# Patient Record
Sex: FEMALE | Race: WHITE | NOT HISPANIC OR LATINO | Employment: OTHER | URBAN - METROPOLITAN AREA
[De-identification: names, ages, dates, MRNs, and addresses within clinical notes are randomized per-mention and may not be internally consistent; named-entity substitution may affect disease eponyms.]

---

## 2017-01-05 ENCOUNTER — HOSPITAL ENCOUNTER (OUTPATIENT)
Dept: PHYSICAL THERAPY | Facility: CLINIC | Age: 62
Setting detail: THERAPIES SERIES
End: 2017-01-05
Attending: OTOLARYNGOLOGY
Payer: COMMERCIAL

## 2017-01-05 PROCEDURE — 97110 THERAPEUTIC EXERCISES: CPT | Mod: GP | Performed by: PHYSICAL THERAPIST

## 2017-01-05 PROCEDURE — 40000718 ZZHC STATISTIC PT DEPARTMENT ORTHO VISIT: Performed by: PHYSICAL THERAPIST

## 2017-01-05 PROCEDURE — 97112 NEUROMUSCULAR REEDUCATION: CPT | Mod: GP | Performed by: PHYSICAL THERAPIST

## 2017-01-05 NOTE — DISCHARGE INSTRUCTIONS
Home Exercise Program    Continue:  Strength  1) Clamshells  2) Bridging    Balance  3) Tandem, eyes open: 30 sec holds  4) Partial tandem, eyes closed, turn head right and left x 10 with each foot in back (slowly spread feet towards tandem position as this exercise gets easier)    Add:  Balance  1)  Partial tandem, eyes closed, move head up and down x 10 with each foot in back     Habituation/balance  2) Holding 1-2# weight with both hands, place feet together and stretch arms out in front. Keeping eyes and head focused on the weight, turn your body to the right and to the left x 10 each direction. This exercise is meant to make you feel a little dizzy. Stop and rest after, let dizziness lessen, before you continue on to another exercise or any other activity.

## 2017-01-05 NOTE — IP AVS SNAPSHOT
MRN:[id number]                      After Visit Summary   1/5/2017    Yaritza Bradley    MRN: [id number]           Visit Information        Provider Department      1/5/2017  9:30 AM Sima Morillo PT [facility]          Further instructions from your care team       Home Exercise Program    Continue:  Strength  1) Clamshells  2) Bridging    Balance  3) Tandem, eyes open: 30 sec holds  4) Partial tandem, eyes closed, turn head right and left x 10 with each foot in back (slowly spread feet towards tandem position as this exercise gets easier)    Add:  Balance  1)  Partial tandem, eyes closed, move head up and down x 10 with each foot in back     Habituation/balance  2) Holding 1-2# weight with both hands, place feet together and stretch arms out in front. Keeping eyes and head focused on the weight, turn your body to the right and to the left x 10 each direction. This exercise is meant to make you feel a little dizzy. Stop and rest after, let dizziness lessen, before you continue on to another exercise or any other activity.    [employer] Information     [employer] gives you secure access to your electronic health record. If you see a primary care provider, you can also send messages to your care team and make appointments. If you have questions, please call your primary care clinic.  If you do not have a primary care provider, please call [phone] and they will assist you.        Care EveryWhere ID     This is your Care EveryWhere ID. This could be used by other organizations to access your [place] medical records  [id number]

## 2017-01-12 ENCOUNTER — HOSPITAL ENCOUNTER (OUTPATIENT)
Dept: PHYSICAL THERAPY | Facility: CLINIC | Age: 62
Setting detail: THERAPIES SERIES
End: 2017-01-12
Attending: OTOLARYNGOLOGY
Payer: COMMERCIAL

## 2017-01-12 PROCEDURE — 97112 NEUROMUSCULAR REEDUCATION: CPT | Mod: GP | Performed by: PHYSICAL THERAPIST

## 2017-01-12 PROCEDURE — 40000840 ZZHC STATISTIC PT VESTIBULAR VISIT: Performed by: PHYSICAL THERAPIST

## 2017-01-19 ENCOUNTER — HOSPITAL ENCOUNTER (OUTPATIENT)
Dept: PHYSICAL THERAPY | Facility: CLINIC | Age: 62
Setting detail: THERAPIES SERIES
End: 2017-01-19
Attending: OTOLARYNGOLOGY
Payer: COMMERCIAL

## 2017-01-19 PROCEDURE — 40000840 ZZHC STATISTIC PT VESTIBULAR VISIT: Performed by: PHYSICAL THERAPIST

## 2017-01-19 PROCEDURE — 97112 NEUROMUSCULAR REEDUCATION: CPT | Mod: GP | Performed by: PHYSICAL THERAPIST

## 2017-01-26 ENCOUNTER — HOSPITAL ENCOUNTER (OUTPATIENT)
Dept: PHYSICAL THERAPY | Facility: CLINIC | Age: 62
Setting detail: THERAPIES SERIES
End: 2017-01-26
Attending: OTOLARYNGOLOGY
Payer: COMMERCIAL

## 2017-01-26 ENCOUNTER — OFFICE VISIT (OUTPATIENT)
Dept: FAMILY MEDICINE | Facility: CLINIC | Age: 62
End: 2017-01-26
Payer: COMMERCIAL

## 2017-01-26 VITALS
BODY MASS INDEX: 20.49 KG/M2 | SYSTOLIC BLOOD PRESSURE: 110 MMHG | HEART RATE: 80 BPM | WEIGHT: 120 LBS | HEIGHT: 64 IN | DIASTOLIC BLOOD PRESSURE: 61 MMHG

## 2017-01-26 DIAGNOSIS — K21.9 GASTROESOPHAGEAL REFLUX DISEASE WITHOUT ESOPHAGITIS: Primary | ICD-10-CM

## 2017-01-26 DIAGNOSIS — R42 LIGHT HEADEDNESS: ICD-10-CM

## 2017-01-26 PROCEDURE — 40000718 ZZHC STATISTIC PT DEPARTMENT ORTHO VISIT: Performed by: PHYSICAL THERAPIST

## 2017-01-26 PROCEDURE — 97112 NEUROMUSCULAR REEDUCATION: CPT | Mod: GP | Performed by: PHYSICAL THERAPIST

## 2017-01-26 PROCEDURE — 93000 ELECTROCARDIOGRAM COMPLETE: CPT | Performed by: FAMILY MEDICINE

## 2017-01-26 PROCEDURE — 99214 OFFICE O/P EST MOD 30 MIN: CPT | Performed by: FAMILY MEDICINE

## 2017-01-26 RX ORDER — PANTOPRAZOLE SODIUM 40 MG/1
40 TABLET, DELAYED RELEASE ORAL DAILY
Qty: 30 TABLET | Refills: 1 | Status: SHIPPED | OUTPATIENT
Start: 2017-01-26 | End: 2017-11-16

## 2017-01-26 NOTE — PATIENT INSTRUCTIONS
Thank you for choosing [facility]  You may be receiving a survey in the mail from [employer] regarding your visit today.  Please take a few minutes to complete and return the survey to let us know how we are doing.      If you have questions or concerns, please contact us via [employer] or you can contact your care team at [phone].    Our Clinic hours are:  Monday 6:40 am  to 7:00 pm  Tuesday -Friday 6:40 am to 5:00 pm    The [place] outpatient lab hours are:  Monday - Friday 6:10 am to 4:45 pm  Saturdays 7:00 am to 11:00 am  Appointments are required, call [phone]    If you have clinical questions after hours or would like to schedule an appointment,  call the clinic at [phone].

## 2017-01-26 NOTE — Clinical Note
My Depression Action Plan  Name: Yaritza Bradley   Date of Birth 1955  Date: 1/26/2017    My doctor: Karla Wade   My clinic: [facility]  [address] [place] [address]  [phone]          GREEN    ZONE   Good Control    What it looks like:     Things are going generally well. You have normal up s and down s. You may even feel depressed from time to time, but bad moods usually last less than a day.   What you need to do:  1. Continue to care for yourself (see self care plan)  2. Check your depression survival kit and update it as needed  3. Follow your physician s recommendations including any medication.  4. Do not stop taking medication unless you consult with your physician first.           YELLOW         ZONE Getting Worse    What it looks like:     Depression is starting to interfere with your life.     It may be hard to get out of bed; you may be starting to isolate yourself from others.    Symptoms of depression are starting to last most all day and this has happened for several days.     You may have suicidal thoughts but they are not constant.   What you need to do:     1. Call your care team, your response to treatment will improve if you keep your care team informed of your progress. Yellow periods are signs an adjustment may need to be made.     2. Continue your self-care, even if you have to fake it!    3. Talk to someone in your support network    4. Open up your depression survival kit           RED    ZONE Medical Alert - Get Help    What it looks like:     Depression is seriously interfering with your life.     You may experience these or other symptoms: You can t get out of bed most days, can t work or engage in other necessary activities, you have trouble taking care of basic hygiene, or basic responsibilities, thoughts of suicide or death that will not go away, self-injurious behavior.     What you need to do:  1. Call your care team  and request a same-day appointment. If they are not available (weekends or after hours) call your local crisis line, emergency room or 911.      Electronically signed by: Amaya López, January 26, 2017    Depression Self Care Plan / Survival Kit    Self-Care for Depression  Here s the deal. Your body and mind are really not as separate as most people think.  What you do and think affects how you feel and how you feel influences what you do and think. This means if you do things that people who feel good do, it will help you feel better.  Sometimes this is all it takes.  There is also a place for medication and therapy depending on how severe your depression is, so be sure to consult with your medical provider and/ or Behavioral Health Consultant if your symptoms are worsening or not improving.     In order to better manage my stress, I will:    Exercise  Get some form of exercise, every day. This will help reduce pain and release endorphins, the  feel good  chemicals in your brain. This is almost as good as taking antidepressants!  This is not the same as joining a gym and then never going! (they count on that by the way ) It can be as simple as just going for a walk or doing some gardening, anything that will get you moving.      Hygiene   Maintain good hygiene (Get out of bed in the morning, Make your bed, Brush your teeth, Take a shower, and Get dressed like you were going to work, even if you are unemployed).  If your clothes don't fit try to get ones that do.    Diet  I will strive to eat foods that are good for me, drink plenty of water, and avoid excessive sugar, caffeine, alcohol, and other mood-altering substances.  Some foods that are helpful in depression are: complex carbohydrates, B vitamins, flaxseed, fish or fish oil, fresh fruits and vegetables.    Psychotherapy  I agree to participate in Individual Therapy (if recommended).    Medication  If prescribed medications, I agree to take them.  Missing  doses can result in serious side effects.  I understand that drinking alcohol, or other illicit drug use, may cause potential side effects.  I will not stop my medication abruptly without first discussing it with my provider.    Staying Connected With Others  I will stay in touch with my friends, family members, and my primary care provider/team.    Use your imagination  Be creative.  We all have a creative side; it doesn t matter if it s oil painting, sand castles, or mud pies! This will also kick up the endorphins.    Witness Beauty  (AKA stop and smell the roses) Take a look outside, even in mid-winter. Notice colors, textures. Watch the squirrels and birds.     Service to others  Be of service to others.  There is always someone else in need.  By helping others we can  get out of ourselves  and remember the really important things.  This also provides opportunities for practicing all the other parts of the program.    Humor  Laugh and be silly!  Adjust your TV habits for less news and crime-drama and more comedy.    Control your stress  Try breathing deep, massage therapy, biofeedback, and meditation. Find time to relax each day.     My support system    Clinic Contact:  Phone number:    Contact 1:  Phone number:    Contact 2:  Phone number:    Denominational/:  Phone number:    Therapist:  Phone number:    Local crisis center:    Phone number:    Other community support:  Phone number:

## 2017-01-26 NOTE — PROGRESS NOTES
"a  SUBJECTIVE:                                                    Yaritza Bradley is 61 year old female   Chief Complaint   Patient presents with     Abdominal Pain     pain for 1 month     Dizziness     intermittent dizziness     ABDOMINAL PAIN     Onset: 1 month     Description:   Character: Dull ache and Burning  Location: epigastric region  Radiation: None and Sternal area    Intensity: moderate    Progression of Symptoms:  worsening    Accompanying Signs & Symptoms:  Fever/Chills?: YES - chills  Gas/Bloating: YES  Nausea: YES  Vomitting: no   Diarrhea?: YES  Constipation:YES  Dysuria or Hematuria: no   Dizziness: YES   History:   Trauma: no   Previous similar pain: YES - \"years ago, although it wasn't quite as bad\"  Previous tests done: Upper Endoscopy    Precipitating factors:   Does the pain change with:     Food: YES     BM: no     Urination: no     Alleviating factors:  n/a    Therapies Tried and outcome: TUMS    LMP:  not applicable       Problem list and histories reviewed & adjusted, as indicated.  Additional history: working with PT on balance, not helping, very careful about salt and fat intake.  Bending over, up and feels like going to faint, does not.      Patient Active Problem List   Diagnosis     Rheumatoid arthritis (H)     Disorder of bone and cartilage     CARDIOVASCULAR SCREENING; LDL GOAL LESS THAN 160     GERD (gastroesophageal reflux disease)     Generalized anxiety disorder     Moderate recurrent major depression (H)     Memory loss or impairment     Acetabular labrum tear, right, initial encounter     Trochanteric bursitis of right hip     Femoral acetabular impingement, right     Primary osteoarthritis of right hip, end stage DJD     Light headedness     Gastroesophageal reflux disease without esophagitis     Past Surgical History   Procedure Laterality Date     Hc dilation/curettage diag/ther non ob  2001     for uterine fibroids     Rotator cuff repair rt/lt  2009     left     " "Esophagoscopy, gastroscopy, duodenoscopy (egd), combined  8/19/2011     Procedure:COMBINED ESOPHAGOSCOPY, GASTROSCOPY, DUODENOSCOPY (EGD), BIOPSY SINGLE OR MULTIPLE; Gastroscopy with biopsy; Surgeon:FARZAD GARCIA; Location:[facility]     Esophagoscopy, gastroscopy, duodenoscopy (egd), combined  7/28/2014     Procedure: COMBINED ESOPHAGOSCOPY, GASTROSCOPY, DUODENOSCOPY (EGD), BIOPSY SINGLE OR MULTIPLE;  Surgeon: Marshall Matrinez MD;  Location: [facility]     Open reduction internal fixation elbow Right 1/22/2016     right THR     Colonoscopy N/A 10/14/2016     Procedure: COLONOSCOPY;  Surgeon: Gerson Douglas MD;  Location: [facility]       Social History   Substance Use Topics     Smoking status: Never Smoker      Smokeless tobacco: Never Used     Alcohol Use: Yes      Comment: 1 wine a month hardly     Family History   Problem Relation Age of Onset     C.A.D. Father      C.A.D. Paternal Grandfather      C.A.D. Brother      Alcohol/Drug Brother      Alzheimer Disease Maternal Grandmother      Alzheimer Disease Maternal Grandfather      Alzheimer Disease Paternal Grandmother      Colon Cancer Mother          Current Outpatient Prescriptions   Medication Sig Dispense Refill     pantoprazole (PROTONIX) 40 MG EC tablet Take 1 tablet (40 mg) by mouth daily Take 30-60 minutes before a meal. 30 tablet 1     FLUoxetine (PROZAC) 40 MG capsule Take 1 capsule (40 mg) by mouth daily 90 capsule 3     Omega-3 Fatty Acids (OMEGA-3 FISH OIL PO) Take by mouth 2 times daily (with meals)       HUMIRA PEN 40 MG/0.8ML injection Inject 40 mg Subcutaneous once a week       RESTASIS 0.05 % ophthalmic emulsion Place 1 drop into both eyes 2 times daily       FOLIC ACID 1 MG PO TABS 1 TABLET DAILY       METHOTREXATE 2.5 MG OR TABS Take 3 tablets Tuesday evening and 2 tablets Wednesday morning. 0 0     SM CALCIUM SOFT CHEWS 500-100-40 OR CHEW CHEW 2-3 ORALLY DAILY  0     B-D INSULIN SYRINGE 25G X 5/8\" 1 ML MISC TO BE USED IN WEEKLY " "METHOTREXATE INJECTIONS  1     Allergies   Allergen Reactions     Nka [No Known Allergies]      Recent Labs   Lab Test 12/08/16  10/11/16   0928 06/16/16 04/26/16   1054  04/20/15   1142   05/27/14   1501   07/31/10   0845   12/07/09   1533   08/27/09   1220   LDL   --   86   --    --    --    --    --    --   129   --    --    --   117   HDL   --   68   --    --    --    --    --    --   63   --    --    --   82   TRIG   --   77   --    --    --    --    --    --   75   --    --    --   98   ALT  14   --   13   --   28   < >  45   < >   --    < >  26   < >  31   CR  0.570   --   0.6  0.58  0.64   < >  0.68   < >   --    < >  0.76   < >  0.73   GFRESTIMATED  114.2   --   108.0  >90  Non  GFR Calc    >90  Non  GFR Calc     < >  89   < >   --    < >  79   < >  83   GFRESTBLACK   --    --    --   >90   GFR Calc    >90   GFR Calc     < >  >90   < >   --    < >  >90   < >  >90   POTASSIUM   --    --    --   4.3   --    --   3.8   --    --    --   3.9   --    --    TSH   --    --    --    --    --    --   1.60   --    --    --   1.30   --    --     < > = values in this interval not displayed.      BP Readings from Last 3 Encounters:   01/26/17 110/61   10/14/16 121/72   09/22/16 137/67    Wt Readings from Last 3 Encounters:   01/26/17 120 lb (54.432 kg)   10/14/16 121 lb (54.885 kg)   09/22/16 121 lb (54.885 kg)         ROS:  Constitutional, HEENT, cardiovascular, pulmonary, gi and gu systems are negative, except as otherwise noted.    OBJECTIVE:                                                    /61 mmHg  Pulse 80  Ht 5' 4\" (1.626 m)  Wt 120 lb (54.432 kg)  BMI 20.59 kg/m2  GENERAL APPEARANCE ADULT: Alert, no acute distress, thin, no distress, tired appearing  HENT: right TM normal, left TM normal, throat/mouth:normal, mucous membranes moist  RESP: lungs clear to auscultation   CV: normal rate, regular rhythm, no murmur or gallop  ABDOMEN: soft, " no organomegaly, masses,  Mid-epigastric and general tenderness, most pronounced mid-epigastric.  Diagnostic Test Results:  none     ASSESSMENT/PLAN:                                                    1. Gastroesophageal reflux disease without esophagitis  Trial of proton pump inhibition, if not effective, gastroscopy.  Information on GI care/GERD printed  - pantoprazole (PROTONIX) 40 MG EC tablet; Take 1 tablet (40 mg) by mouth daily Take 30-60 minutes before a meal.  Dispense: 30 tablet; Refill: 1  - EKG 12-lead complete w/read - Clinics    2. Light headedness  Low blood pressure most likely cause, will increase oncotic rich liquids and recheck if not better.      Karla Wade MD  [facility]

## 2017-01-26 NOTE — MR AVS SNAPSHOT
After Visit Summary   1/26/2017    Yaritza Bradley    MRN: [id number]           Patient Information     Date Of Birth          1955        Visit Information        Provider Department      1/26/2017 8:20 AM Karla Wade MD [facility]        Today's Diagnoses     Gastroesophageal reflux disease without esophagitis    -  1       Care Instructions          Thank you for choosing [facility].  You may be receiving a survey in the mail from [facility] regarding your visit today.  Please take a few minutes to complete and return the survey to let us know how we are doing.      If you have questions or concerns, please contact us via [employer] or you can contact your care team at [phone].    Our Clinic hours are:  Monday 6:40 am  to 7:00 pm  Tuesday -Friday 6:40 am to 5:00 pm    The [place] outpatient lab hours are:  Monday - Friday 6:10 am to 4:45 pm  Saturdays 7:00 am to 11:00 am  Appointments are required, call [phone]    If you have clinical questions after hours or would like to schedule an appointment,  call the clinic at [phone].          Follow-ups after your visit        Your next 10 appointments already scheduled     Jan 26, 2017 10:00 AM   Treatment with Sima Morillo PT   [facility] ([facility])    [address] [address] [phone]              Who to contact     If you have questions or need follow up information about today's clinic visit or your schedule please contact [facility] directly at [phone].  Normal or non-critical lab and imaging results will be communicated to you by MyChart, letter or phone within 4 business days after the clinic has received the results. If you do not hear from us within 7 days, please contact the clinic through MyChart or phone. If you have a critical or abnormal lab result, we will notify you by phone as soon as  "possible.  Submit refill requests through [employer] or call your pharmacy and they will forward the refill request to us. Please allow 3 business days for your refill to be completed.          Additional Information About Your Visit        [employer] Information     [employer] gives you secure access to your electronic health record. If you see a primary care provider, you can also send messages to your care team and make appointments. If you have questions, please call your primary care clinic.  If you do not have a primary care provider, please call [phone] and they will assist you.        Care EveryWhere ID     This is your Care EveryWhere ID. This could be used by other organizations to access your [place] medical records  [id number]        Your Vitals Were     Pulse Height BMI (Body Mass Index)             80 5' 4\" (1.626 m) 20.59 kg/m2          Blood Pressure from Last 3 Encounters:   01/26/17 110/61   10/14/16 121/72   09/22/16 137/67    Weight from Last 3 Encounters:   01/26/17 120 lb (54.432 kg)   10/14/16 121 lb (54.885 kg)   09/22/16 121 lb (54.885 kg)              We Performed the Following     DEPRESSION ACTION PLAN (DAP)     EKG 12-lead complete w/read - Clinics          Today's Medication Changes          These changes are accurate as of: 1/26/17  8:46 AM.  If you have any questions, ask your nurse or doctor.               Start taking these medicines.        Dose/Directions    pantoprazole 40 MG EC tablet   Commonly known as:  PROTONIX   Used for:  Gastroesophageal reflux disease without esophagitis   Started by:  Karla Wade MD        Dose:  40 mg   Take 1 tablet (40 mg) by mouth daily Take 30-60 minutes before a meal.   Quantity:  30 tablet   Refills:  1            Where to get your medicines      These medications were sent to [id number] - [place] [address] AT [place] [address] [facility] [address]     Phone:  " "[phone]    - pantoprazole 40 MG EC tablet             Primary Care Provider Office Phone # Fax #    Karla Maral Wade [phone]       [facility] [address] [facility] [facility] [address]        Thank you!     Thank you for choosing [facility]  for your care. Our goal is always to provide you with excellent care. Hearing back from our patients is one way we can continue to improve our services. Please take a few minutes to complete the written survey that you may receive in the mail after your visit with us. Thank you!             Your Updated Medication List - Protect others around you: Learn how to safely use, store and throw away your medicines at www.disposemymeds.org.          This list is accurate as of: 1/26/17  8:46 AM.  Always use your most recent med list.                   Brand Name Dispense Instructions for use    B-D INSULIN SYRINGE 25G X 5/8\" 1 ML Misc   Generic drug:  Insulin Syringe-Needle U-100      TO BE USED IN WEEKLY METHOTREXATE INJECTIONS       FLUoxetine 40 MG capsule    PROzac    90 capsule    Take 1 capsule (40 mg) by mouth daily       folic acid 1 MG tablet    FOLVITE     1 TABLET DAILY       HUMIRA PEN 40 MG/0.8ML injection   Generic drug:  adalimumab      Inject 40 mg Subcutaneous once a week       methotrexate 2.5 MG tablet CHEMO     0    Take 3 tablets Tuesday evening and 2 tablets Wednesday morning.       OMEGA-3 FISH OIL PO      Take by mouth 2 times daily (with meals)       pantoprazole 40 MG EC tablet    PROTONIX    30 tablet    Take 1 tablet (40 mg) by mouth daily Take 30-60 minutes before a meal.       RESTASIS 0.05 % ophthalmic emulsion   Generic drug:  cycloSPORINE      Place 1 drop into both eyes 2 times daily       SM CALCIUM SOFT CHEWS 500-100-40 MG-UNT-MCG Chew   Generic drug:  Calcium-Vitamin D-Vitamin K      CHEW 2-3 ORALLY DAILY         "

## 2017-01-26 NOTE — NURSING NOTE
"Chief Complaint   Patient presents with     Abdominal Pain     pain for 1 month     Dizziness     intermittent dizziness       Initial /61 mmHg  Pulse 80  Ht 5' 4\" (1.626 m)  Wt 120 lb (54.432 kg)  BMI 20.59 kg/m2 Estimated body mass index is 20.59 kg/(m^2) as calculated from the following:    Height as of this encounter: 5' 4\" (1.626 m).    Weight as of this encounter: 120 lb (54.432 kg).  BP completed using cuff size: regular  "

## 2017-02-09 ENCOUNTER — HOSPITAL ENCOUNTER (OUTPATIENT)
Dept: PHYSICAL THERAPY | Facility: CLINIC | Age: 62
Setting detail: THERAPIES SERIES
End: 2017-02-09
Attending: OTOLARYNGOLOGY
Payer: COMMERCIAL

## 2017-02-09 DIAGNOSIS — Z79.899 LONG TERM USE OF DRUG: ICD-10-CM

## 2017-02-09 DIAGNOSIS — M06.9 RA (RHEUMATOID ARTHRITIS) (H): Primary | ICD-10-CM

## 2017-02-09 LAB
ALBUMIN SERPL-MCNC: 3.8 G/DL (ref 3.4–5)
ALT SERPL W P-5'-P-CCNC: 22 U/L (ref 0–50)
AST SERPL W P-5'-P-CCNC: 23 U/L (ref 0–45)
BASOPHILS # BLD AUTO: 0.1 10E9/L (ref 0–0.2)
BASOPHILS NFR BLD AUTO: 0.9 %
CREAT SERPL-MCNC: 0.73 MG/DL (ref 0.52–1.04)
DIFFERENTIAL METHOD BLD: NORMAL
EOSINOPHIL # BLD AUTO: 0.1 10E9/L (ref 0–0.7)
EOSINOPHIL NFR BLD AUTO: 1.4 %
ERYTHROCYTE [DISTWIDTH] IN BLOOD BY AUTOMATED COUNT: 13.4 % (ref 10–15)
GFR SERPL CREATININE-BSD FRML MDRD: 81 ML/MIN/1.7M2
HCT VFR BLD AUTO: 39.5 % (ref 35–47)
HGB BLD-MCNC: 13.8 G/DL (ref 11.7–15.7)
LYMPHOCYTES # BLD AUTO: 2 10E9/L (ref 0.8–5.3)
LYMPHOCYTES NFR BLD AUTO: 34.1 %
MCH RBC QN AUTO: 30.9 PG (ref 26.5–33)
MCHC RBC AUTO-ENTMCNC: 34.9 G/DL (ref 31.5–36.5)
MCV RBC AUTO: 89 FL (ref 78–100)
MONOCYTES # BLD AUTO: 0.7 10E9/L (ref 0–1.3)
MONOCYTES NFR BLD AUTO: 12.3 %
NEUTROPHILS # BLD AUTO: 3 10E9/L (ref 1.6–8.3)
NEUTROPHILS NFR BLD AUTO: 51.3 %
PLATELET # BLD AUTO: 227 10E9/L (ref 150–450)
RBC # BLD AUTO: 4.46 10E12/L (ref 3.8–5.2)
WBC # BLD AUTO: 5.9 10E9/L (ref 4–11)

## 2017-02-09 PROCEDURE — 82565 ASSAY OF CREATININE: CPT | Performed by: FAMILY MEDICINE

## 2017-02-09 PROCEDURE — 36415 COLL VENOUS BLD VENIPUNCTURE: CPT | Performed by: FAMILY MEDICINE

## 2017-02-09 PROCEDURE — 84460 ALANINE AMINO (ALT) (SGPT): CPT | Performed by: FAMILY MEDICINE

## 2017-02-09 PROCEDURE — 97112 NEUROMUSCULAR REEDUCATION: CPT | Mod: GP | Performed by: PHYSICAL THERAPIST

## 2017-02-09 PROCEDURE — 84450 TRANSFERASE (AST) (SGOT): CPT | Performed by: FAMILY MEDICINE

## 2017-02-09 PROCEDURE — 85025 COMPLETE CBC W/AUTO DIFF WBC: CPT | Performed by: FAMILY MEDICINE

## 2017-02-09 PROCEDURE — 40000840 ZZHC STATISTIC PT VESTIBULAR VISIT: Performed by: PHYSICAL THERAPIST

## 2017-02-09 PROCEDURE — 82040 ASSAY OF SERUM ALBUMIN: CPT | Performed by: FAMILY MEDICINE

## 2017-02-09 NOTE — PROGRESS NOTES
PHYSICAL THERAPY PROGRESS NOTE  02/09/17 0900   Signing Clinician's Name / Credentials   Signing clinician's name / credentials Sima Morillo, PT, DPT, OCS   Session Number   Session Number 7 PO   Progress Note/Recertification   Progress Note Due Date 03/09/17   Progress Note Completed Date 02/09/17   Adult Goals   PT Eval Goals 1;2;3;4   Goal 1   Goal Identifier 1   Goal Description Patient will be able to single leg stand for 10 seconds on each leg to improve safety with navigating curbs and steps.    Target Date 03/09/17   Date Met (partially met - R 9 sec L 12 sec)   Goal 2   Goal Identifier 2   Goal Description Patient will be able to walk and turn head without gait impairments.    Target Date 03/09/17   Date Met (moderate gait impairments demonstrated - see objective)   Goal 3   Goal Identifier 3   Goal Description Patient will achieve 30 seconds on condition 5 of the CTSIB to improve safety in dimly lit environments.    Target Date 03/09/17   Date Met (19 seconds)   Goal 4   Goal Identifier 4   Goal Description Patient will be independent and compliant with HEP to aid functional recovery.    Target Date 03/09/17   Date Met (semi-compliant)   Subjective Report   Subjective Report Patient reports she had to miss last visit due to a flare up in her arm. States that the exercises are going ok. Hasn't been as good as she should about doing them.     Objective Measures   Objective Measures Objective Measure 1;Objective Measure 2;Objective Measure 3;Objective Measure 4   Objective Measure 1   Objective Measure SLS   Details R 9 sec L 12 sec   Objective Measure 2   Objective Measure Tandem    Details R 34 sec L 60 sec   Objective Measure 3   Objective Measure FT on foam, EC   Details 19 sec   Objective Measure 4   Objective Measure Gait with head turns horizontal demonstrated path devaiation, staggering and at times foot crosses midline. Gait with head turns verticle demonstrated mild slowing of gait and  occasional copensatory stutter steps   Treatment Interventions   Interventions Neuromuscular Re-education;Therapeutic Procedure/Exercise   Therapeutic Procedure/exercise   Minutes 5   Skilled Intervention HEP instruction, strength   Patient Response no pain, cuing to lift each foot not drag, cuing to keep feet apart while stepping   Treatment Detail Bike level 4 x 5 min,   Neuromuscular Re-education   Minutes 25   Skilled Intervention balance   Patient Response cuing for WS with SLS activities, occasional Min A from therapist with balance ex, occasional UE assist with balance ex,   Treatment Detail SLS marching 3' holds x 20 B, tandem EO B, FT on foam EC, FT on foam EO with 2# med ball horizontal turns, gait with head turns horizontal and verticle    Assessments Completed   Assessments Completed Patient has demonstrated mild improvement in goals. Needs to be more compliant with home program   Plan   Home program added gait with head turns horizontal and verticle     Updates to plan of care 1x every other week for 4 weeks   Plan for next session balance, strength, habituation   Total Session Time   Total Session Time 30, nmr2   AMBULATORY CLINICS ONLY-MEDICAL AND TREATMENT DIAGNOSIS   Medical Diagnosis disequilibrium   PT Diagnosis balance impairments, motion sensitivity       Please contact me with any questions or concerns.  Thank you for your referral.    Sima Morillo, PT, DPT, OCS  Physical Therapist, Orthopedic Certified Specialist  [facility]  [phone]

## 2017-05-09 ENCOUNTER — TRANSFERRED RECORDS (OUTPATIENT)
Dept: HEALTH INFORMATION MANAGEMENT | Facility: CLINIC | Age: 62
End: 2017-05-09

## 2017-06-08 ENCOUNTER — TRANSFERRED RECORDS (OUTPATIENT)
Dept: HEALTH INFORMATION MANAGEMENT | Facility: CLINIC | Age: 62
End: 2017-06-08

## 2017-06-30 NOTE — PROGRESS NOTES
Patient did not return for follow up treatments as directed.  Goal status and current objective information is therefore unknown.  The daily note from the patient's last visit will serve as the discharge note. Discharge from PT services at this time for this episode of treatment. Please see attached documentation under this episode of care for further information including dates of service, start of care date, referring physician, Dx, treatment plan, treatments, etc.    Please contact me with any questions or concerns.  Thank you for your referral.    Sima Morillo PT, GUTIERREZT, OCS  Physical Therapist, Orthopedic Certified Specialist  [facility]  [phone]       PHYSICAL THERAPY PROGRESS NOTE  02/09/17 0900   Signing Clinician's Name / Credentials   Signing clinician's name / credentials Sima Morillo PT, FEROZ, OCS   Session Number   Session Number 7 PO   Progress Note/Recertification   Progress Note Due Date 03/09/17   Progress Note Completed Date 02/09/17   Adult Goals   PT Eval Goals 1;2;3;4   Goal 1   Goal Identifier 1   Goal Description Patient will be able to single leg stand for 10 seconds on each leg to improve safety with navigating curbs and steps.    Target Date 03/09/17   Date Met (partially met - R 9 sec L 12 sec)   Goal 2   Goal Identifier 2   Goal Description Patient will be able to walk and turn head without gait impairments.    Target Date 03/09/17   Date Met (moderate gait impairments demonstrated - see objective)   Goal 3   Goal Identifier 3   Goal Description Patient will achieve 30 seconds on condition 5 of the CTSIB to improve safety in dimly lit environments.    Target Date 03/09/17   Date Met (19 seconds)   Goal 4   Goal Identifier 4   Goal Description Patient will be independent and compliant with HEP to aid functional recovery.    Target Date 03/09/17   Date Met (semi-compliant)   Subjective Report   Subjective Report Patient reports she had to  miss last visit due to a flare up in her arm. States that the exercises are going ok. Hasn't been as good as she should about doing them.     Objective Measures   Objective Measures Objective Measure 1;Objective Measure 2;Objective Measure 3;Objective Measure 4   Objective Measure 1   Objective Measure SLS   Details R 9 sec L 12 sec   Objective Measure 2   Objective Measure Tandem    Details R 34 sec L 60 sec   Objective Measure 3   Objective Measure FT on foam, EC   Details 19 sec   Objective Measure 4   Objective Measure Gait with head turns horizontal demonstrated path devaiation, staggering and at times foot crosses midline. Gait with head turns verticle demonstrated mild slowing of gait and occasional copensatory stutter steps   Treatment Interventions   Interventions Neuromuscular Re-education;Therapeutic Procedure/Exercise   Therapeutic Procedure/exercise   Minutes 5   Skilled Intervention HEP instruction, strength   Patient Response no pain, cuing to lift each foot not drag, cuing to keep feet apart while stepping   Treatment Detail Bike level 4 x 5 min,   Neuromuscular Re-education   Minutes 25   Skilled Intervention balance   Patient Response cuing for WS with SLS activities, occasional Min A from therapist with balance ex, occasional UE assist with balance ex,   Treatment Detail SLS marching 3' holds x 20 B, tandem EO B, FT on foam EC, FT on foam EO with 2# med ball horizontal turns, gait with head turns horizontal and verticle    Assessments Completed   Assessments Completed Patient has demonstrated mild improvement in goals. Needs to be more compliant with home program   Plan   Home program added gait with head turns horizontal and verticle     Updates to plan of care 1x every other week for 4 weeks   Plan for next session balance, strength, habituation   Total Session Time   Total Session Time 30, nmr2   AMBULATORY CLINICS ONLY-MEDICAL AND TREATMENT DIAGNOSIS   Medical Diagnosis disequilibrium   PT  Diagnosis balance impairments, motion sensitivity

## 2017-06-30 NOTE — ADDENDUM NOTE
Encounter addended by: Sima Morillo, PT on: 6/30/2017  1:37 PM<BR>     Actions taken: Sign clinical note, Flowsheet accepted, Episode resolved

## 2017-07-14 ENCOUNTER — OFFICE VISIT (OUTPATIENT)
Dept: FAMILY MEDICINE | Facility: CLINIC | Age: 62
End: 2017-07-14
Payer: COMMERCIAL

## 2017-07-14 VITALS
TEMPERATURE: 99 F | HEIGHT: 64 IN | BODY MASS INDEX: 20.79 KG/M2 | DIASTOLIC BLOOD PRESSURE: 65 MMHG | SYSTOLIC BLOOD PRESSURE: 122 MMHG | WEIGHT: 121.8 LBS | HEART RATE: 91 BPM

## 2017-07-14 DIAGNOSIS — H25.9 AGE-RELATED CATARACT OF BOTH EYES, UNSPECIFIED AGE-RELATED CATARACT TYPE: ICD-10-CM

## 2017-07-14 DIAGNOSIS — M05.79 RHEUMATOID ARTHRITIS INVOLVING MULTIPLE SITES WITH POSITIVE RHEUMATOID FACTOR (H): ICD-10-CM

## 2017-07-14 DIAGNOSIS — Z01.818 PREOP GENERAL PHYSICAL EXAM: Primary | ICD-10-CM

## 2017-07-14 DIAGNOSIS — F33.1 MODERATE RECURRENT MAJOR DEPRESSION (H): ICD-10-CM

## 2017-07-14 PROBLEM — Z98.890 POST-OPERATIVE NAUSEA AND VOMITING: Status: ACTIVE | Noted: 2017-07-14

## 2017-07-14 PROBLEM — R11.2 POST-OPERATIVE NAUSEA AND VOMITING: Status: ACTIVE | Noted: 2017-07-14

## 2017-07-14 PROCEDURE — 99214 OFFICE O/P EST MOD 30 MIN: CPT | Performed by: FAMILY MEDICINE

## 2017-07-14 ASSESSMENT — ANXIETY QUESTIONNAIRES
2. NOT BEING ABLE TO STOP OR CONTROL WORRYING: SEVERAL DAYS
3. WORRYING TOO MUCH ABOUT DIFFERENT THINGS: SEVERAL DAYS
GAD7 TOTAL SCORE: 8
5. BEING SO RESTLESS THAT IT IS HARD TO SIT STILL: SEVERAL DAYS
1. FEELING NERVOUS, ANXIOUS, OR ON EDGE: SEVERAL DAYS
7. FEELING AFRAID AS IF SOMETHING AWFUL MIGHT HAPPEN: SEVERAL DAYS
IF YOU CHECKED OFF ANY PROBLEMS ON THIS QUESTIONNAIRE, HOW DIFFICULT HAVE THESE PROBLEMS MADE IT FOR YOU TO DO YOUR WORK, TAKE CARE OF THINGS AT HOME, OR GET ALONG WITH OTHER PEOPLE: SOMEWHAT DIFFICULT
6. BECOMING EASILY ANNOYED OR IRRITABLE: SEVERAL DAYS

## 2017-07-14 ASSESSMENT — PATIENT HEALTH QUESTIONNAIRE - PHQ9: 5. POOR APPETITE OR OVEREATING: MORE THAN HALF THE DAYS

## 2017-07-14 NOTE — PROGRESS NOTES
[facility]  [address] [place] [address]  [phone]  Dept: [phone]    PRE-OP EVALUATION:  Today's date: 2017    Yaritza Bradley (: 1955) presents for pre-operative evaluation assessment as requested by Dr. Singleton.  She requires evaluation and anesthesia risk assessment prior to undergoing surgery/procedure for treatment of Both Eyes Cataracts.  Proposed procedure: Phacoemulsification/Interaocular Lens    Date of Surgery/ Procedure: 17 and 17  Time of Surgery/ Procedure: 11 am   Hospital/Surgical Facility: [facility]  Fax number for surgical facility: [phone]  Primary Physician: Karla Wade  Type of Anesthesia Anticipated: Local with MAC    Patient has a Health Care Directive or Living Will:  YES home    1. NO - Do you have a history of heart attack, stroke, stent, bypass or surgery on an artery in the head, neck, heart or legs?  2. NO - Do you ever have any pain or discomfort in your chest?  3. NO - Do you have a history of  Heart Failure?  4. YES - ARE YOUR TROUBLED BY SHORTNESS OF BREATH WHEN WALKING ON THE LEVEL, UP A SLIGHT HILL OR AT NIGHT? Unsure of cause, notices when swims  5. YES - DO YOU CURRENTLY HAVE A COLD, BRONCHITIS OR OTHER RESPIRATORY INFECTION? Cough   6. NO - Do you have a cough, shortness of breath or wheezing?  7. NO - Do you sometimes get pains in the calves of your legs when you walk?  8. NO - Do you or anyone in your family have previous history of blood clots?  9. NO - Do you or does anyone in your family have a serious bleeding problem such as prolonged bleeding following surgeries or cuts?  10. NO - Have you ever had problems with anemia or been told to take iron pills?  11. NO - Have you had any abnormal blood loss such as black, tarry or bloody stools, or abnormal vaginal bleeding?  12. NO - Have you ever had a blood transfusion?  13. NO - Have you or any of your relatives ever had problems with anesthesia?  14.  NO - Do you have sleep apnea, excessive snoring or daytime drowsiness?  15. NO - Do you have any prosthetic heart valves?  16. yes - Do you have prosthetic joints? Right hip and Right elbow  17. NO - Is there any chance that you may be pregnant?      HPI:                                                      Brief HPI related to upcoming procedure: Yaritza Bradley is 61 year old white female with cataracts, GERD, anxiety, rheumatoid arthritis on immunosuppressive medications who is here to get clearance to have general anesthesia.        See problem list for active medical problems.  Problems all longstanding and stable, except as noted/documented.  See ROS for pertinent symptoms related to these conditions.                                                                                                  .    MEDICAL HISTORY:                                                      Patient Active Problem List    Diagnosis Date Noted     Light headedness 01/26/2017     Priority: Medium     Gastroesophageal reflux disease without esophagitis 01/26/2017     Priority: Medium     Acetabular labrum tear, right, initial encounter 04/26/2016     Priority: Medium     Trochanteric bursitis of right hip 04/26/2016     Priority: Medium     Femoral acetabular impingement, right 04/26/2016     Priority: Medium     Primary osteoarthritis of right hip, end stage DJD 04/26/2016     Priority: Medium     Memory loss or impairment 07/07/2014     Priority: Medium     Moderate recurrent major depression (H) 07/07/2012     Priority: Medium     Generalized anxiety disorder 04/10/2012     Priority: Medium     Diagnosis updated by automated process. Provider to review and confirm.       GERD (gastroesophageal reflux disease) 03/17/2011     Priority: Medium     CARDIOVASCULAR SCREENING; LDL GOAL LESS THAN 160 10/31/2010     Priority: Medium     Disorder of bone and cartilage 02/20/2007     Priority: Medium     H/o osteopenia; previously on  Actonel           Rheumatoid arthritis (H) 04/26/2006     Priority: Medium     Celine Moreno  Problem list name updated by automated process. Provider to review        Past Medical History:   Diagnosis Date     Acute sinusitis, unspecified     migraines and allergies     Hemorrhage of rectum and anus 2002     Myalgia and myositis, unspecified     Fibromyalgia     Osteoarthritis      Rheumatoid arthritis(714.0) 1996     Dr. Traylor at [facility]     Past Surgical History:   Procedure Laterality Date     COLONOSCOPY N/A 10/14/2016    Procedure: COLONOSCOPY;  Surgeon: Gerson Douglas MD;  Location: [facility]     ESOPHAGOSCOPY, GASTROSCOPY, DUODENOSCOPY (EGD), COMBINED  8/19/2011    Procedure:COMBINED ESOPHAGOSCOPY, GASTROSCOPY, DUODENOSCOPY (EGD), BIOPSY SINGLE OR MULTIPLE; Gastroscopy with biopsy; Surgeon:FARZAD GARCIA; Location:[facility]     ESOPHAGOSCOPY, GASTROSCOPY, DUODENOSCOPY (EGD), COMBINED  7/28/2014    Procedure: COMBINED ESOPHAGOSCOPY, GASTROSCOPY, DUODENOSCOPY (EGD), BIOPSY SINGLE OR MULTIPLE;  Surgeon: Marshall Martinez MD;  Location: [facility]     HC DILATION/CURETTAGE DIAG/THER NON OB  2001    for uterine fibroids     OPEN REDUCTION INTERNAL FIXATION ELBOW Right 1/22/2016    right THR     ROTATOR CUFF REPAIR RT/LT  2009    left     Current Outpatient Prescriptions   Medication Sig Dispense Refill     FLUoxetine (PROZAC) 40 MG capsule Take 1 capsule (40 mg) by mouth daily 90 capsule 3     Omega-3 Fatty Acids (OMEGA-3 FISH OIL PO) Take by mouth 2 times daily (with meals)       HUMIRA PEN 40 MG/0.8ML injection Inject 40 mg Subcutaneous once a week       RESTASIS 0.05 % ophthalmic emulsion Place 1 drop into both eyes 2 times daily       FOLIC ACID 1 MG PO TABS 1 TABLET DAILY       METHOTREXATE 2.5 MG OR TABS Take 3 tablets Tuesday evening and 2 tablets Wednesday morning. 0 0     SM CALCIUM SOFT CHEWS 500-100-40 OR CHEW CHEW 2-3 ORALLY DAILY  0     pantoprazole (PROTONIX) 40 MG EC tablet  "Take 1 tablet (40 mg) by mouth daily Take 30-60 minutes before a meal. (Patient not taking: Reported on 7/14/2017) 30 tablet 1     B-D INSULIN SYRINGE 25G X 5/8\" 1 ML MISC TO BE USED IN WEEKLY METHOTREXATE INJECTIONS  1     OTC products: None, except as noted above    Allergies   Allergen Reactions     Nka [No Known Allergies]       Latex Allergy: NO    Social History   Substance Use Topics     Smoking status: Never Smoker     Smokeless tobacco: Never Used     Alcohol use Yes      Comment: 1 wine a month hardly     History   Drug Use No       REVIEW OF SYSTEMS:                                                    Constitutional, HEENT, cardiovascular, pulmonary, gi and gu systems are negative, except as otherwise noted.    EXAM:                                                    /65  Pulse 91  Temp 99  F (37.2  C) (Tympanic)  Ht 5' 4\" (1.626 m)  Wt 121 lb 12.8 oz (55.2 kg)  BMI 20.91 kg/m2    GENERAL APPEARANCE: healthy, alert and no distress     EYES: EOMI, PERRL     HENT: ear canals and TM's normal and nose and mouth without ulcers or lesions     NECK: no adenopathy, no asymmetry, masses, or scars and thyroid normal to palpation     RESP: lungs clear to auscultation - no rales, rhonchi or wheezes     CV: regular rates and rhythm, normal S1 S2, no S3 or S4 and no murmur, click or rub     ABDOMEN:  soft, nontender, no HSM or masses and bowel sounds normal     MS: extremities normal- no gross deformities noted, no evidence of inflammation in joints, FROM in all extremities.     SKIN: no suspicious lesions or rashes     NEURO: Normal strength and tone, sensory exam grossly normal, mentation intact and speech normal     PSYCH: mentation appears normal. and affect normal/bright     LYMPHATICS: No axillary, cervical, or supraclavicular nodes    DIAGNOSTICS:                                                    No labs or EKG required for low risk surgery (cataract, skin procedure, breast biopsy, etc)    Recent Labs "   Lab Test  02/09/17   1158 12/08/16  10/11/16   0928   04/26/16   1054   05/27/14   1501   HGB  13.8   --   13.5   < >  13.4   < >  13.3   PLT  227   --   196   < >  178   < >  146*   NA   --    --    --    --   138   --   136   POTASSIUM   --    --    --    --   4.3   --   3.8   CR  0.73  0.570   --    < >  0.58   < >  0.68    < > = values in this interval not displayed.        IMPRESSION:                                                    Reason for surgery/procedure:   1. Preop general physical exam  2. Age-related cataract of both eyes, unspecified age-related cataract type   cleared for general anesthesia    3. Moderate recurrent major depression (H)  On medication and in remission    4. Rheumatoid arthritis involving multiple sites with positive rheumatoid factor (H)  Managed by rheumatology, multiple medication, immunocompromised due to these meds.      The proposed surgical procedure is considered LOW risk.    REVISED CARDIAC RISK INDEX  The patient has the following serious cardiovascular risks for perioperative complications such as (MI, PE, VFib and 3  AV Block):  No serious cardiac risks  INTERPRETATION: The 10-year ASCVD risk score ([place] DC Jr, et al., 2013) is: 2.9%    Values used to calculate the score:      Age: 61 years      Sex: Female      Is Non- : No      Diabetic: No      Tobacco smoker: No      Systolic Blood Pressure: 122 mmHg      Is BP treated: No      HDL Cholesterol: 68 mg/dL      Total Cholesterol: 169 mg/dL      The patient has the following additional risks for perioperative complications:  No identified additional risks      ICD-10-CM    1. Preop general physical exam Z01.818        RECOMMENDATIONS:                                                      --Consult hospital rounder / IM to assist post-op medical management    --Patient is to take all scheduled medications on the day of surgery EXCEPT for modifications listed below.    APPROVAL GIVEN to proceed  with proposed procedure, without further diagnostic evaluation       Signed Electronically by: Karla Wade MD    Copy of this evaluation report is provided to requesting physician.    [facility] Preop Guidelines

## 2017-07-14 NOTE — PATIENT INSTRUCTIONS
Thank you for choosing [facility].  You may be receiving a survey in the mail from [employer] regarding your visit today.  Please take a few minutes to complete and return the survey to let us know how we are doing.      If you have questions or concerns, please contact us via [facility] or you can contact your care team at [phone].    Our Clinic hours are:  Monday 6:40 am  to 7:00 pm  Tuesday -Friday 6:40 am to 5:00 pm    The [place] outpatient lab hours are:  Monday - Friday 6:10 am to 4:45 pm  Saturdays 7:00 am to 11:00 am  Appointments are required, call [phone]    If you have clinical questions after hours or would like to schedule an appointment,  call the clinic at [phone].        [facility] [address] [place] [address] [address] [facility] AT [address]    Before Your Surgery      Call your surgeon if there is any change in your health. This includes signs of a cold or flu (such as a sore throat, runny nose, cough, rash or fever).    Do not smoke, drink alcohol or take over the counter medicine (unless your surgeon or primary care doctor tells you to) for the 24 hours before and after surgery.    If you take prescribed drugs: Follow your doctor s orders about which medicines to take and which to stop until after surgery.    Eating and drinking prior to surgery: follow the instructions from your surgeon    Take a shower or bath the night before surgery. Use the soap your surgeon gave you to gently clean your skin. If you do not have soap from your surgeon, use your regular soap. Do not shave or scrub the surgery site.  Wear clean pajamas and have clean sheets on your bed.

## 2017-07-14 NOTE — MR AVS SNAPSHOT
After Visit Summary   7/14/2017    Yaritza Bradley    MRN: [id number]           Patient Information     Date Of Birth          1955        Visit Information        Provider Department      7/14/2017 9:20 AM Karla Wade MD [facility]        Today's Diagnoses     Preop general physical exam    -  1      Care Instructions          Thank you for choosing [facility].  You may be receiving a survey in the mail from [facility] regarding your visit today.  Please take a few minutes to complete and return the survey to let us know how we are doing.      If you have questions or concerns, please contact us via [employer] or you can contact your care team at [phone].    Our Clinic hours are:  Monday 6:40 am  to 7:00 pm  Tuesday -Friday 6:40 am to 5:00 pm    The [place] outpatient lab hours are:  Monday - Friday 6:10 am to 4:45 pm  Saturdays 7:00 am to 11:00 am  Appointments are required, call [phone]    If you have clinical questions after hours or would like to schedule an appointment,  call the clinic at [phone].        [facility] [address] [place] [address] [address] AT [address]    Before Your Surgery      Call your surgeon if there is any change in your health. This includes signs of a cold or flu (such as a sore throat, runny nose, cough, rash or fever).    Do not smoke, drink alcohol or take over the counter medicine (unless your surgeon or primary care doctor tells you to) for the 24 hours before and after surgery.    If you take prescribed drugs: Follow your doctor s orders about which medicines to take and which to stop until after surgery.    Eating and drinking prior to surgery: follow the instructions from your surgeon    Take a shower or bath the night before surgery. Use the soap your surgeon gave you to gently clean your skin. If you do not have soap from your surgeon, use your regular soap. Do not shave or scrub the  "surgery site.  Wear clean pajamas and have clean sheets on your bed.           Follow-ups after your visit        Who to contact     If you have questions or need follow up information about today's clinic visit or your schedule please contact [facility] directly at [phone].  Normal or non-critical lab and imaging results will be communicated to you by MyChart, letter or phone within 4 business days after the clinic has received the results. If you do not hear from us within 7 days, please contact the clinic through [employer] or phone. If you have a critical or abnormal lab result, we will notify you by phone as soon as possible.  Submit refill requests through [employer] or call your pharmacy and they will forward the refill request to us. Please allow 3 business days for your refill to be completed.          Additional Information About Your Visit        MyChart Information     [employer] gives you secure access to your electronic health record. If you see a primary care provider, you can also send messages to your care team and make appointments. If you have questions, please call your primary care clinic.  If you do not have a primary care provider, please call [phone] and they will assist you.        Care EveryWhere ID     This is your Care EveryWhere ID. This could be used by other organizations to access your [place] medical records  [id number]        Your Vitals Were     Pulse Temperature Height BMI (Body Mass Index)          91 99  F (37.2  C) (Tympanic) 5' 4\" (1.626 m) 20.91 kg/m2         Blood Pressure from Last 3 Encounters:   07/14/17 122/65   01/26/17 110/61   10/14/16 121/72    Weight from Last 3 Encounters:   07/14/17 121 lb 12.8 oz (55.2 kg)   01/26/17 120 lb (54.4 kg)   10/14/16 121 lb (54.9 kg)              Today, you had the following     No orders found for display       Primary Care Provider Office Phone # Fax #    Karla Wade [phone]       " "[facility] [address] [facility] [address]        Equal Access to Services     JOSE GREENE : Hadii aad ku hadvaishalio Sojennyali, waaxda luqadaha, qaybta kaalmada nevinjesusrupesh, bi linaresdotarchana zayas. So [facility] [phone].    ATENCIÓN: Si habla español, tiene a moura disposición servicios gratuitos de asistencia lingüística. Pepeame al [phone].    We comply with applicable federal civil rights laws and [place] laws. We do not discriminate on the basis of race, color, national origin, age, disability sex, sexual orientation or gender identity.            Thank you!     Thank you for choosing [facility]  for your care. Our goal is always to provide you with excellent care. Hearing back from our patients is one way we can continue to improve our services. Please take a few minutes to complete the written survey that you may receive in the mail after your visit with us. Thank you!             Your Updated Medication List - Protect others around you: Learn how to safely use, store and throw away your medicines at www.disposemymeds.org.          This list is accurate as of: 7/14/17  9:38 AM.  Always use your most recent med list.                   Brand Name Dispense Instructions for use Diagnosis    B-D INSULIN SYRINGE 25G X 5/8\" 1 ML Misc   Generic drug:  Insulin Syringe-Needle U-100      TO BE USED IN WEEKLY METHOTREXATE INJECTIONS        FLUoxetine 40 MG capsule    PROzac    90 capsule    Take 1 capsule (40 mg) by mouth daily    Moderate recurrent major depression (H)       folic acid 1 MG tablet    FOLVITE     1 TABLET DAILY        HUMIRA PEN 40 MG/0.8ML injection   Generic drug:  adalimumab      Inject 40 mg Subcutaneous once a week        methotrexate 2.5 MG tablet CHEMO     0    Take 3 tablets Tuesday evening and 2 tablets Wednesday morning.        OMEGA-3 FISH OIL PO      Take by mouth 2 times daily (with meals)        pantoprazole 40 MG EC tablet    PROTONIX    30 " tablet    Take 1 tablet (40 mg) by mouth daily Take 30-60 minutes before a meal.    Gastroesophageal reflux disease without esophagitis       RESTASIS 0.05 % ophthalmic emulsion   Generic drug:  cycloSPORINE      Place 1 drop into both eyes 2 times daily        SM CALCIUM SOFT CHEWS 500-100-40 MG-UNT-MCG Chew   Generic drug:  Calcium-Vitamin D-Vitamin K      CHEW 2-3 ORALLY DAILY

## 2017-07-14 NOTE — NURSING NOTE
"Chief Complaint   Patient presents with     Pre-Op Exam       Initial /65  Pulse 91  Temp 99  F (37.2  C) (Tympanic)  Ht 5' 4\" (1.626 m)  Wt 121 lb 12.8 oz (55.2 kg)  BMI 20.91 kg/m2 Estimated body mass index is 20.91 kg/(m^2) as calculated from the following:    Height as of this encounter: 5' 4\" (1.626 m).    Weight as of this encounter: 121 lb 12.8 oz (55.2 kg).  Medication Reconciliation: complete  "

## 2017-07-15 ASSESSMENT — ANXIETY QUESTIONNAIRES: GAD7 TOTAL SCORE: 8

## 2017-07-15 ASSESSMENT — PATIENT HEALTH QUESTIONNAIRE - PHQ9: SUM OF ALL RESPONSES TO PHQ QUESTIONS 1-9: 11

## 2017-09-21 ENCOUNTER — TRANSFERRED RECORDS (OUTPATIENT)
Dept: HEALTH INFORMATION MANAGEMENT | Facility: CLINIC | Age: 62
End: 2017-09-21

## 2017-09-25 DIAGNOSIS — F33.1 MODERATE RECURRENT MAJOR DEPRESSION (H): ICD-10-CM

## 2017-09-25 RX ORDER — FLUOXETINE 40 MG/1
40 CAPSULE ORAL DAILY
Qty: 90 CAPSULE | Refills: 0 | Status: SHIPPED | OUTPATIENT
Start: 2017-09-25 | End: 2017-12-07

## 2017-09-25 NOTE — TELEPHONE ENCOUNTER
No future appointment scheduled     Fluoxetine     Last Written Prescription Date: 9/22/16  Last Fill Quantity: 90, # refills: 3  Last Office Visit with [facility] primary care provider:  9/22/16        Last PHQ-9 score on record=   PHQ-9 SCORE 7/14/2017   Total Score -   Total Score 11     Erika refill given per protocol x1.  PHQ-9 UTD    Patient needs appointment for additional refills.    Tricia Bates   Ob[facility]Gyn Clinic  RN            Fluoxetine

## 2017-11-16 ENCOUNTER — OFFICE VISIT (OUTPATIENT)
Dept: PODIATRY | Facility: CLINIC | Age: 62
End: 2017-11-16
Payer: COMMERCIAL

## 2017-11-16 VITALS
HEART RATE: 85 BPM | WEIGHT: 121 LBS | HEIGHT: 64 IN | DIASTOLIC BLOOD PRESSURE: 71 MMHG | BODY MASS INDEX: 20.66 KG/M2 | SYSTOLIC BLOOD PRESSURE: 114 MMHG

## 2017-11-16 DIAGNOSIS — L85.1 PLANTAR POROKERATOSIS, ACQUIRED: Primary | ICD-10-CM

## 2017-11-16 DIAGNOSIS — M79.672 BILATERAL FOOT PAIN: ICD-10-CM

## 2017-11-16 DIAGNOSIS — M79.671 BILATERAL FOOT PAIN: ICD-10-CM

## 2017-11-16 PROCEDURE — 11056 PARNG/CUTG B9 HYPRKR LES 2-4: CPT | Performed by: PODIATRIST

## 2017-11-16 PROCEDURE — 99203 OFFICE O/P NEW LOW 30 MIN: CPT | Mod: 25 | Performed by: PODIATRIST

## 2017-11-16 NOTE — LETTER
11/16/2017         RE: Yaritza Bradley  [address] [address]        Dear Colleague,    Thank you for referring your patient, Yaritza Bradley, to the [facility]. Please see a copy of my visit note below.    PATIENT HISTORY:  Yaritza Bradley is a 62 year old female who presents to clinic with parents with a chief complaint of a painful feet.  The patient relates having a wart on the bottom on right and left foot for the past several months.  She has been trying to get rid of with over the counter wart medication with no success.  The patient relates pain with ambulation.         REVIEW OF SYSTEMS:  Constitutional, HEENT, cardiovascular, pulmonary, GI, , musculoskeletal, neuro, skin, endocrine and psych systems are negative, except as otherwise noted.       PAST MEDICAL HISTORY:   Past Medical History:   Diagnosis Date     Acute sinusitis, unspecified     migraines and allergies     Hemorrhage of rectum and anus 2002     Myalgia and myositis, unspecified     Fibromyalgia     Osteoarthritis      Post-operative nausea and vomiting 7/14/2017     Rheumatoid arthritis(714.0) 1996     Dr. Traylor at [facility]        PAST SURGICAL HISTORY:   Past Surgical History:   Procedure Laterality Date     COLONOSCOPY N/A 10/14/2016    Procedure: COLONOSCOPY;  Surgeon: Gerson Douglas MD;  Location: [facility]     ESOPHAGOSCOPY, GASTROSCOPY, DUODENOSCOPY (EGD), COMBINED  8/19/2011    Procedure:COMBINED ESOPHAGOSCOPY, GASTROSCOPY, DUODENOSCOPY (EGD), BIOPSY SINGLE OR MULTIPLE; Gastroscopy with biopsy; Surgeon:FARZAD GARCIA; Location:[facility]     ESOPHAGOSCOPY, GASTROSCOPY, DUODENOSCOPY (EGD), COMBINED  7/28/2014    Procedure: COMBINED ESOPHAGOSCOPY, GASTROSCOPY, DUODENOSCOPY (EGD), BIOPSY SINGLE OR MULTIPLE;  Surgeon: Marshall Martinez MD;  Location: [facility]     HC DILATION/CURETTAGE DIAG/THER NON OB  2001    for uterine fibroids     OPEN REDUCTION INTERNAL FIXATION ELBOW  "Right 1/22/2016    right THR     ROTATOR CUFF REPAIR RT/LT  2009    left        MEDICATIONS:   Current Outpatient Prescriptions:      FLUoxetine (PROZAC) 40 MG capsule, Take 1 capsule (40 mg) by mouth daily Additional refills at clinic appointment., Disp: 90 capsule, Rfl: 0     B-D INSULIN SYRINGE 25G X 5/8\" 1 ML MISC, TO BE USED IN WEEKLY METHOTREXATE INJECTIONS, Disp: , Rfl: 1     Omega-3 Fatty Acids (OMEGA-3 FISH OIL PO), Take by mouth 2 times daily (with meals), Disp: , Rfl:      HUMIRA PEN 40 MG/0.8ML injection, Inject 40 mg Subcutaneous once a week, Disp: , Rfl:      RESTASIS 0.05 % ophthalmic emulsion, Place 1 drop into both eyes 2 times daily, Disp: , Rfl:      FOLIC ACID 1 MG PO TABS, 1 TABLET DAILY, Disp: , Rfl:      METHOTREXATE 2.5 MG OR TABS, Take 3 tablets Tuesday evening and 2 tablets Wednesday morning., Disp: 0, Rfl: 0     SM CALCIUM SOFT CHEWS 500-100-40 OR CHEW, CHEW 2-3 ORALLY DAILY, Disp: , Rfl: 0     ALLERGIES:    Allergies   Allergen Reactions     Nka [No Known Allergies]         SOCIAL HISTORY:   Social History     Social History     Marital status:      Spouse name: N/A     Number of children: N/A     Years of education: N/A     Occupational History     Not on file.     Social History Main Topics     Smoking status: Never Smoker     Smokeless tobacco: Never Used     Alcohol use Yes      Comment: 1 wine a month hardly     Drug use: No     Sexual activity: No     Other Topics Concern      Service No     Blood Transfusions No     Caffeine Concern Yes     3-4 cups a day coffee and pop     Occupational Exposure No     Hobby Hazards No     Sleep Concern Yes     Stress Concern Yes     Weight Concern No     fluctuates up and down     Special Diet Yes     multi vit calcium and vit D chewable 3 a day     Back Care No     Exercise Yes     walk     Bike Helmet Yes     Seat Belt Yes     Self-Exams No     Patient does not do self breast exams, pamphlet given-instructions     Parent/Sibling " "W/ Cabg, Mi Or Angioplasty Before 65f 55m? Yes     Father-54 Bypass     Social History Narrative        FAMILY HISTORY:   Family History   Problem Relation Age of Onset     C.A.D. Father      C.A.D. Paternal Grandfather      C.A.D. Brother      Alcohol/Drug Brother      Alzheimer Disease Maternal Grandmother      Alzheimer Disease Maternal Grandfather      Alzheimer Disease Paternal Grandmother      Colon Cancer Mother         EXAM:Vitals: /71 (BP Location: Left arm, Patient Position: Sitting, Cuff Size: Adult Regular)  Pulse 85  Ht 5' 4\" (1.626 m)  Wt 121 lb (54.9 kg)  BMI 20.77 kg/m2  BMI= Body mass index is 20.77 kg/(m^2).        General appearance: Patient is alert and fully cooperative with history & exam.  No sign of distress is noted during the visit.     Psychiatric: Affect is pleasant & appropriate.  Patient appears motivated to improve health.     Respiratory: Breathing is regular & unlabored while sitting.     HEENT: Hearing is intact to spoken word.  Speech is clear.  No gross evidence of visual impairment that would impact ambulation.     Dermatologic: Integument is intact with an exception of a porokeratotic lesion located under the plantar aspect of the ball and heel on the right and left foot.  One notes no pepper spots within the lesion.  One notes pain with squeezing from the side of the lesions.        Vascular: DP & PT pulses are intact & regular bilaterally.  No significant edema or varicosities noted.  CFT and skin temperature is normal to both lower extremities.     Neurologic: Lower extremity sensation is intact to light touch.  No evidence of weakness or contracture in the lower extremities.  No evidence of neuropathy.     Musculoskeletal: Patient is ambulatory without assistive device or brace.  No gross ankle deformity noted.  No foot or ankle joint effusion is noted.    ASSESSMENT / PLAN: Porokeratosis right and left foot.    I have explained to Yaritza  about the conditions.  We " discussed all risks and benefits from chemical as well as surgical treatments for porokeratosis.  The skin lesions were sharply debrided with a #10 blade down to healthy epidermis. No bleeding noted. It was recommended to the patient that she try topical over-the-counter salicylic acid applications to the affected skin lesions to better soften the tissue. The patient was instructed return to the office if problems persist.    Disclaimer: This note consists of symbols derived from keyboarding, dictation and/or voice recognition software. As a result, there may be errors in the script that have gone undetected. Please consider this when interpreting information found in this chart.       YESI Kruger D.P.M., F.SAMANTHA.C.F.A.S.      Again, thank you for allowing me to participate in the care of your patient.        Sincerely,        Yefri Kruger DPM

## 2017-11-16 NOTE — NURSING NOTE
"Chief Complaint   Patient presents with     Consult     Bilateral foot pain, corns, warts, callus        Initial /71 (BP Location: Left arm, Patient Position: Sitting, Cuff Size: Adult Regular)  Pulse 85  Ht 5' 4\" (1.626 m)  Wt 121 lb (54.9 kg)  BMI 20.77 kg/m2 Estimated body mass index is 20.77 kg/(m^2) as calculated from the following:    Height as of this encounter: 5' 4\" (1.626 m).    Weight as of this encounter: 121 lb (54.9 kg).  Medication Reconciliation: complete     Diane Panchal MA        "

## 2017-11-16 NOTE — PROGRESS NOTES
PATIENT HISTORY:  Yaritza Bradley is a 62 year old female who presents to clinic with parents with a chief complaint of a painful feet.  The patient relates having a wart on the bottom on right and left foot for the past several months.  She has been trying to get rid of with over the counter wart medication with no success.  The patient relates pain with ambulation.         REVIEW OF SYSTEMS:  Constitutional, HEENT, cardiovascular, pulmonary, GI, , musculoskeletal, neuro, skin, endocrine and psych systems are negative, except as otherwise noted.       PAST MEDICAL HISTORY:   Past Medical History:   Diagnosis Date     Acute sinusitis, unspecified     migraines and allergies     Hemorrhage of rectum and anus 2002     Myalgia and myositis, unspecified     Fibromyalgia     Osteoarthritis      Post-operative nausea and vomiting 7/14/2017     Rheumatoid arthritis(714.0) 1996     Dr. Traylor at [facility]        PAST SURGICAL HISTORY:   Past Surgical History:   Procedure Laterality Date     COLONOSCOPY N/A 10/14/2016    Procedure: COLONOSCOPY;  Surgeon: Gerson Douglas MD;  Location: [facility]     ESOPHAGOSCOPY, GASTROSCOPY, DUODENOSCOPY (EGD), COMBINED  8/19/2011    Procedure:COMBINED ESOPHAGOSCOPY, GASTROSCOPY, DUODENOSCOPY (EGD), BIOPSY SINGLE OR MULTIPLE; Gastroscopy with biopsy; Surgeon:FARZAD GARCIA; Location:[facility]     ESOPHAGOSCOPY, GASTROSCOPY, DUODENOSCOPY (EGD), COMBINED  7/28/2014    Procedure: COMBINED ESOPHAGOSCOPY, GASTROSCOPY, DUODENOSCOPY (EGD), BIOPSY SINGLE OR MULTIPLE;  Surgeon: Marshall Martinez MD;  Location: [facility]     HC DILATION/CURETTAGE DIAG/THER NON OB  2001    for uterine fibroids     OPEN REDUCTION INTERNAL FIXATION ELBOW Right 1/22/2016    right THR     ROTATOR CUFF REPAIR RT/LT  2009    left        MEDICATIONS:   Current Outpatient Prescriptions:      FLUoxetine (PROZAC) 40 MG capsule, Take 1 capsule (40 mg) by mouth daily Additional refills at clinic appointment., Disp: 90  "capsule, Rfl: 0     B-D INSULIN SYRINGE 25G X 5/8\" 1 ML MISC, TO BE USED IN WEEKLY METHOTREXATE INJECTIONS, Disp: , Rfl: 1     Omega-3 Fatty Acids (OMEGA-3 FISH OIL PO), Take by mouth 2 times daily (with meals), Disp: , Rfl:      HUMIRA PEN 40 MG/0.8ML injection, Inject 40 mg Subcutaneous once a week, Disp: , Rfl:      RESTASIS 0.05 % ophthalmic emulsion, Place 1 drop into both eyes 2 times daily, Disp: , Rfl:      FOLIC ACID 1 MG PO TABS, 1 TABLET DAILY, Disp: , Rfl:      METHOTREXATE 2.5 MG OR TABS, Take 3 tablets Tuesday evening and 2 tablets Wednesday morning., Disp: 0, Rfl: 0     SM CALCIUM SOFT CHEWS 500-100-40 OR CHEW, CHEW 2-3 ORALLY DAILY, Disp: , Rfl: 0     ALLERGIES:    Allergies   Allergen Reactions     Nka [No Known Allergies]         SOCIAL HISTORY:   Social History     Social History     Marital status:      Spouse name: N/A     Number of children: N/A     Years of education: N/A     Occupational History     Not on file.     Social History Main Topics     Smoking status: Never Smoker     Smokeless tobacco: Never Used     Alcohol use Yes      Comment: 1 wine a month hardly     Drug use: No     Sexual activity: No     Other Topics Concern      Service No     Blood Transfusions No     Caffeine Concern Yes     3-4 cups a day coffee and pop     Occupational Exposure No     Hobby Hazards No     Sleep Concern Yes     Stress Concern Yes     Weight Concern No     fluctuates up and down     Special Diet Yes     multi vit calcium and vit D chewable 3 a day     Back Care No     Exercise Yes     walk     Bike Helmet Yes     Seat Belt Yes     Self-Exams No     Patient does not do self breast exams, pamphlet given-instructions     Parent/Sibling W/ Cabg, Mi Or Angioplasty Before 65f 55m? Yes     Father-54 Bypass     Social History Narrative        FAMILY HISTORY:   Family History   Problem Relation Age of Onset     C.A.D. Father      C.A.D. Paternal Grandfather      C.A.D. Brother      Alcohol/Drug " "Brother      Alzheimer Disease Maternal Grandmother      Alzheimer Disease Maternal Grandfather      Alzheimer Disease Paternal Grandmother      Colon Cancer Mother         EXAM:Vitals: /71 (BP Location: Left arm, Patient Position: Sitting, Cuff Size: Adult Regular)  Pulse 85  Ht 5' 4\" (1.626 m)  Wt 121 lb (54.9 kg)  BMI 20.77 kg/m2  BMI= Body mass index is 20.77 kg/(m^2).        General appearance: Patient is alert and fully cooperative with history & exam.  No sign of distress is noted during the visit.     Psychiatric: Affect is pleasant & appropriate.  Patient appears motivated to improve health.     Respiratory: Breathing is regular & unlabored while sitting.     HEENT: Hearing is intact to spoken word.  Speech is clear.  No gross evidence of visual impairment that would impact ambulation.     Dermatologic: Integument is intact with an exception of a porokeratotic lesion located under the plantar aspect of the ball and heel on the right and left foot.  One notes no pepper spots within the lesion.  One notes pain with squeezing from the side of the lesions.        Vascular: DP & PT pulses are intact & regular bilaterally.  No significant edema or varicosities noted.  CFT and skin temperature is normal to both lower extremities.     Neurologic: Lower extremity sensation is intact to light touch.  No evidence of weakness or contracture in the lower extremities.  No evidence of neuropathy.     Musculoskeletal: Patient is ambulatory without assistive device or brace.  No gross ankle deformity noted.  No foot or ankle joint effusion is noted.    ASSESSMENT / PLAN: Porokeratosis right and left foot.    I have explained to Yaritza  about the conditions.  We discussed all risks and benefits from chemical as well as surgical treatments for porokeratosis.  The skin lesions were sharply debrided with a #10 blade down to healthy epidermis. No bleeding noted. It was recommended to the patient that she try topical " over-the-counter salicylic acid applications to the affected skin lesions to better soften the tissue. The patient was instructed return to the office if problems persist.    Disclaimer: This note consists of symbols derived from keyboarding, dictation and/or voice recognition software. As a result, there may be errors in the script that have gone undetected. Please consider this when interpreting information found in this chart.       YESI Kruger D.P.M., F.SAMANTHA.TOM.F.A.S.

## 2017-12-07 ENCOUNTER — OFFICE VISIT (OUTPATIENT)
Dept: OBGYN | Facility: CLINIC | Age: 62
End: 2017-12-07
Payer: COMMERCIAL

## 2017-12-07 VITALS
HEART RATE: 84 BPM | DIASTOLIC BLOOD PRESSURE: 76 MMHG | WEIGHT: 123 LBS | BODY MASS INDEX: 21 KG/M2 | HEIGHT: 64 IN | SYSTOLIC BLOOD PRESSURE: 113 MMHG

## 2017-12-07 DIAGNOSIS — Z01.411 ENCOUNTER FOR GYNECOLOGICAL EXAMINATION WITH ABNORMAL FINDING: Primary | ICD-10-CM

## 2017-12-07 DIAGNOSIS — N95.2 ATROPHIC VAGINITIS: ICD-10-CM

## 2017-12-07 DIAGNOSIS — Z79.899 LONG TERM USE OF DRUG: ICD-10-CM

## 2017-12-07 DIAGNOSIS — M06.9 RA (RHEUMATOID ARTHRITIS) (H): ICD-10-CM

## 2017-12-07 DIAGNOSIS — F33.1 MODERATE RECURRENT MAJOR DEPRESSION (H): ICD-10-CM

## 2017-12-07 LAB
ALBUMIN SERPL-MCNC: 3.9 G/DL (ref 3.4–5)
ALT SERPL W P-5'-P-CCNC: 35 U/L (ref 0–50)
AST SERPL W P-5'-P-CCNC: 40 U/L (ref 0–45)
BASOPHILS # BLD AUTO: 0 10E9/L (ref 0–0.2)
BASOPHILS NFR BLD AUTO: 0.3 %
CREAT SERPL-MCNC: 0.76 MG/DL (ref 0.52–1.04)
DIFFERENTIAL METHOD BLD: NORMAL
EOSINOPHIL # BLD AUTO: 0.1 10E9/L (ref 0–0.7)
EOSINOPHIL NFR BLD AUTO: 1.2 %
ERYTHROCYTE [DISTWIDTH] IN BLOOD BY AUTOMATED COUNT: 13.7 % (ref 10–15)
GFR SERPL CREATININE-BSD FRML MDRD: 77 ML/MIN/1.7M2
HCT VFR BLD AUTO: 41.6 % (ref 35–47)
HGB BLD-MCNC: 14.3 G/DL (ref 11.7–15.7)
LYMPHOCYTES # BLD AUTO: 1.6 10E9/L (ref 0.8–5.3)
LYMPHOCYTES NFR BLD AUTO: 27.2 %
MCH RBC QN AUTO: 31.1 PG (ref 26.5–33)
MCHC RBC AUTO-ENTMCNC: 34.4 G/DL (ref 31.5–36.5)
MCV RBC AUTO: 90 FL (ref 78–100)
MONOCYTES # BLD AUTO: 0.7 10E9/L (ref 0–1.3)
MONOCYTES NFR BLD AUTO: 12 %
NEUTROPHILS # BLD AUTO: 3.6 10E9/L (ref 1.6–8.3)
NEUTROPHILS NFR BLD AUTO: 59.3 %
PLATELET # BLD AUTO: 215 10E9/L (ref 150–450)
RBC # BLD AUTO: 4.6 10E12/L (ref 3.8–5.2)
WBC # BLD AUTO: 6 10E9/L (ref 4–11)

## 2017-12-07 PROCEDURE — 82040 ASSAY OF SERUM ALBUMIN: CPT | Performed by: FAMILY MEDICINE

## 2017-12-07 PROCEDURE — 85025 COMPLETE CBC W/AUTO DIFF WBC: CPT | Performed by: FAMILY MEDICINE

## 2017-12-07 PROCEDURE — 36415 COLL VENOUS BLD VENIPUNCTURE: CPT | Performed by: FAMILY MEDICINE

## 2017-12-07 PROCEDURE — 84450 TRANSFERASE (AST) (SGOT): CPT | Performed by: FAMILY MEDICINE

## 2017-12-07 PROCEDURE — 99396 PREV VISIT EST AGE 40-64: CPT | Performed by: OBSTETRICS & GYNECOLOGY

## 2017-12-07 PROCEDURE — 84460 ALANINE AMINO (ALT) (SGPT): CPT | Performed by: FAMILY MEDICINE

## 2017-12-07 PROCEDURE — 82565 ASSAY OF CREATININE: CPT | Performed by: FAMILY MEDICINE

## 2017-12-07 RX ORDER — FLUOXETINE 40 MG/1
40 CAPSULE ORAL DAILY
Qty: 90 CAPSULE | Refills: 3 | Status: SHIPPED | OUTPATIENT
Start: 2017-12-07 | End: 2018-10-22

## 2017-12-07 RX ORDER — ESTRADIOL 0.5 MG/1
TABLET ORAL
Qty: 30 TABLET | Refills: 3 | Status: SHIPPED | OUTPATIENT
Start: 2017-12-07 | End: 2018-06-18

## 2017-12-07 NOTE — MR AVS SNAPSHOT
After Visit Summary   12/7/2017    Yaritza Bradley    MRN: [id number]           Patient Information     Date Of Birth          1955        Visit Information        Provider Department      12/7/2017 10:00 AM Melany Katz MD [facility]        Today's Diagnoses     Encounter for gynecological examination with abnormal finding    -  1    Moderate recurrent major depression (H)        Atrophic vaginitis          Care Instructions      Preventive Health Recommendations  Female Ages 50 - 64    Yearly exam: See your health care provider every year in order to  o Review health changes.   o Discuss preventive care.    o Review your medicines if your doctor has prescribed any.      Get a Pap test every three years (unless you have an abnormal result and your provider advises testing more often).    If you get Pap tests with HPV test, you only need to test every 5 years, unless you have an abnormal result.     You do not need a Pap test if your uterus was removed (hysterectomy) and you have not had cancer.    You should be tested each year for STDs (sexually transmitted diseases) if you're at risk.     Have a mammogram every 1 to 2 years.    Have a colonoscopy at age 50, or have a yearly FIT test (stool test). These exams screen for colon cancer.      Have a cholesterol test every 5 years, or more often if advised.    Have a diabetes test (fasting glucose) every three years. If you are at risk for diabetes, you should have this test more often.     If you are at risk for osteoporosis (brittle bone disease), think about having a bone density scan (DEXA).    Shots: Get a flu shot each year. Get a tetanus shot every 10 years.    Nutrition:     Eat at least 5 servings of fruits and vegetables each day.    Eat whole-grain bread, whole-wheat pasta and brown rice instead of white grains and rice.    Talk to your provider about Calcium and Vitamin D.     Lifestyle    Exercise at least 150  minutes a week (30 minutes a day, 5 days a week). This will help you control your weight and prevent disease.    Limit alcohol to one drink per day.    No smoking.     Wear sunscreen to prevent skin cancer.     See your dentist every six months for an exam and cleaning.    See your eye doctor every 1 to 2 years.            Follow-ups after your visit        Your next 10 appointments already scheduled     Jan 02, 2018  9:40 AM CST   New Visit with Mony Ruelas PA-C   [facility]    [address] [place] [id number] [phone]              Who to contact     If you have questions or need follow up information about today's clinic visit or your schedule please contact [facility] directly at [phone].  Normal or non-critical lab and imaging results will be communicated to you by Bashahart, letter or phone within 4 business days after the clinic has received the results. If you do not hear from us within 7 days, please contact the clinic through Bashahart or phone. If you have a critical or abnormal lab result, we will notify you by phone as soon as possible.  Submit refill requests through [employer] or call your pharmacy and they will forward the refill request to us. Please allow 3 business days for your refill to be completed.          Additional Information About Your Visit        [facility] Information     [employer] gives you secure access to your electronic health record. If you see a primary care provider, you can also send messages to your care team and make appointments. If you have questions, please call your primary care clinic.  If you do not have a primary care provider, please call [phone] and they will assist you.        Care EveryWhere ID     This is your Care EveryWhere ID. This could be used by other organizations to access your [place] medical records  [id number]        Your Vitals Were     Pulse Height BMI (Body Mass  "Index)             84 5' 4\" (1.626 m) 21.11 kg/m2          Blood Pressure from Last 3 Encounters:   12/07/17 113/76   11/16/17 114/71   07/14/17 122/65    Weight from Last 3 Encounters:   12/07/17 123 lb (55.8 kg)   11/16/17 121 lb (54.9 kg)   07/14/17 121 lb 12.8 oz (55.2 kg)              Today, you had the following     No orders found for display         Today's Medication Changes          These changes are accurate as of: 12/7/17 10:35 AM.  If you have any questions, ask your nurse or doctor.               Start taking these medicines.        Dose/Directions    estradiol 0.5 MG tablet   Commonly known as:  ESTRACE   Used for:  Atrophic vaginitis   Started by:  Melany Katz MD        Place vaginally at bedtime x 5 nights then twice a week   Quantity:  30 tablet   Refills:  3         These medicines have changed or have updated prescriptions.        Dose/Directions    FLUoxetine 40 MG capsule   Commonly known as:  PROzac   This may have changed:  additional instructions   Used for:  Moderate recurrent major depression (H)   Changed by:  Melany Katz MD        Dose:  40 mg   Take 1 capsule (40 mg) by mouth daily   Quantity:  90 capsule   Refills:  3            Where to get your medicines      These medications were sent to [facility] [address] [place] [address] [address] [facility] AT [facility] OF [address] [place] [address]  [address] [facility] [address]     Phone:  [phone]     estradiol 0.5 MG tablet    FLUoxetine 40 MG capsule                Primary Care Provider Office Phone # Fax #    Karla Maral Wade [phone] [address] [facility] [address]        Equal Access to Services     AISSATOU GREENE AH: Hadii nicole Cheatham, waclaudyda luqadaha, qaybta kaalmada adeegyada, bi zayas. So [facility] [phone].    ATENCIÓN: Si habla español, tiene a moura disposición servicios gratuitos de asistencia lingüística. Llame al " "[phone].    We comply with applicable federal civil rights laws and [place] laws. We do not discriminate on the basis of race, color, national origin, age, disability, sex, sexual orientation, or gender identity.            Thank you!     Thank you for choosing [facility]  for your care. Our goal is always to provide you with excellent care. Hearing back from our patients is one way we can continue to improve our services. Please take a few minutes to complete the written survey that you may receive in the mail after your visit with us. Thank you!             Your Updated Medication List - Protect others around you: Learn how to safely use, store and throw away your medicines at www.disposemymeds.org.          This list is accurate as of: 12/7/17 10:35 AM.  Always use your most recent med list.                   Brand Name Dispense Instructions for use Diagnosis    B-D INSULIN SYRINGE 25G X 5/8\" 1 ML Misc   Generic drug:  Insulin Syringe-Needle U-100      TO BE USED IN WEEKLY METHOTREXATE INJECTIONS        estradiol 0.5 MG tablet    ESTRACE    30 tablet    Place vaginally at bedtime x 5 nights then twice a week    Atrophic vaginitis       FLUoxetine 40 MG capsule    PROzac    90 capsule    Take 1 capsule (40 mg) by mouth daily    Moderate recurrent major depression (H)       folic acid 1 MG tablet    FOLVITE     1 TABLET DAILY        HUMIRA PEN 40 MG/0.8ML injection   Generic drug:  adalimumab      Inject 40 mg Subcutaneous once a week        methotrexate 2.5 MG tablet CHEMO     0    Take 3 tablets Tuesday evening and 2 tablets Wednesday morning.        OMEGA-3 FISH OIL PO      Take by mouth 2 times daily (with meals)        RESTASIS 0.05 % ophthalmic emulsion   Generic drug:  cycloSPORINE      Place 1 drop into both eyes 2 times daily        SM CALCIUM SOFT CHEWS 500-100-40 MG-UNT-MCG Chew   Generic drug:  Calcium-Vitamin D-Vitamin K      CHEW 2-3 ORALLY DAILY        TURMERIC PO             "

## 2017-12-07 NOTE — PROGRESS NOTES
SUBJECTIVE:   CC: Yaritza Bradley is an 62 year old woman who presents for preventive health visit.       Healthy Habits:    Do you get at least three servings of calcium containing foods daily (dairy, green leafy vegetables, etc.)? yes    Amount of exercise or daily activities, outside of work: 5 day(s) per week    Problems taking medications regularly No    Medication side effects: Yes stomach    Have you had an eye exam in the past two years? yes    Do you see a dentist twice per year? yes    Do you have sleep apnea, excessive snoring or daytime drowsiness?no            Today's PHQ-2 Score:   PHQ-2 ( 1999 Pf[employer]) 12/7/2017 8/5/2015   Q1: Little interest or pleasure in doing things 1 0   Q2: Feeling down, depressed or hopeless 1 0   PHQ-2 Score 2 0         Abuse: Current or Past(Physical, Sexual or Emotional)- No  Do you feel safe in your environment - Yes  Social History   Substance Use Topics     Smoking status: Never Smoker     Smokeless tobacco: Never Used     Alcohol use Yes      Comment: 1 wine a month hardly     The patient does not drink >3 drinks per day nor >7 drinks per week.    Reviewed orders with patient.  Reviewed health maintenance and updated orders accordingly - Yes  Labs reviewed in EPIC  BP Readings from Last 3 Encounters:   12/07/17 113/76   11/16/17 114/71   07/14/17 122/65    Wt Readings from Last 3 Encounters:   12/07/17 123 lb (55.8 kg)   11/16/17 121 lb (54.9 kg)   07/14/17 121 lb 12.8 oz (55.2 kg)                  Patient Active Problem List   Diagnosis     Rheumatoid arthritis (H)     Disorder of bone and cartilage     CARDIOVASCULAR SCREENING; LDL GOAL LESS THAN 160     GERD (gastroesophageal reflux disease)     Generalized anxiety disorder     Moderate recurrent major depression (H)     Memory loss or impairment     Acetabular labrum tear, right, initial encounter     Trochanteric bursitis of right hip     Femoral acetabular impingement, right     Primary osteoarthritis of right  hip, end stage DJD     Light headedness     Gastroesophageal reflux disease without esophagitis     Post-operative nausea and vomiting     Past Surgical History:   Procedure Laterality Date     COLONOSCOPY N/A 10/14/2016    Procedure: COLONOSCOPY;  Surgeon: Gerson Douglas MD;  Location: [facility]     ESOPHAGOSCOPY, GASTROSCOPY, DUODENOSCOPY (EGD), COMBINED  8/19/2011    Procedure:COMBINED ESOPHAGOSCOPY, GASTROSCOPY, DUODENOSCOPY (EGD), BIOPSY SINGLE OR MULTIPLE; Gastroscopy with biopsy; Surgeon:FARZAD GARCIA; Location:[facility]     ESOPHAGOSCOPY, GASTROSCOPY, DUODENOSCOPY (EGD), COMBINED  7/28/2014    Procedure: COMBINED ESOPHAGOSCOPY, GASTROSCOPY, DUODENOSCOPY (EGD), BIOPSY SINGLE OR MULTIPLE;  Surgeon: Marshall Martinez MD;  Location: [facility]     HC DILATION/CURETTAGE DIAG/THER NON OB  2001    for uterine fibroids     OPEN REDUCTION INTERNAL FIXATION ELBOW Right 1/22/2016    right THR     ROTATOR CUFF REPAIR RT/LT  2009    left       Social History   Substance Use Topics     Smoking status: Never Smoker     Smokeless tobacco: Never Used     Alcohol use Yes      Comment: 1 wine a month hardly     Family History   Problem Relation Age of Onset     C.A.D. Father      C.A.D. Paternal Grandfather      C.A.D. Brother      Alcohol/Drug Brother      Alzheimer Disease Maternal Grandmother      Alzheimer Disease Maternal Grandfather      Alzheimer Disease Paternal Grandmother      Colon Cancer Mother                Patient over age 50, mutual decision to screen reflected in health maintenance.      Pertinent mammograms are reviewed under the imaging tab.  History of abnormal Pap smear: NO - age 30- 65 PAP every 3 years recommended    Reviewed and updated as needed this visit by clinical staffTobacco  Allergies  Meds  Med Hx  Surg Hx  Fam Hx  Soc Hx        Reviewed and updated as needed this visit by Provider              ROS:  C: NEGATIVE for fever, chills, change in weight  I: NEGATIVE for worrisome rashes,  "moles or lesions  E: NEGATIVE for vision changes or irritation  ENT: NEGATIVE for ear, mouth and throat problems  R: NEGATIVE for significant cough or SOB  B: NEGATIVE for masses, tenderness or discharge  CV: NEGATIVE for chest pain, palpitations or peripheral edema  GI: NEGATIVE for nausea, abdominal pain, heartburn, or change in bowel habits  : NEGATIVE for unusual urinary or vaginal symptoms. No vaginal bleeding.   menopausal female: positive for vaginal dryness and urgency  M: NEGATIVE for significant arthralgias or myalgia  N: NEGATIVE for weakness, dizziness or paresthesias  P: NEGATIVE for changes in mood or affect     OBJECTIVE:   /76 (BP Location: Right arm, Patient Position: Chair, Cuff Size: Adult Small)  Pulse 84  Ht 5' 4\" (1.626 m)  Wt 123 lb (55.8 kg)  BMI 21.11 kg/m2  EXAM:  GENERAL APPEARANCE: healthy, alert and no distress  EYES: Eyes grossly normal to inspection, PERRL and conjunctivae and sclerae normal  HENT: ear canals and TM's normal, nose and mouth without ulcers or lesions, oropharynx clear and oral mucous membranes moist  NECK: no adenopathy, no asymmetry, masses, or scars and thyroid normal to palpation  RESP: lungs clear to auscultation - no rales, rhonchi or wheezes  BREAST: normal without masses, tenderness or nipple discharge and no palpable axillary masses or adenopathy  CV: regular rate and rhythm, normal S1 S2, no S3 or S4, no murmur, click or rub, no peripheral edema and peripheral pulses strong  ABDOMEN: soft, nontender, no hepatosplenomegaly, no masses and bowel sounds normal   (female): normal female external genitalia, normal urethral meatus, vaginal mucosal atrophy noted and normal cervix, adnexae, and uterus without masses.  MS: no musculoskeletal defects are noted and gait is age appropriate without ataxia  SKIN: no suspicious lesions or rashes  NEURO: Normal strength and tone, sensory exam grossly normal, mentation intact and speech normal  PSYCH: mentation " "appears normal and affect normal/bright    ASSESSMENT/PLAN:       ICD-10-CM    1. Encounter for gynecological examination with abnormal finding Z01.411    2. Moderate recurrent major depression (H) F33.1 FLUoxetine (PROZAC) 40 MG capsule   3. Atrophic vaginitis N95.2 estradiol (ESTRACE) 0.5 MG tablet       COUNSELING:   Reviewed preventive health counseling, as reflected in patient instructions  Special attention given to:        Use of vaginal estrogen for atrophy; refill of Prozac       Regular exercise       Healthy diet/nutrition       Vision screening       Osteoporosis Prevention/Bone Health         reports that she has never smoked. She has never used smokeless tobacco.    Estimated body mass index is 21.11 kg/(m^2) as calculated from the following:    Height as of this encounter: 5' 4\" (1.626 m).    Weight as of this encounter: 123 lb (55.8 kg).         Counseling Resources:  ATP IV Guidelines  Pooled Cohorts Equation Calculator  Breast Cancer Risk Calculator  FRAX Risk Assessment  ICSI Preventive Guidelines  Dietary Guidelines for Americans, 2010  USDA's MyPlate  ASA Prophylaxis  Lung CA Screening    Melany Katz MD  [facility]  "

## 2017-12-07 NOTE — NURSING NOTE
"Initial /76 (BP Location: Right arm, Patient Position: Chair, Cuff Size: Adult Small)  Pulse 84  Ht 5' 4\" (1.626 m)  Wt 123 lb (55.8 kg)  BMI 21.11 kg/m2 Estimated body mass index is 21.11 kg/(m^2) as calculated from the following:    Height as of this encounter: 5' 4\" (1.626 m).    Weight as of this encounter: 123 lb (55.8 kg). .      "

## 2018-01-23 ENCOUNTER — TRANSFERRED RECORDS (OUTPATIENT)
Dept: HEALTH INFORMATION MANAGEMENT | Facility: CLINIC | Age: 63
End: 2018-01-23

## 2018-03-06 ENCOUNTER — OFFICE VISIT (OUTPATIENT)
Dept: NEUROLOGY | Facility: CLINIC | Age: 63
End: 2018-03-06
Payer: COMMERCIAL

## 2018-03-06 VITALS
RESPIRATION RATE: 12 BRPM | HEART RATE: 93 BPM | TEMPERATURE: 98.6 F | WEIGHT: 124.4 LBS | SYSTOLIC BLOOD PRESSURE: 118 MMHG | BODY MASS INDEX: 21.35 KG/M2 | DIASTOLIC BLOOD PRESSURE: 77 MMHG

## 2018-03-06 DIAGNOSIS — R06.09 DYSPNEA ON EXERTION: ICD-10-CM

## 2018-03-06 DIAGNOSIS — R26.89 BALANCE PROBLEMS: Primary | ICD-10-CM

## 2018-03-06 PROCEDURE — 82607 VITAMIN B-12: CPT | Performed by: PSYCHIATRY & NEUROLOGY

## 2018-03-06 PROCEDURE — 36415 COLL VENOUS BLD VENIPUNCTURE: CPT | Performed by: PSYCHIATRY & NEUROLOGY

## 2018-03-06 PROCEDURE — 99358 PROLONG SERVICE W/O CONTACT: CPT | Performed by: PSYCHIATRY & NEUROLOGY

## 2018-03-06 PROCEDURE — 99204 OFFICE O/P NEW MOD 45 MIN: CPT | Performed by: PSYCHIATRY & NEUROLOGY

## 2018-03-06 NOTE — MR AVS SNAPSHOT
After Visit Summary   3/6/2018    Yaritza Bradley    MRN: [id number]           Patient Information     Date Of Birth          1955        Visit Information        Provider Department      3/6/2018 11:00 AM Carley Cuadra MD [facility]        Today's Diagnoses     Balance problems    -  1    Dyspnea on exertion          Care Instructions    Plan:    I recommend some vessel imaging - it sounds like this will be done through [facility]. Please let us know when you get the results.   We will check orthostatic vitals today too, as discussed.   Referral to cardiology for further cardiac screen.  Labs today: B12.  I think you should return to physical therapy. Regardless of the cause for the balance problems, this is usually really helpful.  Return to clinic in 3 months.           Follow-ups after your visit        Additional Services     CARDIOLOGY EVAL ADULT REFERRAL       Preferred location:  [facility] [phone]   [email or web address]    Please be aware that coverage of these services is subject to the terms and limitations of your health insurance plan.  Call member services at your health plan with any benefit or coverage questions.      Please bring the following to your appointment:  Any x-rays, CTs or MRIs which have been performed. Contact the facility where they were done to arrange for  prior to your scheduled appointment.    List of current medications  This referral request   Any documents/labs given to you for this referral            PHYSICAL THERAPY REFERRAL       *This therapy referral will be filtered to a centralized scheduling office at [facility] and the patient will receive a call to schedule an appointment at a [place] location most convenient for them. *     [facility] provides Physical Therapy evaluation and treatment and many  "specialty services across the [place] system.  If requesting a specialty program, please choose from the list below.    If you have not heard from the scheduling office within 2 business days, please call [phone] for all locations, with the exception of [place], please call [phone] and [place], please call [phone]  Treatment: Evaluation & Treatment  Special Instructions/Modalities: balance / previous therapy helpful  Special Programs: Balance/Vestibular    Please be aware that coverage of these services is subject to the terms and limitations of your health insurance plan.  Call member services at your health plan with any benefit or coverage questions.      **Note to Provider:  If you are referring outside of [place] for the therapy appointment, please list the name of the location in the \"special instructions\" above, print the referral and give to the patient to schedule the appointment.                  Who to contact     If you have questions or need follow up information about today's clinic visit or your schedule please contact [facility] directly at [phone].  Normal or non-critical lab and imaging results will be communicated to you by MyChart, letter or phone within 4 business days after the clinic has received the results. If you do not hear from us within 7 days, please contact the clinic through "[employer]hart or phone. If you have a critical or abnormal lab result, we will notify you by phone as soon as possible.  Submit refill requests through SeatSw[employer] or call your pharmacy and they will forward the refill request to us. Please allow 3 business days for your refill to be completed.          Additional Information About Your Visit        "[employer] Information     [employer] gives you secure access to your electronic health record. If you see a primary care provider, you can also send messages to your care team and make appointments. If you have questions, please call your " primary care clinic.  If you do not have a primary care provider, please call [phone] and they will assist you.        Care EveryWhere ID     This is your Care EveryWhere ID. This could be used by other organizations to access your [place] medical records  [id number]        Your Vitals Were     Pulse Temperature Respirations BMI (Body Mass Index)          97 98.6  F (37  C) (Oral) 12 21.35 kg/m2         Blood Pressure from Last 3 Encounters:   03/06/18 114/61   12/07/17 113/76   11/16/17 114/71    Weight from Last 3 Encounters:   03/06/18 56.4 kg (124 lb 6.4 oz)   12/07/17 55.8 kg (123 lb)   11/16/17 54.9 kg (121 lb)              We Performed the Following     CARDIOLOGY EVAL ADULT REFERRAL     PHYSICAL THERAPY REFERRAL     Vitamin B12          Today's Medication Changes          These changes are accurate as of 3/6/18 11:48 AM.  If you have any questions, ask your nurse or doctor.               Stop taking these medicines if you haven't already. Please contact your care team if you have questions.     methotrexate 2.5 MG tablet CHEMO   Stopped by:  Carley Cuadra MD                    Primary Care Provider Office Phone # Fax #    Karla Maral Wade [phone] [address] [facility] [address]        Equal Access to Services     JOSE GREENE AH: Hadii nicole hollins hadasho Soaminata, waaxda luqadaha, qaybta kaalmada adevaishnavi, bi patino . So [facility] [phone].    ATENCIÓN: Si habla español, tiene a moura disposición servicios gratuitos de asistencia lingüística. Llame al [phone].    We comply with applicable federal civil rights laws and [place] laws. We do not discriminate on the basis of race, color, national origin, age, disability, sex, sexual orientation, or gender identity.            Thank you!     Thank you for choosing [facility]  for your care. Our goal is always to provide you with excellent care. Hearing back from our patients  "is one way we can continue to improve our services. Please take a few minutes to complete the written survey that you may receive in the mail after your visit with us. Thank you!             Your Updated Medication List - Protect others around you: Learn how to safely use, store and throw away your medicines at www.disposemymeds.org.          This list is accurate as of 3/6/18 11:48 AM.  Always use your most recent med list.                   Brand Name Dispense Instructions for use Diagnosis    B-D INSULIN SYRINGE 25G X 5/8\" 1 ML Misc   Generic drug:  Insulin Syringe-Needle U-100      TO BE USED IN WEEKLY METHOTREXATE INJECTIONS        BONIVA IV      Inject into the vein every 3 months        estradiol 0.5 MG tablet    ESTRACE    30 tablet    Place vaginally at bedtime x 5 nights then twice a week    Atrophic vaginitis       FLUoxetine 40 MG capsule    PROzac    90 capsule    Take 1 capsule (40 mg) by mouth daily    Moderate recurrent major depression (H)       folic acid 1 MG tablet    FOLVITE     1 TABLET DAILY        HUMIRA PEN 40 MG/0.8ML injection   Generic drug:  adalimumab      Inject 40 mg Subcutaneous once a week        METHOTREXATE (PF) SC      Inject 0.5 mLs Subcutaneous once a week        OMEGA-3 FISH OIL PO      Take by mouth daily        RESTASIS 0.05 % ophthalmic emulsion   Generic drug:  cycloSPORINE      Place 1 drop into both eyes 2 times daily        SM CALCIUM SOFT CHEWS 500-100-40 MG-UNT-MCG Chew   Generic drug:  Calcium-Vitamin D-Vitamin K      CHEW 2-3 ORALLY DAILY        TURMERIC PO      Take 1,000 mg by mouth daily          "

## 2018-03-06 NOTE — PROGRESS NOTES
INITIAL NEUROLOGY CONSULTATION    DATE OF VISIT: 3/6/2018  MRN: [id number]  PATIENT NAME: Yaritza Bradley  YOB: 1955    REFERRING PROVIDER: No ref. provider found    Chief Complaint   Patient presents with     New Patient     Balance issues, dizziness.  Self-referred.       SUBJECTIVE:                                                      HPI:   Yaritza Bradley is a 62 year old female whom I was asked to see in consultation for dizziness/balance problems. In reviewing her chart, I note that the patient was seen by  Dr. Cisneros in 2014 for memory difficulties. The problems with balance were also mentioned at that time. MRI brain showed non-specific white matter changes. Cervical spine imaging later in the year showed normal cord, some degenerative changes, moderate facet involvement at C3-C6. Dr. Cisneros makes note of some brisk reflexes and concern regarding B12. B12 was 408 at the time and does not appear to have been checked since. Neuropsych testing showed subtle diffuse cerebral dysfunction and possibly greater non-dominant parietal involvement (without structural brain findings to explain this); Not dementia. Low mood was felt to have played a role in her performance.  Dr. Adrian (ENT) saw the patient in 8.2016 for concerns about poor balance. She was having trouble if she had to do tasks with arms above her head. He reported one fall over 5-6 months of symptoms. No vertigo described. Otologic exam was normal. He suspected multifactorial balance problems and recommended physical therapy. PT note indicates patient mentioned worsening balance after Right hip replacement surgery. There is also history of sinus headaches. The patient reported some  whoozy  sensations with cervical and oculomotor testing by PT. Frenzel lens testing was negative. Her case was not felt to be clearly central or peripheral vestibular in origin. In a January 2017 primary care note, the patient was having some orthostatic symptoms.  She was again sent to PT in early 2017. She had mild sensorineural hearing loss on audiogram completed in 2016.   Lumbar MRI from 4.2015 showed some multilevel foraminal narrowing, mild.   Additional history includes rheumatoid arthritis, fibromyalgia, SHEA and depression.     The patient tells me that her main concern is the balance issue. She feels like she weaves when she walks and that her body doesn't always work with her brain. She denies vertigo, which she has experienced in the past. She tends to be clumsy in general too. She says that positional changes do seem to make a difference (ie, working above her head and squatting down can make her symptomatic). She says that this is all the same as prior complaints but the reason she is here today is that she is noticing the balance problems more frequently lately. She does feel lightheaded at times. She says that she previously thought it was medication-related, but now she has been on the same medicines for a long time.  She has not had any recent falls. She says she does bump her head occasionally. She denies history of head injury causing concussion, no LOC. No neck injuries. She has had Right hip replacement and does endorse that the balance has been worse since.     She does get abdominal pain and reports some chest pain, but she does not think they are associated with the balance problems necessarily. She does get some lightheadedness and shortness of breath with exertion, along with the chest pain (points to sternum). She has had some remote cardiac testing completed in the past, but nothing . She is concerned about cardiac disease in her family. She says that she has talked to her primary care provider about the abdominal pain. She also has a lump on her head too that is new. She also mentions the memory problems. She gets up to urinate frequently during the night. She does not have problems with control any longer, though she describes a brief period of  time when she had some problems with controlling her bladder. She denies changes in hearing/fullness/tinnitus.     She thinks that physical therapy was helpful for the balance, at least temporarily. She admits that she was not good about continuing the recommended exercises at home.     She does have plans to do some carotid artery testing, as well as coronary testing, bone density and something else soon. This is going to be done through [email or web address] on March 22. She says it is a mobile screening program for cardiac/vascular problems.     There is positive family history of migraine, headaches, stroke and dementia in grandparents.    Past Medical History:   Diagnosis Date     Acute sinusitis, unspecified     migraines and allergies     Hemorrhage of rectum and anus 2002     Myalgia and myositis, unspecified     Fibromyalgia     Osteoarthritis      Post-operative nausea and vomiting 7/14/2017     Rheumatoid arthritis(714.0) 1996     [facility]     Past Surgical History:   Procedure Laterality Date     COLONOSCOPY N/A 10/14/2016    Procedure: COLONOSCOPY;  Surgeon: Gesron Douglas MD;  Location: [facility]     ESOPHAGOSCOPY, GASTROSCOPY, DUODENOSCOPY (EGD), COMBINED  8/19/2011    Procedure:COMBINED ESOPHAGOSCOPY, GASTROSCOPY, DUODENOSCOPY (EGD), BIOPSY SINGLE OR MULTIPLE; Gastroscopy with biopsy; Surgeon:FARZAD GARCIA; Location:[facility]     ESOPHAGOSCOPY, GASTROSCOPY, DUODENOSCOPY (EGD), COMBINED  7/28/2014    Procedure: COMBINED ESOPHAGOSCOPY, GASTROSCOPY, DUODENOSCOPY (EGD), BIOPSY SINGLE OR MULTIPLE;  Surgeon: Marshall Martinez MD;  Location: [facility]     HC DILATION/CURETTAGE DIAG/THER NON OB  2001    for uterine fibroids     OPEN REDUCTION INTERNAL FIXATION ELBOW Right 1/22/2016    right THR     ROTATOR CUFF REPAIR RT/LT  2009    left         Current Outpatient Prescriptions on File Prior to Visit:  FLUoxetine (PROZAC) 40 MG capsule Take 1 capsule (40 mg) by mouth daily   estradiol (ESTRACE)  "0.5 MG tablet Place vaginally at bedtime x 5 nights then twice a week   Omega-3 Fatty Acids (OMEGA-3 FISH OIL PO) Take by mouth daily    HUMIRA PEN 40 MG/0.8ML injection Inject 40 mg Subcutaneous once a week   RESTASIS 0.05 % ophthalmic emulsion Place 1 drop into both eyes 2 times daily   FOLIC ACID 1 MG PO TABS 1 TABLET DAILY   SM CALCIUM SOFT CHEWS 500-100-40 OR CHEW CHEW 2-3 ORALLY DAILY   TURMERIC PO Take 1,000 mg by mouth daily    B-D INSULIN SYRINGE 25G X 5\" 1 ML MISC TO BE USED IN WEEKLY METHOTREXATE INJECTIONS     No current facility-administered medications on file prior to visit.   Allergies   Allergen Reactions     Nka [No Known Allergies]         Problem (# of Occurrences) Relation (Name,Age of Onset)    Alcohol/Drug (1) Brother    Alzheimer Disease (3) Maternal Grandmother, Maternal Grandfather, Paternal Grandmother    C.A.D. (3) Father, Paternal Grandfather, Brother    Colon Cancer (1) Mother        Social History   Substance Use Topics     Smoking status: Never Smoker     Smokeless tobacco: Never Used     Alcohol use Yes      Comment: 1 wine a month hardly       REVIEW OF SYSTEMS:                                                      10-point review of systems is negative except as mentioned above in HPI.     EXAM:                                                      Physical Exam:   Vitals: /77 (BP Location: Left arm, Patient Position: Standing, Cuff Size: Adult Regular)  Pulse 93  Temp 98.6  F (37  C) (Oral)  Resp 12  Wt 56.4 kg (124 lb 6.4 oz)  BMI 21.35 kg/m2  BMI= Body mass index is 21.35 kg/(m^2).  GENERAL: NAD.   HEENT: Scaly lesion on Right post-auricular scalp, about 2cm in diameter.   Neurologic:  MENTAL STATUS: Alert, attentive. Appears mildly anxious. Speech is fluent. Normal comprehension. Mini-co/5 (missed one word on recall). Normal concentration. Adequate fund of knowledge.   CRANIAL NERVES: Discs flat. Visual fields intact to confrontation. Pupils equally, round and " reactive to light. Facial sensation and movement normal. EOM full. Hearing intact to conversation. Trapezius strength intact. Palate moves symmetrically. Tongue midline.  MOTOR: 5/5 in proximal and distal muscle groups of upper and lower extremities. Tone and bulk normal.   DTRs: Brisk at patellae and ankles. Babinski down-going bilaterally.   SENSATION: Normal light touch and pinprick. Intact proprioception. Vibration: Normal at both ankles.   COORDINATION: Tremor (L>R) with finger nose finger. Possible mild dysmetria on Left and with knee heel shin bilaterally.  STATION AND GAIT: Romberg negative. Normal toe walk and heel walk. Tandem minimally unsteady. Gait appears slightly spastic vs impulsive.   CV: RRR. S1, S2.   NECK: No bruits.    Relevant Data:  MRI Brain (7.17.14):  IMPRESSION:  Nonspecific white matter T2 hyperintensities in the  cerebral hemispheres, probably sequelae of small vessel ischemic  disease in this age patient. Otherwise normal MRI of the brain.  Sinuses are essentially clear.     MRI Cervical Spine (11.4.14):  FINDINGS:  The spinal cord appears normal. The paraspinal soft tissues  appear normal.  The bone marrow has normal signal intensity.          C2-C3:  Normal disc, facet joints, spinal canal and neural foramina.     C3-C4:  Degeneration of the disc. Mild annular disc bulge. Moderate  right and mild left foraminal stenosis due to uncinate spurs.     C4-C5:  Degeneration of the disc. Minimal bulge. Moderate right  foraminal stenosis due to uncinate spur. Mild degenerative change in  the facet joints.     C5-C6:  Mild annular disc bulge. Central canal normal. Moderate  degenerative change in the facet joints.     C6-C7:  Mild annular disc bulge. Central canal normal. Mild  degenerative change in the facet joints.     C7-T1: Normal disc, facet joints, spinal canal and neural foramina.     IMPRESSION:    1. Cervical cord appears normal.  2. Mild multilevel degenerative change.    Lumbar MRI  (4.20.15):  IMPRESSION:   1. Early degenerative disc disease L2-L3 with minimal left foraminal  stenosis.  2. Degenerative disc disease L3-L4 with minimal left foraminal  stenosis.  3. Early degenerative disc disease and grade 1 degenerative  anterolisthesis L4-L5 with no impingement on neural structures.  Incidental note of a posteriorly projecting synovial cyst from the  right L4-L5 posterior facet joint.  4. Early degenerative disc disease L5-S1 without impingement on neural  Structures.    Imaging reviewed by me. Agree with radiology read.     ASSESSMENT and PLAN:                                                      Assessment and Plan:     ICD-10-CM    1. Balance problems R26.89 Vitamin B12     PHYSICAL THERAPY REFERRAL     CARDIOLOGY EVAL ADULT REFERRAL   2. Dyspnea on exertion R06.09 CARDIOLOGY EVAL ADULT REFERRAL        Ms. Bradely is a 63 yo woman with history of RA, SHEA, GERD, Right hip replacement and mild hearing loss here for balance concerns, primarily. The patient is self-referred and wanted to discuss several problems in clinic today. We decided to focus on the balance. I think that I would agree with Dr. Adrian's assessment that the balance issue is likely multifactorial. Her symptoms seem to be stable over the years, without one distinct etiology that stands out. In light of this, I recommended she return to physical therapy (there was some noted benefit in the past). We will also recheck her B12. I explained to the patient that this can be important for both brain and peripheral nerve health. I recommend she have head/neck vessel imaging done. It sounds like some of this is probably going to be completed through the [facility] service, so we will await those results. In addition, given the sternal pain and SAINI, I think she should pursue cardiology consultation as well, because this could certainly be playing a role in her lightheadedness. That being said, orthostatics were negative in clinic today.      I am considering repeat brain imaging and sleep consultation as part of her work-up, but we will see what the above evaluations show first.     The patient understands and agrees with the plan.     Patient Instructions:  I recommend some vessel imaging - it sounds like this will be done through [facility]. Please let us know when you get the results.   We will check orthostatic vitals today too, as discussed.   Referral to cardiology for further cardiac screen.  Labs today: B12.  I think you should return to physical therapy. Regardless of the cause for the balance problems, this is usually really helpful.  Return to clinic in 3 months.     Total Time: 45 minutes were spent with the patient. Additional 30 minutes in chart review. More than 50% of the time spent on counseling (as described above in Assessment and Plan) /coordinating the care.    Carley Cuadra MD  Neurology

## 2018-03-06 NOTE — NURSING NOTE
"Chief Complaint   Patient presents with     New Patient     Balance issues, dizziness.  Self-referred.       Initial /61 (BP Location: Right arm, Patient Position: Sitting, Cuff Size: Adult Regular)  Pulse 97  Temp 98.6  F (37  C) (Oral)  Resp 12  Wt 56.4 kg (124 lb 6.4 oz)  BMI 21.35 kg/m2 Estimated body mass index is 21.35 kg/(m^2) as calculated from the following:    Height as of 12/7/17: 1.626 m (5' 4\").    Weight as of this encounter: 56.4 kg (124 lb 6.4 oz).  Medication Reconciliation: complete    Patient prefers to be contacted: [phone]  Okay to leave detailed message on voicemail: yes      Lucia MARIA-CMA    "

## 2018-03-06 NOTE — PATIENT INSTRUCTIONS
Plan:    I recommend some vessel imaging - it sounds like this will be done through [facility]. Please let us know when you get the results.   We will check orthostatic vitals today too, as discussed.   Referral to cardiology for further cardiac screen.  Labs today: B12.  I think you should return to physical therapy. Regardless of the cause for the balance problems, this is usually really helpful.  Return to clinic in 3 months.

## 2018-03-06 NOTE — LETTER
3/6/2018         RE: Yaritza Bradley  [address] [facility] [address]        Dear Colleague,    Thank you for referring your patient, Yaritza Bradley, to the [facility]. Please see a copy of my visit note below.    INITIAL NEUROLOGY CONSULTATION    DATE OF VISIT: 3/6/2018  MRN: [id number]  PATIENT NAME: Yaritza Bradley  YOB: 1955    REFERRING PROVIDER: No ref. provider found    Chief Complaint   Patient presents with     New Patient     Balance issues, dizziness.  Self-referred.       SUBJECTIVE:                                                      HPI:   Yaritza Bradley is a 62 year old female whom I was asked to see in consultation for dizziness/balance problems. In reviewing her chart, I note that the patient was seen by  Dr. Cisneros in 2014 for memory difficulties. The problems with balance were also mentioned at that time. MRI brain showed non-specific white matter changes. Cervical spine imaging later in the year showed normal cord, some degenerative changes, moderate facet involvement at C3-C6. Dr. Cisneros makes note of some brisk reflexes and concern regarding B12. B12 was 408 at the time and does not appear to have been checked since. Neuropsych testing showed subtle diffuse cerebral dysfunction and possibly greater non-dominant parietal involvement (without structural brain findings to explain this); Not dementia. Low mood was felt to have played a role in her performance.  Dr. Adrian (ENT) saw the patient in 8.2016 for concerns about poor balance. She was having trouble if she had to do tasks with arms above her head. He reported one fall over 5-6 months of symptoms. No vertigo described. Otologic exam was normal. He suspected multifactorial balance problems and recommended physical therapy. PT note indicates patient mentioned worsening balance after Right hip replacement surgery. There is also history of sinus headaches. The patient reported some  whoozy  sensations with  cervical and oculomotor testing by PT. Frenzel lens testing was negative. Her case was not felt to be clearly central or peripheral vestibular in origin. In a January 2017 primary care note, the patient was having some orthostatic symptoms. She was again sent to PT in early 2017. She had mild sensorineural hearing loss on audiogram completed in 2016.   Lumbar MRI from 4.2015 showed some multilevel foraminal narrowing, mild.   Additional history includes rheumatoid arthritis, fibromyalgia, SHEA and depression.     The patient tells me that her main concern is the balance issue. She feels like she weaves when she walks and that her body doesn't always work with her brain. She denies vertigo, which she has experienced in the past. She tends to be clumsy in general too. She says that positional changes do seem to make a difference (ie, working above her head and squatting down can make her symptomatic). She says that this is all the same as prior complaints but the reason she is here today is that she is noticing the balance problems more frequently lately. She does feel lightheaded at times. She says that she previously thought it was medication-related, but now she has been on the same medicines for a long time.  She has not had any recent falls. She says she does bump her head occasionally. She denies history of head injury causing concussion, no LOC. No neck injuries. She has had Right hip replacement and does endorse that the balance has been worse since.     She does get abdominal pain and reports some chest pain, but she does not think they are associated with the balance problems necessarily. She does get some lightheadedness and shortness of breath with exertion, along with the chest pain (points to sternum). She has had some remote cardiac testing completed in the past, but nothing . She is concerned about cardiac disease in her family. She says that she has talked to her primary care provider about the  abdominal pain. She also has a lump on her head too that is new. She also mentions the memory problems. She gets up to urinate frequently during the night. She does not have problems with control any longer, though she describes a brief period of time when she had some problems with controlling her bladder. She denies changes in hearing/fullness/tinnitus.     She thinks that physical therapy was helpful for the balance, at least temporarily. She admits that she was not good about continuing the recommended exercises at home.     She does have plans to do some carotid artery testing, as well as coronary testing, bone density and something else soon. This is going to be done through [facility] on March 22. She says it is a mobile screening program for cardiac/vascular problems.     There is positive family history of migraine, headaches, stroke and dementia in grandparents.    Past Medical History:   Diagnosis Date     Acute sinusitis, unspecified     migraines and allergies     Hemorrhage of rectum and anus 2002     Myalgia and myositis, unspecified     Fibromyalgia     Osteoarthritis      Post-operative nausea and vomiting 7/14/2017     Rheumatoid arthritis(714.0) 1996     Dr. Traylor [facility]     Past Surgical History:   Procedure Laterality Date     COLONOSCOPY N/A 10/14/2016    Procedure: COLONOSCOPY;  Surgeon: Gerson Douglas MD;  Location: [facility]     ESOPHAGOSCOPY, GASTROSCOPY, DUODENOSCOPY (EGD), COMBINED  8/19/2011    Procedure:COMBINED ESOPHAGOSCOPY, GASTROSCOPY, DUODENOSCOPY (EGD), BIOPSY SINGLE OR MULTIPLE; Gastroscopy with biopsy; Surgeon:FARZAD GARCIA; Location:[facility]     ESOPHAGOSCOPY, GASTROSCOPY, DUODENOSCOPY (EGD), COMBINED  7/28/2014    Procedure: COMBINED ESOPHAGOSCOPY, GASTROSCOPY, DUODENOSCOPY (EGD), BIOPSY SINGLE OR MULTIPLE;  Surgeon: Marshall Martinez MD;  Location: [facility]     HC DILATION/CURETTAGE DIAG/THER NON OB  2001    for uterine fibroids     OPEN REDUCTION INTERNAL  "FIXATION ELBOW Right 1/22/2016    right THR     ROTATOR CUFF REPAIR RT/LT  2009    left         Current Outpatient Prescriptions on File Prior to Visit:  FLUoxetine (PROZAC) 40 MG capsule Take 1 capsule (40 mg) by mouth daily   estradiol (ESTRACE) 0.5 MG tablet Place vaginally at bedtime x 5 nights then twice a week   Omega-3 Fatty Acids (OMEGA-3 FISH OIL PO) Take by mouth daily    HUMIRA PEN 40 MG/0.8ML injection Inject 40 mg Subcutaneous once a week   RESTASIS 0.05 % ophthalmic emulsion Place 1 drop into both eyes 2 times daily   FOLIC ACID 1 MG PO TABS 1 TABLET DAILY   SM CALCIUM SOFT CHEWS 500-100-40 OR CHEW CHEW 2-3 ORALLY DAILY   TURMERIC PO Take 1,000 mg by mouth daily    B-D INSULIN SYRINGE 25G X 5/8\" 1 ML MISC TO BE USED IN WEEKLY METHOTREXATE INJECTIONS     No current facility-administered medications on file prior to visit.   Allergies   Allergen Reactions     Nka [No Known Allergies]         Problem (# of Occurrences) Relation (Name,Age of Onset)    Alcohol/Drug (1) Brother    Alzheimer Disease (3) Maternal Grandmother, Maternal Grandfather, Paternal Grandmother    C.A.D. (3) Father, Paternal Grandfather, Brother    Colon Cancer (1) Mother        Social History   Substance Use Topics     Smoking status: Never Smoker     Smokeless tobacco: Never Used     Alcohol use Yes      Comment: 1 wine a month hardly       REVIEW OF SYSTEMS:                                                      10-point review of systems is negative except as mentioned above in HPI.     EXAM:                                                      Physical Exam:   Vitals: /77 (BP Location: Left arm, Patient Position: Standing, Cuff Size: Adult Regular)  Pulse 93  Temp 98.6  F (37  C) (Oral)  Resp 12  Wt 56.4 kg (124 lb 6.4 oz)  BMI 21.35 kg/m2  BMI= Body mass index is 21.35 kg/(m^2).  GENERAL: NAD.   HEENT: Scaly lesion on Right post-auricular scalp, about 2cm in diameter.   Neurologic:  MENTAL STATUS: Alert, attentive. " Appears mildly anxious. Speech is fluent. Normal comprehension. Mini-co/5 (missed one word on recall). Normal concentration. Adequate fund of knowledge.   CRANIAL NERVES: Discs flat. Visual fields intact to confrontation. Pupils equally, round and reactive to light. Facial sensation and movement normal. EOM full. Hearing intact to conversation. Trapezius strength intact. Palate moves symmetrically. Tongue midline.  MOTOR: 5/5 in proximal and distal muscle groups of upper and lower extremities. Tone and bulk normal.   DTRs: Brisk at patellae and ankles. Babinski down-going bilaterally.   SENSATION: Normal light touch and pinprick. Intact proprioception. Vibration: Normal at both ankles.   COORDINATION: Tremor (L>R) with finger nose finger. Possible mild dysmetria on Left and with knee heel shin bilaterally.  STATION AND GAIT: Romberg negative. Normal toe walk and heel walk. Tandem minimally unsteady. Gait appears slightly spastic vs impulsive.   CV: RRR. S1, S2.   NECK: No bruits.    Relevant Data:  MRI Brain (14):  IMPRESSION:  Nonspecific white matter T2 hyperintensities in the  cerebral hemispheres, probably sequelae of small vessel ischemic  disease in this age patient. Otherwise normal MRI of the brain.  Sinuses are essentially clear.     MRI Cervical Spine (14):  FINDINGS:  The spinal cord appears normal. The paraspinal soft tissues  appear normal.  The bone marrow has normal signal intensity.          C2-C3:  Normal disc, facet joints, spinal canal and neural foramina.     C3-C4:  Degeneration of the disc. Mild annular disc bulge. Moderate  right and mild left foraminal stenosis due to uncinate spurs.     C4-C5:  Degeneration of the disc. Minimal bulge. Moderate right  foraminal stenosis due to uncinate spur. Mild degenerative change in  the facet joints.     C5-C6:  Mild annular disc bulge. Central canal normal. Moderate  degenerative change in the facet joints.     C6-C7:  Mild annular disc  bulge. Central canal normal. Mild  degenerative change in the facet joints.     C7-T1: Normal disc, facet joints, spinal canal and neural foramina.     IMPRESSION:    1. Cervical cord appears normal.  2. Mild multilevel degenerative change.    Lumbar MRI (4.20.15):  IMPRESSION:   1. Early degenerative disc disease L2-L3 with minimal left foraminal  stenosis.  2. Degenerative disc disease L3-L4 with minimal left foraminal  stenosis.  3. Early degenerative disc disease and grade 1 degenerative  anterolisthesis L4-L5 with no impingement on neural structures.  Incidental note of a posteriorly projecting synovial cyst from the  right L4-L5 posterior facet joint.  4. Early degenerative disc disease L5-S1 without impingement on neural  Structures.    Imaging reviewed by me. Agree with radiology read.     ASSESSMENT and PLAN:                                                      Assessment and Plan:     ICD-10-CM    1. Balance problems R26.89 Vitamin B12     PHYSICAL THERAPY REFERRAL     CARDIOLOGY EVAL ADULT REFERRAL   2. Dyspnea on exertion R06.09 CARDIOLOGY EVAL ADULT REFERRAL        Ms. Bradley is a 61 yo woman with history of RA, SHEA, GERD, Right hip replacement and mild hearing loss here for balance concerns, primarily. The patient is self-referred and wanted to discuss several problems in clinic today. We decided to focus on the balance. I think that I would agree with Dr. Adrian's assessment that the balance issue is likely multifactorial. Her symptoms seem to be stable over the years, without one distinct etiology that stands out. In light of this, I recommended she return to physical therapy (there was some noted benefit in the past). We will also recheck her B12. I explained to the patient that this can be important for both brain and peripheral nerve health. I recommend she have head/neck vessel imaging done. It sounds like some of this is probably going to be completed through the [facility] service, so we will  await those results. In addition, given the sternal pain and SAINI, I think she should pursue cardiology consultation as well, because this could certainly be playing a role in her lightheadedness. That being said, orthostatics were negative in clinic today.     I am considering repeat brain imaging and sleep consultation as part of her work-up, but we will see what the above evaluations show first.     The patient understands and agrees with the plan.     Patient Instructions:  I recommend some vessel imaging - it sounds like this will be done through [facility]. Please let us know when you get the results.   We will check orthostatic vitals today too, as discussed.   Referral to cardiology for further cardiac screen.  Labs today: B12.  I think you should return to physical therapy. Regardless of the cause for the balance problems, this is usually really helpful.  Return to clinic in 3 months.     Total Time: 45 minutes were spent with the patient. Additional 30 minutes in chart review. More than 50% of the time spent on counseling (as described above in Assessment and Plan) /coordinating the care.    Carley Cuadra MD  Neurology        Again, thank you for allowing me to participate in the care of your patient.        Sincerely,        Carley Cuadra MD

## 2018-03-07 LAB — VIT B12 SERPL-MCNC: 315 PG/ML (ref 193–986)

## 2018-03-07 NOTE — PROGRESS NOTES
Please advise Yaritza Bradley,  1955, that her B12 level is in the low range. I recommend she start a daily supplement: 1000mcg.  [phone] (home) none (work)  Carley Cuadra

## 2018-03-08 ENCOUNTER — MEDICAL CORRESPONDENCE (OUTPATIENT)
Dept: HEALTH INFORMATION MANAGEMENT | Facility: CLINIC | Age: 63
End: 2018-03-08

## 2018-03-21 ENCOUNTER — TRANSFERRED RECORDS (OUTPATIENT)
Dept: HEALTH INFORMATION MANAGEMENT | Facility: CLINIC | Age: 63
End: 2018-03-21

## 2018-04-09 DIAGNOSIS — M06.9 RA (RHEUMATOID ARTHRITIS) (H): ICD-10-CM

## 2018-04-09 DIAGNOSIS — Z79.899 LONG TERM USE OF DRUG: ICD-10-CM

## 2018-04-09 LAB
ALBUMIN SERPL-MCNC: 3.6 G/DL (ref 3.4–5)
ALT SERPL W P-5'-P-CCNC: 24 U/L (ref 0–50)
AST SERPL W P-5'-P-CCNC: 19 U/L (ref 0–45)
BASOPHILS # BLD AUTO: 0 10E9/L (ref 0–0.2)
BASOPHILS NFR BLD AUTO: 0.4 %
CREAT SERPL-MCNC: 0.75 MG/DL (ref 0.52–1.04)
DIFFERENTIAL METHOD BLD: NORMAL
EOSINOPHIL # BLD AUTO: 0.1 10E9/L (ref 0–0.7)
EOSINOPHIL NFR BLD AUTO: 0.8 %
ERYTHROCYTE [DISTWIDTH] IN BLOOD BY AUTOMATED COUNT: 13 % (ref 10–15)
GFR SERPL CREATININE-BSD FRML MDRD: 78 ML/MIN/1.7M2
HCT VFR BLD AUTO: 38.1 % (ref 35–47)
HGB BLD-MCNC: 13.6 G/DL (ref 11.7–15.7)
LYMPHOCYTES # BLD AUTO: 2.3 10E9/L (ref 0.8–5.3)
LYMPHOCYTES NFR BLD AUTO: 32.4 %
MCH RBC QN AUTO: 32.5 PG (ref 26.5–33)
MCHC RBC AUTO-ENTMCNC: 35.7 G/DL (ref 31.5–36.5)
MCV RBC AUTO: 91 FL (ref 78–100)
MONOCYTES # BLD AUTO: 0.7 10E9/L (ref 0–1.3)
MONOCYTES NFR BLD AUTO: 9.4 %
NEUTROPHILS # BLD AUTO: 4.1 10E9/L (ref 1.6–8.3)
NEUTROPHILS NFR BLD AUTO: 57 %
PLATELET # BLD AUTO: 216 10E9/L (ref 150–450)
RBC # BLD AUTO: 4.19 10E12/L (ref 3.8–5.2)
WBC # BLD AUTO: 7.2 10E9/L (ref 4–11)

## 2018-04-09 PROCEDURE — 85025 COMPLETE CBC W/AUTO DIFF WBC: CPT

## 2018-04-09 PROCEDURE — 82040 ASSAY OF SERUM ALBUMIN: CPT

## 2018-04-09 PROCEDURE — 82565 ASSAY OF CREATININE: CPT

## 2018-04-09 PROCEDURE — 36415 COLL VENOUS BLD VENIPUNCTURE: CPT

## 2018-04-09 PROCEDURE — 84450 TRANSFERASE (AST) (SGOT): CPT

## 2018-04-09 PROCEDURE — 84460 ALANINE AMINO (ALT) (SGPT): CPT

## 2018-05-04 DIAGNOSIS — M06.9 RA (RHEUMATOID ARTHRITIS) (H): ICD-10-CM

## 2018-05-04 DIAGNOSIS — Z51.81 ENCOUNTER FOR THERAPEUTIC DRUG MONITORING: Primary | ICD-10-CM

## 2018-05-15 ENCOUNTER — TELEPHONE (OUTPATIENT)
Dept: NEUROLOGY | Facility: CLINIC | Age: 63
End: 2018-05-15

## 2018-05-15 NOTE — TELEPHONE ENCOUNTER
Reason for Call:  Other     Detailed comments: Pt thought she had appt this morning. She left a copy of a [facility] Screening here for Dr. Cuadra's review. Her question is:  Based on results of the screening, would she still recommend pt be seen by cardiac services; if so, what is the cardiologist's name. Also wants to know if she should start PT. Does she need to make another appt or can this be handled over the phone - Please advise    Phone Number Patient can be reached at: Home number on file [phone] (home)    Best Time: Any    Can we leave a detailed message on this number? YES    Call taken on 5/15/2018 at 12:28 PM by Denise Behrendt

## 2018-05-15 NOTE — TELEPHONE ENCOUNTER
I called pt and recommended that she schedule an office visit to address these concerns.  Pt came in this morning for what she had written in her calendar for an appt with Dr. Cuadra and she was told that she was not on her schedule today.  Pt would like Dr. Cuadra to review her concerns and respond back, she does not want to wait until her next available appt on July 30th.    Palmira Bruce  [facility] RN

## 2018-05-16 NOTE — TELEPHONE ENCOUNTER
Left message on answering machine for patient to call back.  Skye Kline RN  [facility]

## 2018-05-16 NOTE — TELEPHONE ENCOUNTER
Based on the symptoms she described when in clinic (chest pain/dyspnea on exertion), I do think she should still follow-up with cardiology. Any of the providers would be fine. I am not sure who comes up here, perhaps Dr. Lodnon or Dr. Vargas? Please let the patient know I reviewed the test results she brought in and we will have this scanned into her chart so cardiology can review as well.   Thank you,  Carley Cuadra

## 2018-05-16 NOTE — TELEPHONE ENCOUNTER
Spoke with patient and advised of note below from Dr. Cuadra.  Pt reported she would make an appt with cardiology.  Cecelia MEJIA RN BSN PHN  [facility]

## 2018-05-20 ENCOUNTER — HOSPITAL ENCOUNTER (INPATIENT)
Facility: CLINIC | Age: 63
LOS: 1 days | Discharge: HOME | DRG: 603 | End: 2018-05-21
Attending: EMERGENCY MEDICINE | Admitting: INTERNAL MEDICINE
Payer: COMMERCIAL

## 2018-05-20 ENCOUNTER — APPOINTMENT (OUTPATIENT)
Dept: GENERAL RADIOLOGY | Facility: CLINIC | Age: 63
DRG: 603 | End: 2018-05-20
Attending: EMERGENCY MEDICINE
Payer: COMMERCIAL

## 2018-05-20 DIAGNOSIS — L08.9 LOCAL SKIN INFECTION: ICD-10-CM

## 2018-05-20 DIAGNOSIS — S61.452A DOG BITE OF LEFT HAND, INITIAL ENCOUNTER: ICD-10-CM

## 2018-05-20 DIAGNOSIS — Z23 NEED FOR PROPHYLACTIC VACCINATION AND INOCULATION AGAINST CHOLERA ALONE: ICD-10-CM
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
## 2018-05-20 DIAGNOSIS — S61.452A OPEN BITE OF LEFT HAND WITHOUT FOREIGN BODY, INITIAL ENCOUNTER: ICD-10-CM

## 2018-05-20 DIAGNOSIS — L08.9 INFECTED DOG BITE: ICD-10-CM
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
## 2018-05-20 PROBLEM — R11.2 POST-OPERATIVE NAUSEA AND VOMITING: Status: RESOLVED | Noted: 2017-07-14 | Resolved: 2018-05-20

## 2018-05-20 PROBLEM — S61.459A DOG BITE OF HAND: Status: ACTIVE | Noted: 2018-05-20

## 2018-05-20 PROBLEM — L03.90 CELLULITIS: Status: ACTIVE | Noted: 2018-05-20

## 2018-05-20 PROBLEM — R42 LIGHT HEADEDNESS: Status: RESOLVED | Noted: 2017-01-26 | Resolved: 2018-05-20

## 2018-05-20 PROBLEM — Z98.890 POST-OPERATIVE NAUSEA AND VOMITING: Status: RESOLVED | Noted: 2017-07-14 | Resolved: 2018-05-20

## 2018-05-20 LAB
ANION GAP SERPL CALCULATED.3IONS-SCNC: 6 MMOL/L (ref 3–14)
BASOPHILS # BLD AUTO: 0 10E9/L (ref 0–0.2)
BASOPHILS NFR BLD AUTO: 0.2 %
BUN SERPL-MCNC: 12 MG/DL (ref 7–30)
CALCIUM SERPL-MCNC: 8.6 MG/DL (ref 8.5–10.1)
CHLORIDE SERPL-SCNC: 101 MMOL/L (ref 94–109)
CO2 SERPL-SCNC: 27 MMOL/L (ref 20–32)
CREAT SERPL-MCNC: 0.63 MG/DL (ref 0.52–1.04)
DIFFERENTIAL METHOD BLD: ABNORMAL
EOSINOPHIL # BLD AUTO: 0.1 10E9/L (ref 0–0.7)
EOSINOPHIL NFR BLD AUTO: 0.6 %
ERYTHROCYTE [DISTWIDTH] IN BLOOD BY AUTOMATED COUNT: 13 % (ref 10–15)
GFR SERPL CREATININE-BSD FRML MDRD: >90 ML/MIN/1.7M2
GLUCOSE SERPL-MCNC: 98 MG/DL (ref 70–99)
HCT VFR BLD AUTO: 37.7 % (ref 35–47)
HGB BLD-MCNC: 13.3 G/DL (ref 11.7–15.7)
IMM GRANULOCYTES # BLD: 0 10E9/L (ref 0–0.4)
IMM GRANULOCYTES NFR BLD: 0.2 %
LYMPHOCYTES # BLD AUTO: 1.8 10E9/L (ref 0.8–5.3)
LYMPHOCYTES NFR BLD AUTO: 14.1 %
MCH RBC QN AUTO: 31.3 PG (ref 26.5–33)
MCHC RBC AUTO-ENTMCNC: 35.3 G/DL (ref 31.5–36.5)
MCV RBC AUTO: 89 FL (ref 78–100)
MONOCYTES # BLD AUTO: 1.2 10E9/L (ref 0–1.3)
MONOCYTES NFR BLD AUTO: 9.7 %
NEUTROPHILS # BLD AUTO: 9.3 10E9/L (ref 1.6–8.3)
NEUTROPHILS NFR BLD AUTO: 75.2 %
PLATELET # BLD AUTO: 217 10E9/L (ref 150–450)
POTASSIUM SERPL-SCNC: 3.8 MMOL/L (ref 3.4–5.3)
RBC # BLD AUTO: 4.25 10E12/L (ref 3.8–5.2)
SODIUM SERPL-SCNC: 134 MMOL/L (ref 133–144)
WBC # BLD AUTO: 12.4 10E9/L (ref 4–11)

## 2018-05-20 PROCEDURE — 80048 BASIC METABOLIC PNL TOTAL CA: CPT | Performed by: EMERGENCY MEDICINE

## 2018-05-20 PROCEDURE — 25000128 H RX IP 250 OP 636: Performed by: INTERNAL MEDICINE

## 2018-05-20 PROCEDURE — 96365 THER/PROPH/DIAG IV INF INIT: CPT | Performed by: EMERGENCY MEDICINE

## 2018-05-20 PROCEDURE — 90715 TDAP VACCINE 7 YRS/> IM: CPT | Performed by: EMERGENCY MEDICINE

## 2018-05-20 PROCEDURE — 99284 EMERGENCY DEPT VISIT MOD MDM: CPT | Mod: Z6 | Performed by: EMERGENCY MEDICINE

## 2018-05-20 PROCEDURE — 73130 X-RAY EXAM OF HAND: CPT | Mod: LT

## 2018-05-20 PROCEDURE — 25000125 ZZHC RX 250: Performed by: INTERNAL MEDICINE

## 2018-05-20 PROCEDURE — 25000132 ZZH RX MED GY IP 250 OP 250 PS 637: Performed by: FAMILY MEDICINE

## 2018-05-20 PROCEDURE — 90471 IMMUNIZATION ADMIN: CPT | Performed by: EMERGENCY MEDICINE

## 2018-05-20 PROCEDURE — 12000000 ZZH R&B MED SURG/OB

## 2018-05-20 PROCEDURE — 99222 1ST HOSP IP/OBS MODERATE 55: CPT | Mod: AI | Performed by: INTERNAL MEDICINE

## 2018-05-20 PROCEDURE — 85025 COMPLETE CBC W/AUTO DIFF WBC: CPT | Performed by: EMERGENCY MEDICINE

## 2018-05-20 PROCEDURE — 25000128 H RX IP 250 OP 636: Performed by: FAMILY MEDICINE

## 2018-05-20 PROCEDURE — 87040 BLOOD CULTURE FOR BACTERIA: CPT | Performed by: EMERGENCY MEDICINE

## 2018-05-20 PROCEDURE — 25000128 H RX IP 250 OP 636: Performed by: EMERGENCY MEDICINE

## 2018-05-20 PROCEDURE — 99285 EMERGENCY DEPT VISIT HI MDM: CPT | Mod: 25 | Performed by: EMERGENCY MEDICINE

## 2018-05-20 RX ORDER — ACETAMINOPHEN 325 MG/1
650 TABLET ORAL EVERY 4 HOURS PRN
Status: DISCONTINUED | OUTPATIENT
Start: 2018-05-20 | End: 2018-05-21 | Stop reason: HOSPADM

## 2018-05-20 RX ORDER — PROCHLORPERAZINE MALEATE 10 MG
10 TABLET ORAL EVERY 6 HOURS PRN
Status: DISCONTINUED | OUTPATIENT
Start: 2018-05-20 | End: 2018-05-20

## 2018-05-20 RX ORDER — ONDANSETRON 2 MG/ML
4 INJECTION INTRAMUSCULAR; INTRAVENOUS EVERY 6 HOURS PRN
Status: DISCONTINUED | OUTPATIENT
Start: 2018-05-20 | End: 2018-05-21 | Stop reason: HOSPADM

## 2018-05-20 RX ORDER — IBUPROFEN 600 MG/1
600 TABLET, FILM COATED ORAL EVERY 6 HOURS PRN
Status: DISCONTINUED | OUTPATIENT
Start: 2018-05-20 | End: 2018-05-21 | Stop reason: HOSPADM

## 2018-05-20 RX ORDER — NALOXONE HYDROCHLORIDE 0.4 MG/ML
.1-.4 INJECTION, SOLUTION INTRAMUSCULAR; INTRAVENOUS; SUBCUTANEOUS
Status: DISCONTINUED | OUTPATIENT
Start: 2018-05-20 | End: 2018-05-21 | Stop reason: HOSPADM

## 2018-05-20 RX ORDER — OXYCODONE HYDROCHLORIDE 5 MG/1
5-10 TABLET ORAL
Status: DISCONTINUED | OUTPATIENT
Start: 2018-05-20 | End: 2018-05-21 | Stop reason: HOSPADM

## 2018-05-20 RX ORDER — CLINDAMYCIN PHOSPHATE 600 MG/50ML
600 INJECTION, SOLUTION INTRAVENOUS EVERY 8 HOURS
Status: DISCONTINUED | OUTPATIENT
Start: 2018-05-20 | End: 2018-05-21 | Stop reason: HOSPADM

## 2018-05-20 RX ORDER — ONDANSETRON 4 MG/1
4 TABLET, ORALLY DISINTEGRATING ORAL EVERY 6 HOURS PRN
Status: DISCONTINUED | OUTPATIENT
Start: 2018-05-20 | End: 2018-05-21 | Stop reason: HOSPADM

## 2018-05-20 RX ORDER — SODIUM CHLORIDE 9 MG/ML
INJECTION, SOLUTION INTRAVENOUS CONTINUOUS
Status: DISCONTINUED | OUTPATIENT
Start: 2018-05-20 | End: 2018-05-21 | Stop reason: HOSPADM

## 2018-05-20 RX ORDER — POLYETHYLENE GLYCOL 3350 17 G/17G
17 POWDER, FOR SOLUTION ORAL DAILY PRN
Status: DISCONTINUED | OUTPATIENT
Start: 2018-05-20 | End: 2018-05-21 | Stop reason: HOSPADM

## 2018-05-20 RX ORDER — OXYCODONE HYDROCHLORIDE 5 MG/1
5-10 TABLET ORAL
Status: DISCONTINUED | OUTPATIENT
Start: 2018-05-20 | End: 2018-05-20

## 2018-05-20 RX ORDER — PROCHLORPERAZINE 25 MG
25 SUPPOSITORY, RECTAL RECTAL EVERY 12 HOURS PRN
Status: DISCONTINUED | OUTPATIENT
Start: 2018-05-20 | End: 2018-05-20

## 2018-05-20 RX ORDER — AMPICILLIN AND SULBACTAM 2; 1 G/1; G/1
3 INJECTION, POWDER, FOR SOLUTION INTRAMUSCULAR; INTRAVENOUS EVERY 6 HOURS
Status: DISCONTINUED | OUTPATIENT
Start: 2018-05-20 | End: 2018-05-21 | Stop reason: HOSPADM

## 2018-05-20 RX ORDER — ONDANSETRON 2 MG/ML
4 INJECTION INTRAMUSCULAR; INTRAVENOUS EVERY 6 HOURS PRN
Status: DISCONTINUED | OUTPATIENT
Start: 2018-05-20 | End: 2018-05-20

## 2018-05-20 RX ORDER — PROCHLORPERAZINE MALEATE 10 MG
10 TABLET ORAL EVERY 6 HOURS PRN
Status: DISCONTINUED | OUTPATIENT
Start: 2018-05-20 | End: 2018-05-21 | Stop reason: HOSPADM

## 2018-05-20 RX ORDER — ACETAMINOPHEN 325 MG/1
650 TABLET ORAL EVERY 4 HOURS PRN
Status: DISCONTINUED | OUTPATIENT
Start: 2018-05-20 | End: 2018-05-20

## 2018-05-20 RX ORDER — ONDANSETRON 4 MG/1
4 TABLET, ORALLY DISINTEGRATING ORAL EVERY 6 HOURS PRN
Status: DISCONTINUED | OUTPATIENT
Start: 2018-05-20 | End: 2018-05-20

## 2018-05-20 RX ORDER — PROCHLORPERAZINE 25 MG
25 SUPPOSITORY, RECTAL RECTAL EVERY 12 HOURS PRN
Status: DISCONTINUED | OUTPATIENT
Start: 2018-05-20 | End: 2018-05-21 | Stop reason: HOSPADM

## 2018-05-20 RX ORDER — NALOXONE HYDROCHLORIDE 0.4 MG/ML
.1-.4 INJECTION, SOLUTION INTRAMUSCULAR; INTRAVENOUS; SUBCUTANEOUS
Status: DISCONTINUED | OUTPATIENT
Start: 2018-05-20 | End: 2018-05-20

## 2018-05-20 RX ADMIN — SODIUM CHLORIDE: 9 INJECTION, SOLUTION INTRAVENOUS at 23:00

## 2018-05-20 RX ADMIN — SODIUM CHLORIDE: 9 INJECTION, SOLUTION INTRAVENOUS at 09:07

## 2018-05-20 RX ADMIN — FLUOXETINE 40 MG: 20 CAPSULE ORAL at 08:31

## 2018-05-20 RX ADMIN — CLINDAMYCIN PHOSPHATE 600 MG: 12 INJECTION, SOLUTION INTRAVENOUS at 10:52

## 2018-05-20 RX ADMIN — CLINDAMYCIN PHOSPHATE 600 MG: 12 INJECTION, SOLUTION INTRAVENOUS at 19:04

## 2018-05-20 RX ADMIN — CLOSTRIDIUM TETANI TOXOID ANTIGEN (FORMALDEHYDE INACTIVATED), CORYNEBACTERIUM DIPHTHERIAE TOXOID ANTIGEN (FORMALDEHYDE INACTIVATED), BORDETELLA PERTUSSIS TOXOID ANTIGEN (GLUTARALDEHYDE INACTIVATED), BORDETELLA PERTUSSIS FILAMENTOUS HEMAGGLUTININ ANTIGEN (FORMALDEHYDE INACTIVATED), BORDETELLA PERTUSSIS PERTACTIN ANTIGEN, AND BORDETELLA PERTUSSIS FIMBRIAE 2/3 ANTIGEN 0.5 ML: 5; 2; 2.5; 5; 3; 5 INJECTION, SUSPENSION INTRAMUSCULAR at 06:29

## 2018-05-20 RX ADMIN — Medication 1 MG: at 23:00

## 2018-05-20 RX ADMIN — TAZOBACTAM SODIUM AND PIPERACILLIN SODIUM 3.38 G: 375; 3 INJECTION, SOLUTION INTRAVENOUS at 05:56

## 2018-05-20 RX ADMIN — AMPICILLIN SODIUM AND SULBACTAM SODIUM 3 G: 2; 1 INJECTION, POWDER, FOR SOLUTION INTRAMUSCULAR; INTRAVENOUS at 17:36

## 2018-05-20 RX ADMIN — SODIUM CHLORIDE, POTASSIUM CHLORIDE, SODIUM LACTATE AND CALCIUM CHLORIDE 1000 ML: 600; 310; 30; 20 INJECTION, SOLUTION INTRAVENOUS at 05:45

## 2018-05-20 RX ADMIN — AMPICILLIN SODIUM AND SULBACTAM SODIUM 3 G: 2; 1 INJECTION, POWDER, FOR SOLUTION INTRAMUSCULAR; INTRAVENOUS at 11:54

## 2018-05-20 NOTE — ED NOTES
Patient had dog bite to left palm by 4th digit yesterday afternoon. Dogs shots UTD.  Small puncture wound noted.  This morning redness to left hand with red streaks up arm.  CMS good.  Afebrile.  Pain tolerable.

## 2018-05-20 NOTE — PLAN OF CARE
"[facility] [place]MISSION NOTE    Patient admitted to room 2301 at approximately 0635 via wheel chair from emergency room. Patient was accompanied by spouse and transport tech.     Verbal SBAR report received from Lucia GRISSOM prior to patient arrival.     Patient ambulated to bed with stand-by assist. Patient alert and oriented X 3. The patient is not having any pain.  . Admission vital signs: Blood pressure 128/54, pulse 80, temperature 97.9  F (36.6  C), temperature source Oral, resp. rate 18, height 1.626 m (5' 4\"), weight 57.5 kg (126 lb 12.2 oz), SpO2 100 %, not currently breastfeeding. Patient was oriented to plan of care, call light, bed controls, tv, telephone, bathroom and visiting hours.     Risk Assessment    The following safety risks were identified during admission: fall. Yellow risk band applied: YES.     Skin Initial Assessment    This writer admitted this patient and completed a full skin assessment and Mukesh score in the Adult PCS flowsheet. Appropriate interventions initiated as needed.     Secondary skin check completed by Peggy Gonsales & Pam Law RN'S.     patient is at risk for falls D/T Unsteady gait, History of Rheumatoid Arthritis, few yrs back patient fell broke bilat elbows, patient is currently in PATIENT, seeing a neurologists, etc. To find out why unsteady gait. Patient thinks it is related to her B/P.    Mukesh Risk Assessment  Sensory Perception: 4-->no impairment  Moisture: 4-->rarely moist  Activity: 3-->walks occasionally  Mobility: 3-->slightly limited  Nutrition: 3-->adequate  Friction and Shear: 3-->no apparent problem  Mukesh Score: 20    Ney[facility]    "

## 2018-05-20 NOTE — IP AVS SNAPSHOT
[facility]    [address] [address]    Phone:  [phone]    Fax:  [phone]                                       After Visit Summary   5/20/2018    Yaritza Bradley    MRN: [id number]           After Visit Summary Signature Page     I have received my discharge instructions, and my questions have been answered. I have discussed any challenges I see with this plan with the nurse or doctor.    ..........................................................................................................................................  Patient/Patient Representative Signature      ..........................................................................................................................................  Patient Representative Print Name and Relationship to Patient    ..................................................               ................................................  Date                                            Time    ..........................................................................................................................................  Reviewed by Signature/Title    ...................................................              ..............................................  Date                                                            Time

## 2018-05-20 NOTE — IP AVS SNAPSHOT
MRN:[id number]                      After Visit Summary   5/20/2018    Yaritza Bradley    MRN: [id number]           Thank you!     Thank you for choosing [facility] for your care. Our goal is always to provide you with excellent care. Hearing back from our patients is one way we can continue to improve our services. Please take a few minutes to complete the written survey that you may receive in the mail after you visit with us. Thank you!        Patient Information     Date Of Birth          1955        Designated Caregiver       Most Recent Value    Caregiver    Will someone help with your care after discharge? yes    Name of designated caregiver Remi    Phone number of caregiver [phone]    Caregiver address same as pt      About your hospital stay     You were admitted on:  May 20, 2018 You last received care in the:  [facility]    You were discharged on:  May 21, 2018        Reason for your hospital stay       You were hospitalized for treatment of a soft tissue infection (cellulitis) due to a dog bite. You needed IV antibiotics to initially treat the infection, but will be transitioned to an oral antibiotic called Augmentin to take for 10 days when you return home.                  Who to Call     For medical emergencies, please call 911.  For non-urgent questions about your medical care, please call your primary care provider or clinic, [phone]          Attending Provider     Provider Specialty    Rebel Whitlock MD Emergency Medicine    [facility], Shane RIVAS MD [facility] Internal Medicine    Marvin nicole MD Internal Medicine       Primary Care Provider Office Phone # Fax #    Karla Maral Wade [phone]       When to contact your care team       Call your Orthopedic surgeon at [facility]  if you have any of the following: temperature greater than 100.4,  increased shortness of breath,  "increased drainage, increased swelling or increased pain.                  Follow-up Appointments     Follow-up and recommended labs and tests       Follow up with  Faby Simpson PA-C or Dr. Jeffrey Andrade at  [facility], within 1 week for hospital follow- up.                  Your next 10 appointments already scheduled     May 30, 2018 10:40 AM CDT   SHORT with Aziza Aviles MD   [facility] [place] [place]    [address]  [facility] [id number] [phone]            Jun 18, 2018  2:00 PM CDT   New Visit with Azra London MD   [facility] ([facility]    [address] [address] [phone]              Further instructions from your care team       Orthopedic Surgery Discharge Instructions    1. Follow up:  Follow up with Faby Simpson PA-C or Dr. Jeffrey Swann  Within 1 week for re-check of your hand.  Call [phone] if appointment needed or questions. If you have questions or concerns while at home, please call [facility]. If it is an emergency, call 911.                Pending Results     Date and Time Order Name Status Description    5/20/2018 0532 Blood culture Preliminary     5/20/2018 0532 Blood culture Preliminary             Statement of Approval     Ordered          05/21/18 5974  I have reviewed and agree with all the recommendations and orders detailed in this document.  EFFECTIVE NOW     Approved and electronically signed by:  Mattie Baumann PA-C             Admission Information     Date & Time Provider Department Dept. Phone    5/20/2018 Marvin Baez MD [facility] [phone]      Your Vitals Were     Blood Pressure Pulse Temperature Respirations Height Weight    134/56 83 98.8  F (37.1  C) (Oral) 16 1.626 m (5' 4\") 57.5 kg (126 lb 12.2 oz)    Pulse Oximetry BMI (Body Mass Index)                98% 21.76 kg/m2        " "  MyChart Information     [facility] gives you secure access to your electronic health record. If you see a primary care provider, you can also send messages to your care team and make appointments. If you have questions, please call your primary care clinic.  If you do not have a primary care provider, please call [phone] and they will assist you.        Care EveryWhere ID     This is your Care EveryWhere ID. This could be used by other organizations to access your [facility] medical records  [id number]        Equal Access to Services     JOSE GREENE : Hadii nicole ku hadasho Soomaali, waaxda luqadaha, qaybta kaalmada adeegyada, waxcameron patino . So [facility] [phone].    ATENCIÓN: Si habla español, tiene a moura disposición servicios gratuitos de asistencia lingüística. Asha al [phone].    We comply with applicable federal civil rights laws and [place] laws. We do not discriminate on the basis of race, color, national origin, age, disability, sex, sexual orientation, or gender identity.               Review of your medicines      START taking        Dose / Directions    amoxicillin-clavulanate 875-125 MG per tablet   Commonly known as:  AUGMENTIN   Used for:  Open bite of left hand without foreign body, initial encounter        Dose:  1 tablet   Take 1 tablet by mouth 2 times daily   Quantity:  28 tablet   Refills:  0         CONTINUE these medicines which have NOT CHANGED        Dose / Directions    B-D INSULIN SYRINGE 25G X 5/8\" 1 ML Misc   Generic drug:  Insulin Syringe-Needle U-100        TO BE USED IN WEEKLY METHOTREXATE INJECTIONS   Refills:  1       BONIVA IV        Inject into the vein every 3 months   Refills:  0       estradiol 0.5 MG tablet   Commonly known as:  ESTRACE   Used for:  Atrophic vaginitis        Place vaginally at bedtime x 5 nights then twice a week   Quantity:  30 tablet   Refills:  3       FLUoxetine 40 MG capsule   Commonly known as:  PROzac   Used for:  " Moderate recurrent major depression (H)        Dose:  40 mg   Take 1 capsule (40 mg) by mouth daily   Quantity:  90 capsule   Refills:  3       folic acid 1 MG tablet   Commonly known as:  FOLVITE        1 TABLET DAILY   Refills:  0       HUMIRA PEN 40 MG/0.8ML injection   Generic drug:  adalimumab        Dose:  40 mg   Inject 40 mg Subcutaneous every 14 days   Refills:  0       METHOTREXATE (PF) SC        Dose:  0.5 mL   Inject 0.5 mLs Subcutaneous once a week On Tuesday   Refills:  0       OMEGA-3 FISH OIL PO   Indication:  dry eyes        Dose:  1 g   Take 1 g by mouth daily (DRY EYE OMEGA BENEFITS) 667-250 mg-unit cap   Refills:  0       RESTASIS 0.05 % ophthalmic emulsion   Generic drug:  cycloSPORINE        Dose:  1 drop   Place 1 drop into both eyes 2 times daily   Refills:  0       SM CALCIUM SOFT CHEWS 500-100-40 MG-UNT-MCG Chew   Generic drug:  Calcium-Vitamin D-Vitamin K        CHEW 2-3 ORALLY DAILY   Refills:  0       TURMERIC PO        Dose:  1000 mg   Take 1,000 mg by mouth daily   Refills:  0       UNKNOWN TO PATIENT        Apply topically every evening Prescription ointment for hip pain   Refills:  0            Where to get your medicines      These medications were sent to [facility] [place], [place] - [address]  [address] [address]     Phone:  [phone]     amoxicillin-clavulanate 875-125 MG per tablet                Protect others around you: Learn how to safely use, store and throw away your medicines at www.disposemymeds.org.        ANTIBIOTIC INSTRUCTION     You've Been Prescribed an Antibiotic - Now What?  Your healthcare team thinks that you or your loved one might have an infection. Some infections can be treated with antibiotics, which are powerful, life-saving drugs. Like all medications, antibiotics have side effects and should only be used when necessary. There are some important things you should know about your antibiotic treatment.      Your  healthcare team may run tests before you start taking an antibiotic.    Your team may take samples (e.g., from your blood, urine or other areas) to run tests to look for bacteria. These test can be important to determine if you need an antibiotic at all and, if you do, which antibiotic will work best.      Within a few days, your healthcare team might change or even stop your antibiotic.    Your team may start you on an antibiotic while they are working to find out what is making you sick.    Your team might change your antibiotic because test results show that a different antibiotic would be better to treat your infection.    In some cases, once your team has more information, they learn that you do not need an antibiotic at all. They may find out that you don't have an infection, or that the antibiotic you're taking won't work against your infection. For example, an infection caused by a virus can't be treated with antibiotics. Staying on an antibiotic when you don't need it is more likely to be harmful than helpful.      You may experience side effects from your antibiotic.    Like all medications, antibiotics have side effects. Some of these can be serious.    Let you healthcare team know if you have any known allergies when you are admitted to the hospital.    One significant side effect of nearly all antibiotics is the risk of severe and sometimes deadly diarrhea caused by Clostridium difficile (C. Difficile). This occurs when a person takes antibiotics because some good germs are destroyed. Antibiotic use allows C. diificile to take over, putting patients at high risk for this serious infection.    As a patient or caregiver, it is important to understand your or your loved one's antibiotic treatment. It is especially important for caregivers to speak up when patients can't speak for themselves. Here are some important questions to ask your healthcare team.    What infection is this antibiotic treating and how  "do you know I have that infection?    What side effects might occur from this antibiotic?    How long will I need to take this antibiotic?    Is it safe to take this antibiotic with other medications or supplements (e.g., vitamins) that I am taking?     Are there any special directions I need to know about taking this antibiotic? For example, should I take it with food?    How will I be monitored to know whether my infection is responding to the antibiotic?    What tests may help to make sure the right antibiotic is prescribed for me?      Information provided by:  www.cdc.gov/getsmart  U.S. Department of Health and Human Services  Centers for disease Control and Prevention  National Center for Emerging and Zoonotic Infectious Diseases  Division of Healthcare Quality Promotion             Medication List: This is a list of all your medications and when to take them. Check marks below indicate your daily home schedule. Keep this list as a reference.      Medications           Morning Afternoon Evening Bedtime As Needed    amoxicillin-clavulanate 875-125 MG per tablet   Commonly known as:  AUGMENTIN   Take 1 tablet by mouth 2 times daily                                B-D INSULIN SYRINGE 25G X 5/8\" 1 ML Misc   TO BE USED IN WEEKLY METHOTREXATE INJECTIONS   Generic drug:  Insulin Syringe-Needle U-100                                BONIVA IV   Inject into the vein every 3 months                                estradiol 0.5 MG tablet   Commonly known as:  ESTRACE   Place vaginally at bedtime x 5 nights then twice a week                                FLUoxetine 40 MG capsule   Commonly known as:  PROzac   Take 1 capsule (40 mg) by mouth daily   Last time this was given:  40 mg on 5/21/2018  8:16 AM                                folic acid 1 MG tablet   Commonly known as:  FOLVITE   1 TABLET DAILY                                HUMIRA PEN 40 MG/0.8ML injection   Inject 40 mg Subcutaneous every 14 days   Generic drug:  " adalimumab                                METHOTREXATE (PF) SC   Inject 0.5 mLs Subcutaneous once a week On Tuesday                                OMEGA-3 FISH OIL PO   Take 1 g by mouth daily (DRY EYE OMEGA BENEFITS) 667-250 mg-unit cap                                RESTASIS 0.05 % ophthalmic emulsion   Place 1 drop into both eyes 2 times daily   Generic drug:  cycloSPORINE                                SM CALCIUM SOFT CHEWS 500-100-40 MG-UNT-MCG Chew   CHEW 2-3 ORALLY DAILY   Generic drug:  Calcium-Vitamin D-Vitamin K                                TURMERIC PO   Take 1,000 mg by mouth daily                                UNKNOWN TO PATIENT   Apply topically every evening Prescription ointment for hip pain

## 2018-05-20 NOTE — CONSULTS
[facility] Consultation    Consultation - [facility]  Level of consult: Consult, follow and place orders    Yaritza Olvera,  1955, MRN [id number]     Admitting Dx: Infected dog bite [W54.0XXA, L08.9]     PCP: Karla Wade, [phone]     Code status:  Full Code     Extended Emergency Contact Information  Primary Emergency Contact: Remi OLVERA  Address: [address] [place] [address]  Home Phone: [phone]  Work Phone: [phone]  Mobile Phone: none  Relation: Spouse  Secondary Emergency Contact: MELY Zheng   [facility]  Home Phone: [phone]  Work Phone: none  Mobile Phone: none  Relation: Mother     Assessment:    1. Dog bite cellulitis     Plan:  No surgical intervention indicated.  Erythema is receeding from previously placed marker line, xrays show no fracture.  No indications of septic joint or compartment syndrome.  Continue zosyn.  Pain medication as needed. Monitor for increasing pain, redness, swelling.  Encouraged elevation and finger ROM.  Will continue to follow.    Active Problems:    Cellulitis       Chief Complaint  Left hand dog bite     HPI  We have been requested by Dr. Jackson to evaluate Yaritza Olvera who is a 62 year old year old female for a dog bite.  The bite occurred yesterday when she tried to deter two dogs who were fighting.  She had increasing pain, swelling and redness overnight and presented to the ED.     History is obtained from the patient     Past Medical History  Past Medical History:   Diagnosis Date     Acute sinusitis, unspecified     migraines and allergies     Hemorrhage of rectum and anus      Myalgia and myositis, unspecified     Fibromyalgia     Osteoarthritis      Post-operative nausea and vomiting 2017     Rheumatoid arthritis(714.0)      Dr. Traylor at [facility]       Surgical History  Past Surgical History:   Procedure Laterality Date     COLONOSCOPY N/A  10/14/2016    Procedure: COLONOSCOPY;  Surgeon: Gerson Douglas MD;  Location: [facility]     ESOPHAGOSCOPY, GASTROSCOPY, DUODENOSCOPY (EGD), COMBINED  8/19/2011    Procedure:COMBINED ESOPHAGOSCOPY, GASTROSCOPY, DUODENOSCOPY (EGD), BIOPSY SINGLE OR MULTIPLE; Gastroscopy with biopsy; Surgeon:FARZAD GARCIA; Location:[facility]     ESOPHAGOSCOPY, GASTROSCOPY, DUODENOSCOPY (EGD), COMBINED  7/28/2014    Procedure: COMBINED ESOPHAGOSCOPY, GASTROSCOPY, DUODENOSCOPY (EGD), BIOPSY SINGLE OR MULTIPLE;  Surgeon: Marshall Martinez MD;  Location: [facility]     HC DILATION/CURETTAGE DIAG/THER NON OB  2001    for uterine fibroids     OPEN REDUCTION INTERNAL FIXATION ELBOW Right 1/22/2016    right THR     ROTATOR CUFF REPAIR RT/LT  2009    left        Social History  Social History     Social History     Marital status:      Spouse name: N/A     Number of children: N/A     Years of education: N/A     Occupational History     Not on file.     Social History Main Topics     Smoking status: Never Smoker     Smokeless tobacco: Never Used     Alcohol use Yes      Comment: 1 wine a month hardly     Drug use: No     Sexual activity: No     Other Topics Concern      Service No     Blood Transfusions No     Caffeine Concern Yes     3-4 cups a day coffee and pop     Occupational Exposure No     Hobby Hazards No     Sleep Concern Yes     Stress Concern Yes     Weight Concern No     fluctuates up and down     Special Diet Yes     multi vit calcium and vit D chewable 3 a day     Back Care No     Exercise Yes     walk     Bike Helmet Yes     Seat Belt Yes     Self-Exams No     Patient does not do self breast exams, pamphlet given-instructions     Parent/Sibling W/ Cabg, Mi Or Angioplasty Before 65f 55m? Yes     Father-54 [address]     Social History Narrative       Family History  Family History   Problem Relation Age of Onset     C.A.D. Father      C.A.D. Paternal Grandfather      C.A.D. Brother      Alcohol/Drug Brother       Alzheimer Disease Maternal Grandmother      Alzheimer Disease Maternal Grandfather      Alzheimer Disease Paternal Grandmother      Colon Cancer Mother         Allergies:  Nka [no known allergies]      Current Medications:  Current Facility-Administered Medications   Medication     acetaminophen (TYLENOL) tablet 650 mg     FLUoxetine (PROzac) capsule 40 mg     ibuprofen (ADVIL/MOTRIN) tablet 600 mg     melatonin tablet 1 mg     naloxone (NARCAN) injection 0.1-0.4 mg     ondansetron (ZOFRAN-ODT) ODT tab 4 mg    Or     ondansetron (ZOFRAN) injection 4 mg     oxyCODONE IR (ROXICODONE) tablet 5-10 mg     piperacillin-tazobactam (ZOSYN) infusion 3.375 g     polyethylene glycol (MIRALAX/GLYCOLAX) Packet 17 g     prochlorperazine (COMPAZINE) injection 10 mg    Or     prochlorperazine (COMPAZINE) tablet 10 mg    Or     prochlorperazine (COMPAZINE) Suppository 25 mg     sodium chloride 0.9% infusion       Review of Systems:  CONSTITUTIONAL: NEGATIVE for fever, chills, change in weight  ENT/MOUTH: NEGATIVE for ear, mouth and throat problems  RESP: NEGATIVE for significant cough or SOB  CV: NEGATIVE for chest pain, palpitations or peripheral edema  MUSCULOSKELETAL: POSITIVE for significant arthralgias or myalgia,  arthralgias, joint stiffness, joint swelling and joint warmth   NEURO: NEGATIVE for weakness, dizziness or paresthesias    Physical Exam:  Temp:  [97.9  F (36.6  C)-98.2  F (36.8  C)] 97.9  F (36.6  C)  Pulse:  [80-96] 80  Resp:  [18-20] 18  BP: (128-138)/(54-84) 128/54  SpO2:  [97 %-100 %] 100 %    Exam:  Constitutional: healthy, alert and no distress  Head: Normocephalic. No masses, lesions, tenderness or abnormalities  Cardiovascular: negative, No edema or JVD.  Respiratory: negative  Gastrointestinal: negative, Abdomen soft, non-tender.   Musculoskeletal: Left hand is edematous with erythema from the PIP joints to the medial aspect of the upper arm.  Erythema is outlined with black marker and has receded from  this line.  Puncture bite is present on the volar aspect of the second webspace.  No drainage or purulence. Fingers show stiff ROM due to edema.    Neurologic: negative  Psychiatric: mentation appears normal and affect normal/bright       Pertinent Labs  Lab Results: personally reviewed.  Lab Results   Component Value Date    WBC 12.4 (H) 05/20/2018    HGB 13.3 05/20/2018    HCT 37.7 05/20/2018    MCV 89 05/20/2018     05/20/2018     No results for input(s): INR in the last 168 hours.    Pertinent Radiology  Radiology Results: images and radiology report reviewed  Recent Results (from the past 24 hour(s))   XR Hand Left G/E 3 Views    Narrative    XR HAND LT G/E 3 VW 5/20/2018 6:19 AM    HISTORY: Dogbite.    COMPARISON: None.      Impression    IMPRESSION: No evidence of acute fracture or malalignment.  Degenerative changes of the first carpometacarpal joint.    RODRI DAWSON MD       Attestation:  I have reviewed today's vital signs, notes, medications, labs and imaging.     Faby Simpson

## 2018-05-20 NOTE — ED PROVIDER NOTES
History     Chief Complaint   Patient presents with     Dog Bite     Wound Infection     HPI  Yaritza Bradley is a 62 year old female who presents for evaluation of swollen painful left hand.  The patient reports that about 24 hours ago she was bit by her neighbor's puppy in the left hand.  The dog's shots are reportedly up-to-date.  She started having increased pain in the left hand over the past several hours with redness, swelling, and then streaking erythema up the arm to the axilla.  Pain is aching, moderate severity.  She has not taking anything for symptoms.  She notes a puncture wound to the palmar aspect of the left hand at the base of the index finger.  No numbness or tingling.  She denies fever, chills, body aches, nausea, vomiting, abdominal pain.  She does have a history of rheumatoid arthritis and is on Humira and methotrexate for this.    Problem List:    Patient Active Problem List    Diagnosis Date Noted     Post-operative nausea and vomiting 07/14/2017     Priority: Medium     Light headedness 01/26/2017     Priority: Medium     Gastroesophageal reflux disease without esophagitis 01/26/2017     Priority: Medium     Acetabular labrum tear, right, initial encounter 04/26/2016     Priority: Medium     Trochanteric bursitis of right hip 04/26/2016     Priority: Medium     Femoral acetabular impingement, right 04/26/2016     Priority: Medium     Primary osteoarthritis of right hip, end stage DJD 04/26/2016     Priority: Medium     Memory loss or impairment 07/07/2014     Priority: Medium     Moderate recurrent major depression (H) 07/07/2012     Priority: Medium     Generalized anxiety disorder 04/10/2012     Priority: Medium     Diagnosis updated by automated process. Provider to review and confirm.       GERD (gastroesophageal reflux disease) 03/17/2011     Priority: Medium     CARDIOVASCULAR SCREENING; LDL GOAL LESS THAN 160 10/31/2010     Priority: Medium     Disorder of bone and cartilage  02/20/2007     Priority: Medium     H/o osteopenia; previously on Actonel           Rheumatoid arthritis (H) 04/26/2006     Priority: Medium     Celine Moreno  Problem list name updated by automated process. Provider to review          Past Medical History:    Past Medical History:   Diagnosis Date     Acute sinusitis, unspecified      Hemorrhage of rectum and anus 2002     Myalgia and myositis, unspecified      Osteoarthritis      Post-operative nausea and vomiting 7/14/2017     Rheumatoid arthritis(714.0) 1996       Past Surgical History:    Past Surgical History:   Procedure Laterality Date     COLONOSCOPY N/A 10/14/2016    Procedure: COLONOSCOPY;  Surgeon: Gerson Douglas MD;  Location: [facility]     ESOPHAGOSCOPY, GASTROSCOPY, DUODENOSCOPY (EGD), COMBINED  8/19/2011    Procedure:COMBINED ESOPHAGOSCOPY, GASTROSCOPY, DUODENOSCOPY (EGD), BIOPSY SINGLE OR MULTIPLE; Gastroscopy with biopsy; Surgeon:FARZAD GARCIA; Location:[facility]     ESOPHAGOSCOPY, GASTROSCOPY, DUODENOSCOPY (EGD), COMBINED  7/28/2014    Procedure: COMBINED ESOPHAGOSCOPY, GASTROSCOPY, DUODENOSCOPY (EGD), BIOPSY SINGLE OR MULTIPLE;  Surgeon: Marshall Martinez MD;  Location: [facility]     HC DILATION/CURETTAGE DIAG/THER NON OB  2001    for uterine fibroids     OPEN REDUCTION INTERNAL FIXATION ELBOW Right 1/22/2016    right THR     ROTATOR CUFF REPAIR RT/LT  2009    left       Family History:    Family History   Problem Relation Age of Onset     C.A.D. Father      C.A.D. Paternal Grandfather      C.A.D. Brother      Alcohol/Drug Brother      Alzheimer Disease Maternal Grandmother      Alzheimer Disease Maternal Grandfather      Alzheimer Disease Paternal Grandmother      Colon Cancer Mother        Social History:  Marital Status:   [2]  Social History   Substance Use Topics     Smoking status: Never Smoker     Smokeless tobacco: Never Used     Alcohol use Yes      Comment: 1 wine a month hardly        Medications:      B-D  "INSULIN SYRINGE 25G X 5/8\" 1 ML MISC   estradiol (ESTRACE) 0.5 MG tablet   FLUoxetine (PROZAC) 40 MG capsule   FOLIC ACID 1 MG PO TABS   HUMIRA PEN 40 MG/0.8ML injection   Ibandronate Sodium (BONIVA IV)   Methotrexate, Anti-Rheumatic, (METHOTREXATE, PF, SC)   Omega-3 Fatty Acids (OMEGA-3 FISH OIL PO)   RESTASIS 0.05 % ophthalmic emulsion   SM CALCIUM SOFT CHEWS 500-100-40 OR CHEW   TURMERIC PO         Review of Systems  Pertinent positives and negatives listed in the HPI, all other systems reviewed and are negative.    Physical Exam   BP: 138/84  Pulse: 96  Temp: 98.2  F (36.8  C)  Resp: 20  SpO2: 97 %      Physical Exam   Constitutional: She is oriented to person, place, and time. She appears well-developed and well-nourished. She appears distressed.   HENT:   Head: Normocephalic and atraumatic.   Right Ear: External ear normal.   Left Ear: External ear normal.   Nose: Nose normal.   Eyes: Conjunctivae are normal. No scleral icterus.   Neck: Normal range of motion.   Cardiovascular: Normal rate and regular rhythm.    Pulmonary/Chest: Effort normal. No stridor. No respiratory distress.   Abdominal: Soft. She exhibits no distension.   Neurological: She is alert and oriented to person, place, and time.   Skin: Skin is warm and dry. She is not diaphoretic.   Left hand: Small puncture wound to the palmar aspect of the left hand at the base of the index finger with erythema, swelling, and streaking erythema up the arm to the axilla.  The left index finger is swollen over the proximal phalanx but she has good range of motion.   Psychiatric: She has a normal mood and affect. Her behavior is normal.   Nursing note and vitals reviewed.      ED Course     ED Course     Procedures               Critical Care time:  none               Results for orders placed or performed during the hospital encounter of 05/20/18 (from the past 24 hour(s))   Basic metabolic panel   Result Value Ref Range    Sodium 134 133 - 144 mmol/L    " Potassium 3.8 3.4 - 5.3 mmol/L    Chloride 101 94 - 109 mmol/L    Carbon Dioxide 27 20 - 32 mmol/L    Anion Gap 6 3 - 14 mmol/L    Glucose 98 70 - 99 mg/dL    Urea Nitrogen 12 7 - 30 mg/dL    Creatinine 0.63 0.52 - 1.04 mg/dL    GFR Estimate >90 >60 mL/min/1.7m2    GFR Estimate If Black >90 >60 mL/min/1.7m2    Calcium 8.6 8.5 - 10.1 mg/dL   CBC with platelets differential   Result Value Ref Range    WBC 12.4 (H) 4.0 - 11.0 10e9/L    RBC Count 4.25 3.8 - 5.2 10e12/L    Hemoglobin 13.3 11.7 - 15.7 g/dL    Hematocrit 37.7 35.0 - 47.0 %    MCV 89 78 - 100 fl    MCH 31.3 26.5 - 33.0 pg    MCHC 35.3 31.5 - 36.5 g/dL    RDW 13.0 10.0 - 15.0 %    Platelet Count 217 150 - 450 10e9/L    Diff Method Automated Method     % Neutrophils 75.2 %    % Lymphocytes 14.1 %    % Monocytes 9.7 %    % Eosinophils 0.6 %    % Basophils 0.2 %    % Immature Granulocytes 0.2 %    Absolute Neutrophil 9.3 (H) 1.6 - 8.3 10e9/L    Absolute Lymphocytes 1.8 0.8 - 5.3 10e9/L    Absolute Monocytes 1.2 0.0 - 1.3 10e9/L    Absolute Eosinophils 0.1 0.0 - 0.7 10e9/L    Absolute Basophils 0.0 0.0 - 0.2 10e9/L    Abs Immature Granulocytes 0.0 0 - 0.4 10e9/L   XR Hand Left G/E 3 Views    Narrative    XR HAND LT G/E 3 VW 5/20/2018 6:19 AM    HISTORY: Dogbite.    COMPARISON: None.      Impression    IMPRESSION: No evidence of acute fracture or malalignment.  Degenerative changes of the first carpometacarpal joint.    RODRI DAWSON MD       Medications   piperacillin-tazobactam (ZOSYN) infusion 3.375 g (0 g Intravenous Stopped 5/20/18 0627)   lactated ringers BOLUS 1,000 mL (0 mLs Intravenous ED Infusing on Admission/transfer 5/20/18 0627)   Tdap (tetanus-diphtheria-acell pertussis) (ADACEL) injection 0.5 mL (0.5 mLs Intramuscular Given 5/20/18 0629)       Assessments & Plan (with Medical Decision Making)   62-year-old female who presents for pain and swelling of the left hand after a dog bite.  Temperature is 36.8 C, blood pressure 138/84, heart rate 96, SPO2  is 98% on room air.  X-ray of the head obtained, images reviewed independently as well as radiology read reviewed, no signs of retained foreign body or fracture.  No sensation of abscess on examination.  There is definitely cellulitis with lymphatic spreading up the arm.  Given her history of rheumatoid arthritis being treated with Humira and methotrexate this is very concerning.  While she does have a swollen digit, there are no definite signs of infectious tenosynovitis at this time.  Blood cultures are drawn, she does have an elevated white blood cell count of 12.  She is started on IV piperacillin tazobactam for treatment.  Given her rapid progression of symptoms she will be admitted to the medicine service for further treatment.  I have discussed the case with the on-call hospitalist, Dr. Jackson who agrees to admit the patient to his service.    I have reviewed the nursing notes.    I have reviewed the findings, diagnosis, plan and need for follow up with the patient.       New Prescriptions    No medications on file       Final diagnoses:   Infected dog bite       5/20/2018   [facility] EMERGENCY DEPARTMENT     Rebel Whitlock MD  05/20/18 0687

## 2018-05-20 NOTE — H&P
[facility] History and Physical    Yaritza Bradley MRN# [id number]   Age: 62 year old YOB: 1955     Date of Admission:  5/20/2018    Home clinic: [facility]  Primary care provider: Karla Wade          Chief Complaint:   Dog bite with wound infection    History is obtained from the patient          History of Present Illness:   This patient is a 62 year old  female with a significant past medical history of RA  who presents with dog bite with erethema/ pain in L hand this morning. erethema increased and spread up to L axilla  In a streak pattern. Hand swollen and tender--unable to flex fingers. No fever.           Past Medical History:     Patient Active Problem List    Diagnosis Date Noted     Cellulitis 05/20/2018     Priority: Medium     Post-operative nausea and vomiting 07/14/2017     Priority: Medium     Light headedness 01/26/2017     Priority: Medium     Gastroesophageal reflux disease without esophagitis 01/26/2017     Priority: Medium     Acetabular labrum tear, right, initial encounter 04/26/2016     Priority: Medium     Trochanteric bursitis of right hip 04/26/2016     Priority: Medium     Femoral acetabular impingement, right 04/26/2016     Priority: Medium     Primary osteoarthritis of right hip, end stage DJD 04/26/2016     Priority: Medium     Memory loss or impairment 07/07/2014     Priority: Medium     Moderate recurrent major depression (H) 07/07/2012     Priority: Medium     Generalized anxiety disorder 04/10/2012     Priority: Medium     Diagnosis updated by automated process. Provider to review and confirm.       GERD (gastroesophageal reflux disease) 03/17/2011     Priority: Medium     CARDIOVASCULAR SCREENING; LDL GOAL LESS THAN 160 10/31/2010     Priority: Medium     Disorder of bone and cartilage 02/20/2007     Priority: Medium     H/o osteopenia; previously on Actonel           Rheumatoid arthritis (H) 04/26/2006     Priority: Medium      Celine Moreno  Problem list name updated by automated process. Provider to review                 Past Surgical History:      Past Surgical History:   Procedure Laterality Date     COLONOSCOPY N/A 10/14/2016    Procedure: COLONOSCOPY;  Surgeon: Gerson Douglas MD;  Location: [facility]     ESOPHAGOSCOPY, GASTROSCOPY, DUODENOSCOPY (EGD), COMBINED  8/19/2011    Procedure:COMBINED ESOPHAGOSCOPY, GASTROSCOPY, DUODENOSCOPY (EGD), BIOPSY SINGLE OR MULTIPLE; Gastroscopy with biopsy; Surgeon:FARZAD GARCIA; Location:[facility]     ESOPHAGOSCOPY, GASTROSCOPY, DUODENOSCOPY (EGD), COMBINED  7/28/2014    Procedure: COMBINED ESOPHAGOSCOPY, GASTROSCOPY, DUODENOSCOPY (EGD), BIOPSY SINGLE OR MULTIPLE;  Surgeon: Marshall Martinez MD;  Location: [facility]     HC DILATION/CURETTAGE DIAG/THER NON OB  2001    for uterine fibroids     OPEN REDUCTION INTERNAL FIXATION ELBOW Right 1/22/2016    right THR     ROTATOR CUFF REPAIR RT/LT  2009    left             Social History:     Social History     Social History     Marital status:      Spouse name: N/A     Number of children: N/A     Years of education: N/A     Occupational History     Not on file.     Social History Main Topics     Smoking status: Never Smoker     Smokeless tobacco: Never Used     Alcohol use Yes      Comment: 1 wine a month hardly     Drug use: No     Sexual activity: No     Other Topics Concern      Service No     Blood Transfusions No     Caffeine Concern Yes     3-4 cups a day coffee and pop     Occupational Exposure No     Hobby Hazards No     Sleep Concern Yes     Stress Concern Yes     Weight Concern No     fluctuates up and down     Special Diet Yes     multi vit calcium and vit D chewable 3 a day     Back Care No     Exercise Yes     walk     Bike Helmet Yes     Seat Belt Yes     Self-Exams No     Patient does not do self breast exams, pamphlet given-instructions     Parent/Sibling W/ Cabg, Mi Or Angioplasty Before 65f 55m? Yes      "Father-54 Bypass     Social History Narrative             Family History:     Family History   Problem Relation Age of Onset     C.A.D. Father      C.A.D. Paternal Grandfather      C.A.D. Brother      Alcohol/Drug Brother      Alzheimer Disease Maternal Grandmother      Alzheimer Disease Maternal Grandfather      Alzheimer Disease Paternal Grandmother      Colon Cancer Mother              Allergies:     Allergies   Allergen Reactions     Nka [No Known Allergies]              Medications:     Prior to Admission medications    Medication Sig Last Dose Taking? Auth Provider   B-D INSULIN SYRINGE 25G X 5/8\" 1 ML MISC TO BE USED IN WEEKLY METHOTREXATE INJECTIONS 5/19/2018 at Unknown time Yes Reported, Patient   estradiol (ESTRACE) 0.5 MG tablet Place vaginally at bedtime x 5 nights then twice a week 5/19/2018 at Unknown time Yes Melany Katz MD   FLUoxetine (PROZAC) 40 MG capsule Take 1 capsule (40 mg) by mouth daily 5/19/2018 at Unknown time Yes Melany Katz MD   FOLIC ACID 1 MG PO TABS 1 TABLET DAILY 5/19/2018 at Unknown time Yes Reported, Patient   HUMIRA PEN 40 MG/0.8ML injection Inject 40 mg Subcutaneous once a week 5/19/2018 at Unknown time Yes Reported, Patient   Ibandronate Sodium (BONIVA IV) Inject into the vein every 3 months 5/19/2018 at Unknown time Yes Reported, Patient   Methotrexate, Anti-Rheumatic, (METHOTREXATE, PF, SC) Inject 0.5 mLs Subcutaneous once a week 5/19/2018 at Unknown time Yes Reported, Patient   Omega-3 Fatty Acids (OMEGA-3 FISH OIL PO) Take by mouth daily  5/19/2018 at Unknown time Yes Reported, Patient   RESTASIS 0.05 % ophthalmic emulsion Place 1 drop into both eyes 2 times daily 5/19/2018 at Unknown time Yes Reported, Patient   SM CALCIUM SOFT CHEWS 500-100-40 OR CHEW CHEW 2-3 ORALLY DAILY 5/19/2018 at Unknown time Yes Karla Wade MD   TURMERIC PO Take 1,000 mg by mouth daily  5/19/2018 at Unknown time Yes Reported, Patient            Review of Systems: " "  The Review of Systems is negative in ALL other than noted in the HPI          Physical Exam:   Blood pressure 128/54, pulse 80, temperature 97.9  F (36.6  C), temperature source Oral, resp. rate 18, height 1.626 m (5' 4\"), weight 57.5 kg (126 lb 12.2 oz), SpO2 100 %, not currently breastfeeding.  GENERAL APPEARANCE: healthy, alert and no distress  EYES: conjunctiva clear, eyes grossly normal  HENT: external ears and nose normal   NECK: supple, no masses or adenopathy  RESP: lungs clear to auscultation - no rales, rhonchi or wheezes  CV: regular rate and rhythm, normal S1 S2, no S3 or S4 and no murmur, click or rub   ABDOMEN: soft, nontender, no HSM or masses and bowel sounds normal  MS: no clubbing, cyanosis; dorsum L hand edema-erethema dorsum of hand --up forearm/ upper arm in streak up to axilla. Tender LN. Unable to completely flex digits. Puncture wound on palmar side near thumb.  SKIN: clear without significant rashes or lesions  NEURO: Normal strength and tone, sensory exam grossly normal, mentation intact and speech normal         Data:     Lab Results   Component Value Date    WBC 12.4 (H) 05/20/2018    HGB 13.3 05/20/2018    HCT 37.7 05/20/2018    MCV 89 05/20/2018     05/20/2018     Lab Results   Component Value Date     05/20/2018    CO2 27 05/20/2018     Lab Results   Component Value Date    BUN 12 05/20/2018     No components found for: SEDRATE  No components found for: DDIMER  No results found for: BNP  Lab Results   Component Value Date    TSH 1.60 05/27/2014     No results found for: TROPONIN  UA RESULTS:  No results for input(s): COLOR, APPEARANCE, URINEGLC, URINEBILI, URINEKETONE, SG, UBLD, URINEPH, PROTEIN, UROBILINOGEN, NITRITE, LEUKEST, RBCU, WBCU in the last 25911 hours.  Liver Function Studies -   Recent Labs   Lab Test  04/09/18   1302   07/07/14   1303   PROTTOTAL   --    --   6.7*   ALBUMIN  3.6   < >  4.0   BILITOTAL   --    --   0.9   ALKPHOS   --    --   117   AST  19   < " >  37   ALT  24   < >  35    < > = values in this interval not displayed.       EKG results: none    RADIOLOGY:  Xray hand L-IMPRESSION: No evidence of acute fracture or malalignment.  Degenerative changes of the first carpometacarpal joint.     A/P  INFECTED DOG BITE WITH CELLULITIS/ LYMPHANGITIS  Started on zosyn in ED. WBC 12.4. Afebrile.   -continue zosyn. Add clinda for better anaerobic coverage. If not iproving in a day or so, needs ortho consult.    RA  Continue meds OP    DEPRESSION  Continue meds    HRT  Continue meds    DISPO  Home in 1-2 days when improved.     DVT PROF-low risk--not needed     Jignesh Groves MD  [phone]

## 2018-05-21 VITALS
OXYGEN SATURATION: 98 % | SYSTOLIC BLOOD PRESSURE: 134 MMHG | BODY MASS INDEX: 21.64 KG/M2 | RESPIRATION RATE: 16 BRPM | HEART RATE: 83 BPM | HEIGHT: 64 IN | WEIGHT: 126.76 LBS | TEMPERATURE: 98.8 F | DIASTOLIC BLOOD PRESSURE: 56 MMHG

## 2018-05-21 LAB
ALBUMIN SERPL-MCNC: 3 G/DL (ref 3.4–5)
ALP SERPL-CCNC: 54 U/L (ref 40–150)
ALT SERPL W P-5'-P-CCNC: 20 U/L (ref 0–50)
ANION GAP SERPL CALCULATED.3IONS-SCNC: 4 MMOL/L (ref 3–14)
AST SERPL W P-5'-P-CCNC: 16 U/L (ref 0–45)
BILIRUB SERPL-MCNC: 0.5 MG/DL (ref 0.2–1.3)
BUN SERPL-MCNC: 8 MG/DL (ref 7–30)
CALCIUM SERPL-MCNC: 7.7 MG/DL (ref 8.5–10.1)
CHLORIDE SERPL-SCNC: 111 MMOL/L (ref 94–109)
CO2 SERPL-SCNC: 24 MMOL/L (ref 20–32)
CREAT SERPL-MCNC: 0.56 MG/DL (ref 0.52–1.04)
ERYTHROCYTE [DISTWIDTH] IN BLOOD BY AUTOMATED COUNT: 13.2 % (ref 10–15)
GFR SERPL CREATININE-BSD FRML MDRD: >90 ML/MIN/1.7M2
GLUCOSE SERPL-MCNC: 99 MG/DL (ref 70–99)
HCT VFR BLD AUTO: 33.7 % (ref 35–47)
HGB BLD-MCNC: 11.8 G/DL (ref 11.7–15.7)
MCH RBC QN AUTO: 31.1 PG (ref 26.5–33)
MCHC RBC AUTO-ENTMCNC: 35 G/DL (ref 31.5–36.5)
MCV RBC AUTO: 89 FL (ref 78–100)
PLATELET # BLD AUTO: 168 10E9/L (ref 150–450)
POTASSIUM SERPL-SCNC: 4.1 MMOL/L (ref 3.4–5.3)
PROT SERPL-MCNC: 5.7 G/DL (ref 6.8–8.8)
RBC # BLD AUTO: 3.79 10E12/L (ref 3.8–5.2)
SODIUM SERPL-SCNC: 139 MMOL/L (ref 133–144)
WBC # BLD AUTO: 6.3 10E9/L (ref 4–11)

## 2018-05-21 PROCEDURE — 80053 COMPREHEN METABOLIC PANEL: CPT | Performed by: FAMILY MEDICINE

## 2018-05-21 PROCEDURE — 36415 COLL VENOUS BLD VENIPUNCTURE: CPT | Performed by: FAMILY MEDICINE

## 2018-05-21 PROCEDURE — 99239 HOSP IP/OBS DSCHRG MGMT >30: CPT | Performed by: PHYSICIAN ASSISTANT

## 2018-05-21 PROCEDURE — 25000132 ZZH RX MED GY IP 250 OP 250 PS 637: Performed by: FAMILY MEDICINE

## 2018-05-21 PROCEDURE — 25000128 H RX IP 250 OP 636: Performed by: FAMILY MEDICINE

## 2018-05-21 PROCEDURE — 85027 COMPLETE CBC AUTOMATED: CPT | Performed by: FAMILY MEDICINE

## 2018-05-21 PROCEDURE — 25000128 H RX IP 250 OP 636: Performed by: INTERNAL MEDICINE

## 2018-05-21 PROCEDURE — 25000125 ZZHC RX 250: Performed by: INTERNAL MEDICINE

## 2018-05-21 RX ADMIN — FLUOXETINE 40 MG: 20 CAPSULE ORAL at 08:16

## 2018-05-21 RX ADMIN — CLINDAMYCIN PHOSPHATE 600 MG: 12 INJECTION, SOLUTION INTRAVENOUS at 03:37

## 2018-05-21 RX ADMIN — AMPICILLIN SODIUM AND SULBACTAM SODIUM 3 G: 2; 1 INJECTION, POWDER, FOR SOLUTION INTRAMUSCULAR; INTRAVENOUS at 00:30

## 2018-05-21 RX ADMIN — AMPICILLIN SODIUM AND SULBACTAM SODIUM 3 G: 2; 1 INJECTION, POWDER, FOR SOLUTION INTRAMUSCULAR; INTRAVENOUS at 07:00

## 2018-05-21 RX ADMIN — CLINDAMYCIN PHOSPHATE 600 MG: 12 INJECTION, SOLUTION INTRAVENOUS at 11:31

## 2018-05-21 RX ADMIN — SODIUM CHLORIDE: 9 INJECTION, SOLUTION INTRAVENOUS at 11:31

## 2018-05-21 RX ADMIN — AMPICILLIN SODIUM AND SULBACTAM SODIUM 3 G: 2; 1 INJECTION, POWDER, FOR SOLUTION INTRAMUSCULAR; INTRAVENOUS at 13:34

## 2018-05-21 NOTE — DISCHARGE SUMMARY
[facility]    Discharge Summary  Hospital Medicine    Date of Admission:  5/20/2018  Date of Discharge:  5/21/2018   Discharging Provider: Mattie Baumann  Date of Service: 5/21/2018      Primary Care     Mauro Karla Vidaly  [address] [address]      Identification and Chief Complaint: Yaritza Bradley is a 62 year old female who presented on 5/20/2018 with complaint of swelling, redness, and pain in her left hand after a dog bite.    Discharge Diagnoses       Cellulitis    Rheumatoid arthritis (H)    Generalized anxiety disorder    Moderate recurrent major depression (H)    Dog bite of hand      Discharge Disposition   Discharged to home    Discharge Orders     Follow-up and recommended labs and tests   Follow up with  Faby Simpson PA-C or Dr. Jeffrey Andrade at  [facility], within 1 week for hospital follow- up.     When to contact your care team   Call your Orthopedic surgeon at [facility]  if you have any of the following: temperature greater than 100.4,  increased shortness of breath, increased drainage, increased swelling or increased pain.     Reason for your hospital stay   You were hospitalized for treatment of a soft tissue infection (cellulitis) due to a dog bite. You needed IV antibiotics to initially treat the infection, but will be transitioned to an oral antibiotic called Augmentin to take for 10 days when you return home.           Further instructions from your care team       Orthopedic Surgery Discharge Instructions    1. Follow up:  Follow up with Faby Simpson PA-C or Dr. Jeffrey Swann  Within 1 week for re-check of your hand.  Call [phone] if appointment needed or questions. If you have questions or concerns while at home, please call [facility]. If it is an emergency, call [phone].                   Discharge Medications   Current Discharge Medication List      START taking these medications    Details  "  amoxicillin-clavulanate (AUGMENTIN) 875-125 MG per tablet Take 1 tablet by mouth 2 times daily  Qty: 28 tablet, Refills: 0    Associated Diagnoses: Open bite of left hand without foreign body, initial encounter         CONTINUE these medications which have NOT CHANGED    Details   B-D INSULIN SYRINGE 25G X 5/8\" 1 ML MISC TO BE USED IN WEEKLY METHOTREXATE INJECTIONS  Refills: 1      estradiol (ESTRACE) 0.5 MG tablet Place vaginally at bedtime x 5 nights then twice a week  Qty: 30 tablet, Refills: 3    Associated Diagnoses: Atrophic vaginitis      FLUoxetine (PROZAC) 40 MG capsule Take 1 capsule (40 mg) by mouth daily  Qty: 90 capsule, Refills: 3    Associated Diagnoses: Moderate recurrent major depression (H)      FOLIC ACID 1 MG PO TABS 1 TABLET DAILY      HUMIRA PEN 40 MG/0.8ML injection Inject 40 mg Subcutaneous every 14 days       Ibandronate Sodium (BONIVA IV) Inject into the vein every 3 months      Methotrexate, Anti-Rheumatic, (METHOTREXATE, PF, SC) Inject 0.5 mLs Subcutaneous once a week On Tuesday      Omega-3 Fatty Acids (OMEGA-3 FISH OIL PO) Take 1 g by mouth daily (DRY EYE OMEGA BENEFITS) 667-250 mg-unit cap      RESTASIS 0.05 % ophthalmic emulsion Place 1 drop into both eyes 2 times daily      SM CALCIUM SOFT CHEWS 500-100-40 OR CHEW CHEW 2-3 ORALLY DAILY  Refills: 0      TURMERIC PO Take 1,000 mg by mouth daily       UNKNOWN TO PATIENT Apply topically every evening Prescription ointment for hip pain           Allergies   Allergies   Allergen Reactions     Nka [No Known Allergies]        Consultations This Hospital Stay   Consultation during this admission received from orthopedics    ORTHOPEDIC SURGERY IP CONSULT    Significant Results and Procedures   Procedures    None    Data   Results for orders placed or performed during the hospital encounter of 05/20/18   XR Hand Left G/E 3 Views    Narrative    XR HAND LT G/E 3 VW 5/20/2018 6:19 AM    HISTORY: Dogbite.    COMPARISON: None.      Impression    " IMPRESSION: No evidence of acute fracture or malalignment.  Degenerative changes of the first carpometacarpal joint.    RODRI DAWSON MD       History of Present Illness   (From H&P)     This patient is a 62 year old  female with a significant past medical history of RA  who presents with dog bite with erythema/ pain in L hand this morning. erythema increased and spread up to L axilla  In a streak pattern. Hand swollen and tender--unable to flex fingers. No fever.    Hospital Course   Yaritza Bradley was admitted on 5/20/2018.  The following problems were addressed during her hospitalization:    INFECTED DOG BITE WITH CELLULITIS/ LYMPHANGITIS  Dog bite occurred on 5/20/18 as patient was  2 dogs who were fighting (one was her dog, and the dog who bit her was the neighbor's dog). Acquired a puncture wound of the palmar aspect of left hand between 2nd and 3rd fingers. Developed profound swelling, erythema, and pain overnight, prompting emergency department evaluation on 5/21. Erythema with streaking almost up to her axillae upon presentation. Presented with WBC 12.4, afebrile. Hand x-ray without fracture or malalignment. Started on zosyn in emergency department, later transitioned to Unasyn and clindamycin. Tdap vaccine was administered during hospitalization. Orthopedic surgery was consulted and followed patient during hospitalization. Her leukocytosis resolved on day of discharge (WBC 6.3), afebrile during hospital course. Area of erythema was outlined upon admission, erythema had nearly fully resolved on day of discharge. Distal CMS intact. Patient did not require narcotic medications for pain management during hospitalization, no narcotics were prescribed upon discharge. Patient stable to discharge on a 10 day course of Augmentin. Patient instructed to follow-up with [facility] within 7 days.     Rheumatoid arthritis  Patient taking methotrexate and Humira prior to admission. She was not  due for administration of either medication during her hospital stay. Medications were resumed unchanged upon discharge.     DEPRESSION  Previously diagnosed. Taking Prozac prior to admission which was continued during hospitalization and upon discharge.     Hormone replacement therapy  Patient taking estrace prior to admission which was held during hospitalization and resumed upon discharge.    Dry eyes  Patient uses Restasis prior to admission which was held during hospitalization and resumed upon discharge.    Osteopenia  Patient gets Boniva injections q 3 months. No injections required during hospitalization, medication resumed upon discharge.      Pending Results   Unresulted Labs Ordered in the Past 30 Days of this Admission     Date and Time Order Name Status Description    5/20/2018 0532 Blood culture Preliminary     5/20/2018 0532 Blood culture Preliminary           Physical Exam   Temp:  [96.8  F (36  C)-98.8  F (37.1  C)] 98.8  F (37.1  C)  Pulse:  [70-84] 83  Resp:  [16-18] 16  BP: (109-134)/(52-66) 134/56  SpO2:  [98 %-100 %] 98 %  Vitals:    05/20/18 0639   Weight: 57.5 kg (126 lb 12.2 oz)       General appearance: Awake, alert, and in no acute distress.   HEENT: Moist mucus membranes, no exudate. No scleral icterus.  CV: Regular rhythm & rate, no murmurs. No edema, dorsalis pedis pulses 2+.  Respiratory: Moving air well bilaterally, no wheezing, crackles, or rhonchi.  GI: Non-distended, soft, nontender to palpation. Normoactive bowel sounds.  Skin: Warm and dry. Left hand with small puncture wound on palmar aspect of left hand between 2nd and 3rd metacarpal with approximately 2 cm area of surrounding erythema and edema. No exudate from wound. Patient's fingers and palm are slightly edematous, especially the 2nd, 3rd, and 4th digits. Marking is present that outlined the previous area of erythema upon admission - erythema is almost entirely resolved with exception of area surrounding puncture wound on  palm.   Musculoskeletal / extremities: Moves all extremities equally, no obvious abnormalities. Distal CMS of bilateral hands is intact.  Neurologic: No focal deficits.      The discharge plan was discussed with the patient and Dr. Baez.    Total time on this discharge was greater than 30 minutes with more than 50% time spent in coordination of care.    Mattie Baumann PA-C  [place]  Pager: [phone]

## 2018-05-21 NOTE — PLAN OF CARE
Problem: Patient Care Overview  Goal: Plan of Care/Patient Progress Review  Outcome: Improving  Patient reports only minimal discomfort with movement, declines pain medication.  Swelling and redness have significantly decreased, receiving IV Antibiotics.  Up independently, states that she would like to go home today.

## 2018-05-21 NOTE — PLAN OF CARE
"Problem: Infection, Risk/Actual (Adult)  Goal: Infection Prevention/Resolution  Patient will demonstrate the desired outcomes by discharge/transition of care.   Outcome: Improving  Pt alert, oriented, independent. Has mild pain in hand, denies need for pain medications. IV antibiotics give. Redness has decreased significantly overnight is within marked lines. Hand is edematous +2. Denies nausea, SOB. NaCl running at 100 ml/hr. LS clear. /55  Pulse 70  Temp 97.7  F (36.5  C) (Oral)  Resp 18  Ht 1.626 m (5' 4\")  Wt 57.5 kg (126 lb 12.2 oz)  SpO2 99%  BMI 21.76 kg/m2        "

## 2018-05-21 NOTE — PROGRESS NOTES
[facility] DISCHARGE NOTE    Patient discharged to home at 4:32 PM via ambulation. Accompanied by spouse and staff. Discharge instructions reviewed with patient and spouse, opportunity offered to ask questions. Prescriptions sent to patients preferred pharmacy. All belongings sent with patient.    Delphine Deluna RN

## 2018-05-21 NOTE — PROGRESS NOTES
Ortho    No acute events overnight.  Pain and erythema markedly improving  Hasn't had fevers or chills    AFVSS  Pleasant, NAD  Nonlabored breathing  Left hand with two puncture wounds without erythema  Full motion of all fingers without significant pain  Mild swelling in fingers    IMPRESSION:   1.  Left hand dog bite 5.19.18 -- responding well to zosyn    PLAN:   --Cont elevating left hand and using fingers as much as possible to minimize risk of stiffness   --Given her dramatic improvement, think it's safe to discharge home today on oral antibiotics.   --Recommend augmentin bid x 10d   --RTC later this week vs early next week for follow up    Jared Fitch MD

## 2018-05-21 NOTE — PHARMACY - DISCHARGE MEDICATION RECONCILIATION
"Discharge medication review for this patient is complete. Pharmacist assisted with medication reconciliation of discharge medications with prior to admission medications.     The following changes were made to the discharge medication list based on pharmacist review:  Added:  augmentin  Discontinued: NA  Changed: NA      Patient's Discharge Medication List  - medications as listed on After Visit Summary (AVS)     Review of your medicines      START taking       Dose / Directions    amoxicillin-clavulanate 875-125 MG per tablet   Commonly known as:  AUGMENTIN   Used for:  Open bite of left hand without foreign body, initial encounter        Dose:  1 tablet   Take 1 tablet by mouth 2 times daily   Quantity:  28 tablet   Refills:  0         CONTINUE these medicines which have NOT CHANGED       Dose / Directions    B-D INSULIN SYRINGE 25G X 5/8\" 1 ML Misc   Generic drug:  Insulin Syringe-Needle U-100        TO BE USED IN WEEKLY METHOTREXATE INJECTIONS   Refills:  1       BONIVA IV        Inject into the vein every 3 months   Refills:  0       estradiol 0.5 MG tablet   Commonly known as:  ESTRACE   Used for:  Atrophic vaginitis        Place vaginally at bedtime x 5 nights then twice a week   Quantity:  30 tablet   Refills:  3       FLUoxetine 40 MG capsule   Commonly known as:  PROzac   Used for:  Moderate recurrent major depression (H)        Dose:  40 mg   Take 1 capsule (40 mg) by mouth daily   Quantity:  90 capsule   Refills:  3       folic acid 1 MG tablet   Commonly known as:  FOLVITE        1 TABLET DAILY   Refills:  0       HUMIRA PEN 40 MG/0.8ML injection   Generic drug:  adalimumab        Dose:  40 mg   Inject 40 mg Subcutaneous every 14 days   Refills:  0       METHOTREXATE (PF) SC        Dose:  0.5 mL   Inject 0.5 mLs Subcutaneous once a week On Tuesday   Refills:  0       OMEGA-3 FISH OIL PO   Indication:  dry eyes        Dose:  1 g   Take 1 g by mouth daily (DRY EYE OMEGA BENEFITS) 667-250 mg-unit cap "   Refills:  0       RESTASIS 0.05 % ophthalmic emulsion   Generic drug:  cycloSPORINE        Dose:  1 drop   Place 1 drop into both eyes 2 times daily   Refills:  0       SM CALCIUM SOFT CHEWS 500-100-40 MG-UNT-MCG Chew   Generic drug:  Calcium-Vitamin D-Vitamin K        CHEW 2-3 ORALLY DAILY   Refills:  0       TURMERIC PO        Dose:  1000 mg   Take 1,000 mg by mouth daily   Refills:  0       UNKNOWN TO PATIENT        Apply topically every evening Prescription ointment for hip pain   Refills:  0            Where to get your medicines      These medications were sent to [facility] [place], [place] - [address]  [address] [facility] [address]     Phone:  [phone]      amoxicillin-clavulanate 875-125 MG per tablet

## 2018-05-21 NOTE — DISCHARGE INSTRUCTIONS
Orthopedic Surgery Discharge Instructions    1. Follow up:  Follow up with Faby Simpson PA-C or Dr. Jeffrey Swann  Within 1 week for re-check of your hand.  Call [phone] if appointment needed or questions. If you have questions or concerns while at home, please call [facility]. If it is an emergency, call [phone].

## 2018-05-22 ENCOUNTER — TELEPHONE (OUTPATIENT)
Dept: FAMILY MEDICINE | Facility: CLINIC | Age: 63
End: 2018-05-22

## 2018-05-22 NOTE — TELEPHONE ENCOUNTER
"ED / Discharge Outreach Protocol    Patient Contact    Attempt # 1    Was call answered?  Yes.  \"May I please speak with <patient name>\"  Is patient available?   Yes  Hospital/TCU/ED for chronic condition Discharge Protocol    \"Hi, my name is Kell Helton, a registered nurse, and I am calling from [facility].  I am calling to follow up and see how things are going for you after your recent emergency visit/hospital/TCU stay.\"    Tell me how you are doing now that you are home?\" \"much of the red was gone yesterday. And I can bend fingers and touch palm and everything looks good. No fevers \"      Discharge Instructions    \"Let's review your discharge instructions.  What is/are the follow-up recommendations?  Pt. Response: \"orthopedist, I haven't scheduled yet. I will call for an appt with Dr Swann\"     \"Has an appointment with your primary care provider been scheduled?\"   \"they just said ortho, but I have an appt next week in [facility]. \"    \"When you see the provider, I would recommend that you bring your medications with you.\"    Medications    \"Tell me what changed about your medicines when you discharged?\"    Changes to chronic meds?    0-1    \"What questions do you have about your medications?\"    None     New diagnoses of heart failure, COPD, diabetes, or MI?    No            Medication reconciliation completed? Yes  Was MTM referral placed (*Make sure to put transitions as reason for referral)?   No    Call Summary    \"What questions or concerns do you have about your recent visit and your follow-up care?\"     none    \"If you have questions or things don't continue to improve, we encourage you contact us through the main clinic number [phone] .  Even if the clinic is not open, triage nurses are available 24/7 to help you.     We would like you to know that our clinic has extended hours 7 am to 7 pm some days each week  .  We also have urgent care noon to eight in the ED daily    \"Thank you for your " "time and take care!\"    Kell Helton RNC             "

## 2018-05-26 LAB
BACTERIA SPEC CULT: NO GROWTH
BACTERIA SPEC CULT: NO GROWTH
Lab: NORMAL
Lab: NORMAL
SPECIMEN SOURCE: NORMAL
SPECIMEN SOURCE: NORMAL

## 2018-06-18 ENCOUNTER — OFFICE VISIT (OUTPATIENT)
Dept: CARDIOLOGY | Facility: CLINIC | Age: 63
End: 2018-06-18
Attending: PSYCHIATRY & NEUROLOGY
Payer: COMMERCIAL

## 2018-06-18 VITALS
OXYGEN SATURATION: 99 % | DIASTOLIC BLOOD PRESSURE: 73 MMHG | BODY MASS INDEX: 21.11 KG/M2 | WEIGHT: 123 LBS | HEART RATE: 91 BPM | SYSTOLIC BLOOD PRESSURE: 124 MMHG

## 2018-06-18 DIAGNOSIS — R07.9 CHEST PAIN: Primary | ICD-10-CM

## 2018-06-18 PROCEDURE — 99205 OFFICE O/P NEW HI 60 MIN: CPT | Performed by: INTERNAL MEDICINE

## 2018-06-18 NOTE — LETTER
"6/18/2018    Karla Wade MD  [address] [address]    RE: Yaritza Bradley       Dear Colleague,    I had the pleasure of seeing Yaritza Bradley in the [facility].    HPI and Plan:      Ms Yaritza Bradley was seen for imbalance, fogginess of thinking, orthostasis, non-exertional chest pain and she has rheumatoid arthritis.    She has a family history for premature CAD in her father and brother.    Exam:  Blood pressure 124/73, pulse 91, weight 55.8 kg (123 lb), SpO2 99 %, not currently breastfeeding.  Chest is clear, cardiac with S1, S2.    Assessment/Plan:  1. Lexiscan stress test.  2. Echo given RA.  3. PT for balance therapy.    Orders Placed This Encounter   Procedures     NM Lexiscan stress test     Follow-Up with Cardiologist     Echocardiogram       Orders Placed This Encounter   Medications     diclofenac (VOLTAREN) 1 % GEL topical gel     Sig: Place onto the skin 4 times daily       Medications Discontinued During This Encounter   Medication Reason     amoxicillin-clavulanate (AUGMENTIN) 875-125 MG per tablet      estradiol (ESTRACE) 0.5 MG tablet      UNKNOWN TO PATIENT          Encounter Diagnosis   Name Primary?     Chest pain Yes       CURRENT MEDICATIONS:  Current Outpatient Prescriptions   Medication Sig Dispense Refill     B-D INSULIN SYRINGE 25G X 5/8\" 1 ML MISC TO BE USED IN WEEKLY METHOTREXATE INJECTIONS  1     Cyanocobalamin (B-12) 1000 MCG TBCR Take 1,000 mcg by mouth daily 100 tablet 1     diclofenac (VOLTAREN) 1 % GEL topical gel Place onto the skin 4 times daily       FLUoxetine (PROZAC) 40 MG capsule Take 1 capsule (40 mg) by mouth daily 90 capsule 3     FOLIC ACID 1 MG PO TABS 1 TABLET DAILY       HUMIRA PEN 40 MG/0.8ML injection Inject 40 mg Subcutaneous every 14 days        Ibandronate Sodium (BONIVA IV) Inject into the vein every 3 months       Methotrexate, Anti-Rheumatic, (METHOTREXATE, PF, SC) Inject 0.5 mLs Subcutaneous once a week On " Tuesday       Omega-3 Fatty Acids (OMEGA-3 FISH OIL PO) Take 1 g by mouth daily (DRY EYE OMEGA BENEFITS) 667-250 mg-unit cap       RESTASIS 0.05 % ophthalmic emulsion Place 1 drop into both eyes 2 times daily       SM CALCIUM SOFT CHEWS 500-100-40 OR CHEW CHEW 2-3 ORALLY DAILY  0     TURMERIC PO Take 1,000 mg by mouth daily          ALLERGIES     Allergies   Allergen Reactions     Nka [No Known Allergies]        PAST MEDICAL HISTORY:  Past Medical History:   Diagnosis Date     Acetabular labrum tear, right, initial encounter 4/26/2016     Hemorrhage of rectum and anus 2002     Post-operative nausea and vomiting 7/14/2017     Trochanteric bursitis of right hip 4/26/2016       PAST SURGICAL HISTORY:  Past Surgical History:   Procedure Laterality Date     COLONOSCOPY N/A 10/14/2016    Procedure: COLONOSCOPY;  Surgeon: Gerson Douglas MD;  Location: [facility]     ESOPHAGOSCOPY, GASTROSCOPY, DUODENOSCOPY (EGD), COMBINED  8/19/2011    Procedure:COMBINED ESOPHAGOSCOPY, GASTROSCOPY, DUODENOSCOPY (EGD), BIOPSY SINGLE OR MULTIPLE; Gastroscopy with biopsy; Surgeon:FARZAD GARCIA; Location:[facility]     ESOPHAGOSCOPY, GASTROSCOPY, DUODENOSCOPY (EGD), COMBINED  7/28/2014    Procedure: COMBINED ESOPHAGOSCOPY, GASTROSCOPY, DUODENOSCOPY (EGD), BIOPSY SINGLE OR MULTIPLE;  Surgeon: Marshall Martinez MD;  Location: [facility]     HC DILATION/CURETTAGE DIAG/THER NON OB  2001    for uterine fibroids     OPEN REDUCTION INTERNAL FIXATION ELBOW Right 1/22/2016    right THR     ROTATOR CUFF REPAIR RT/LT  2009    left       FAMILY HISTORY:  Family History   Problem Relation Age of Onset     C.A.D. Father      C.A.D. Paternal Grandfather      C.A.D. Brother      Alcohol/Drug Brother      Alzheimer Disease Maternal Grandmother      Alzheimer Disease Maternal Grandfather      Alzheimer Disease Paternal Grandmother      Colon Cancer Mother        SOCIAL HISTORY:  Social History     Social History     Marital status:      Spouse name:  N/A     Number of children: N/A     Years of education: N/A     Social History Main Topics     Smoking status: Never Smoker     Smokeless tobacco: Never Used     Alcohol use Yes      Comment: 1 wine a month hardly     Drug use: No     Sexual activity: No     Other Topics Concern      Service No     Blood Transfusions No     Caffeine Concern Yes     3-4 cups a day coffee and pop     Occupational Exposure No     Hobby Hazards No     Sleep Concern Yes     Stress Concern Yes     Weight Concern No     fluctuates up and down     Special Diet Yes     multi vit calcium and vit D chewable 3 a day     Back Care No     Exercise Yes     walk     Bike Helmet Yes     Seat Belt Yes     Self-Exams No     Patient does not do self breast exams, pamphlet given-instructions     Parent/Sibling W/ Cabg, Mi Or Angioplasty Before 65f 55m? Yes     Father-[address]     Social History Narrative       Review of Systems:  Skin:  Negative       Eyes:  Positive for glasses    ENT:  Negative      Respiratory:  Positive for shortness of breath;cough     Cardiovascular:  Negative for;palpitations;syncope or near-syncope chest pain;Positive for;edema;fatigue;lightheadedness;dizziness    Gastroenterology: Positive for heartburn;vomiting;nausea    Genitourinary:  Negative      Musculoskeletal:  Positive for arthritis    Neurologic:  Positive for headaches;memory problems    Psychiatric:  Positive for anxiety    Heme/Lymph/Imm:  Negative for bleeding disorder    Endocrine:  Negative for thyroid disorder;diabetes      Physical Exam:  Vitals: /73  Pulse 91  Wt 55.8 kg (123 lb)  SpO2 99%  BMI 21.11 kg/m2    Constitutional:  cooperative;in no acute distress thin      Skin:  warm and dry to the touch, no apparent skin lesions or masses noted          Head:  normocephalic        Eyes:  sclera white        Lymph:      ENT:           Neck:  carotid pulses are full and equal bilaterally, JVP normal, no carotid bruit        Respiratory:  normal  breath sounds, clear to auscultation, normal A-P diameter, normal symmetry, normal respiratory excursion, no use of accessory muscles         Cardiac: regular rhythm, normal S1/S2, no S3 or S4, apical impulse not displaced, no murmurs, gallops or rubs                                                         GI:           Extremities and Muscular Skeletal:  no deformities, clubbing, cyanosis, erythema observed;no edema              Neurological:  no gross motor deficits;affect appropriate        Psych:  Alert and Oriented x 3;affect appropriate, oriented to time, person and place        Thank you for allowing me to participate in the care of your patient.    Sincerely,     Azra London MD     [facility]    cc:   Carley Cuadra MD  [address] [place] [address]

## 2018-06-18 NOTE — PATIENT INSTRUCTIONS
[facility]  [facility]~[address]~[place], [place]~[address]  Thank you for your [facility] visit today. If you have questions regarding your visit, please contact your cardiology RN's, Isa Strong or Asha Morgan, at [phone]. Your provider has recommended the following:  Medication Changes:  None today. Continue taking your medications as you have been.  Recommendations:  Echocardiogram and Anupama scan next available  Follow-up:  See Dr. London or HERLINDA for cardiology follow up in 3 months  To schedule a future appointment, we kindly ask that you call cardiology scheduling at [phone] three months prior to requested revisit date.               Reminder:  For your safety, we ask that you bring in your current medication(s) or an updated list of your medications with you to EACH office visit. Include the medication name, dose of pill on bottle and how you are taking it. Include over-the-counter medications or supplements. Your provider will review this at each visit and plan your care based on your current information.

## 2018-06-18 NOTE — MR AVS SNAPSHOT
After Visit Summary   6/18/2018    Yaritza Bradley    MRN: [id number]           Patient Information     Date Of Birth          1955        Visit Information        Provider Department      6/18/2018 2:00 PM Azra London MD [facility]        Today's Diagnoses     Chest pain    -  1      Care Instructions    [facility]~[address]~[place], [place]~[address]  Thank you for your  Heart Care visit today. If you have questions regarding your visit, please contact your cardiology RN's, Isa Strong or Asha Morgan, at [phone]. Your provider has recommended the following:  Medication Changes:  None today. Continue taking your medications as you have been.  Recommendations:  Echocardiogram and Anupama scan next available  Follow-up:  See Dr. London or HERLINDA for cardiology follow up in 3 months  To schedule a future appointment, we kindly ask that you call cardiology scheduling at [phone] three months prior to requested revisit date.               Reminder:  For your safety, we ask that you bring in your current medication(s) or an updated list of your medications with you to EACH office visit. Include the medication name, dose of pill on bottle and how you are taking it. Include over-the-counter medications or supplements. Your provider will review this at each visit and plan your care based on your current information.               Follow-ups after your visit        Additional Services     Follow-Up with Cardiologist                 Future tests that were ordered for you today     Open Future Orders        Priority Expected Expires Ordered    Follow-Up with Cardiologist Routine 9/18/2018 3/18/2019 6/18/2018    Echocardiogram Routine 6/18/2018 6/18/2019 6/18/2018    NM Lexiscan stress test Routine 6/18/2018 6/18/2019 6/18/2018            Who to contact     If you have questions or need follow up  information about today's clinic visit or your schedule please contact [facility] directly at [phone].  Normal or non-critical lab and imaging results will be communicated to you by [employer], letter or phone within 4 business days after the clinic has received the results. If you do not hear from us within 7 days, please contact the clinic through [employer] or phone. If you have a critical or abnormal lab result, we will notify you by phone as soon as possible.  Submit refill requests through [employer] or call your pharmacy and they will forward the refill request to us. Please allow 3 business days for your refill to be completed.          Additional Information About Your Visit        [employer] Information     [employer] gives you secure access to your electronic health record. If you see a primary care provider, you can also send messages to your care team and make appointments. If you have questions, please call your primary care clinic.  If you do not have a primary care provider, please call [phone] and they will assist you.        Care EveryWhere ID     This is your Care EveryWhere ID. This could be used by other organizations to access your [facility] medical records  [id number]        Your Vitals Were     Pulse Pulse Oximetry BMI (Body Mass Index)             91 99% 21.11 kg/m2          Blood Pressure from Last 3 Encounters:   06/18/18 124/73   05/21/18 134/56   03/06/18 118/77    Weight from Last 3 Encounters:   06/18/18 55.8 kg (123 lb)   05/20/18 57.5 kg (126 lb 12.2 oz)   03/06/18 56.4 kg (124 lb 6.4 oz)                 Today's Medication Changes          These changes are accurate as of 6/18/18  3:16 PM.  If you have any questions, ask your nurse or doctor.               Stop taking these medicines if you haven't already. Please contact your care team if you have questions.     amoxicillin-clavulanate 875-125 MG per tablet   Commonly known as:  AUGMENTIN  "  Stopped by:  Azra London MD           estradiol 0.5 MG tablet   Commonly known as:  ESTRACE   Stopped by:  Azra London MD           UNKNOWN TO PATIENT   Stopped by:  Azra London MD                    Primary Care Provider Office Phone # Fax #    Karla Maral Wade [phone] [address] [facility] [address]        Equal Access to Services     [facility] Center[facility]LL[facility] : Hadii aad ku hadasho Soomaali, waaxda luqadaha, qaybta kaalmada adeegyada, waxay idiin hayaan adeeg kharash la'aan ah. So [facility] [phone].    ATENCIÓN: Si dimple urban, tiene a moura disposición servicios gratuitos de asistencia lingüística. Llame al [phone].    We comply with applicable federal civil rights laws and [place] laws. We do not discriminate on the basis of race, color, national origin, age, disability, sex, sexual orientation, or gender identity.            Thank you!     Thank you for choosing [facility]  for your care. Our goal is always to provide you with excellent care. Hearing back from our patients is one way we can continue to improve our services. Please take a few minutes to complete the written survey that you may receive in the mail after your visit with us. Thank you!             Your Updated Medication List - Protect others around you: Learn how to safely use, store and throw away your medicines at www.disposemymeds.org.          This list is accurate as of 6/18/18  3:16 PM.  Always use your most recent med list.                   Brand Name Dispense Instructions for use Diagnosis    B-12 1000 MCG Tbcr     100 tablet    Take 1,000 mcg by mouth daily        B-D INSULIN SYRINGE 25G X 5/8\" 1 ML Misc   Generic drug:  Insulin Syringe-Needle U-100      TO BE USED IN WEEKLY METHOTREXATE INJECTIONS        BONIVA IV      Inject into the vein every 3 months        diclofenac 1 % Gel topical gel    VOLTAREN     Place onto the skin 4 times daily        " FLUoxetine 40 MG capsule    PROzac    90 capsule    Take 1 capsule (40 mg) by mouth daily    Moderate recurrent major depression (H)       folic acid 1 MG tablet    FOLVITE     1 TABLET DAILY        HUMIRA PEN 40 MG/0.8ML injection   Generic drug:  adalimumab      Inject 40 mg Subcutaneous every 14 days        METHOTREXATE (PF) SC      Inject 0.5 mLs Subcutaneous once a week On Tuesday        OMEGA-3 FISH OIL PO      Take 1 g by mouth daily (DRY EYE OMEGA BENEFITS) 667-250 mg-unit cap        RESTASIS 0.05 % ophthalmic emulsion   Generic drug:  cycloSPORINE      Place 1 drop into both eyes 2 times daily        SM CALCIUM SOFT CHEWS 500-100-40 MG-UNT-MCG Chew   Generic drug:  Calcium-Vitamin D-Vitamin K      CHEW 2-3 ORALLY DAILY        TURMERIC PO      Take 1,000 mg by mouth daily

## 2018-06-18 NOTE — PROGRESS NOTES
"HPI and Plan:      Ms Yaritza Bradley was seen for imbalance, fogginess of thinking, orthostasis, non-exertional chest pain and she has rheumatoid arthritis.    She has a family history for premature CAD in her father and brother.    Exam:  Blood pressure 124/73, pulse 91, weight 55.8 kg (123 lb), SpO2 99 %, not currently breastfeeding.  Chest is clear, cardiac with S1, S2.    Assessment/Plan:  1. Lexiscan stress test.  2. Echo given RA.  3. PT for balance therapy.    Orders Placed This Encounter   Procedures     NM Lexiscan stress test     Follow-Up with Cardiologist     Echocardiogram       Orders Placed This Encounter   Medications     diclofenac (VOLTAREN) 1 % GEL topical gel     Sig: Place onto the skin 4 times daily       Medications Discontinued During This Encounter   Medication Reason     amoxicillin-clavulanate (AUGMENTIN) 875-125 MG per tablet      estradiol (ESTRACE) 0.5 MG tablet      UNKNOWN TO PATIENT          Encounter Diagnosis   Name Primary?     Chest pain Yes       CURRENT MEDICATIONS:  Current Outpatient Prescriptions   Medication Sig Dispense Refill     B-D INSULIN SYRINGE 25G X 5/8\" 1 ML MISC TO BE USED IN WEEKLY METHOTREXATE INJECTIONS  1     Cyanocobalamin (B-12) 1000 MCG TBCR Take 1,000 mcg by mouth daily 100 tablet 1     diclofenac (VOLTAREN) 1 % GEL topical gel Place onto the skin 4 times daily       FLUoxetine (PROZAC) 40 MG capsule Take 1 capsule (40 mg) by mouth daily 90 capsule 3     FOLIC ACID 1 MG PO TABS 1 TABLET DAILY       HUMIRA PEN 40 MG/0.8ML injection Inject 40 mg Subcutaneous every 14 days        Ibandronate Sodium (BONIVA IV) Inject into the vein every 3 months       Methotrexate, Anti-Rheumatic, (METHOTREXATE, PF, SC) Inject 0.5 mLs Subcutaneous once a week On Tuesday       Omega-3 Fatty Acids (OMEGA-3 FISH OIL PO) Take 1 g by mouth daily (DRY EYE OMEGA BENEFITS) 667-250 mg-unit cap       RESTASIS 0.05 % ophthalmic emulsion Place 1 drop into both eyes 2 times daily       SM " CALCIUM SOFT CHEWS 500-100-40 OR CHEW CHEW 2-3 ORALLY DAILY  0     TURMERIC PO Take 1,000 mg by mouth daily          ALLERGIES     Allergies   Allergen Reactions     Nka [No Known Allergies]        PAST MEDICAL HISTORY:  Past Medical History:   Diagnosis Date     Acetabular labrum tear, right, initial encounter 4/26/2016     Hemorrhage of rectum and anus 2002     Post-operative nausea and vomiting 7/14/2017     Trochanteric bursitis of right hip 4/26/2016       PAST SURGICAL HISTORY:  Past Surgical History:   Procedure Laterality Date     COLONOSCOPY N/A 10/14/2016    Procedure: COLONOSCOPY;  Surgeon: Gerson Douglas MD;  Location: [facility]     ESOPHAGOSCOPY, GASTROSCOPY, DUODENOSCOPY (EGD), COMBINED  8/19/2011    Procedure:COMBINED ESOPHAGOSCOPY, GASTROSCOPY, DUODENOSCOPY (EGD), BIOPSY SINGLE OR MULTIPLE; Gastroscopy with biopsy; Surgeon:FARZAD GARCIA; Location:[facility]     ESOPHAGOSCOPY, GASTROSCOPY, DUODENOSCOPY (EGD), COMBINED  7/28/2014    Procedure: COMBINED ESOPHAGOSCOPY, GASTROSCOPY, DUODENOSCOPY (EGD), BIOPSY SINGLE OR MULTIPLE;  Surgeon: Marshall Martinez MD;  Location: [facility]     HC DILATION/CURETTAGE DIAG/THER NON OB  2001    for uterine fibroids     OPEN REDUCTION INTERNAL FIXATION ELBOW Right 1/22/2016    right THR     ROTATOR CUFF REPAIR RT/LT  2009    left       FAMILY HISTORY:  Family History   Problem Relation Age of Onset     C.A.D. Father      C.A.D. Paternal Grandfather      C.A.D. Brother      Alcohol/Drug Brother      Alzheimer Disease Maternal Grandmother      Alzheimer Disease Maternal Grandfather      Alzheimer Disease Paternal Grandmother      Colon Cancer Mother        SOCIAL HISTORY:  Social History     Social History     Marital status:      Spouse name: N/A     Number of children: N/A     Years of education: N/A     Social History Main Topics     Smoking status: Never Smoker     Smokeless tobacco: Never Used     Alcohol use Yes      Comment: 1 wine a month hardly      Drug use: No     Sexual activity: No     Other Topics Concern      Service No     Blood Transfusions No     Caffeine Concern Yes     3-4 cups a day coffee and pop     Occupational Exposure No     Hobby Hazards No     Sleep Concern Yes     Stress Concern Yes     Weight Concern No     fluctuates up and down     Special Diet Yes     multi vit calcium and vit D chewable 3 a day     Back Care No     Exercise Yes     walk     Bike Helmet Yes     Seat Belt Yes     Self-Exams No     Patient does not do self breast exams, pamphlet given-instructions     Parent/Sibling W/ Cabg, Mi Or Angioplasty Before 65f 55m? Yes     Father-54 Bypass     Social History Narrative       Review of Systems:  Skin:  Negative       Eyes:  Positive for glasses    ENT:  Negative      Respiratory:  Positive for shortness of breath;cough     Cardiovascular:  Negative for;palpitations;syncope or near-syncope chest pain;Positive for;edema;fatigue;lightheadedness;dizziness    Gastroenterology: Positive for heartburn;vomiting;nausea    Genitourinary:  Negative      Musculoskeletal:  Positive for arthritis    Neurologic:  Positive for headaches;memory problems    Psychiatric:  Positive for anxiety    Heme/Lymph/Imm:  Negative for bleeding disorder    Endocrine:  Negative for thyroid disorder;diabetes      Physical Exam:  Vitals: /73  Pulse 91  Wt 55.8 kg (123 lb)  SpO2 99%  BMI 21.11 kg/m2    Constitutional:  cooperative;in no acute distress thin      Skin:  warm and dry to the touch, no apparent skin lesions or masses noted          Head:  normocephalic        Eyes:  sclera white        Lymph:      ENT:           Neck:  carotid pulses are full and equal bilaterally, JVP normal, no carotid bruit        Respiratory:  normal breath sounds, clear to auscultation, normal A-P diameter, normal symmetry, normal respiratory excursion, no use of accessory muscles         Cardiac: regular rhythm, normal S1/S2, no S3 or S4, apical impulse not  displaced, no murmurs, gallops or rubs                                                         GI:           Extremities and Muscular Skeletal:  no deformities, clubbing, cyanosis, erythema observed;no edema              Neurological:  no gross motor deficits;affect appropriate        Psych:  Alert and Oriented x 3;affect appropriate, oriented to time, person and place          CC  Carley Cuadra MD  [address] [place] [address]

## 2018-06-18 NOTE — Clinical Note
"6/18/2018    Karla Wade MD  [address] [address]    RE: Yaritza Bradley       Dear Colleague,    I had the pleasure of seeing Yaritza Bradley in the [facility].    HPI and Plan:      Ms Yaritza Bradley was seen for imbalance, fogginess of thinking, orthostasis, non-exertional chest pain and she has rheumatoid arthritis.    She has a family history for premature CAD in her father and brother.    Exam:  Blood pressure 124/73, pulse 91, weight 55.8 kg (123 lb), SpO2 99 %, not currently breastfeeding.  Chest is clear, cardiac with S1, S2.    Assessment/Plan:  1. Lexiscan stress test.  2. Echo given RA.  3. PT for balance therapy.    Orders Placed This Encounter   Procedures     NM Lexiscan stress test     Follow-Up with Cardiologist     Echocardiogram       Orders Placed This Encounter   Medications     diclofenac (VOLTAREN) 1 % GEL topical gel     Sig: Place onto the skin 4 times daily       Medications Discontinued During This Encounter   Medication Reason     amoxicillin-clavulanate (AUGMENTIN) 875-125 MG per tablet      estradiol (ESTRACE) 0.5 MG tablet      UNKNOWN TO PATIENT          Encounter Diagnosis   Name Primary?     Chest pain Yes       CURRENT MEDICATIONS:  Current Outpatient Prescriptions   Medication Sig Dispense Refill     B-D INSULIN SYRINGE 25G X 5/8\" 1 ML MISC TO BE USED IN WEEKLY METHOTREXATE INJECTIONS  1     Cyanocobalamin (B-12) 1000 MCG TBCR Take 1,000 mcg by mouth daily 100 tablet 1     diclofenac (VOLTAREN) 1 % GEL topical gel Place onto the skin 4 times daily       FLUoxetine (PROZAC) 40 MG capsule Take 1 capsule (40 mg) by mouth daily 90 capsule 3     FOLIC ACID 1 MG PO TABS 1 TABLET DAILY       HUMIRA PEN 40 MG/0.8ML injection Inject 40 mg Subcutaneous every 14 days        Ibandronate Sodium (BONIVA IV) Inject into the vein every 3 months       Methotrexate, Anti-Rheumatic, (METHOTREXATE, PF, SC) Inject 0.5 mLs Subcutaneous once a week On " Tuesday       Omega-3 Fatty Acids (OMEGA-3 FISH OIL PO) Take 1 g by mouth daily (DRY EYE OMEGA BENEFITS) 667-250 mg-unit cap       RESTASIS 0.05 % ophthalmic emulsion Place 1 drop into both eyes 2 times daily       SM CALCIUM SOFT CHEWS 500-100-40 OR CHEW CHEW 2-3 ORALLY DAILY  0     TURMERIC PO Take 1,000 mg by mouth daily          ALLERGIES     Allergies   Allergen Reactions     Nka [No Known Allergies]        PAST MEDICAL HISTORY:  Past Medical History:   Diagnosis Date     Acetabular labrum tear, right, initial encounter 4/26/2016     Hemorrhage of rectum and anus 2002     Post-operative nausea and vomiting 7/14/2017     Trochanteric bursitis of right hip 4/26/2016       PAST SURGICAL HISTORY:  Past Surgical History:   Procedure Laterality Date     COLONOSCOPY N/A 10/14/2016    Procedure: COLONOSCOPY;  Surgeon: Gerson Douglas MD;  Location: [facility]     ESOPHAGOSCOPY, GASTROSCOPY, DUODENOSCOPY (EGD), COMBINED  8/19/2011    Procedure:COMBINED ESOPHAGOSCOPY, GASTROSCOPY, DUODENOSCOPY (EGD), BIOPSY SINGLE OR MULTIPLE; Gastroscopy with biopsy; Surgeon:FARZAD GARCIA; Location:[facility]     ESOPHAGOSCOPY, GASTROSCOPY, DUODENOSCOPY (EGD), COMBINED  7/28/2014    Procedure: COMBINED ESOPHAGOSCOPY, GASTROSCOPY, DUODENOSCOPY (EGD), BIOPSY SINGLE OR MULTIPLE;  Surgeon: Marshall Martinez MD;  Location: [facility]     HC DILATION/CURETTAGE DIAG/THER NON OB  2001    for uterine fibroids     OPEN REDUCTION INTERNAL FIXATION ELBOW Right 1/22/2016    right THR     ROTATOR CUFF REPAIR RT/LT  2009    left       FAMILY HISTORY:  Family History   Problem Relation Age of Onset     C.A.D. Father      C.A.D. Paternal Grandfather      C.A.D. Brother      Alcohol/Drug Brother      Alzheimer Disease Maternal Grandmother      Alzheimer Disease Maternal Grandfather      Alzheimer Disease Paternal Grandmother      Colon Cancer Mother        SOCIAL HISTORY:  Social History     Social History     Marital status:      Spouse name:  N/A     Number of children: N/A     Years of education: N/A     Social History Main Topics     Smoking status: Never Smoker     Smokeless tobacco: Never Used     Alcohol use Yes      Comment: 1 wine a month hardly     Drug use: No     Sexual activity: No     Other Topics Concern      Service No     Blood Transfusions No     Caffeine Concern Yes     3-4 cups a day coffee and pop     Occupational Exposure No     Hobby Hazards No     Sleep Concern Yes     Stress Concern Yes     Weight Concern No     fluctuates up and down     Special Diet Yes     multi vit calcium and vit D chewable 3 a day     Back Care No     Exercise Yes     walk     Bike Helmet Yes     Seat Belt Yes     Self-Exams No     Patient does not do self breast exams, pamphlet given-instructions     Parent/Sibling W/ Cabg, Mi Or Angioplasty Before 65f 55m? Yes     Father-54 Bypass     Social History Narrative       Review of Systems:  Skin:  Negative       Eyes:  Positive for glasses    ENT:  Negative      Respiratory:  Positive for shortness of breath;cough     Cardiovascular:  Negative for;palpitations;syncope or near-syncope chest pain;Positive for;edema;fatigue;lightheadedness;dizziness    Gastroenterology: Positive for heartburn;vomiting;nausea    Genitourinary:  Negative      Musculoskeletal:  Positive for arthritis    Neurologic:  Positive for headaches;memory problems    Psychiatric:  Positive for anxiety    Heme/Lymph/Imm:  Negative for bleeding disorder    Endocrine:  Negative for thyroid disorder;diabetes      Physical Exam:  Vitals: /73  Pulse 91  Wt 55.8 kg (123 lb)  SpO2 99%  BMI 21.11 kg/m2    Constitutional:  cooperative;in no acute distress thin      Skin:  warm and dry to the touch, no apparent skin lesions or masses noted          Head:  normocephalic        Eyes:  sclera white        Lymph:      ENT:           Neck:  carotid pulses are full and equal bilaterally, JVP normal, no carotid bruit        Respiratory:  normal  breath sounds, clear to auscultation, normal A-P diameter, normal symmetry, normal respiratory excursion, no use of accessory muscles         Cardiac: regular rhythm, normal S1/S2, no S3 or S4, apical impulse not displaced, no murmurs, gallops or rubs                                                         GI:           Extremities and Muscular Skeletal:  no deformities, clubbing, cyanosis, erythema observed;no edema              Neurological:  no gross motor deficits;affect appropriate        Psych:  Alert and Oriented x 3;affect appropriate, oriented to time, person and place          CC  Carley Cuadra MD  [address] [place] [address]                    Thank you for allowing me to participate in the care of your patient.      Sincerely,     Azra London MD     [facility]    cc:   Carley Cuadra MD  [address] [place], [place] [id number]

## 2018-07-16 ENCOUNTER — OFFICE VISIT (OUTPATIENT)
Dept: FAMILY MEDICINE | Facility: CLINIC | Age: 63
End: 2018-07-16
Payer: COMMERCIAL

## 2018-07-16 VITALS
DIASTOLIC BLOOD PRESSURE: 70 MMHG | HEART RATE: 88 BPM | HEIGHT: 64 IN | WEIGHT: 122 LBS | RESPIRATION RATE: 18 BRPM | SYSTOLIC BLOOD PRESSURE: 126 MMHG | TEMPERATURE: 98 F | OXYGEN SATURATION: 98 % | BODY MASS INDEX: 20.83 KG/M2

## 2018-07-16 DIAGNOSIS — K21.9 GASTROESOPHAGEAL REFLUX DISEASE, ESOPHAGITIS PRESENCE NOT SPECIFIED: Primary | ICD-10-CM

## 2018-07-16 PROCEDURE — 99214 OFFICE O/P EST MOD 30 MIN: CPT | Performed by: FAMILY MEDICINE

## 2018-07-16 ASSESSMENT — PAIN SCALES - GENERAL: PAINLEVEL: SEVERE PAIN (7)

## 2018-07-16 ASSESSMENT — ANXIETY QUESTIONNAIRES
5. BEING SO RESTLESS THAT IT IS HARD TO SIT STILL: MORE THAN HALF THE DAYS
7. FEELING AFRAID AS IF SOMETHING AWFUL MIGHT HAPPEN: SEVERAL DAYS
1. FEELING NERVOUS, ANXIOUS, OR ON EDGE: SEVERAL DAYS
3. WORRYING TOO MUCH ABOUT DIFFERENT THINGS: SEVERAL DAYS
GAD7 TOTAL SCORE: 9
2. NOT BEING ABLE TO STOP OR CONTROL WORRYING: SEVERAL DAYS
6. BECOMING EASILY ANNOYED OR IRRITABLE: SEVERAL DAYS

## 2018-07-16 ASSESSMENT — PATIENT HEALTH QUESTIONNAIRE - PHQ9: 5. POOR APPETITE OR OVEREATING: MORE THAN HALF THE DAYS

## 2018-07-16 NOTE — PROGRESS NOTES
"  SUBJECTIVE:   Yaritza Bradley is a 62 year old female who presents to clinic today for the following health issues:      GERD/Heartburn    Onset: ongoing    Description:     Burning in chest: YES    Intensity: 7/10    Progression of Symptoms: same    Accompanying Signs & Symptoms:  Does it feel like food gets stuck: no   Nausea: YES  Vomiting (bloody?): no   Abdominal Pain: YES- upper abdomen  Black-Tarry stools: no :  Bloody stools: no     History:   Previous ulcers: no     Precipitating factors:   Caffeine use: YES- cut back  Alcohol use: no   NSAID/Aspirin use: no   Tobacco use: no   Worse with carbonated drinks cause a burp reflux and no particular food or drink.    Alleviating factors:    Epigastric pain \"most all the time\".  Goes down into the stomach and up into the chest.    Fatigue - has RA.   Has had some ongoing balance and dizziness problems. Has seen a neurologist for that.    Had a period of chills in early June.  Teeth were chattering.  Couldn't get warm.  That lasted several hours.  Then it broke after she had the sweats.  That all only lasted a day.  The next day was tired, but otherwise felt okay. One episode of emesis with that.      Saw Dr. London a month ago - echo and lexiscan was recommended and patient decided not to do that because it was her impression that Dr. London didn't feel it was cardiac and she felt the test was ordered \"to appease\" her.  We discussed that tests are generally not ordered if they are felt to be inappropriate and that in order to really determine that there is no cardiac component, testing is necessary.      Therapies Tried and outcome:chewable antacid     Has been on PPI in the past and it was found to be normal.    /70  Pulse 88  Temp 98  F (36.7  C) (Tympanic)  Resp 18  Ht 5' 4\" (1.626 m)  Wt 122 lb (55.3 kg)  SpO2 98%  BMI 20.94 kg/m2  EXAM: GENERAL APPEARANCE: Alert, no acute distress  RESP: lungs clear to auscultation   CV: normal rate, regular rhythm, " no murmur or gallop  ABDOMEN: soft, no organomegaly or masses , tender epigastric and RUQ moderate and guarding   PSYCH: mentation appears normal., affect and mood normal    ASSESSMENT/PLAN:      ICD-10-CM    1. Gastroesophageal reflux disease, esophagitis presence not specified K21.9 GASTROENTEROLOGY ADULT REF PROCEDURE ONLY [facility] [phone]; [facility]     Recommend EGD to r/o gastritis/PUD.  If negative, consider further evaluation of gallbladder (US/HIDA) or consideration of other causes (sjogren's, etc).     Encouraged her to do the echo/lexiscan as ordered by Dr. London.    Aziza Aviles M.D.      Patient Instructions         Thank you for choosing [facility].  You may be receiving a survey in the mail from [employer] regarding your visit today.  Please take a few minutes to complete and return the survey to let us know how we are doing.      Our Clinic hours are:  Mondays    7:20 am - 7 pm  Tues -  Fri  7:20 am - 5 pm    Clinic Phone: [phone]    The clinic lab opens at 7:30 am Mon - Fri and appointments are required.    [facility]. [phone]  Monday  8 am - 7pm  Tues - Fri 8 am - 5:30 pm

## 2018-07-16 NOTE — MR AVS SNAPSHOT
After Visit Summary   7/16/2018    Yaritza Bradley    MRN: [id number]           Patient Information     Date Of Birth          1955        Visit Information        Provider Department      7/16/2018 2:00 PM Aziza Aviles MD [facility]        Today's Diagnoses     Gastroesophageal reflux disease, esophagitis presence not specified    -  1      Care Instructions          Thank you for choosing [facility].  You may be receiving a survey in the mail from [employer] regarding your visit today.  Please take a few minutes to complete and return the survey to let us know how we are doing.      Our Clinic hours are:  Mondays    7:20 am - 7 pm  Tues -  Fri  7:20 am - 5 pm    Clinic Phone: [phone]    The clinic lab opens at 7:30 am Mon - Fri and appointments are required.    [facility]. [phone]  Monday  8 am - 7pm  Tues - Fri 8 am - 5:30 pm                 Follow-ups after your visit        Additional Services     GASTROENTEROLOGY ADULT REF PROCEDURE ONLY [facility] [phone]; [facility] Surgeon       Last Lab Result: Creatinine (mg/dL)       Date                     Value                 05/21/2018               0.56             ----------  Body mass index is 20.94 kg/(m^2).     Needed:  No  Language:  English    Patient will be contacted to schedule procedure.     Please be aware that coverage of these services is subject to the terms and limitations of your health insurance plan.  Call member services at your health plan with any benefit or coverage questions.  Any procedures must be performed at a [facility] facility OR coordinated by your clinic's referral office.    Please bring the following with you to your appointment:    (1) Any X-Rays, CTs or MRIs which have been performed.  Contact the facility where they were done to arrange for  prior to your scheduled appointment.    (2) List of current medications  "  (3) This referral request   (4) Any documents/labs given to you for this referral                  Who to contact     If you have questions or need follow up information about today's clinic visit or your schedule please contact [facility] directly at [phone].  Normal or non-critical lab and imaging results will be communicated to you by MyChart, letter or phone within 4 business days after the clinic has received the results. If you do not hear from us within 7 days, please contact the clinic through [employer] or phone. If you have a critical or abnormal lab result, we will notify you by phone as soon as possible.  Submit refill requests through [employer] or call your pharmacy and they will forward the refill request to us. Please allow 3 business days for your refill to be completed.          Additional Information About Your Visit        [employer] Information     [employer] gives you secure access to your electronic health record. If you see a primary care provider, you can also send messages to your care team and make appointments. If you have questions, please call your primary care clinic.  If you do not have a primary care provider, please call [phone] and they will assist you.        Care EveryWhere ID     This is your Care EveryWhere ID. This could be used by other organizations to access your [facility] medical records  [id number]        Your Vitals Were     Pulse Temperature Respirations Height Pulse Oximetry BMI (Body Mass Index)    88 98  F (36.7  C) (Tympanic) 18 5' 4\" (1.626 m) 98% 20.94 kg/m2       Blood Pressure from Last 3 Encounters:   07/16/18 126/70   06/18/18 124/73   05/21/18 134/56    Weight from Last 3 Encounters:   07/16/18 122 lb (55.3 kg)   06/18/18 123 lb (55.8 kg)   05/20/18 126 lb 12.2 oz (57.5 kg)              We Performed the Following     DEPRESSION ACTION PLAN (DAP)     GASTROENTEROLOGY ADULT REF PROCEDURE ONLY [facility] [phone]; [facility] " "Surgeon        Primary Care Provider Office Phone # Fax #    Karla Maral Wade [phone] [address] [address]        Equal Access to Services     JOSE GREENE : Veda hollins gabrielao Sojennyali, waaxda luqadaha, qaybta kaalmada yosef, bi disla mattiebossman snell laNohemikenyon zayas. So [facility] [phone].    ATENCIÓN: Si habla español, tiene a moura disposición servicios gratuitos de asistencia lingüística. Llame al [phone].    We comply with applicable federal civil rights laws and [place] laws. We do not discriminate on the basis of race, color, national origin, age, disability, sex, sexual orientation, or gender identity.            Thank you!     Thank you for choosing [facility]  for your care. Our goal is always to provide you with excellent care. Hearing back from our patients is one way we can continue to improve our services. Please take a few minutes to complete the written survey that you may receive in the mail after your visit with us. Thank you!             Your Updated Medication List - Protect others around you: Learn how to safely use, store and throw away your medicines at www.disposemymeds.org.          This list is accurate as of 7/16/18  2:29 PM.  Always use your most recent med list.                   Brand Name Dispense Instructions for use Diagnosis    B-12 1000 MCG Tbcr     100 tablet    Take 1,000 mcg by mouth daily        B-D INSULIN SYRINGE 25G X 5/8\" 1 ML Misc   Generic drug:  Insulin Syringe-Needle U-100      TO BE USED IN WEEKLY METHOTREXATE INJECTIONS        BONIVA IV      Inject into the vein every 3 months        diclofenac 1 % Gel topical gel    VOLTAREN     Place onto the skin 4 times daily        FLUoxetine 40 MG capsule    PROzac    90 capsule    Take 1 capsule (40 mg) by mouth daily    Moderate recurrent major depression (H)       folic acid 1 MG tablet    FOLVITE     1 TABLET DAILY        HUMIRA PEN 40 MG/0.8ML " injection   Generic drug:  adalimumab      Inject 40 mg Subcutaneous every 14 days        METHOTREXATE (PF) SC      Inject 0.5 mLs Subcutaneous once a week On Tuesday        OMEGA-3 FISH OIL PO      Take 1 g by mouth daily (DRY EYE OMEGA BENEFITS) 667-250 mg-unit cap        RESTASIS 0.05 % ophthalmic emulsion   Generic drug:  cycloSPORINE      Place 1 drop into both eyes 2 times daily        SM CALCIUM SOFT CHEWS 500-100-40 MG-UNT-MCG Chew   Generic drug:  Calcium-Vitamin D-Vitamin K      CHEW 2-3 ORALLY DAILY        TURMERIC PO      Take 1,000 mg by mouth daily

## 2018-07-16 NOTE — PATIENT INSTRUCTIONS
Thank you for choosing [facility].  You may be receiving a survey in the mail from [facility] regarding your visit today.  Please take a few minutes to complete and return the survey to let us know how we are doing.      Our Clinic hours are:  Mondays    7:20 am - 7 pm  Tues - Fri  7:20 am - 5 pm    Clinic Phone: [phone]    The clinic lab opens at 7:30 am Mon - Fri and appointments are required.    [facility]. [phone]  Monday  8 am - 7pm  Tues - Fri 8 am - 5:30 pm

## 2018-07-17 ENCOUNTER — ANESTHESIA EVENT (OUTPATIENT)
Dept: GASTROENTEROLOGY | Facility: CLINIC | Age: 63
End: 2018-07-17
Payer: COMMERCIAL

## 2018-07-17 ASSESSMENT — PATIENT HEALTH QUESTIONNAIRE - PHQ9: SUM OF ALL RESPONSES TO PHQ QUESTIONS 1-9: 13

## 2018-07-17 ASSESSMENT — ANXIETY QUESTIONNAIRES: GAD7 TOTAL SCORE: 9

## 2018-07-17 NOTE — ANESTHESIA PREPROCEDURE EVALUATION
Anesthesia Evaluation     . Pt has had prior anesthetic.     History of anesthetic complications   - PONV        ROS/MED HX    ENT/Pulmonary:       Neurologic:       Cardiovascular:         METS/Exercise Tolerance:     Hematologic:         Musculoskeletal:   (+) arthritis, , , -       GI/Hepatic:     (+) GERD       Renal/Genitourinary:         Endo:         Psychiatric:     (+) psychiatric history anxiety and depression      Infectious Disease:         Malignancy:         Other:                     Physical Exam  Normal systems: pulmonary and dental    Airway   Mallampati: I  TM distance: >3 FB  Neck ROM: full    Dental     Cardiovascular   Rhythm and rate: regular and normal      Pulmonary    breath sounds clear to auscultation                    Anesthesia Plan      History & Physical Review  History and physical reviewed and following examination; no interval change.    ASA Status:  3 .    NPO Status:  > 6 hours    Plan for MAC Reason for MAC:  Deep or markedly invasive procedure (G8)         Postoperative Care      Consents  Anesthetic plan, risks, benefits and alternatives discussed with:  Patient..                          .

## 2018-07-18 ENCOUNTER — SURGERY (OUTPATIENT)
Age: 63
End: 2018-07-18

## 2018-07-18 ENCOUNTER — ANESTHESIA (OUTPATIENT)
Dept: GASTROENTEROLOGY | Facility: CLINIC | Age: 63
End: 2018-07-18
Payer: COMMERCIAL

## 2018-07-18 ENCOUNTER — HOSPITAL ENCOUNTER (OUTPATIENT)
Facility: CLINIC | Age: 63
Discharge: HOME | End: 2018-07-18
Attending: SURGERY | Admitting: SURGERY
Payer: COMMERCIAL

## 2018-07-18 VITALS
OXYGEN SATURATION: 98 % | BODY MASS INDEX: 20.83 KG/M2 | TEMPERATURE: 97.9 F | RESPIRATION RATE: 16 BRPM | SYSTOLIC BLOOD PRESSURE: 123 MMHG | HEIGHT: 64 IN | WEIGHT: 122 LBS | DIASTOLIC BLOOD PRESSURE: 63 MMHG

## 2018-07-18 LAB — UPPER GI ENDOSCOPY: NORMAL

## 2018-07-18 PROCEDURE — 43239 EGD BIOPSY SINGLE/MULTIPLE: CPT | Performed by: SURGERY

## 2018-07-18 PROCEDURE — 88305 TISSUE EXAM BY PATHOLOGIST: CPT | Performed by: SURGERY

## 2018-07-18 PROCEDURE — 25000128 H RX IP 250 OP 636: Performed by: NURSE ANESTHETIST, CERTIFIED REGISTERED

## 2018-07-18 PROCEDURE — 25000125 ZZHC RX 250: Performed by: SURGERY

## 2018-07-18 PROCEDURE — 37000008 ZZH ANESTHESIA TECHNICAL FEE, 1ST 30 MIN: Performed by: SURGERY

## 2018-07-18 PROCEDURE — 25000128 H RX IP 250 OP 636: Performed by: SURGERY

## 2018-07-18 PROCEDURE — 88305 TISSUE EXAM BY PATHOLOGIST: CPT | Mod: 26 | Performed by: SURGERY

## 2018-07-18 RX ORDER — ONDANSETRON 2 MG/ML
4 INJECTION INTRAMUSCULAR; INTRAVENOUS
Status: DISCONTINUED | OUTPATIENT
Start: 2018-07-18 | End: 2018-07-18 | Stop reason: HOSPADM

## 2018-07-18 RX ORDER — LIDOCAINE 40 MG/G
CREAM TOPICAL
Status: DISCONTINUED | OUTPATIENT
Start: 2018-07-18 | End: 2018-07-18 | Stop reason: HOSPADM

## 2018-07-18 RX ORDER — PROPOFOL 10 MG/ML
INJECTION, EMULSION INTRAVENOUS PRN
Status: DISCONTINUED | OUTPATIENT
Start: 2018-07-18 | End: 2018-07-18

## 2018-07-18 RX ORDER — SODIUM CHLORIDE, SODIUM LACTATE, POTASSIUM CHLORIDE, CALCIUM CHLORIDE 600; 310; 30; 20 MG/100ML; MG/100ML; MG/100ML; MG/100ML
INJECTION, SOLUTION INTRAVENOUS CONTINUOUS
Status: DISCONTINUED | OUTPATIENT
Start: 2018-07-18 | End: 2018-07-18 | Stop reason: HOSPADM

## 2018-07-18 RX ADMIN — TOPICAL ANESTHETIC 2 EACH: 200 SPRAY DENTAL; PERIODONTAL at 11:38

## 2018-07-18 RX ADMIN — PROPOFOL 150 MG: 10 INJECTION, EMULSION INTRAVENOUS at 11:38

## 2018-07-18 RX ADMIN — LIDOCAINE HYDROCHLORIDE 1 ML: 10 INJECTION, SOLUTION EPIDURAL; INFILTRATION; INTRACAUDAL; PERINEURAL at 10:40

## 2018-07-18 RX ADMIN — SODIUM CHLORIDE, POTASSIUM CHLORIDE, SODIUM LACTATE AND CALCIUM CHLORIDE: 600; 310; 30; 20 INJECTION, SOLUTION INTRAVENOUS at 10:40

## 2018-07-18 NOTE — LETTER
Yaritza Bradley  [address] [place] [id number]    July 20, 2018    Dear Yaritza,  This letter is to inform you of the results of your pathology report on your EGD.  Your pathology report was:  FINAL DIAGNOSIS:   A. Stomach, antrum, biopsy:   - Gastric antral-type mucosa with changes suggestive of reactive/chemical   gastropathy   - No evidence of inflammation, intestinal metaplasia, dysplasia or   malignancy   - No Helicobacter pylori identified.     B. Stomach, body, biopsy:   - Gastric body-type mucosa with focal minimal chronic inflammation.   - No evidence of active inflammation, intestinal metaplasia, dysplasia or   malignancy.   - No Helicobacter pylori identified.     C. Gastroesophageal junction, biopsy:   - Stratified squamous mucosa with mild reactive changes, nonspecific.   - Cardia columnar mucinous mucosa with mild chronic inflammation and   reactive changes   - No evidence of goblet cells, dysplasia or malignancy    Biopsies had some inflammation in the stomach (mild/minor) which could be from NSAID use. I am unsure how much this might be related to your pain. There was no H.pylori bacteria see, no acid damage or changes to the esophagus from reflux (Rankin's esophagus). If ongoing symptoms then an US of the gallbladder may show a problem there.     If you have questions please feel free to call the clinic at [phone].    Sincerely,     Jorge Schofield M.D.

## 2018-07-18 NOTE — H&P
"  GI Pre-Procedure History & Physical    Yaritza Bradley MRN# [id number]   Age: 62 year old YOB: 1955      Date of Surgery: 7/18/2018  Location [facility]      Date of Exam 7/18/2018 Facility (Same Day Surgery)            Active problem list:   Patient Active Problem List   Diagnosis     Rheumatoid arthritis (H)     Disorder of bone and cartilage     CARDIOVASCULAR SCREENING; LDL GOAL LESS THAN 160     GERD (gastroesophageal reflux disease)     Generalized anxiety disorder     Moderate recurrent major depression (H)     Memory loss or impairment     Femoral acetabular impingement, right     Primary osteoarthritis of right hip, end stage DJD     Cellulitis     Dog bite of hand            Medications (include herbals and vitamins):   Any Plavix use in the last 7 days?  No     Current Facility-Administered Medications   Medication     lactated ringers infusion     lidocaine (LMX4) kit     lidocaine 1 % 1 mL     ondansetron (ZOFRAN) injection 4 mg     sodium chloride (PF) 0.9% PF flush 3 mL     sodium chloride (PF) 0.9% PF flush 3 mL             Allergies:      Allergies   Allergen Reactions     Nka [No Known Allergies]              Social History:     Social History   Substance Use Topics     Smoking status: Never Smoker     Smokeless tobacco: Never Used     Alcohol use Yes      Comment: 1 wine a month hardly            Physical Exam:   All vitals have been reviewed   Patient Vitals for the past 8 hrs:   BP Temp Temp src Heart Rate Resp SpO2 Height Weight   07/18/18 1005 126/63 97.9  F (36.6  C) Oral 71 16 99 % 1.626 m (5' 4\") 55.3 kg (122 lb)       Airway assessment:   Patient is able to open mouth wide  Patient is able to stick out tongue   Constitutional: Awake, alert, cooperative, no apparent distress, and appears stated age.  Eyes: Pupils equal, round and reactive to light.  ENT: Normocephalic, sinuses nontender, external ears without lesions, oral pharynx with moist mucus membranes.  Neck: " Supple, symmetrical, trachea midline, thyroid symmetric and non-tender.  Lungs: No increased work of breathing, good air exchange, clear to auscultation bilaterally, no crackles or wheezing.  Cardiovascular: Regular rate and rhythm, normal heart sound, and no murmur noted.  Abdomen: Normal bowel sounds, soft, non-distended, non-tender, no masses palpated.  Musculoskeletal: No cyanosis or edema.  Neurologic: Awake, alert, oriented to name, place and time.    Skin: No rashes, erythema, pallor, petechia or purpura.          Review of Systems:       E: NEGATIVE for vision changes or irritation  E/M: NEGATIVE for ear, mouth and throat problems  R: NEGATIVE for significant cough or SOB  CV: NEGATIVE for chest pain, palpitations or peripheral edema  GI: NEGATIVE for nausea, abdominal pain, heartburn, or change in bowel habits  : NEGATIVE for frequency, dysuria, or hematuria  M: NEGATIVE for significant arthralgias or myalgia  N: NEGATIVE for weakness, dizziness or paresthesias  H: NEGATIVE for bleeding problems           Lab / Radiology Results:   All laboratory data reviewed          Assessment:   Appropriately NPO  Chief complaint or anatomic assessment of involved area: EGD for epigastric pain, GERD         Plan:   MAC Anesthesia     Patient's active problems diagnostically and therapeutically optimized for the planned procedure  Risks, benefits, alternatives to procedure explained and consent obtained  P2 (patient with mild systemic disease)  Discharge from Phase 1 and / or Phase 2 recovery when patient meets criteria    I have reviewed the history and physical, lab finding(s), diagnostic data, medications, and the plan for sedation.  I have determined this patient to be an appropriate candidate for the planned sedation / procedure and have reassessed the patient immediately prior to sedation / procedure.      Jorge Schofield MD

## 2018-07-18 NOTE — ANESTHESIA POSTPROCEDURE EVALUATION
Patient: Yaritza Bradley    Procedure(s):  gastroscopy with biopsies - Wound Class: II-Clean Contaminated    Diagnosis:gasroesophageal reflux disease    diagnostic  Diagnosis Additional Information: No value filed.    Anesthesia Type:  MAC    Note:  Anesthesia Post Evaluation    Patient location during evaluation: Bedside  Patient participation: Able to fully participate in evaluation  Level of consciousness: awake and alert  Pain management: adequate  Airway patency: patent  Cardiovascular status: acceptable  Respiratory status: acceptable  Hydration status: acceptable  PONV: none     Anesthetic complications: None          Last vitals:  Vitals:    07/18/18 1005   BP: 126/63   Resp: 16   Temp: 36.6  C (97.9  F)   SpO2: 99%         Electronically Signed By: WENDY Hutchison CRNA  July 18, 2018  11:57 AM

## 2018-07-20 LAB — COPATH REPORT: NORMAL

## 2018-08-23 ENCOUNTER — HOSPITAL ENCOUNTER (OUTPATIENT)
Dept: MAMMOGRAPHY | Facility: CLINIC | Age: 63
Discharge: HOME | End: 2018-08-23
Attending: FAMILY MEDICINE | Admitting: FAMILY MEDICINE
Payer: COMMERCIAL

## 2018-08-23 DIAGNOSIS — Z12.31 VISIT FOR SCREENING MAMMOGRAM: ICD-10-CM

## 2018-08-23 PROCEDURE — 77067 SCR MAMMO BI INCL CAD: CPT

## 2018-09-11 DIAGNOSIS — M06.9 RA (RHEUMATOID ARTHRITIS) (H): ICD-10-CM

## 2018-09-11 DIAGNOSIS — Z51.81 ENCOUNTER FOR THERAPEUTIC DRUG MONITORING: ICD-10-CM

## 2018-09-11 LAB
ALBUMIN SERPL-MCNC: 3.8 G/DL (ref 3.4–5)
ALT SERPL W P-5'-P-CCNC: 23 U/L (ref 0–50)
AST SERPL W P-5'-P-CCNC: 19 U/L (ref 0–45)
BASOPHILS # BLD AUTO: 0 10E9/L (ref 0–0.2)
BASOPHILS NFR BLD AUTO: 0.7 %
CREAT SERPL-MCNC: 0.71 MG/DL (ref 0.52–1.04)
DIFFERENTIAL METHOD BLD: NORMAL
EOSINOPHIL # BLD AUTO: 0.1 10E9/L (ref 0–0.7)
EOSINOPHIL NFR BLD AUTO: 1.2 %
ERYTHROCYTE [DISTWIDTH] IN BLOOD BY AUTOMATED COUNT: 13.5 % (ref 10–15)
GFR SERPL CREATININE-BSD FRML MDRD: 83 ML/MIN/1.7M2
HCT VFR BLD AUTO: 38.8 % (ref 35–47)
HGB BLD-MCNC: 13.6 G/DL (ref 11.7–15.7)
LYMPHOCYTES # BLD AUTO: 2 10E9/L (ref 0.8–5.3)
LYMPHOCYTES NFR BLD AUTO: 32.4 %
MCH RBC QN AUTO: 32.2 PG (ref 26.5–33)
MCHC RBC AUTO-ENTMCNC: 35.1 G/DL (ref 31.5–36.5)
MCV RBC AUTO: 92 FL (ref 78–100)
MONOCYTES # BLD AUTO: 0.8 10E9/L (ref 0–1.3)
MONOCYTES NFR BLD AUTO: 12.5 %
NEUTROPHILS # BLD AUTO: 3.2 10E9/L (ref 1.6–8.3)
NEUTROPHILS NFR BLD AUTO: 53.2 %
PLATELET # BLD AUTO: 230 10E9/L (ref 150–450)
RBC # BLD AUTO: 4.22 10E12/L (ref 3.8–5.2)
WBC # BLD AUTO: 6.1 10E9/L (ref 4–11)

## 2018-09-11 PROCEDURE — 84450 TRANSFERASE (AST) (SGOT): CPT | Performed by: INTERNAL MEDICINE

## 2018-09-11 PROCEDURE — 36415 COLL VENOUS BLD VENIPUNCTURE: CPT | Performed by: INTERNAL MEDICINE

## 2018-09-11 PROCEDURE — 82040 ASSAY OF SERUM ALBUMIN: CPT | Performed by: INTERNAL MEDICINE

## 2018-09-11 PROCEDURE — 85025 COMPLETE CBC W/AUTO DIFF WBC: CPT | Performed by: INTERNAL MEDICINE

## 2018-09-11 PROCEDURE — 84460 ALANINE AMINO (ALT) (SGPT): CPT | Performed by: INTERNAL MEDICINE

## 2018-09-11 PROCEDURE — 82565 ASSAY OF CREATININE: CPT | Performed by: INTERNAL MEDICINE

## 2018-10-22 DIAGNOSIS — F33.1 MODERATE RECURRENT MAJOR DEPRESSION (H): ICD-10-CM

## 2018-10-22 NOTE — TELEPHONE ENCOUNTER
"Requested Prescriptions   Pending Prescriptions Disp Refills     cycloSPORINE (RESTASIS) 0.05 % ophthalmic emulsion       Sig: Place 1 drop into both eyes 2 times daily    Medication is Historical        FLUoxetine (PROZAC) 40 MG capsule  Last Written Prescription Date:  12/7/2017  Last Fill Quantity: 90,  # refills: 3   Last office visit: 7/16/2018 with prescribing provider:  Stefan    Future Office Visit:     90 capsule 3     Sig: Take 1 capsule (40 mg) by mouth daily    SSRIs Protocol Failed    10/22/2018 10:59 AM  SHEA-7 SCORE 1/18/2016 7/14/2017 7/16/2018   Total Score - - -   Total Score 14 8 9            Failed - PHQ-9 score less than 5 in past 6 months    Please review last PHQ-9 score.   PHQ-9 SCORE 9/22/2016 7/14/2017 7/16/2018   Total Score - - -   Total Score 20 11 13            Passed - Patient is age 18 or older       Passed - No active pregnancy on record       Passed - No positive pregnancy test in last 12 months       Passed - Recent (6 mo) or future (30 days) visit within the authorizing provider's specialty    Patient had office visit in the last 6 months or has a visit in the next 30 days with authorizing provider or within the authorizing provider's specialty.  See \"Patient Info\" tab in inbasket, or \"Choose Columns\" in Meds & Orders section of the refill encounter.            Methotrexate, PF, (RASUVO) 25 MG/0.5ML autoinjector      .Medication is Historical        folic acid (FOLVITE) 1 MG tablet  Medication is Historical   30 tablet      Sig: Take 1 tablet (1,000 mcg) by mouth daily    Vitamin Supplements (Adult) Protocol Failed    10/22/2018 10:59 AM       Failed - Recent (12 mo) or future (30 days) visit within the authorizing provider's specialty    Patient had office visit in the last 12 months or has a visit in the next 30 days with authorizing provider or within the authorizing provider's specialty.  See \"Patient Info\" tab in inbasket, or \"Choose Columns\" in Meds & Orders section of the " refill encounter.             Passed - High dose Vitamin D not ordered

## 2018-10-24 ENCOUNTER — MYC MEDICAL ADVICE (OUTPATIENT)
Dept: FAMILY MEDICINE | Facility: CLINIC | Age: 63
End: 2018-10-24

## 2018-10-24 NOTE — TELEPHONE ENCOUNTER
Routing refill request to provider for review/approval because:  Drug not on the [facility] refill protocol   Medication is reported/historical  Three of the four meds are on chart as historical and 2 of them are not on protocol. Fluoxetine was last signed by Dr Katz.   PHQ-9 ([employer]) 7/16/2018   1.  Little interest or pleasure in doing things 1   2.  Feeling down, depressed, or hopeless 1   3.  Trouble falling or staying asleep, or sleeping too much 1   4.  Feeling tired or having little energy 3   5.  Poor appetite or overeating 2   6.  Feeling bad about yourself 0   7.  Trouble concentrating 2   8.  Moving slowly or restless 3   9.  Suicidal or self-harm thoughts 0   PHQ-9 Total Score 13     I sent her another PHQ9 via [employer] to complete and return now.     Kell Helton RNC

## 2018-10-26 RX ORDER — FOLIC ACID 1 MG/1
1000 TABLET ORAL DAILY
Qty: 30 TABLET | OUTPATIENT
Start: 2018-10-26

## 2018-10-26 RX ORDER — CYCLOSPORINE 0.5 MG/ML
1 EMULSION OPHTHALMIC 2 TIMES DAILY
OUTPATIENT
Start: 2018-10-26

## 2018-10-26 RX ORDER — FLUOXETINE 40 MG/1
40 CAPSULE ORAL DAILY
Qty: 90 CAPSULE | Refills: 3 | Status: SHIPPED | OUTPATIENT
Start: 2018-10-26 | End: 2018-12-12

## 2018-10-26 NOTE — TELEPHONE ENCOUNTER
cycloSPORINE (RESTASIS) 0.05 % ophthalmic emulsion  Place 1 drop into both eyes 2 times daily       Disp: Not specified Refills:     Class: E-Prescribe Start: 10/26/2018   Refused by: Karla Wade MD  Refusal reason: Originating/Specialty Provider to approve  Methotrexate, PF, (RASUVO) 25 MG/0.5ML autoinjector  N/A       Disp: Not specified Refills:     Class: E-Prescribe Start: 10/26/2018   Refused by: Karla Wade MD  Refusal reason: Originating/Specialty Provider to approve  folic acid (FOLVITE) 1 MG tablet  Take 1 tablet (1,000 mcg) by mouth daily       Disp: 30 tablet Refills:     Class: E-Prescribe Start: 10/26/2018   Refused by: Karla Wade MD  Refusal reason: Originating/Specialty Provider to approve

## 2018-11-01 ENCOUNTER — OFFICE VISIT (OUTPATIENT)
Dept: FAMILY MEDICINE | Facility: CLINIC | Age: 63
End: 2018-11-01
Payer: COMMERCIAL

## 2018-11-01 VITALS
DIASTOLIC BLOOD PRESSURE: 74 MMHG | RESPIRATION RATE: 18 BRPM | WEIGHT: 124 LBS | HEART RATE: 87 BPM | HEIGHT: 64 IN | SYSTOLIC BLOOD PRESSURE: 136 MMHG | BODY MASS INDEX: 21.17 KG/M2 | TEMPERATURE: 98.4 F | OXYGEN SATURATION: 99 %

## 2018-11-01 DIAGNOSIS — R42 DIZZINESS: Primary | ICD-10-CM

## 2018-11-01 DIAGNOSIS — Z23 NEED FOR PROPHYLACTIC VACCINATION AND INOCULATION AGAINST INFLUENZA: ICD-10-CM

## 2018-11-01 DIAGNOSIS — R10.11 ABDOMINAL PAIN, RIGHT UPPER QUADRANT: ICD-10-CM

## 2018-11-01 DIAGNOSIS — R26.89 BALANCE PROBLEMS: ICD-10-CM

## 2018-11-01 DIAGNOSIS — R51.9 NONINTRACTABLE EPISODIC HEADACHE, UNSPECIFIED HEADACHE TYPE: ICD-10-CM

## 2018-11-01 DIAGNOSIS — F40.240 CLAUSTROPHOBIA: ICD-10-CM

## 2018-11-01 PROCEDURE — 99215 OFFICE O/P EST HI 40 MIN: CPT | Mod: 25 | Performed by: FAMILY MEDICINE

## 2018-11-01 PROCEDURE — 90471 IMMUNIZATION ADMIN: CPT | Performed by: FAMILY MEDICINE

## 2018-11-01 PROCEDURE — 90472 IMMUNIZATION ADMIN EACH ADD: CPT | Performed by: FAMILY MEDICINE

## 2018-11-01 PROCEDURE — 90732 PPSV23 VACC 2 YRS+ SUBQ/IM: CPT | Performed by: FAMILY MEDICINE

## 2018-11-01 PROCEDURE — 90686 IIV4 VACC NO PRSV 0.5 ML IM: CPT | Performed by: FAMILY MEDICINE

## 2018-11-01 RX ORDER — LORAZEPAM 0.5 MG/1
0.25-0.5 TABLET ORAL EVERY 8 HOURS PRN
Qty: 2 TABLET | Refills: 0 | Status: SHIPPED | OUTPATIENT
Start: 2018-11-01 | End: 2018-12-12

## 2018-11-01 ASSESSMENT — PAIN SCALES - GENERAL: PAINLEVEL: MODERATE PAIN (4)

## 2018-11-01 NOTE — PROGRESS NOTES

## 2018-11-01 NOTE — PATIENT INSTRUCTIONS
Thank you for choosing [facility].  You may be receiving a survey in the mail from [facility] regarding your visit today.  Please take a few minutes to complete and return the survey to let us know how we are doing.      Our Clinic hours are:  Mondays    7:20 am - 7 pm  Tues - Fri  7:20 am - 5 pm    Clinic Phone: [phone]    The clinic lab opens at 7:30 am Mon - Fri and appointments are required.    [facility]. [phone]  Monday  8 am - 7pm  Tues - Fri 8 am - 5:30 pm

## 2018-11-01 NOTE — MR AVS SNAPSHOT
After Visit Summary   11/1/2018    Yaritza Bradley    MRN: [id number]           Patient Information     Date Of Birth          1955        Visit Information        Provider Department      11/1/2018 2:20 PM Aziza Aviles MD [facility]        Today's Diagnoses     Dizziness    -  1    Nonintractable episodic headache, unspecified headache type        Abdominal pain, right upper quadrant        Balance problems        Claustrophobia          Care Instructions          Thank you for choosing [facility].  You may be receiving a survey in the mail from [employer] [facility] regarding your visit today.  Please take a few minutes to complete and return the survey to let us know how we are doing.      Our Clinic hours are:  Mondays    7:20 am - 7 pm  Tues -  Fri  7:20 am - 5 pm    Clinic Phone: [phone]    The clinic lab opens at 7:30 am Mon - Fri and appointments are required.    [facility]  Ph. [phone]  Monday  8 am - 7pm  Tues - Fri 8 am - 5:30 pm                 Follow-ups after your visit        Your next 10 appointments already scheduled     Dec 12, 2018  9:20 AM CST   PHYSICAL with Aziza Aviles MD   [facility] ([facility])    [address] [facility] [place] [id number] [phone]              Future tests that were ordered for you today     Open Future Orders        Priority Expected Expires Ordered    MR Brain w/o & w Contrast Routine  11/1/2019 11/1/2018    MRA Brain ([place]) wo Contrast Routine  11/1/2019 11/1/2018    NM Hepatobiliary Scan w GB EF Routine  11/1/2019 11/1/2018    MRA Neck (Carotids) w Contrast Routine  11/1/2019 11/1/2018            Who to contact     If you have questions or need follow up information about today's clinic visit or your schedule please contact [facility] directly at [phone].  Normal or non-critical lab and imaging results will be  "communicated to you by [employer], letter or phone within 4 business days after the clinic has received the results. If you do not hear from us within 7 days, please contact the clinic through [employer] or phone. If you have a critical or abnormal lab result, we will notify you by phone as soon as possible.  Submit refill requests through [employer] or call your pharmacy and they will forward the refill request to us. Please allow 3 business days for your refill to be completed.          Additional Information About Your Visit        [employer] Information     [employer] gives you secure access to your electronic health record. If you see a primary care provider, you can also send messages to your care team and make appointments. If you have questions, please call your primary care clinic.  If you do not have a primary care provider, please call [phone] and they will assist you.        Care EveryWhere ID     This is your Care EveryWhere ID. This could be used by other organizations to access your [place] medical records  [id number]        Your Vitals Were     Pulse Temperature Respirations Height Pulse Oximetry Breastfeeding?    87 98.4  F (36.9  C) (Tympanic) 18 5' 4\" (1.626 m) 99% No    BMI (Body Mass Index)                   21.28 kg/m2            Blood Pressure from Last 3 Encounters:   11/01/18 136/74   07/18/18 123/63   07/16/18 126/70    Weight from Last 3 Encounters:   11/01/18 124 lb (56.2 kg)   07/18/18 122 lb (55.3 kg)   07/16/18 122 lb (55.3 kg)                 Today's Medication Changes          These changes are accurate as of 11/1/18  2:57 PM.  If you have any questions, ask your nurse or doctor.               Start taking these medicines.        Dose/Directions    LORazepam 0.5 MG tablet   Commonly known as:  ATIVAN   Used for:  Claustrophobia   Started by:  Aziza Aviles MD        Dose:  0.25-0.5 mg   Take 0.5-1 tablets (0.25-0.5 mg) by mouth every 8 hours as needed for anxiety 1 tab 1 hour prior to " "MRI, can repeat 15 minutes before or during procedure if necessary   Quantity:  2 tablet   Refills:  0            Where to get your medicines      Some of these will need a paper prescription and others can be bought over the counter.  Ask your nurse if you have questions.     Bring a paper prescription for each of these medications     LORazepam 0.5 MG tablet                Primary Care Provider Office Phone # Fax #    Karla Maral Wade [phone] [address] [facility] [address]        Equal Access to Services     JOSE GREENE : Hadii aad ku hadasho Soomaali, waaxda luqadaha, qaybta kaalmada adeegyada, waxay idiin hayaan adebossman patino . So [facility] [phone].    ATENCIÓN: Si habla español, tiene a moura disposición servicios gratuitos de asistencia lingüística. Llame al [phone].    We comply with applicable federal civil rights laws and [place] laws. We do not discriminate on the basis of race, color, national origin, age, disability, sex, sexual orientation, or gender identity.            Thank you!     Thank you for choosing [facility]  for your care. Our goal is always to provide you with excellent care. Hearing back from our patients is one way we can continue to improve our services. Please take a few minutes to complete the written survey that you may receive in the mail after your visit with us. Thank you!             Your Updated Medication List - Protect others around you: Learn how to safely use, store and throw away your medicines at www.disposemymeds.org.          This list is accurate as of 11/1/18  2:57 PM.  Always use your most recent med list.                   Brand Name Dispense Instructions for use Diagnosis    B-12 1000 MCG Tbcr     100 tablet    Take 1,000 mcg by mouth daily        B-D INSULIN SYRINGE 25G X 5/8\" 1 ML Misc   Generic drug:  Insulin Syringe-Needle U-100      TO BE USED IN WEEKLY METHOTREXATE INJECTIONS        BONIVA IV "      Inject into the vein every 3 months        diclofenac 1 % Gel topical gel    VOLTAREN     Place onto the skin 4 times daily        FLUoxetine 40 MG capsule    PROzac    90 capsule    Take 1 capsule (40 mg) by mouth daily    Moderate recurrent major depression (H)       folic acid 1 MG tablet    FOLVITE     1 TABLET DAILY        HUMIRA PEN 40 MG/0.8ML injection   Generic drug:  adalimumab      Inject 40 mg Subcutaneous every 14 days        LORazepam 0.5 MG tablet    ATIVAN    2 tablet    Take 0.5-1 tablets (0.25-0.5 mg) by mouth every 8 hours as needed for anxiety 1 tab 1 hour prior to MRI, can repeat 15 minutes before or during procedure if necessary    Claustrophobia       METHOTREXATE (PF) SC      Inject 0.5 mLs Subcutaneous once a week On Tuesday        OMEGA-3 FISH OIL PO      Take 1 g by mouth daily (DRY EYE OMEGA BENEFITS) 667-250 mg-unit cap        RESTASIS 0.05 % ophthalmic emulsion   Generic drug:  cycloSPORINE      Place 1 drop into both eyes 2 times daily        SM CALCIUM SOFT CHEWS 500-100-40 MG-UNT-MCG Chew   Generic drug:  Calcium-Vitamin D-Vitamin K      CHEW 2-3 ORALLY DAILY        TURMERIC PO      Take 1,000 mg by mouth daily

## 2018-11-01 NOTE — PROGRESS NOTES
"  SUBJECTIVE:   Yaritza Bradley is a 62 year old female who presents to clinic today for the following health issues:      Acute Illness   Acute illness concerns: vertigo, headache and cough  Onset: 6 days    Fever: no    Chills/Sweats: no    Headache (location?): YES    Sinus Pressure:YES- tender, facial pain and teeth pain    Conjunctivitis:  no    Ear Pain: YES: left    Rhinorrhea: no     Congestion: no     Sore Throat: no      Cough: YES non-productive of clear sputum    Wheeze: no     Decreased Appetite: no     Nausea: YES    Vomiting: no     Diarrhea:  no     Dysuria/Freq.: no     Fatigue/Achiness: YES    Sick/Strep Exposure: YES- kid     Therapies Tried and outcome: n/a      Vertigo started Saturday/Sunday/Monday.  That has improved now.     Still feels a bit unstable/off balance.  That has been going on for at least a year.  Happens all the time.  Will walk into end tables, etc.  Walking along and suddenly will fall or lose her balance.    ?tripping on her feet    Feels she doesn't walk in a straight line.    Gets lightheaded when she looks up for too long. Feels flushed/nauseated and lightheaded/dizzy.  This seems to get worse with time.      C/o headaches almost daily.  That has been going on a month or so.  Pain is both temples and behind her eyes.  Wakes up with them but doesn't feel the headache is the reason she's waking up.     Does have dry eyes.  No occipital pain.  Some blurry vision - less when using the restasis.    Sees an eye doctor regularly and just saw her ophthalmologist. No diplopia.  No field cuts.     The URI symptoms started a few days ago.        /74  Pulse 87  Temp 98.4  F (36.9  C) (Tympanic)  Resp 18  Ht 5' 4\" (1.626 m)  Wt 124 lb (56.2 kg)  SpO2 99%  Breastfeeding? No  BMI 21.28 kg/m2  EXAM: GENERAL APPEARANCE: Alert, no acute distress  HENT: Ears and TMs normal, oral mucosa and posterior oropharynx normal, negative ifrah-hallpike  Neck: negative for carotid bruit  RESP: " lungs clear to auscultation   CV: normal rate, regular rhythm, no murmur or gallop  ABDOMEN: soft, no organomegaly, masses or tenderness  NEURO: Alert, oriented, speech and mentation normal, Cranial nerves 2-12 are normal., Strength normal and symmetrical in upper and lower extremities., Reflexes 2+ and symmetrical at biceps, triceps, brachioradialis, knees and ankles, rhomberg negative  PSYCH: mentation appears normal., slightly anxious      ASSESSMENT/PLAN:      ICD-10-CM    1. Dizziness R42 MR Brain w/o & w Contrast     MRA Brain ([place]) wo Contrast     MRA Neck (Carotids) w Contrast   2. Nonintractable episodic headache, unspecified headache type R51 MR Brain w/o & w Contrast     MRA Brain ([place]) wo Contrast     MRA Neck (Carotids) w Contrast   3. Abdominal pain, right upper quadrant R10.11 NM Hepatobiliary Scan w GB EF   4. Balance problems R26.89 MR Brain w/o & w Contrast     MRA Brain ([place]) wo Contrast     MRA Neck (Carotids) w Contrast   5. Claustrophobia F40.240 LORazepam (ATIVAN) 0.5 MG tablet   6. Need for prophylactic vaccination and inoculation against influenza Z23 FLU VACCINE, SPLIT VIRUS, IM (QUADRIVALENT) [69131]- >3 YRS     Pneumococcal vaccine 23 valent PPSV23  (Pneumovax) [76471]     Vaccine Administration, Each Additional [44132]     Vaccine Administration, Initial [77294]     Differntial diagnosis for the dizziness/lightheaded feelings discussed.  Consider stroke (not acute), mass, vertebrobasilar insufficiency, bppv (less likely given story and exam).   If negative imaging, consider PT for balance work.   Reasonable to get imaging due to her age and the headache/basilar symptoms.    Also consider, with the somewhat vague symptoms, that some of this is psychosomatic and that her anxiety is worsening symptoms.  We may  Need to try a different ssri.     Continues to have some right upper quadrant pain/epigastric pain.  I would recommend we get a HIDA scan as  the next step.      Review of her EGD and surgical pathology indicates reactive gastropathy without inflammation.  ? If this could be from her Boniva. In fact, boniva can cause abdominal pain/gastropathy/gastritis and could potentially lead to some dizziness.  Tried to call patient to discuss this after she left clinic - left message for her to return my call.     Aziza Aviles M.D.      Patient Instructions         Thank you for choosing [facility].  You may be receiving a survey in the mail from "[employer] [facility] regarding your visit today.  Please take a few minutes to complete and return the survey to let us know how we are doing.      Our Clinic hours are:  Mondays    7:20 am - 7 pm  Tues -  Fri  7:20 am - 5 pm    Clinic Phone: [phone]    The clinic lab opens at 7:30 am Mon - Fri and appointments are required.    [facility]. [phone]  Monday  8 am - 7pm  Tues - Fri 8 am - 5:30 pm

## 2018-11-09 ENCOUNTER — HOSPITAL ENCOUNTER (OUTPATIENT)
Dept: MRI IMAGING | Facility: CLINIC | Age: 63
Discharge: HOME | End: 2018-11-09
Attending: FAMILY MEDICINE | Admitting: FAMILY MEDICINE
Payer: COMMERCIAL

## 2018-11-09 ENCOUNTER — HOSPITAL ENCOUNTER (OUTPATIENT)
Dept: MRI IMAGING | Facility: CLINIC | Age: 63
End: 2018-11-09
Attending: FAMILY MEDICINE
Payer: COMMERCIAL

## 2018-11-09 DIAGNOSIS — R51.9 NONINTRACTABLE EPISODIC HEADACHE, UNSPECIFIED HEADACHE TYPE: ICD-10-CM

## 2018-11-09 DIAGNOSIS — R42 DIZZINESS: ICD-10-CM

## 2018-11-09 DIAGNOSIS — R26.89 BALANCE PROBLEMS: ICD-10-CM

## 2018-11-09 PROCEDURE — 70549 MR ANGIOGRAPH NECK W/O&W/DYE: CPT

## 2018-11-09 PROCEDURE — 70544 MR ANGIOGRAPHY HEAD W/O DYE: CPT

## 2018-11-09 PROCEDURE — 70553 MRI BRAIN STEM W/O & W/DYE: CPT

## 2018-11-09 PROCEDURE — 25500064 ZZH RX 255 OP 636: Performed by: FAMILY MEDICINE

## 2018-11-09 PROCEDURE — A9585 GADOBUTROL INJECTION: HCPCS | Performed by: FAMILY MEDICINE

## 2018-11-09 RX ORDER — GADOBUTROL 604.72 MG/ML
6 INJECTION INTRAVENOUS ONCE
Status: COMPLETED | OUTPATIENT
Start: 2018-11-09 | End: 2018-11-09

## 2018-11-09 RX ADMIN — GADOBUTROL 6 ML: 604.72 INJECTION INTRAVENOUS at 13:41

## 2018-11-09 NOTE — PROGRESS NOTES
Overall the neck vessels are normal.  There is very mild narrowing of the right vertebral artery, but unlikely to be causing the symptoms.     Aziza Aviles M.D.

## 2018-11-11 NOTE — PROGRESS NOTES
No stroke or masses. There is some volume loss, not unusual at your age.  There is more small vessel disease (due to plaques in the small arteries of the brain) and this is more advanced than we usually see at 63.  This has worsened over the past 4 years.      See me to discuss the benefits of starting a cholesterol lowering drug.

## 2018-11-26 ENCOUNTER — APPOINTMENT (OUTPATIENT)
Dept: GENERAL RADIOLOGY | Facility: CLINIC | Age: 63
End: 2018-11-26
Attending: PHYSICIAN ASSISTANT
Payer: COMMERCIAL

## 2018-11-26 ENCOUNTER — TELEPHONE (OUTPATIENT)
Dept: FAMILY MEDICINE | Facility: CLINIC | Age: 63
End: 2018-11-26

## 2018-11-26 ENCOUNTER — HOSPITAL ENCOUNTER (EMERGENCY)
Facility: CLINIC | Age: 63
Discharge: HOME | End: 2018-11-26
Attending: PHYSICIAN ASSISTANT | Admitting: PHYSICIAN ASSISTANT
Payer: COMMERCIAL

## 2018-11-26 VITALS
DIASTOLIC BLOOD PRESSURE: 83 MMHG | OXYGEN SATURATION: 97 % | TEMPERATURE: 98.2 F | BODY MASS INDEX: 20.6 KG/M2 | RESPIRATION RATE: 14 BRPM | HEART RATE: 85 BPM | WEIGHT: 120 LBS | SYSTOLIC BLOOD PRESSURE: 143 MMHG

## 2018-11-26 DIAGNOSIS — R42 DIZZINESS: Primary | ICD-10-CM

## 2018-11-26 DIAGNOSIS — S82.031A CLOSED DISPLACED TRANSVERSE FRACTURE OF RIGHT PATELLA, INITIAL ENCOUNTER: ICD-10-CM

## 2018-11-26 DIAGNOSIS — M54.2 NECK PAIN: ICD-10-CM

## 2018-11-26 DIAGNOSIS — S09.90XA CLOSED HEAD INJURY, INITIAL ENCOUNTER: ICD-10-CM

## 2018-11-26 DIAGNOSIS — R26.89 BALANCE PROBLEMS: ICD-10-CM
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
## 2018-11-26 PROCEDURE — 73562 X-RAY EXAM OF KNEE 3: CPT | Mod: RT

## 2018-11-26 PROCEDURE — 27520 TREAT KNEECAP FRACTURE: CPT | Mod: 54 | Performed by: PHYSICIAN ASSISTANT

## 2018-11-26 PROCEDURE — 27520 TREAT KNEECAP FRACTURE: CPT

## 2018-11-26 PROCEDURE — 72040 X-RAY EXAM NECK SPINE 2-3 VW: CPT

## 2018-11-26 PROCEDURE — G0463 HOSPITAL OUTPT CLINIC VISIT: HCPCS | Mod: 25

## 2018-11-26 PROCEDURE — 99213 OFFICE O/P EST LOW 20 MIN: CPT | Mod: 25 | Performed by: PHYSICIAN ASSISTANT

## 2018-11-26 ASSESSMENT — ENCOUNTER SYMPTOMS
CONFUSION: 0
AGITATION: 0
NECK PAIN: 1
NUMBNESS: 0
LIGHT-HEADEDNESS: 0
WEAKNESS: 0
VOMITING: 0
PHOTOPHOBIA: 0
NAUSEA: 0

## 2018-11-26 NOTE — ED AVS SNAPSHOT
[facility] Emergency Department    [address] [facility] [address]    Phone:  [phone]    Fax:  [phone]                                       Yaritza Bradley   MRN: [id number]    Department:  [facility] Emergency Department   Date of Visit:  11/26/2018           Patient Information     Date Of Birth          1955        Your diagnoses for this visit were:     Closed displaced transverse fracture of right patella, initial encounter     Fall, initial encounter     Neck pain     Closed head injury, initial encounter        You were seen by Sonia Meneses PA-C.      Follow-up Information     Follow up with Karla Wade MD In 2 days.    Specialty:  Family Practice    Why:  As needed    Contact information:    [address] [place] [address]  [phone]          Follow up with [facility].    Specialty:  EMERGENCY MEDICINE    Why:  As needed, If symptoms worsen    Contact information:    [address] [place] [address] [phone]    Additional information:    The medical center is located at   [address] [place] [address] (between [address]   [facility] [address] in [place], [place] miles north   of [place]).        Discharge Instructions       Ice, elevate, rest, knee immobilizer to wear until seen by Orthopedics.     Walker to assist with limited weight bearing. Patient to avoid weight bearing as much as possible until seen by orthopedics.     Orthopedics information given to patient for further evaluation and follow up    Patient to return to Emergency Room if headache, dizziness, confusion, visual changes, worsening neck pain, agitation, vomiting occur.     Patient offered head CT in office today, but declined. Patient has appointment with dizzy clinic tomorrow     Your next 10 appointments already scheduled     Dec 12, 2018  9:20 AM CST   PHYSICAL with Aziza Aviles MD   [facility] ([facility])     "[address]  [facility] [address]   [phone]              24 Hour Appointment Hotline       To make an appointment at any [place] clinic, call [phone] ([phone]). If you don't have a family doctor or clinic, we will help you find one. [place] clinics are conveniently located to serve the needs of you and your family.          ED Discharge Orders     Knee Immobilizer           ORTHO  REFERRAL       [facility] is referring you to the Orthopedic  Services at [facility].       The  Representative will assist you in the coordination of your Orthopedic and Musculoskeletal Care as prescribed by your physician.    The  Representative will call you within 1 business day to help schedule your appointment, or you may contact the  Representative at:    All areas ~ [phone]     Type of Referral : Surgical / Specialist       Timeframe requested: 3 - 5 days    Coverage of these services is subject to the terms and limitations of your health insurance plan.  Please call member services at your health plan with any benefit or coverage questions.      If X-rays, CT or MRI's have been performed, please contact the facility where they were done to arrange for , prior to your scheduled appointment.  Please bring this referral request to your appointment and present it to your specialist.            [place] adult                    Review of your medicines      Our records show that you are taking the medicines listed below. If these are incorrect, please call your family doctor or clinic.        Dose / Directions Last dose taken    B-12 1000 MCG Tbcr   Dose:  1000 mcg   Quantity:  100 tablet        Take 1,000 mcg by mouth daily   Refills:  1        B-D INSULIN SYRINGE 25G X 5/8\" 1 ML Misc   Generic drug:  Insulin Syringe-Needle U-100        TO BE USED IN WEEKLY METHOTREXATE INJECTIONS   Refills:  1        " BONIVA IV        Inject into the vein every 3 months   Refills:  0        diclofenac 1 % topical gel   Commonly known as:  VOLTAREN        Place onto the skin 4 times daily   Refills:  0        FLUoxetine 40 MG capsule   Commonly known as:  PROzac   Dose:  40 mg   Quantity:  90 capsule        Take 1 capsule (40 mg) by mouth daily   Refills:  3        folic acid 1 MG tablet   Commonly known as:  FOLVITE        1 TABLET DAILY   Refills:  0        HUMIRA PEN 40 MG/0.8ML injection   Dose:  40 mg   Generic drug:  adalimumab        Inject 40 mg Subcutaneous every 14 days   Refills:  0        LORazepam 0.5 MG tablet   Commonly known as:  ATIVAN   Dose:  0.25-0.5 mg   Quantity:  2 tablet        Take 0.5-1 tablets (0.25-0.5 mg) by mouth every 8 hours as needed for anxiety 1 tab 1 hour prior to MRI, can repeat 15 minutes before or during procedure if necessary   Refills:  0        METHOTREXATE (PF) SC   Dose:  0.5 mL        Inject 0.5 mLs Subcutaneous once a week On Tuesday   Refills:  0        OMEGA-3 FISH OIL PO   Dose:  1 g   Indication:  dry eyes        Take 1 g by mouth daily (DRY EYE OMEGA BENEFITS) 667-250 mg-unit cap   Refills:  0        RESTASIS 0.05 % ophthalmic emulsion   Dose:  1 drop   Generic drug:  cycloSPORINE        Place 1 drop into both eyes 2 times daily   Refills:  0        SM CALCIUM SOFT CHEWS 500-100-40 MG-UNT-MCG Chew   Generic drug:  Calcium-Vitamin D-Vitamin K        CHEW 2-3 ORALLY DAILY   Refills:  0        TURMERIC PO   Dose:  1000 mg        Take 1,000 mg by mouth daily   Refills:  0                Procedures and tests performed during your visit     Cervical spine XR, 2-3 views    XR Knee Right 3 Views      Orders Needing Specimen Collection     None      Pending Results     Date and Time Order Name Status Description    11/26/2018 1949 XR Knee Right 3 Views Preliminary             Pending Culture Results     No orders found from 11/24/2018 to 11/27/2018.            Pending Results Instructions      If you had any lab results that were not finalized at the time of your Discharge, you can call the ED Lab Result RN at [phone]. You will be contacted by this team for any positive Lab results or changes in treatment. The nurses are available 7 days a week from 10A to 6:30P.  You can leave a message 24 hours per day and they will return your call.        Test Results From Your Hospital Stay        11/26/2018  8:18 PM      Narrative     RIGHT KNEE THREE VIEWS   11/26/2018 8:02 PM     HISTORY:  Fall. Right knee pain.    COMPARISON: None.        Impression     IMPRESSION: Cortical irregularity along the inferior anterior aspect  of the patella is suspicious for a mildly displaced fracture. There is  a small right knee joint effusion. No other areas suspicious for acute  fracture are identified within the right knee. There are  tricompartmental degenerative changes in the right knee, moderate in  the medial compartment and mild in the lateral and patellofemoral  compartments.         11/26/2018  9:50 PM      Narrative     XR CERVICAL SPINE TWO-THREE VIEWS   11/26/2018 9:26 PM     HISTORY: Cervical tenderness with palpation. Patient fell and hit  front of head today. Rule out fracture or subluxation.    COMPARISON: MRI 11/4/2014    FINDINGS: Alignment is significant for slight accentuation of the  normal lordosis. Multilevel mild degenerative change is present. No  evidence of acute fracture or traumatic subluxation. Paraspinal soft  tissues are unremarkable.        Impression     IMPRESSION: No fracture or traumatic subluxation identified.    ROCAEL MENA MD                Thank you for choosing [facility]       Thank you for choosing [facility] for your care. Our goal is always to provide you with excellent care. Hearing back from our patients is one way we can continue to improve our services. Please take a few minutes to complete the written survey that you may receive in the mail after you visit with us. Thank  you!        [employer] Information     [employer] gives you secure access to your electronic health record. If you see a primary care provider, you can also send messages to your care team and make appointments. If you have questions, please call your primary care clinic.  If you do not have a primary care provider, please call [phone] and they will assist you.        Care EveryWhere ID     This is your Care EveryWhere ID. This could be used by other organizations to access your [place] medical records  [id number]        Equal Access to Services     JOSE GREENE : Veda Cheatham, waaxrupesh luqadaha, qaybalexia kaalmarupesh navas, bi zayas. So [facility] [phone].    ATENCIÓN: Si habla español, tiene a moura disposición servicios gratuitos de asistencia lingüística. Llame al [phone].    We comply with applicable federal civil rights laws and [place] laws. We do not discriminate on the basis of race, color, national origin, age, disability, sex, sexual orientation, or gender identity.            After Visit Summary       This is your record. Keep this with you and show to your community pharmacist(s) and doctor(s) at your next visit.

## 2018-11-26 NOTE — ED AVS SNAPSHOT
[facility] Emergency Department    [address] [facility] [address]    Phone:  [phone]    Fax:  [phone]                                       Yaritza Bradley   MRN: [id number]    Department:  [facility] Emergency Department   Date of Visit:  11/26/2018           After Visit Summary Signature Page     I have received my discharge instructions, and my questions have been answered. I have discussed any challenges I see with this plan with the nurse or doctor.    ..........................................................................................................................................  Patient/Patient Representative Signature      ..........................................................................................................................................  Patient Representative Print Name and Relationship to Patient    ..................................................               ................................................  Date                                   Time    ..........................................................................................................................................  Reviewed by Signature/Title    ...................................................              ..............................................  Date                                               Time          22EPIC Rev 08/18

## 2018-11-26 NOTE — TELEPHONE ENCOUNTER
Reason for Call: referral    Detailed comments: patient is calling stating she dropped of a note for Dr. Aviles a week ago regarding a referral to the [facility]. She has an appt there tomorrow. She did see Dr. Aviles on 11/1 for this issue. Can we fax a referral to [phone], so she can keep her appt. Please call when faxed.    Phone Number Patient can be reached at: Home number on file [phone] (home)    Best Time: any    Can we leave a detailed message on this number? YES   Purvi Mendez  [facility]      Call taken on 11/26/2018 at 10:51 AM by Purvi Mendez

## 2018-11-26 NOTE — TELEPHONE ENCOUNTER
PHQ 9 was not updated. Patient has had OV since and refills provided by Dr. Aviles. Leeann VERAS RN

## 2018-11-26 NOTE — TELEPHONE ENCOUNTER
Sorry, I misunderstood the form left on my desk. My understanding was that she wanted to talk to me at her appointment on 12/12 regarding this, I did NOT understand that she needed a referral.     Referral has been placed, please fax.    Aziza Aviles M.D.

## 2018-11-27 NOTE — DISCHARGE INSTRUCTIONS
Ice, elevate, rest, knee immobilizer to wear until seen by Orthopedics.     Walker to assist with limited weight bearing. Patient to avoid weight bearing as much as possible until seen by orthopedics.     Orthopedics information given to patient for further evaluation and follow up    Patient to return to Emergency Room if headache, dizziness, confusion, visual changes, worsening neck pain, agitation, vomiting occur.     Patient offered head CT in office today, but declined. Patient has appointment with dizzy clinic tomorrow

## 2018-11-27 NOTE — ED PROVIDER NOTES
History     Chief Complaint   Patient presents with     Fall     tangled in blanket, fell and hit head. Not on blood thinners. No LOC> Hit head, right knee, and elbows.      HPI  Yaritza Bradley is a 63 year old female who presents to the urgent care today after fall around 6 PM.  Patient states she was getting up out of a chair to make her son dinner and got tangled up in a blanket which caused her to fall and hit her head on the hardwood floors.  Patient states she also hit both elbows and the right knee.  Patient denies any loss of consciousness, no blood thinners currently, no confusion, vomiting, agitation, no elbow pain/swelling or bruising.  Patient is a history of dizziness and headaches that she has an appointment to see the dizzy clinic tomorrow for, but denies this being the cause for her fall or worsening/change in these symptoms since fall.  Patient has had MRIs and other imaging done for the dizziness and headaches recently.  Patient denies any worsening dizziness or headaches currently.  Patient does have some mild neck pain, but thinks it may have started prior to the injury occurring because she was at the gym working out today. Patient has right knee pain, swelling, difficulty weight bearing, and pain with full flexion and extension of the knee.     Problem List:    Patient Active Problem List    Diagnosis Date Noted     Cellulitis 05/20/2018     Priority: Medium     Dog bite of hand 05/20/2018     Priority: Medium     Femoral acetabular impingement, right 04/26/2016     Priority: Medium     Primary osteoarthritis of right hip, end stage DJD 04/26/2016     Priority: Medium     Memory loss or impairment 07/07/2014     Priority: Medium     Moderate recurrent major depression (H) 07/07/2012     Priority: Medium     Generalized anxiety disorder 04/10/2012     Priority: Medium     GERD (gastroesophageal reflux disease) 03/17/2011     Priority: Medium     CARDIOVASCULAR SCREENING; LDL GOAL LESS THAN 160  10/31/2010     Priority: Medium     Disorder of bone and cartilage 02/20/2007     Priority: Medium     H/o osteopenia; previously on Actonel       Rheumatoid arthritis (H) 04/26/2006     Priority: Medium     Celine Moreno          Past Medical History:    Past Medical History:   Diagnosis Date     Acetabular labrum tear, right, initial encounter 4/26/2016     Arthritis      Hemorrhage of rectum and anus 2002     Post-operative nausea and vomiting 7/14/2017     Trochanteric bursitis of right hip 4/26/2016       Past Surgical History:    Past Surgical History:   Procedure Laterality Date     COLONOSCOPY N/A 10/14/2016    Procedure: COLONOSCOPY;  Surgeon: Gerson Douglas MD;  Location: [facility]     ESOPHAGOSCOPY, GASTROSCOPY, DUODENOSCOPY (EGD), COMBINED  8/19/2011    Procedure:COMBINED ESOPHAGOSCOPY, GASTROSCOPY, DUODENOSCOPY (EGD), BIOPSY SINGLE OR MULTIPLE; Gastroscopy with biopsy; Surgeon:FARZAD GARCIA; Location:[facility]     ESOPHAGOSCOPY, GASTROSCOPY, DUODENOSCOPY (EGD), COMBINED  7/28/2014    Procedure: COMBINED ESOPHAGOSCOPY, GASTROSCOPY, DUODENOSCOPY (EGD), BIOPSY SINGLE OR MULTIPLE;  Surgeon: Marshall Martinez MD;  Location: [facility]     ESOPHAGOSCOPY, GASTROSCOPY, DUODENOSCOPY (EGD), COMBINED N/A 7/18/2018    Procedure: COMBINED ESOPHAGOSCOPY, GASTROSCOPY, DUODENOSCOPY (EGD), BIOPSY SINGLE OR MULTIPLE;  gastroscopy with biopsies;  Surgeon: Jorge Schofield MD;  Location: [facility]     HC DILATION/CURETTAGE DIAG/THER NON OB  2001    for uterine fibroids     OPEN REDUCTION INTERNAL FIXATION ELBOW Right 1/22/2016    right THR     ROTATOR CUFF REPAIR RT/LT  2009    left       Family History:    Family History   Problem Relation Age of Onset     C.A.D. Father 54     C.A.D. Paternal Grandfather      C.A.D. Brother      Alcohol/Drug Brother      Alzheimer Disease Maternal Grandmother      Alzheimer Disease Maternal Grandfather      Alzheimer Disease Paternal Grandmother      Colon Cancer Mother   "      Social History:  Marital Status:   [2]  Social History   Substance Use Topics     Smoking status: Never Smoker     Smokeless tobacco: Never Used     Alcohol use Yes      Comment: 1 wine a month hardly        Medications:      B-D INSULIN SYRINGE 25G X 5/8\" 1 ML MISC   Cyanocobalamin (B-12) 1000 MCG TBCR   diclofenac (VOLTAREN) 1 % GEL topical gel   FLUoxetine (PROZAC) 40 MG capsule   FOLIC ACID 1 MG PO TABS   HUMIRA PEN 40 MG/0.8ML injection   Ibandronate Sodium (BONIVA IV)   LORazepam (ATIVAN) 0.5 MG tablet   Methotrexate, Anti-Rheumatic, (METHOTREXATE, PF, SC)   Omega-3 Fatty Acids (OMEGA-3 FISH OIL PO)   RESTASIS 0.05 % ophthalmic emulsion   SM CALCIUM SOFT CHEWS 500-100-40 OR CHEW   TURMERIC PO         Review of Systems   Eyes: Negative for photophobia and visual disturbance.   Gastrointestinal: Negative for nausea and vomiting.   Musculoskeletal: Positive for neck pain.        Right knee pain and swelling.    Neurological: Negative for weakness, light-headedness and numbness.        Swelling to the right forehead   Psychiatric/Behavioral: Negative for agitation and confusion.        On LOC    All other systems reviewed and are negative.      Physical Exam   BP: 143/83  Pulse: 85  Temp: 98.2  F (36.8  C)  Resp: 14  Weight: 54.4 kg (120 lb)  SpO2: 97 %      Physical Exam   Constitutional: She is oriented to person, place, and time. Vital signs are normal. She appears well-developed and well-nourished. She is active and cooperative. No distress.   HENT:   Head: Normocephalic. Head is with contusion. Head is without raccoon's eyes, without Yang's sign and without abrasion.       Right Ear: Hearing, tympanic membrane and ear canal normal. No hemotympanum.   Left Ear: Hearing, tympanic membrane and ear canal normal. No hemotympanum.   Nose: Nose normal. No rhinorrhea. Right sinus exhibits no maxillary sinus tenderness and no frontal sinus tenderness. Left sinus exhibits no maxillary sinus tenderness " and no frontal sinus tenderness.   Mouth/Throat: Uvula is midline, oropharynx is clear and moist and mucous membranes are normal.   Eyes: Conjunctivae, EOM and lids are normal. Pupils are equal, round, and reactive to light. Right eye exhibits normal extraocular motion and no nystagmus. Left eye exhibits normal extraocular motion and no nystagmus.   Neck: Trachea normal and phonation normal. Spinous process tenderness and muscular tenderness present. No rigidity. No erythema and normal range of motion present.   Patient moving neck fully in all directions when obtaining history in office today.  Positive tenderness with palpation over the posterior cervical spine and muscle tightness noted bilaterally along the cervical spine.   Cardiovascular: Normal rate, regular rhythm, normal heart sounds and intact distal pulses.    Pulmonary/Chest: Effort normal and breath sounds normal.   Abdominal: Soft. Bowel sounds are normal. She exhibits no distension. There is no tenderness. There is no rebound.   Musculoskeletal:        Right elbow: Normal.She exhibits normal range of motion and no swelling. No tenderness found.        Left elbow: Normal. She exhibits normal range of motion and no swelling. No tenderness found.        Right hip: Normal.        Right knee: She exhibits decreased range of motion, swelling, effusion and bony tenderness. She exhibits no ecchymosis, no deformity, no laceration, no erythema, normal alignment, no LCL laxity, normal patellar mobility and no MCL laxity. Tenderness found. Patellar tendon tenderness noted.        Right ankle: Normal.        Legs:  Neurological: She is alert and oriented to person, place, and time. She has normal strength and normal reflexes. She displays normal reflexes. No cranial nerve deficit or sensory deficit. She exhibits normal muscle tone. Coordination normal. GCS eye subscore is 4. GCS verbal subscore is 5. GCS motor subscore is 6.   Reflex Scores:       Bicep reflexes  are 2+ on the right side and 2+ on the left side.       Brachioradialis reflexes are 2+ on the right side and 2+ on the left side.       Patellar reflexes are 2+ on the left side.       Achilles reflexes are 2+ on the right side.  Gait limited due to pain in right knee.    Skin: Skin is warm, dry and intact. No abrasion, no bruising, no ecchymosis and no rash noted. She is not diaphoretic. No erythema. No pallor.   Psychiatric: She has a normal mood and affect. Her speech is normal and behavior is normal. Judgment and thought content normal. Cognition and memory are normal.       ED Course     ED Course     Procedures              Critical Care time:  none                  Results for orders placed or performed during the hospital encounter of 11/26/18 (from the past 24 hour(s))   XR Knee Right 3 Views    Narrative    RIGHT KNEE THREE VIEWS   11/26/2018 8:02 PM     HISTORY:  Fall. Right knee pain.    COMPARISON: None.      Impression    IMPRESSION: Cortical irregularity along the inferior anterior aspect  of the patella is suspicious for a mildly displaced fracture. There is  a small right knee joint effusion. No other areas suspicious for acute  fracture are identified within the right knee. There are  tricompartmental degenerative changes in the right knee, moderate in  the medial compartment and mild in the lateral and patellofemoral  compartments.    ASIM POWELL MD   Cervical spine XR, 2-3 views    Narrative    XR CERVICAL SPINE TWO-THREE VIEWS   11/26/2018 9:26 PM     HISTORY: Cervical tenderness with palpation. Patient fell and hit  front of head today. Rule out fracture or subluxation.    COMPARISON: MRI 11/4/2014    FINDINGS: Alignment is significant for slight accentuation of the  normal lordosis. Multilevel mild degenerative change is present. No  evidence of acute fracture or traumatic subluxation. Paraspinal soft  tissues are unremarkable.      Impression    IMPRESSION: No fracture or traumatic  subluxation identified.    ROCAEL MENA MD       Medications - No data to display    Assessments & Plan (with Medical Decision Making)     I have reviewed the nursing notes.    I have reviewed the findings, diagnosis, plan and need for follow up with the patient.   1) right patellar fracture:   Patient placed in knee immobilizer and given a walker since she declined crutches.  Discussed that patient is recommended to be as nonweightbearing as possible but due to her history of dizziness we do not want to increase her risk for falls with completely keeping her nonweightbearing on crutches.  She given orthopedic information for follow-up this week.  Patient to ice, rest, elevate knee, Tylenol over-the-counter as needed for pain.    2) closed head injury:  [place] Head CT Rule  (calculator)  Background  Assesses need for head imaging in acute trauma  Only validated in adults 18 and older, with GCS 15 and with blunt head trauma occurring within the prior 24 hours and resulting in loss of consciousness, amnesia, or disorientation  Data   63 year old  Head CT is NOT indicated if ALL of the following criteria ABSENT   Of 7 possible items (headache, vomiting, age>60, intoxicated, anterograde amnesia, supraclavicular signs of trauma, seizure)  Age over 60 years old  Physical evidence of trauma above the clavicles  Interpretation  One or more Head CT criteria are present; Head imaging may be indicated    Discussed obtaining CT head in office today; patient declined because she is concerned about radiation exposure.  Discussed risk factors and side effects of not obtaining imaging today in office.  Patient completely intact neurologically during exam, with no worsening symptoms throughout her visit in the urgent care.  Patient informed and instructed to return if worsening symptoms occur of head injury.  Patient agrees with plan and will hold off on CT at this time.    3) Neck pain:   X-ray obtained in office and was  negative for fracture or subluxation.  Discussed with patient that this is likely muscle related.  Heat, rest, Tylenol over-the-counter for pain.    4) fall:   She did have close follow-up with primary care doctor the next couple of days for recheck of symptoms.      Discharge Medication List as of 11/26/2018  9:51 PM          Final diagnoses:   Closed displaced transverse fracture of right patella, initial encounter   Fall, initial encounter   Neck pain   Closed head injury, initial encounter       11/26/2018   [facility] EMERGENCY DEPARTMENT     Sonia Meneses PA-C  11/26/18 [id number]

## 2018-12-05 ENCOUNTER — TRANSFERRED RECORDS (OUTPATIENT)
Dept: HEALTH INFORMATION MANAGEMENT | Facility: CLINIC | Age: 63
End: 2018-12-05

## 2018-12-08 DIAGNOSIS — Z53.9 DIAGNOSIS NOT YET DEFINED: Primary | ICD-10-CM

## 2018-12-08 PROCEDURE — 99207 C MD RECERTIFICATION HHA PT: CPT | Performed by: FAMILY MEDICINE

## 2018-12-10 ENCOUNTER — MYC MEDICAL ADVICE (OUTPATIENT)
Dept: FAMILY MEDICINE | Facility: CLINIC | Age: 63
End: 2018-12-10

## 2018-12-10 ASSESSMENT — ENCOUNTER SYMPTOMS
EYE PAIN: 1
JOINT SWELLING: 1
DIZZINESS: 1
ARTHRALGIAS: 1
DIARRHEA: 0
CHILLS: 0
COUGH: 1
NAUSEA: 1
ABDOMINAL PAIN: 1
SORE THROAT: 0
HEARTBURN: 1
CONSTIPATION: 1
HEADACHES: 1
WEAKNESS: 1
PARESTHESIAS: 0
NERVOUS/ANXIOUS: 1
HEMATOCHEZIA: 0
PALPITATIONS: 0
HEMATURIA: 0
DYSURIA: 0
FREQUENCY: 1
BREAST MASS: 0
SHORTNESS OF BREATH: 1
FEVER: 0
MYALGIAS: 1

## 2018-12-12 ENCOUNTER — OFFICE VISIT (OUTPATIENT)
Dept: FAMILY MEDICINE | Facility: CLINIC | Age: 63
End: 2018-12-12
Payer: COMMERCIAL

## 2018-12-12 VITALS
BODY MASS INDEX: 21.72 KG/M2 | WEIGHT: 127.2 LBS | SYSTOLIC BLOOD PRESSURE: 130 MMHG | HEIGHT: 64 IN | RESPIRATION RATE: 16 BRPM | HEART RATE: 96 BPM | DIASTOLIC BLOOD PRESSURE: 64 MMHG | TEMPERATURE: 98 F

## 2018-12-12 DIAGNOSIS — Z00.00 ROUTINE GENERAL MEDICAL EXAMINATION AT A HEALTH CARE FACILITY: ICD-10-CM

## 2018-12-12 DIAGNOSIS — A09 TRAVELER'S DIARRHEA: Primary | ICD-10-CM

## 2018-12-12 DIAGNOSIS — E78.5 HYPERLIPIDEMIA, UNSPECIFIED HYPERLIPIDEMIA TYPE: ICD-10-CM

## 2018-12-12 DIAGNOSIS — M05.79 RHEUMATOID ARTHRITIS INVOLVING MULTIPLE SITES WITH POSITIVE RHEUMATOID FACTOR (H): ICD-10-CM

## 2018-12-12 DIAGNOSIS — Z23 NEED FOR VACCINATION: ICD-10-CM

## 2018-12-12 DIAGNOSIS — F33.1 MODERATE RECURRENT MAJOR DEPRESSION (H): ICD-10-CM

## 2018-12-12 LAB
CHOLEST SERPL-MCNC: 195 MG/DL
HDLC SERPL-MCNC: 72 MG/DL
LDLC SERPL CALC-MCNC: 97 MG/DL
NONHDLC SERPL-MCNC: 123 MG/DL
TRIGL SERPL-MCNC: 129 MG/DL

## 2018-12-12 PROCEDURE — 99213 OFFICE O/P EST LOW 20 MIN: CPT | Mod: 25 | Performed by: FAMILY MEDICINE

## 2018-12-12 PROCEDURE — 80061 LIPID PANEL: CPT | Performed by: FAMILY MEDICINE

## 2018-12-12 PROCEDURE — 99396 PREV VISIT EST AGE 40-64: CPT | Mod: 25 | Performed by: FAMILY MEDICINE

## 2018-12-12 PROCEDURE — 36415 COLL VENOUS BLD VENIPUNCTURE: CPT | Performed by: FAMILY MEDICINE

## 2018-12-12 PROCEDURE — 90750 HZV VACC RECOMBINANT IM: CPT | Performed by: FAMILY MEDICINE

## 2018-12-12 PROCEDURE — 90471 IMMUNIZATION ADMIN: CPT | Performed by: FAMILY MEDICINE

## 2018-12-12 RX ORDER — SIMVASTATIN 20 MG
20 TABLET ORAL AT BEDTIME
Qty: 90 TABLET | Refills: 3 | Status: SHIPPED | OUTPATIENT
Start: 2018-12-12 | End: 2019-01-17

## 2018-12-12 RX ORDER — CIPROFLOXACIN 250 MG/1
250 TABLET, FILM COATED ORAL 2 TIMES DAILY
Qty: 6 TABLET | Refills: 0 | Status: SHIPPED | OUTPATIENT
Start: 2018-12-12 | End: 2018-12-15

## 2018-12-12 ASSESSMENT — MIFFLIN-ST. JEOR: SCORE: 1111.98

## 2018-12-12 ASSESSMENT — PAIN SCALES - GENERAL: PAINLEVEL: NO PAIN (0)

## 2018-12-12 NOTE — RESULT ENCOUNTER NOTE
Yaritza,    All of the labs were normal or acceptable.    Please contact my office if you have questions.    Cholesterol looks good, still would do the statin given MRI changes.    Aziza Aviles M.D.

## 2018-12-12 NOTE — PROGRESS NOTES
SUBJECTIVE:   CC: Yarizta Bradley is an 63 year old woman who presents for preventive health visit.     Healthy Habits:    Do you get at least three servings of calcium containing foods daily (dairy, green leafy vegetables, etc.)? no, taking calcium and/or vitamin D supplement: yes -     Amount of exercise or daily activities, outside of work: used to be about 1 hour per day    Problems taking medications regularly No    Medication side effects: No    Have you had an eye exam in the past two years? yes    Do you see a dentist twice per year? yes    Do you have sleep apnea, excessive snoring or daytime drowsiness?no      Does pelvic/breast with OB/GYN - declined today    Depression and Anxiety Follow-Up    Status since last visit: Worsened - would like to adjust medication or change dose    Other associated symptoms:None    Complicating factors:     Significant life event: Yes-  Recent dizziness and balance problems with falls     Current substance abuse: None    PHQ 9/22/2016 7/14/2017 7/16/2018   PHQ-9 Total Score 20 11 13   Q9: Suicide Ideation Several days Not at all Not at all     SHEA-7 SCORE 1/18/2016 7/14/2017 7/16/2018   Total Score - - -   Total Score 14 8 9     In the past two weeks have you had thoughts of suicide or self-harm?  No.    Do you have concerns about your personal safety or the safety of others?   No  PHQ-9  English  PHQ-9   Any Language  SHEA-7  Suicide Assessment Five-step Evaluation and Treatment (SAFE-T)    Today's PHQ-2 Score:   PHQ-2 ( 1999 [employer]) 12/12/2018 12/10/2018   Q1: Little interest or pleasure in doing things 1 1   Q2: Feeling down, depressed or hopeless 1 1   PHQ-2 Score 2 2   Q1: Little interest or pleasure in doing things - Several days   Q2: Feeling down, depressed or hopeless - Several days   PHQ-2 Score - 2       Abuse: Current or Past(Physical, Sexual or Emotional)- No  Do you feel safe in your environment? Yes    Social History     Tobacco Use     Smoking status: Never  "Smoker     Smokeless tobacco: Never Used   Substance Use Topics     Alcohol use: Yes     Comment: 1 wine a month hardly     If you drink alcohol do you typically have >3 drinks per day or >7 drinks per week? No                     Reviewed orders with patient.  Reviewed health maintenance and updated orders accordingly - Yes  Labs reviewed in EPIC  BP Readings from Last 3 Encounters:   12/12/18 130/64   11/26/18 143/83   11/01/18 136/74    Wt Readings from Last 3 Encounters:   12/12/18 57.7 kg (127 lb 3.2 oz)   11/26/18 54.4 kg (120 lb)   11/01/18 56.2 kg (124 lb)                Pertinent mammograms are reviewed under the imaging tab.  History of abnormal Pap smear:   PAP / HPV Latest Ref Rng & Units 9/22/2016 12/10/2013 7/5/2012   PAP - NIL NIL NIL   HPV 16 DNA NEG Negative - -   HPV 18 DNA NEG Negative - -   OTHER HR HPV NEG Negative - -     Reviewed and updated as needed this visit by clinical staff  Tobacco  Allergies  Meds  Med Hx  Surg Hx  Fam Hx  Soc Hx        Reviewed and updated as needed this visit by Provider        ROS:  CONSTITUTIONAL: NEGATIVE for fever, chills, change in weight  INTEGUMENTARY/SKIN: NEGATIVE for worrisome rashes, moles or lesions  EYES: NEGATIVE for vision changes or irritation  ENT: NEGATIVE for ear, mouth and throat problems  RESP: NEGATIVE for significant cough or SOB  CV: NEGATIVE for chest pain, palpitations or peripheral edema  GI: NEGATIVE for nausea, abdominal pain, heartburn, or change in bowel habits  : NEGATIVE for unusual urinary or vaginal symptoms. No vaginal bleeding.  MUSCULOSKELETAL: NEGATIVE for significant arthralgias or myalgia  NEURO: POSITIVE for balance problems and dizziness/lightheadedness  PSYCHIATRIC: NEGATIVE for changes in mood or affect     OBJECTIVE:   /64 (Cuff Size: Adult Regular)   Pulse 96   Temp 98  F (36.7  C) (Tympanic)   Resp 16   Ht 1.626 m (5' 4\")   Wt 57.7 kg (127 lb 3.2 oz)   BMI 21.83 kg/m    EXAM:  GENERAL: healthy, alert " and no distress  EYES: Eyes grossly normal to inspection, PERRL and conjunctivae and sclerae normal  HENT: ear canals and TM's normal, nose and mouth without ulcers or lesions  NECK: no adenopathy, no asymmetry, masses, or scars and thyroid normal to palpation  RESP: lungs clear to auscultation - no rales, rhonchi or wheezes  CV: regular rate and rhythm, normal S1 S2, no S3 or S4, no murmur, click or rub, no peripheral edema and peripheral pulses strong  ABDOMEN: soft, nontender, no hepatosplenomegaly, no masses and bowel sounds normal  MS: right leg in a knee immobilizer  SKIN: no suspicious lesions or rashes  NEURO: Normal strength and tone, mentation intact and speech normal  PSYCH: mentation appears normal, affect normal/bright    Diagnostic Test Results:  pending    ASSESSMENT/PLAN:   1. Routine general medical examination at a health care facility     - Lipid panel reflex to direct LDL Fasting    2. Moderate recurrent major depression (H)  Increase to 60 mg daily.  If not improving, will make a change to another serotonin specific reuptake inhibitor or consider SNRI.   - FLUoxetine (PROZAC) 20 MG capsule; Take 3 capsules (60 mg) by mouth daily  Dispense: 270 capsule; Refill: 3    3. Traveler's diarrhea  To use on upcoming trip to [place]  - ciprofloxacin (CIPRO) 250 MG tablet; Take 1 tablet (250 mg) by mouth 2 times daily for 3 days  Dispense: 6 tablet; Refill: 0    4. Hyperlipidemia, unspecified hyperlipidemia type  Given the small vessel ischemic   - simvastatin (ZOCOR) 20 MG tablet; Take 1 tablet (20 mg) by mouth At Bedtime  Dispense: 90 tablet; Refill: 3    5. Need for vaccination   understands this may be better covered at pharmacy level, wants it today.  - SHINGRIX [95809]  - 1st  Administration  [11578]    6. Rheumatoid arthritis involving multiple sites with positive rheumatoid factor (H)   sees rheumatologist      COUNSELING:   Reviewed preventive health counseling, as reflected in patient  "instructions       Regular exercise       Healthy diet/nutrition    BP Readings from Last 1 Encounters:   12/12/18 130/64     Estimated body mass index is 21.83 kg/m  as calculated from the following:    Height as of this encounter: 1.626 m (5' 4\").    Weight as of this encounter: 57.7 kg (127 lb 3.2 oz).           reports that  has never smoked. she has never used smokeless tobacco.      Counseling Resources:  ATP IV Guidelines  Pooled Cohorts Equation Calculator  Breast Cancer Risk Calculator  FRAX Risk Assessment  ICSI Preventive Guidelines  Dietary Guidelines for Americans, 2010  [employer]'s MyPlate  ASA Prophylaxis  Lung CA Screening    Aziza Aviles MD  [facility]  Answers for HPI/ROS submitted by the patient on 12/10/2018   Annual Exam:  Frequency of exercise:: 6-7 days/week  Getting at least 3 servings of Calcium per day:: Yes  Diet:: Regular (no restrictions)  Taking medications regularly:: Yes  Medication side effects:: None  Bi-annual eye exam:: Yes  Dental care twice a year:: Yes  Sleep apnea or symptoms of sleep apnea:: Daytime drowsiness  abdominal pain: Yes  Blood in stool: No  Blood in urine: No  chest pain: Yes  chills: No  congestion: No  constipation: Yes  cough: Yes  diarrhea: No  dizziness: Yes  ear pain: No  eye pain: Yes  nervous/anxious: Yes  fever: No  frequency: Yes  genital sores: No  headaches: Yes  hearing loss: Yes  heartburn: Yes  arthralgias: Yes  joint swelling: Yes  peripheral edema: No  mood changes: Yes  myalgias: Yes  nausea: Yes  dysuria: No  palpitations: No  Skin sensation changes: No  sore throat: No  urgency: Yes  rash: No  shortness of breath: Yes  visual disturbance: Yes  weakness: Yes  pelvic pain: No  vaginal bleeding: No  vaginal discharge: No  tenderness: Yes  breast mass: No  breast discharge: No  Additional concerns today:: Yes  Duration of exercise:: 30-45 minutes    "

## 2018-12-13 ENCOUNTER — MYC MEDICAL ADVICE (OUTPATIENT)
Dept: FAMILY MEDICINE | Facility: CLINIC | Age: 63
End: 2018-12-13

## 2019-01-08 DIAGNOSIS — Z51.81 ENCOUNTER FOR THERAPEUTIC DRUG MONITORING: ICD-10-CM

## 2019-01-08 DIAGNOSIS — M06.9 RA (RHEUMATOID ARTHRITIS) (H): ICD-10-CM

## 2019-01-08 LAB
ALBUMIN SERPL-MCNC: 3.8 G/DL (ref 3.4–5)
ALT SERPL W P-5'-P-CCNC: 20 U/L (ref 0–50)
AST SERPL W P-5'-P-CCNC: 20 U/L (ref 0–45)
BASOPHILS # BLD AUTO: 0 10E9/L (ref 0–0.2)
BASOPHILS NFR BLD AUTO: 0.5 %
CREAT SERPL-MCNC: 0.68 MG/DL (ref 0.52–1.04)
DIFFERENTIAL METHOD BLD: NORMAL
EOSINOPHIL # BLD AUTO: 0.1 10E9/L (ref 0–0.7)
EOSINOPHIL NFR BLD AUTO: 1 %
ERYTHROCYTE [DISTWIDTH] IN BLOOD BY AUTOMATED COUNT: 13 % (ref 10–15)
GFR SERPL CREATININE-BSD FRML MDRD: >90 ML/MIN/{1.73_M2}
HCT VFR BLD AUTO: 38.7 % (ref 35–47)
HGB BLD-MCNC: 13.4 G/DL (ref 11.7–15.7)
LYMPHOCYTES # BLD AUTO: 2.6 10E9/L (ref 0.8–5.3)
LYMPHOCYTES NFR BLD AUTO: 36.1 %
MCH RBC QN AUTO: 30.8 PG (ref 26.5–33)
MCHC RBC AUTO-ENTMCNC: 34.6 G/DL (ref 31.5–36.5)
MCV RBC AUTO: 89 FL (ref 78–100)
MONOCYTES # BLD AUTO: 0.8 10E9/L (ref 0–1.3)
MONOCYTES NFR BLD AUTO: 10.9 %
NEUTROPHILS # BLD AUTO: 3.8 10E9/L (ref 1.6–8.3)
NEUTROPHILS NFR BLD AUTO: 51.5 %
PLATELET # BLD AUTO: 212 10E9/L (ref 150–450)
RBC # BLD AUTO: 4.35 10E12/L (ref 3.8–5.2)
WBC # BLD AUTO: 7.3 10E9/L (ref 4–11)

## 2019-01-08 PROCEDURE — 82040 ASSAY OF SERUM ALBUMIN: CPT | Performed by: INTERNAL MEDICINE

## 2019-01-08 PROCEDURE — 84450 TRANSFERASE (AST) (SGOT): CPT | Performed by: INTERNAL MEDICINE

## 2019-01-08 PROCEDURE — 84460 ALANINE AMINO (ALT) (SGPT): CPT | Performed by: INTERNAL MEDICINE

## 2019-01-08 PROCEDURE — 36415 COLL VENOUS BLD VENIPUNCTURE: CPT | Performed by: INTERNAL MEDICINE

## 2019-01-08 PROCEDURE — 85025 COMPLETE CBC W/AUTO DIFF WBC: CPT | Performed by: INTERNAL MEDICINE

## 2019-01-08 PROCEDURE — 82565 ASSAY OF CREATININE: CPT | Performed by: INTERNAL MEDICINE

## 2019-01-17 ENCOUNTER — TRANSFERRED RECORDS (OUTPATIENT)
Dept: HEALTH INFORMATION MANAGEMENT | Facility: CLINIC | Age: 64
End: 2019-01-17

## 2019-01-17 ENCOUNTER — TELEPHONE (OUTPATIENT)
Dept: FAMILY MEDICINE | Facility: CLINIC | Age: 64
End: 2019-01-17

## 2019-01-17 DIAGNOSIS — E78.5 HYPERLIPIDEMIA, UNSPECIFIED HYPERLIPIDEMIA TYPE: ICD-10-CM

## 2019-01-17 RX ORDER — SIMVASTATIN 20 MG
20 TABLET ORAL AT BEDTIME
Qty: 90 TABLET | Refills: 3 | Status: SHIPPED | OUTPATIENT
Start: 2019-01-17 | End: 2020-02-04

## 2019-01-17 NOTE — TELEPHONE ENCOUNTER
Please transfer simvastatin (ZOCOR) 20 MG tablet RX to [facility] mail service pharmacy

## 2019-01-18 ENCOUNTER — HOSPITAL ENCOUNTER (OUTPATIENT)
Dept: PHYSICAL THERAPY | Facility: CLINIC | Age: 64
Setting detail: THERAPIES SERIES
End: 2019-01-18
Attending: ORTHOPAEDIC SURGERY
Payer: COMMERCIAL

## 2019-01-18 PROCEDURE — 97161 PT EVAL LOW COMPLEX 20 MIN: CPT | Mod: GP | Performed by: PHYSICAL THERAPIST

## 2019-01-18 PROCEDURE — 97110 THERAPEUTIC EXERCISES: CPT | Mod: GP | Performed by: PHYSICAL THERAPIST

## 2019-01-18 NOTE — PROGRESS NOTES
"  Yaritza ROSE Kirk   PHYSICAL THERAPY EVALUATION    01/18/19 0800   General Information   Type of Visit Initial OP Ortho PT Evaluation   Start of Care Date 01/18/19   Referring Physician Dr. Swann   Patient/Family Goals Statement walk on even and uneven surfaces without falling   Orders Evaluate and Treat   Date of Order 01/17/19   Insurance Type Private   Insurance Comments/Visits Authorized Pref One   Medical Diagnosis patella fracture   Surgical/Medical history reviewed Yes  (RA, dizziness, R RAE)   Body Part(s)   Body Part(s) Knee   Presentation and Etiology   Pertinent history of current problem (include personal factors and/or comorbidities that impact the POC) Fell onto L knee, fx bottom 1/4 of patella.  Wore brace only, no surgery, just got ok to have brace off.  Did have bad vertigo episode prior to her falling, still feeling moving sensation if she moves too quick, gets up from lying down.   Onset date of current episode/exacerbation 11/26/18   Prior Level of Function   Functional Level [facility] chair [facility], walk dog   Current Level of Function   Patient role/employment history F. Retired   Fall Risk Screen   Fall screen completed by PT   Have you fallen 2 or more times in the past year? Yes   Have you fallen and had an injury in the past year? Yes   Is patient a fall risk? Yes;Department fall risk interventions implemented   Fall screen comments only due to dizziness an dpt getting up from lying down and stood too quickly, pt then needed to sit and let dizziness resolve   Knee Objective Findings   Side (if bilateral, select both right and left) Right   Integumentary  very  min edema infrpatella, crepitus on active knee exten R   Posture B valgus   Step Test Height 6\" forward step up x10, not painful, audible click in patella   Knee Special Test Comments squat test shows B valgus, very little depth for control   Palpation min tender patellar endon   Right Knee Extension AROM " "0*   Right Knee Flexion AROM 100% full, heel almost to butt supine   Right Knee Flexion Strength 5   Right Knee Extension Strength 5 - for best effort, in squatting to 70* on wall squat pt lost control and ended up sitting all the way down on the floor, poor motor planning for how to get up from floor even while PT demonstrating how to adjust legs and push  off chair   Right Hip Abduction Strength 5-   Right Quad Set Strength poor   Right Hip Flexor Flexibility 0* R, L 10*-15*   Right Quadricep Flexibility min tight, heel 3\" to butt, L touches butt   Planned Therapy Interventions   Planned Therapy Interventions stretching;strengthening;neuromuscular re-education;balance training   Clinical Impression   Criteria for Skilled Therapeutic Interventions Met yes, treatment indicated   PT Diagnosis functional weakness R LE   Functional limitations due to impairments stairs, prolonged walking, get up from floor   Clinical Presentation Stable/Uncomplicated   Clinical Decision Making (Complexity) Low complexity   Therapy Frequency 2 times/Week   Predicted Duration of Therapy Intervention (days/wks) x2wks, then pt on vacation, may do 1x/wk x2-4 wks after depending on strength proression   Risk & Benefits of therapy have been explained Yes   Patient, Family & other staff in agreement with plan of care Yes   Education Assessment   Preferred Learning Style Listening   Barriers to Learning No barriers   ORTHO GOALS   PT Ortho Eval Goals 1;2;3   Ortho Goal 1   Goal Description pt will have no falls while on vacation in 3wk   Target Date 02/08/19   Ortho Goal 2   Goal Description pt will be able ot do reciprocol stairs on consistent basis in 4wk   Target Date 02/18/19   Ortho Goal 3   Goal Description pt will be able to walk on nikita surfaces without fallin gin 6wk   Target Date 03/01/19   Total Evaluation Time   PT Eval, Low Complexity Minutes (21631) 15   Kris Hoenk, PT #[id number]  [facility]    "

## 2019-01-22 ENCOUNTER — HOSPITAL ENCOUNTER (OUTPATIENT)
Dept: PHYSICAL THERAPY | Facility: CLINIC | Age: 64
Setting detail: THERAPIES SERIES
End: 2019-01-22
Attending: OTOLARYNGOLOGY
Payer: COMMERCIAL

## 2019-01-22 PROCEDURE — 97110 THERAPEUTIC EXERCISES: CPT | Mod: GP | Performed by: PHYSICAL THERAPIST

## 2019-01-25 ENCOUNTER — HOSPITAL ENCOUNTER (OUTPATIENT)
Dept: PHYSICAL THERAPY | Facility: CLINIC | Age: 64
Setting detail: THERAPIES SERIES
End: 2019-01-25
Attending: OTOLARYNGOLOGY
Payer: COMMERCIAL

## 2019-01-25 PROCEDURE — 97110 THERAPEUTIC EXERCISES: CPT | Mod: GP | Performed by: PHYSICAL THERAPIST

## 2019-02-13 NOTE — MR AVS SNAPSHOT
After Visit Summary   11/16/2017    Yaritza Bradley    MRN: [id number]           Patient Information     Date Of Birth          1955        Visit Information        Provider Department      11/16/2017 9:40 AM Yefri Kruger DPM [facility] [place]        Today's Diagnoses     Plantar porokeratosis, acquired    -  1    Bilateral foot pain          Care Instructions    CALLUS / CORNS / IPKs  When there is excessive friction or pressure on the skin, the body responds by making the skin thicker to protect the deeper structures from becoming exposed. While this works well to protect the deeper structures, the thickened skin can increase pressure and pain.    CALLUS: Flat, diffuse thickening are simple calluses and they are usually caused by friction. Often these are the result of rubbing on a shoe or going barefoot.    CORNS: Calluses with a central core between the toes are called corns. These result from prominent joints on adjacent toes rubbing together. Theses are a symptom of bone malalignment and will always recur unless the underlying bones are addressed surgically.    IPKs: Calluses with a central core on the ball of the foot are usually IPKs (intractable plantar keratosis). These are caused by excessive pressure from the metatarsals, the bones that make up the ball of the foot. Often one of these bones is too long or too prominent.  Again, these will always recur unless the underlying bone issue is addressed. There is no cure for these. They will either go away by themselves, recur, or more could develop.    ROUTINE MAINTENANCE  1. File them down with a pumice stone or callus file a couple times a week.   2. An electric callus removing device. Amope Pedi Perfect Electronic Pedicure Foot File and Callus Remover can be a good option.   3. Lotion can be applied to soften the callus. A urea based cream such as Kersal or Vanicream or thicker cream with shea butter are  good options.  4. Toe spacers or toe covers can be used for corns, gel pads can be used for other lesions on the bottom of the foot.   If there is a surgical pathology noted, such as a prominent bone, often this needs to be addressed surgically to minimize recurrence. However, sometimes the lesion simply migrates to another spot after surgery, so it is not a guaranteed cure.                   Follow-ups after your visit        Follow-up notes from your care team     Return in about 4 weeks (around 12/14/2017), or if symptoms worsen or fail to improve.      Your next 10 appointments already scheduled     Dec 07, 2017 10:00 AM CST   [facility]YS[facility] Melany Katz [facility]   [facility]    [address] [address] [phone]            Jan 02, 2018  9:40 AM CST   New Visit with Mony Ruelas PA-C   [facility]    [address] [address] [phone]              Who to contact     If you have questions or need follow up information about today's clinic visit or your schedule please contact [facility] directly at [phone].  Normal or non-critical lab and imaging results will be communicated to you by T[employer]ored Republichart, letter or phone within 4 business days after the clinic has received the results. If you do not hear from us within 7 days, please contact the clinic through Tailored Republichart or phone. If you have a critical or abnormal lab result, we will notify you by phone as soon as possible.  Submit refill requests through [employer] or call your pharmacy and they will forward the refill request to us. Please allow 3 business days for your refill to be completed.          Additional Information About Your Visit        Tucoola Information     T[employer]a gives you secure access to your electronic health record. If you see a primary care provider, you can also send  "messages to your care team and make appointments. If you have questions, please call your primary care clinic.  If you do not have a primary care provider, please call [phone] and they will assist you.        Care EveryWhere ID     This is your Care EveryWhere ID. This could be used by other organizations to access your [facility] medical records  [id number]        Your Vitals Were     Pulse Height BMI (Body Mass Index)             85 5' 4\" (1.626 m) 20.77 kg/m2          Blood Pressure from Last 3 Encounters:   11/16/17 114/71   07/14/17 122/65   01/26/17 110/61    Weight from Last 3 Encounters:   11/16/17 121 lb (54.9 kg)   07/14/17 121 lb 12.8 oz (55.2 kg)   01/26/17 120 lb (54.4 kg)              We Performed the Following     TRIM HYPERKERATOTIC SKIN LESION,2-4        Primary Care Provider Office Phone # Fax #    Karla Maral Wade [phone] [address] [facility] [address]        Equal Access to Services     [facility] Center[facility] : Hadii nicole ku hadasho Soaminata, waaxda luqadaha, qaybta kaalmada yosef, bi patino . So [facility] [phone].    ATENCIÓN: Si habla español, tiene a moura disposición servicios gratuitos de asistencia lingüística. Asha al [phone].    We comply with applicable federal civil rights laws and [place] laws. We do not discriminate on the basis of race, color, national origin, age, disability, sex, sexual orientation, or gender identity.            Thank you!     Thank you for choosing [facility]  for your care. Our goal is always to provide you with excellent care. Hearing back from our patients is one way we can continue to improve our services. Please take a few minutes to complete the written survey that you may receive in the mail after your visit with us. Thank you!             Your Updated Medication List - Protect others around you: Learn how to safely use, store and throw away your " "medicines at www.disposemymeds.org.          This list is accurate as of: 11/16/17 11:59 PM.  Always use your most recent med list.                   Brand Name Dispense Instructions for use Diagnosis    B-D INSULIN SYRINGE 25G X 5/8\" 1 ML Misc   Generic drug:  Insulin Syringe-Needle U-100      TO BE USED IN WEEKLY METHOTREXATE INJECTIONS        FLUoxetine 40 MG capsule    PROzac    90 capsule    Take 1 capsule (40 mg) by mouth daily Additional refills at clinic appointment.    Moderate recurrent major depression (H)       folic acid 1 MG tablet    FOLVITE     1 TABLET DAILY        HUMIRA PEN 40 MG/0.8ML injection   Generic drug:  adalimumab      Inject 40 mg Subcutaneous once a week        methotrexate 2.5 MG tablet CHEMO     0    Take 3 tablets Tuesday evening and 2 tablets Wednesday morning.        OMEGA-3 FISH OIL PO      Take by mouth 2 times daily (with meals)        RESTASIS 0.05 % ophthalmic emulsion   Generic drug:  cycloSPORINE      Place 1 drop into both eyes 2 times daily        SM CALCIUM SOFT CHEWS 500-100-40 MG-UNT-MCG Chew   Generic drug:  Calcium-Vitamin D-Vitamin K      CHEW 2-3 ORALLY DAILY          " no

## 2019-02-28 NOTE — ADDENDUM NOTE
Encounter addended by: Hoenk, Kris, PT on: 2/28/2019 7:44 AM   Actions taken: Sign clinical note, Flowsheet accepted, Episode resolved

## 2019-02-28 NOTE — PROGRESS NOTES
"  Yaritza Bradley   PHYSICAL THERAPY DISCHARGE  01/25/19 0900   Signing Clinician's Name / Credentials   Signing clinician's name / credentials Kris Hoenk, PT   Session Number   Session Number 3 PO seen from 1/18-1/25/19   Adult Goals   PT Ortho Eval Goals 1;2;3   Ortho Goal 1   Goal Description pt will have no falls while on vacation in 3wk   Target Date 02/08/19   Ortho Goal 2   Goal Description pt will be able ot do reciprocol stairs on consistent basis in 4wk   Target Date 02/18/19   Date Met 01/25/19   Ortho Goal 3   Goal Description pt will be able to walk on uneven surfaces without fallin gin 6wk   Target Date 03/01/19   Subjective Report   Subjective Report doing well, can tell it's stronger, had to cancel next week   Therapeutic Procedure/exercise   Therapeutic Procedures: strength, endurance, ROM, flexibillity minutes (24186) 30   Skilled Intervention strength ex progression, adding to HEP   Patient Response lacking quad control   Treatment Detail recumb bike 4min, seat 7, L4, 6\" lat step up x20, SL hip abd x20,  bridge w/ red TB tube around knees x20, monster walks x8 B, squat at railing cues for slow lower and slow up x20 - poor control on lowering as she fatigues,  LAQ 4# x25, SLS few trials B - poor balance B   Plan   Plan for next session pt had to cancel next week, will be on vacation 2 weeks, will decide if she needs toreturn when she gets back, will restart going to [facility] also  No further contacts made, assume pt is doing well with HEP   Total Session Time   Timed Code Treatment Minutes 30   Total Treatment Time (sum of timed and untimed services) 30   Kris Hoenk, PT #[id number]  [facility]    "

## 2019-04-10 ENCOUNTER — OFFICE VISIT (OUTPATIENT)
Dept: OBGYN | Facility: CLINIC | Age: 64
End: 2019-04-10
Payer: COMMERCIAL

## 2019-04-10 VITALS
TEMPERATURE: 98.2 F | HEIGHT: 64 IN | SYSTOLIC BLOOD PRESSURE: 122 MMHG | HEART RATE: 93 BPM | WEIGHT: 121.3 LBS | BODY MASS INDEX: 20.71 KG/M2 | DIASTOLIC BLOOD PRESSURE: 71 MMHG | RESPIRATION RATE: 18 BRPM

## 2019-04-10 DIAGNOSIS — Z12.4 SCREENING FOR MALIGNANT NEOPLASM OF CERVIX: ICD-10-CM

## 2019-04-10 DIAGNOSIS — Z80.0 FH: COLON CANCER: ICD-10-CM

## 2019-04-10 DIAGNOSIS — R10.11 RUQ ABDOMINAL PAIN: ICD-10-CM

## 2019-04-10 DIAGNOSIS — Z01.419 ENCOUNTER FOR GYNECOLOGICAL EXAMINATION WITHOUT ABNORMAL FINDING: Primary | ICD-10-CM

## 2019-04-10 PROCEDURE — G0123 SCREEN CERV/VAG THIN LAYER: HCPCS | Performed by: OBSTETRICS & GYNECOLOGY

## 2019-04-10 PROCEDURE — 99396 PREV VISIT EST AGE 40-64: CPT | Performed by: OBSTETRICS & GYNECOLOGY

## 2019-04-10 PROCEDURE — 87624 HPV HI-RISK TYP POOLED RSLT: CPT | Performed by: OBSTETRICS & GYNECOLOGY

## 2019-04-10 ASSESSMENT — MIFFLIN-ST. JEOR: SCORE: 1085.21

## 2019-04-10 NOTE — PROGRESS NOTES
SUBJECTIVE:   CC: Yaritza Bradley is an 63 year old woman who presents for preventive health visit. She reports ongoing issues with epigastric pain, postprandial nausea--EGD normal; has not had RUQ sonogram    Healthy Habits:    Do you get at least three servings of calcium containing foods daily (dairy, green leafy vegetables, etc.)? yes and no, taking calcium and/or vitamin D supplement: yes -     Amount of exercise or daily activities, outside of work: no day(s) per week    Problems taking medications regularly No    Medication side effects: No    Have you had an eye exam in the past two years? yes    Do you see a dentist twice per year? yes    Do you have sleep apnea, excessive snoring or daytime drowsiness?yes          Today's PHQ-2 Score:   PHQ-2 ( 1999 [employer]) 4/10/2019 4/8/2019   Q1: Little interest or pleasure in doing things 1 1   Q2: Feeling down, depressed or hopeless 1 1   PHQ-2 Score 2 2   Q1: Little interest or pleasure in doing things - Several days   Q2: Feeling down, depressed or hopeless - Several days   PHQ-2 Score - 2       Abuse: Current or Past(Physical, Sexual or Emotional)- No  Do you feel safe in your environment? Yes    Social History     Tobacco Use     Smoking status: Never Smoker     Smokeless tobacco: Never Used   Substance Use Topics     Alcohol use: Yes     Comment: 1 wine a month hardly     If you drink alcohol do you typically have >3 drinks per day or >7 drinks per week? No                     Reviewed orders with patient.  Reviewed health maintenance and updated orders accordingly - Yes  Labs reviewed in EPIC  BP Readings from Last 3 Encounters:   04/10/19 122/71   12/12/18 130/64   11/26/18 143/83    Wt Readings from Last 3 Encounters:   04/10/19 55 kg (121 lb 4.8 oz)   12/12/18 57.7 kg (127 lb 3.2 oz)   11/26/18 54.4 kg (120 lb)                  Patient Active Problem List   Diagnosis     Rheumatoid arthritis (H)     Disorder of bone and cartilage     CARDIOVASCULAR SCREENING;  LDL GOAL LESS THAN 160     GERD (gastroesophageal reflux disease)     Generalized anxiety disorder     Moderate recurrent major depression (H)     Memory loss or impairment     Femoral acetabular impingement, right     Primary osteoarthritis of right hip, end stage DJD     Cellulitis     Dog bite of hand     Past Surgical History:   Procedure Laterality Date     COLONOSCOPY N/A 10/14/2016    Procedure: COLONOSCOPY;  Surgeon: Gerson Douglas MD;  Location: [facility]     ESOPHAGOSCOPY, GASTROSCOPY, DUODENOSCOPY (EGD), COMBINED  8/19/2011    Procedure:COMBINED ESOPHAGOSCOPY, GASTROSCOPY, DUODENOSCOPY (EGD), BIOPSY SINGLE OR MULTIPLE; Gastroscopy with biopsy; Surgeon:FARZAD GARCIA; Location:[facility]     ESOPHAGOSCOPY, GASTROSCOPY, DUODENOSCOPY (EGD), COMBINED  7/28/2014    Procedure: COMBINED ESOPHAGOSCOPY, GASTROSCOPY, DUODENOSCOPY (EGD), BIOPSY SINGLE OR MULTIPLE;  Surgeon: Marshall Martinez MD;  Location: [facility]     ESOPHAGOSCOPY, GASTROSCOPY, DUODENOSCOPY (EGD), COMBINED N/A 7/18/2018    Procedure: COMBINED ESOPHAGOSCOPY, GASTROSCOPY, DUODENOSCOPY (EGD), BIOPSY SINGLE OR MULTIPLE;  gastroscopy with biopsies;  Surgeon: Jorge Schofield MD;  [facility]     HC DILATION/CURETTAGE DIAG/THER NON OB  2001    for uterine fibroids     OPEN REDUCTION INTERNAL FIXATION ELBOW Right 1/22/2016    right THR     ROTATOR CUFF REPAIR RT/LT  2009    left       Social History     Tobacco Use     Smoking status: Never Smoker     Smokeless tobacco: Never Used   Substance Use Topics     Alcohol use: Yes     Comment: 1 wine a month hardly     Family History   Problem Relation Age of Onset     C.A.D. Father 54     C.A.D. Paternal Grandfather      C.A.D. Brother      Alcohol/Drug Brother      Alzheimer Disease Maternal Grandmother      Alzheimer Disease Maternal Grandfather      Alzheimer Disease Paternal Grandmother      Colon Cancer Mother            Mammogram Screening: Patient over age 50, mutual decision to screen  reflected in health maintenance.    Pertinent mammograms are reviewed under the imaging tab.  History of abnormal Pap smear: NO - age 30- 65 PAP every 3 years recommended  PAP / HPV Latest Ref Rng & Units 9/22/2016 12/10/2013 7/5/2012   PAP - NIL NIL NIL   HPV 16 DNA NEG Negative - -   HPV 18 DNA NEG Negative - -   OTHER HR HPV NEG Negative - -     Reviewed and updated as needed this visit by clinical staff         Reviewed and updated as needed this visit by Provider            ROS:  CONSTITUTIONAL: NEGATIVE for fever, chills, change in weight  INTEGUMENTARY/SKIN: NEGATIVE for worrisome rashes, moles or lesions  EYES: NEGATIVE for vision changes or irritation  ENT: NEGATIVE for ear, mouth and throat problems  RESP: NEGATIVE for significant cough or SOB  BREAST: NEGATIVE for masses, tenderness or discharge  CV: NEGATIVE for chest pain, palpitations or peripheral edema  GI: positive for epigastric pain, nausea  : NEGATIVE for unusual urinary or vaginal symptoms. No vaginal bleeding.  MUSCULOSKELETAL: NEGATIVE for significant arthralgias or myalgia  NEURO: NEGATIVE for weakness, dizziness or paresthesias  PSYCHIATRIC: NEGATIVE for changes in mood or affect     OBJECTIVE:   There were no vitals taken for this visit.  EXAM:  GENERAL APPEARANCE: healthy, alert and no distress  EYES: Eyes grossly normal to inspection, PERRL and conjunctivae and sclerae normal  HENT: ear canals and TM's normal, nose and mouth without ulcers or lesions, oropharynx clear and oral mucous membranes moist  NECK: no adenopathy, no asymmetry, masses, or scars and thyroid normal to palpation  RESP: lungs clear to auscultation - no rales, rhonchi or wheezes  BREAST: normal without masses, tenderness or nipple discharge and no palpable axillary masses or adenopathy  CV: regular rate and rhythm, normal S1 S2, no S3 or S4, no murmur, click or rub, no peripheral edema and peripheral pulses strong  ABDOMEN: soft, nontender, no hepatosplenomegaly, no  "masses and bowel sounds normal   (female): normal female external genitalia, normal urethral meatus, vaginal mucosal atrophy noted, normal cervix, adnexae, and uterus without masses. and rectal WNL  MS: no musculoskeletal defects are noted and gait is age appropriate without ataxia  SKIN: no suspicious lesions or rashes  NEURO: Normal strength and tone, sensory exam grossly normal, mentation intact and speech normal  PSYCH: mentation appears normal and affect normal/bright    Diagnostic Test Results:  RUQ sonogram    ASSESSMENT/PLAN:       ICD-10-CM    1. Encounter for gynecological examination without abnormal finding Z01.419    2. RUQ abdominal pain R10.11 US Abdomen Limited   3. FH: colon cancer Z80.0 GASTROENTEROLOGY ADULT REF PROCEDURE ONLY [facility] [phone]   4. Screening for malignant neoplasm of cervix Z12.4 Pap imaged thin layer screen with HPV - recommended age 30 - 65 years (select HPV order below)     HPV High Risk Types DNA Cervical       COUNSELING:   Reviewed preventive health counseling, as reflected in patient instructions  Special attention given to:        RUQ sono ordered for epigastric pain       Regular exercise       Healthy diet/nutrition       Vision screening    BP Readings from Last 1 Encounters:   12/12/18 130/64     Estimated body mass index is 21.83 kg/m  as calculated from the following:    Height as of 12/12/18: 1.626 m (5' 4\").    Weight as of 12/12/18: 57.7 kg (127 lb 3.2 oz).           reports that she has never smoked. She has never used smokeless tobacco.      Counseling Resources:  ATP IV Guidelines  Pooled Cohorts Equation Calculator  Breast Cancer Risk Calculator  FRAX Risk Assessment  ICSI Preventive Guidelines  Dietary Guidelines for Americans, 2010  USDA's MyPlate  ASA Prophylaxis  Lung CA Screening    Melany Katz MD  [facility]  "

## 2019-04-12 LAB
COPATH REPORT: NORMAL
PAP: NORMAL

## 2019-04-16 LAB
FINAL DIAGNOSIS: NORMAL
HPV HR 12 DNA CVX QL NAA+PROBE: NEGATIVE
HPV16 DNA SPEC QL NAA+PROBE: NEGATIVE
HPV18 DNA SPEC QL NAA+PROBE: NEGATIVE
SPECIMEN DESCRIPTION: NORMAL
SPECIMEN SOURCE CVX/VAG CYTO: NORMAL

## 2019-04-17 ENCOUNTER — HOSPITAL ENCOUNTER (OUTPATIENT)
Dept: ULTRASOUND IMAGING | Facility: CLINIC | Age: 64
Discharge: HOME | End: 2019-04-17
Attending: OBSTETRICS & GYNECOLOGY | Admitting: OBSTETRICS & GYNECOLOGY
Payer: COMMERCIAL

## 2019-04-17 DIAGNOSIS — R10.11 RUQ ABDOMINAL PAIN: ICD-10-CM

## 2019-04-17 PROCEDURE — 76705 ECHO EXAM OF ABDOMEN: CPT

## 2019-04-22 ENCOUNTER — TELEPHONE (OUTPATIENT)
Dept: FAMILY MEDICINE | Facility: CLINIC | Age: 64
End: 2019-04-22

## 2019-04-22 NOTE — TELEPHONE ENCOUNTER
Left message for patient to call back.  She is on wait list for Shingrix, when she returns call she was asked to speak with Team VERN - Den KAUFFMAN CMA

## 2019-04-23 NOTE — TELEPHONE ENCOUNTER
Called patient and scheduled her today for 4:15pm. Patient reports she verified through insurance. 2nd dose.  Lita Peterson MA

## 2019-04-24 ENCOUNTER — ALLIED HEALTH/NURSE VISIT (OUTPATIENT)
Dept: FAMILY MEDICINE | Facility: CLINIC | Age: 64
End: 2019-04-24
Payer: COMMERCIAL

## 2019-04-24 DIAGNOSIS — Z23 NEED FOR VACCINATION: Primary | ICD-10-CM

## 2019-04-24 PROCEDURE — 90750 HZV VACC RECOMBINANT IM: CPT

## 2019-04-24 PROCEDURE — 99207 ZZC NO CHARGE NURSE ONLY: CPT

## 2019-04-24 PROCEDURE — 90471 IMMUNIZATION ADMIN: CPT

## 2019-06-04 DIAGNOSIS — Z79.899 HISTORY OF LONG-TERM TREATMENT WITH HIGH-RISK MEDICATION: ICD-10-CM

## 2019-06-04 DIAGNOSIS — M05.79 SEROPOSITIVE RHEUMATOID ARTHRITIS OF MULTIPLE SITES (H): Primary | ICD-10-CM

## 2019-06-04 LAB
ALBUMIN SERPL-MCNC: 3.8 G/DL (ref 3.4–5)
ALT SERPL W P-5'-P-CCNC: 30 U/L (ref 0–50)
AST SERPL W P-5'-P-CCNC: 25 U/L (ref 0–45)
BASOPHILS # BLD AUTO: 0.1 10E9/L (ref 0–0.2)
BASOPHILS NFR BLD AUTO: 0.7 %
CREAT SERPL-MCNC: 0.7 MG/DL (ref 0.52–1.04)
DIFFERENTIAL METHOD BLD: NORMAL
EOSINOPHIL # BLD AUTO: 0.1 10E9/L (ref 0–0.7)
EOSINOPHIL NFR BLD AUTO: 1.3 %
ERYTHROCYTE [DISTWIDTH] IN BLOOD BY AUTOMATED COUNT: 13.5 % (ref 10–15)
GFR SERPL CREATININE-BSD FRML MDRD: >90 ML/MIN/{1.73_M2}
HCT VFR BLD AUTO: 38.6 % (ref 35–47)
HGB BLD-MCNC: 13.8 G/DL (ref 11.7–15.7)
LYMPHOCYTES # BLD AUTO: 1.7 10E9/L (ref 0.8–5.3)
LYMPHOCYTES NFR BLD AUTO: 24.6 %
MCH RBC QN AUTO: 32.2 PG (ref 26.5–33)
MCHC RBC AUTO-ENTMCNC: 35.8 G/DL (ref 31.5–36.5)
MCV RBC AUTO: 90 FL (ref 78–100)
MONOCYTES # BLD AUTO: 0.8 10E9/L (ref 0–1.3)
MONOCYTES NFR BLD AUTO: 12.1 %
NEUTROPHILS # BLD AUTO: 4.3 10E9/L (ref 1.6–8.3)
NEUTROPHILS NFR BLD AUTO: 61.3 %
PLATELET # BLD AUTO: 291 10E9/L (ref 150–450)
RBC # BLD AUTO: 4.28 10E12/L (ref 3.8–5.2)
WBC # BLD AUTO: 7 10E9/L (ref 4–11)

## 2019-06-04 PROCEDURE — 85025 COMPLETE CBC W/AUTO DIFF WBC: CPT | Performed by: INTERNAL MEDICINE

## 2019-06-04 PROCEDURE — 82565 ASSAY OF CREATININE: CPT | Performed by: INTERNAL MEDICINE

## 2019-06-04 PROCEDURE — 36415 COLL VENOUS BLD VENIPUNCTURE: CPT | Performed by: INTERNAL MEDICINE

## 2019-06-04 PROCEDURE — 84460 ALANINE AMINO (ALT) (SGPT): CPT | Performed by: INTERNAL MEDICINE

## 2019-06-04 PROCEDURE — 82040 ASSAY OF SERUM ALBUMIN: CPT | Performed by: INTERNAL MEDICINE

## 2019-06-04 PROCEDURE — 84450 TRANSFERASE (AST) (SGOT): CPT | Performed by: INTERNAL MEDICINE

## 2019-07-02 ENCOUNTER — TELEPHONE (OUTPATIENT)
Dept: FAMILY MEDICINE | Facility: CLINIC | Age: 64
End: 2019-07-02

## 2019-07-02 DIAGNOSIS — F33.1 MODERATE RECURRENT MAJOR DEPRESSION (H): ICD-10-CM

## 2019-07-02 NOTE — TELEPHONE ENCOUNTER
Please transfer FLUoxetine (PROZAC) 20 MG capsule RX to [facility] mail service pharmacy

## 2019-09-17 ENCOUNTER — TRANSFERRED RECORDS (OUTPATIENT)
Dept: HEALTH INFORMATION MANAGEMENT | Facility: CLINIC | Age: 64
End: 2019-09-17

## 2019-09-17 DIAGNOSIS — M05.79 SEROPOSITIVE RHEUMATOID ARTHRITIS OF MULTIPLE SITES (H): ICD-10-CM

## 2019-09-17 DIAGNOSIS — Z79.899 HISTORY OF LONG-TERM TREATMENT WITH HIGH-RISK MEDICATION: ICD-10-CM

## 2019-09-17 LAB
ALBUMIN SERPL-MCNC: 3.8 G/DL (ref 3.4–5)
ALT SERPL W P-5'-P-CCNC: 19 U/L (ref 0–50)
AST SERPL W P-5'-P-CCNC: 21 U/L (ref 0–45)
BASOPHILS # BLD AUTO: 0 10E9/L (ref 0–0.2)
BASOPHILS NFR BLD AUTO: 0.3 %
CREAT SERPL-MCNC: 0.64 MG/DL (ref 0.52–1.04)
DIFFERENTIAL METHOD BLD: NORMAL
EOSINOPHIL # BLD AUTO: 0.1 10E9/L (ref 0–0.7)
EOSINOPHIL NFR BLD AUTO: 0.9 %
ERYTHROCYTE [DISTWIDTH] IN BLOOD BY AUTOMATED COUNT: 13.8 % (ref 10–15)
GFR SERPL CREATININE-BSD FRML MDRD: >90 ML/MIN/{1.73_M2}
HCT VFR BLD AUTO: 39.4 % (ref 35–47)
HGB BLD-MCNC: 13.6 G/DL (ref 11.7–15.7)
LYMPHOCYTES # BLD AUTO: 1.9 10E9/L (ref 0.8–5.3)
LYMPHOCYTES NFR BLD AUTO: 20.1 %
MCH RBC QN AUTO: 30.4 PG (ref 26.5–33)
MCHC RBC AUTO-ENTMCNC: 34.5 G/DL (ref 31.5–36.5)
MCV RBC AUTO: 88 FL (ref 78–100)
MONOCYTES # BLD AUTO: 1.1 10E9/L (ref 0–1.3)
MONOCYTES NFR BLD AUTO: 11.7 %
NEUTROPHILS # BLD AUTO: 6.2 10E9/L (ref 1.6–8.3)
NEUTROPHILS NFR BLD AUTO: 67 %
PLATELET # BLD AUTO: 248 10E9/L (ref 150–450)
RBC # BLD AUTO: 4.47 10E12/L (ref 3.8–5.2)
WBC # BLD AUTO: 9.2 10E9/L (ref 4–11)

## 2019-09-17 PROCEDURE — 36415 COLL VENOUS BLD VENIPUNCTURE: CPT | Performed by: INTERNAL MEDICINE

## 2019-09-17 PROCEDURE — 82565 ASSAY OF CREATININE: CPT | Performed by: INTERNAL MEDICINE

## 2019-09-17 PROCEDURE — 85025 COMPLETE CBC W/AUTO DIFF WBC: CPT | Performed by: INTERNAL MEDICINE

## 2019-09-17 PROCEDURE — 84460 ALANINE AMINO (ALT) (SGPT): CPT | Performed by: INTERNAL MEDICINE

## 2019-09-17 PROCEDURE — 82040 ASSAY OF SERUM ALBUMIN: CPT | Performed by: INTERNAL MEDICINE

## 2019-09-17 PROCEDURE — 84450 TRANSFERASE (AST) (SGOT): CPT | Performed by: INTERNAL MEDICINE

## 2019-09-23 ENCOUNTER — HOSPITAL ENCOUNTER (OUTPATIENT)
Dept: PHYSICAL THERAPY | Facility: CLINIC | Age: 64
Setting detail: THERAPIES SERIES
End: 2019-09-23
Attending: PHYSICIAN ASSISTANT
Payer: COMMERCIAL

## 2019-09-23 PROCEDURE — 97161 PT EVAL LOW COMPLEX 20 MIN: CPT | Mod: GP | Performed by: PHYSICAL THERAPIST

## 2019-09-23 PROCEDURE — 97110 THERAPEUTIC EXERCISES: CPT | Mod: GP | Performed by: PHYSICAL THERAPIST

## 2019-09-24 NOTE — PROGRESS NOTES
PHYSICAL THERAPY INITIAL EVALUATION  09/23/19 1300   General Information   Type of Visit Initial OP Ortho PT Evaluation   Start of Care Date 09/23/19   Referring Physician Clary Damon PA-C   Patient/Family Goals Statement use arm without pain   Orders Evaluate and Treat   Orders Comment theraband    Date of Order 09/17/19   Certification Required? No   Medical Diagnosis rotator cuff tendonitis/impingement - left   Surgical/Medical history reviewed Yes   Precautions/Limitations no known precautions/limitations   Body Part(s)   Body Part(s) Shoulder   Presentation and Etiology   Pertinent history of current problem (include personal factors and/or comorbidities that impact the POC) Was at gym learning how to use exercise equipment and pulled down on a piece of equipment and it dropped faster than she was expecting and strained the shoulder. Had xrays last week, negative for fracture. Has pain with pulling things and reaching, especially reaching back. Gets numb with sleeping on left side.    Impairments A. Pain   Functional Limitations perform activities of daily living;perform desired leisure / sports activities   Symptom Location L lateral shoulder    How/Where did it occur During recreation/sports   Onset date of current episode/exacerbation 07/23/19   Chronicity New   Pain rating (0-10 point scale) Best (/10);Worst (/10)   Best (/10) 2   Worst (/10) 5   Pain quality A. Sharp;B. Dull;C. Aching   Frequency of pain/symptoms C. With activity   Pain/symptoms exacerbated by C. Lifting;D. Carrying;G. Certain positions   Pain/symptoms eased by F. Certain positions   Progression of symptoms since onset: Improved   Prior Level of Function   Prior Level of Function-Mobility independent   Prior Level of Function-ADLs independent   Current Level of Function   Patient role/employment history F. Retired;G. Disabled   Fall Risk Screen   Fall screen completed by PT   Have you fallen 2 or more times in the past year? Yes    Have you fallen and had an injury in the past year? No   Timed Up and Go score (seconds) 11   Is patient a fall risk? No   Shoulder Objective Findings   Side (if bilateral, select both right and left) Right;Left   Posture forward head, rounded shoulders    Scapulothoracic Rhythm asymmetric scapular motion   Pec Minor (supine) Flexibility R 3 fingerwidths L 2 fingerwidths   Neer's Test +   Medina-Manuel Test +   Coracoid Test +   Van Buren's Test -   Shoulder Special Tests Comments + speeds   Palpation tender long head biceps tendon, subacromial space, anterior GH joint, supraspinatus tendon. trigger points L UT and levator.    Right Shoulder Flexion AROM 180   Right Shoulder Abduction AROM 180   Right Shoulder ER AROM 65   Right Shoulder IR AROM T8   Right Shoulder Flexion Strength 5/5   Right Shoulder Abduction Strength 5/5   Right Shoulder ER Strength 5/5   Right Shoulder IR Strength 5/5   Left Shoulder Flexion AROM 180, pain starting at 120   Left Shoulder Flexion PROM 180   Left Shoulder Abduction AROM 180, pain starting at 90    Left Shoulder Abduction PROM 180   Left Shoulder ER AROM 65   Left Shoulder ER PROM 80, pain   Left Shoulder IR AROM T8, discomfort   Left Shoulder IR PROM 90   Left Shoulder Flexion Strength 5/5, pain   Left Shoulder Abduction Strength 5-/5, pain   Left Shoulder ER Strength 4+/5   Left Shoulder IR Strength 5/5   Planned Therapy Interventions   Planned Therapy Interventions joint mobilization;manual therapy;neuromuscular re-education;ROM;strengthening;stretching   Clinical Impression   Criteria for Skilled Therapeutic Interventions Met yes, treatment indicated   PT Diagnosis L shoulder pain   Influenced by the following impairments pain, decreased strength, decreased flexibility   Functional limitations due to impairments lifting, reaching, carrying,   Clinical Presentation Stable/Uncomplicated   Clinical Presentation Rationale improving symptoms   Clinical Decision Making  (Complexity) Low complexity   Therapy Frequency 1 time/week   Predicted Duration of Therapy Intervention (days/wks) 6 weeks   Risk & Benefits of therapy have been explained Yes   Patient, Family & other staff in agreement with plan of care Yes   Clinical Impression Comments Patient presenting with signs and symptoms consistent with L shoulder rotator cuff tendonitis and impingement syndrome.   Education Assessment   Preferred Learning Style Listening;Demonstration;Pictures/video   Barriers to Learning No barriers   ORTHO GOALS   PT Ortho Eval Goals 1;2;3;4   Ortho Goal 1   Goal Identifier 1   Goal Description Patient will be able to reach overhead without pain.    Target Date 11/05/19   Ortho Goal 2   Goal Identifier 2   Goal Description Patient will be able to reach behind back without pain.   Target Date 11/05/19   Ortho Goal 3   Goal Identifier 3   Goal Description Patient will be able to sleep on L shoulder without pain.   Target Date 11/05/19   Ortho Goal 4   Goal Identifier 4   Goal Description Patient will be independent with HEP to aid functional recovery.    Target Date 11/05/19   Total Evaluation Time   PT Eval, Low Complexity Minutes (96318) 30       Please contact me with any questions or concerns.  Thank you for your referral.    Sima Morillo, PT, DPT, OCS  Physical Therapist, Orthopedic Certified Specialist  [facility]  [phone]

## 2019-09-26 NOTE — PROGRESS NOTES
[facility]  [address] [address]  [phone]  Dept: [phone]    PRE-OP EVALUATION:  Today's date: 2019    Yaritza Bradley (: 1955) presents for pre-operative evaluation assessment as requested by Dr. Estrada.  She requires evaluation and anesthesia risk assessment prior to undergoing surgery/procedure for treatment of hardware removal from right elbow .    Fax number for surgical facility: [facility] [phone]  Primary Physician: Aziza Aviles  Type of Anesthesia Anticipated: to be determined    Patient has a Health Care Directive or Living Will:  NO    Preop Questions 2019   Who is doing your surgery? [facility] - Alfredo Keenan   What are you having done? Remove pins and wires from right elbow.   Date of Surgery/Procedure: 10/4/2019   Facility or Hospital where procedure/surgery will be performed: [facility] [place]   1.  Do you have a history of Heart attack, stroke, stent, coronary bypass surgery, or other heart surgery? No   2.  Do you ever have any pain or discomfort in your chest? No   3.  Do you have a history of  Heart Failure? No   4.   Are you troubled by shortness of breath when:  walking on a level surface, or up a slight hill, or at night? No   5.  Do you currently have a cold, bronchitis or other respiratory infection? No   6.  Do you have a cough, shortness of breath, or wheezing? No   7.  Do you sometimes get pains in the calves of your legs when you walk? No   8. Do you or anyone in your family have previous history of blood clots? No   9.  Do you or does anyone in your family have a serious bleeding problem such as prolonged bleeding following surgeries or cuts? No   10. Have you ever had problems with anemia or been told to take iron pills? No   11. Have you had any abnormal blood loss such as black, tarry or bloody stools, or abnormal vaginal bleeding? No   12. Have you ever had a  blood transfusion? No   13. Have you or any of your relatives ever had problems with anesthesia? No   14. Do you have sleep apnea, excessive snoring or daytime drowsiness? No   15. Do you have any prosthetic heart valves? No   16. Do you have prosthetic joints? YES - elbow   17. Is there any chance that you may be pregnant? No         HPI:     HPI related to upcoming procedure: Yaritza Bradley is 63 year old white female with rheumatoid arthritis on immunosuppressive medications, anxiety, poor memory, depression and elbow pain who is here to get clearance to have general anesthesia.        See problem list for active medical problems.  Problems all longstanding and stable, except as noted/documented.  See ROS for pertinent symptoms related to these conditions.      MEDICAL HISTORY:     Patient Active Problem List    Diagnosis Date Noted     Cellulitis 05/20/2018     Priority: Medium     Dog bite of hand 05/20/2018     Priority: Medium     Femoral acetabular impingement, right 04/26/2016     Priority: Medium     Primary osteoarthritis of right hip, end stage DJD 04/26/2016     Priority: Medium     Memory loss or impairment 07/07/2014     Priority: Medium     Moderate recurrent major depression (H) 07/07/2012     Priority: Medium     Generalized anxiety disorder 04/10/2012     Priority: Medium     GERD (gastroesophageal reflux disease) 03/17/2011     Priority: Medium     CARDIOVASCULAR SCREENING; LDL GOAL LESS THAN 160 10/31/2010     Priority: Medium     Disorder of bone and cartilage 02/20/2007     Priority: Medium     H/o osteopenia; previously on Actonel       Rheumatoid arthritis (H) 04/26/2006     Priority: Medium     Celine Moreno        Past Medical History:   Diagnosis Date     Acetabular labrum tear, right, initial encounter 4/26/2016     Arthritis      Depressive disorder      Hemorrhage of rectum and anus 2002     Post-operative nausea and vomiting 7/14/2017     Trochanteric bursitis of right hip  "4/26/2016     Past Surgical History:   Procedure Laterality Date     COLONOSCOPY N/A 10/14/2016    Procedure: COLONOSCOPY;  Surgeon: Gerson Douglas MD;  Location: [facility]     ESOPHAGOSCOPY, GASTROSCOPY, DUODENOSCOPY (EGD), COMBINED  8/19/2011    Procedure:COMBINED ESOPHAGOSCOPY, GASTROSCOPY, DUODENOSCOPY (EGD), BIOPSY SINGLE OR MULTIPLE; Gastroscopy with biopsy; Surgeon:FARZAD GARCIA; Location:[facility]     ESOPHAGOSCOPY, GASTROSCOPY, DUODENOSCOPY (EGD), COMBINED  7/28/2014    Procedure: COMBINED ESOPHAGOSCOPY, GASTROSCOPY, DUODENOSCOPY (EGD), BIOPSY SINGLE OR MULTIPLE;  Surgeon: Marshall Martinez MD;  Location: [facility]     ESOPHAGOSCOPY, GASTROSCOPY, DUODENOSCOPY (EGD), COMBINED N/A 7/18/2018    Procedure: COMBINED ESOPHAGOSCOPY, GASTROSCOPY, DUODENOSCOPY (EGD), BIOPSY SINGLE OR MULTIPLE;  gastroscopy with biopsies;  Surgeon: Jorge Schofield MD;  Location: [facility]     HC DILATION/CURETTAGE DIAG/THER NON OB  2001    for uterine fibroids     OPEN REDUCTION INTERNAL FIXATION ELBOW Right 1/22/2016    right THR     ROTATOR CUFF REPAIR RT/LT  2009    left     Current Outpatient Medications   Medication Sig Dispense Refill     B-D INSULIN SYRINGE 25G X 5/8\" 1 ML MISC TO BE USED IN WEEKLY METHOTREXATE INJECTIONS  1     Cyanocobalamin (B-12) 1000 MCG TBCR Take 1,000 mcg by mouth daily 100 tablet 1     diclofenac (VOLTAREN) 1 % GEL topical gel Place onto the skin 4 times daily       FLUoxetine (PROZAC) 40 MG capsule        FOLIC ACID 1 MG PO TABS 1 TABLET DAILY       HUMIRA PEN 40 MG/0.8ML injection Inject 40 mg Subcutaneous every 14 days        Ibandronate Sodium (BONIVA IV) Inject into the vein every 3 months       methotrexate 50 MG/2ML injection CHEMO        Omega-3 Fatty Acids (OMEGA-3 FISH OIL PO) Take 1 g by mouth daily (DRY EYE OMEGA BENEFITS) 667-250 mg-unit cap       RESTASIS 0.05 % ophthalmic emulsion Place 1 drop into both eyes 2 times daily       simvastatin (ZOCOR) 20 MG tablet Take 1 tablet (20 " "mg) by mouth At Bedtime 90 tablet 3     SM CALCIUM SOFT CHEWS 500-100-40 OR CHEW CHEW 2-3 ORALLY DAILY  0     TURMERIC PO Take 1,000 mg by mouth daily        Methotrexate, Anti-Rheumatic, (METHOTREXATE, PF, SC) Inject 0.5 mLs Subcutaneous once a week On Tuesday       OTC products: None, except as noted above    Allergies   Allergen Reactions     Nka [No Known Allergies]       Latex Allergy: NO    Social History     Tobacco Use     Smoking status: Never Smoker     Smokeless tobacco: Never Used   Substance Use Topics     Alcohol use: Yes     Comment: 1 wine a month hardly     History   Drug Use No       REVIEW OF SYSTEMS:   Constitutional, HEENT, cardiovascular, pulmonary, gi and gu systems are negative, except as otherwise noted.    EXAM:   /66   Pulse 93   Temp 98.6  F (37  C) (Tympanic)   Resp 12   Ht 1.632 m (5' 4.25\")   Wt 53.4 kg (117 lb 12.8 oz)   SpO2 99%   BMI 20.06 kg/m      GENERAL APPEARANCE: healthy, alert and no distress     EYES: EOMI, PERRL     HENT: ear canals and TM's normal and nose and mouth without ulcers or lesions     NECK: no adenopathy, no asymmetry, masses, or scars and thyroid normal to palpation     RESP: lungs clear to auscultation - no rales, rhonchi or wheezes     CV: regular rates and rhythm, normal S1 S2, no S3 or S4 and no murmur, click or rub     ABDOMEN:  soft, nontender, no HSM or masses and bowel sounds normal     MS: extremities normal- no gross deformities noted, no evidence of inflammation in joints, FROM in all extremities.     SKIN: no suspicious lesions or rashes     NEURO: Normal strength and tone, sensory exam grossly normal, mentation intact and speech normal     PSYCH: mentation appears normal. and affect normal/bright     LYMPHATICS: No cervical adenopathy    DIAGNOSTICS:   No labs or EKG required for low risk surgery (cataract, skin procedure, breast biopsy, etc)    Recent Labs   Lab Test 09/17/19  1100 06/04/19  0958  05/21/18  0603 05/20/18  0541   HGB " 13.6 13.8   < > 11.8 13.3    291   < > 168 217   NA  --   --   --  139 134   POTASSIUM  --   --   --  4.1 3.8   CR 0.64 0.70   < > 0.56 0.63    < > = values in this interval not displayed.        IMPRESSION:   Reason for surgery/procedure:   1. Preop general physical exam  2. Right elbow pain  Hardware irritating, will have removed,  cleared for general anesthesia    3. Rheumatoid arthritis involving multiple sites with positive rheumatoid factor (H)  Stable on medications  4. Moderate recurrent major depression (H)  Stable on medications  5. Need for prophylactic vaccination and inoculation against influenza  - INFLUENZA VACCINE IM > 6 MONTHS VALENT IIV4 [18251]  - Vaccine Administration, Initial [72853]        The proposed surgical procedure is considered INTERMEDIATE risk.    REVISED CARDIAC RISK INDEX  The patient has the following serious cardiovascular risks for perioperative complications such as (MI, PE, VFib and 3  AV Block):  No serious cardiac risks  INTERPRETATION: The 10-year ASCVD risk score (Cashiersariel CARLSON Jr., et al., 2013) is: 3.7%    Values used to calculate the score:      Age: 63 years      Sex: Female      Is Non- : No      Diabetic: No      Tobacco smoker: No      Systolic Blood Pressure: 118 mmHg      Is BP treated: No      HDL Cholesterol: 72 mg/dL      Total Cholesterol: 195 mg/dL      The patient has the following additional risks for perioperative complications:  No identified additional risks      ICD-10-CM    1. Preop general physical exam Z01.818        RECOMMENDATIONS:     --Consult hospital rounder / IM to assist post-op medical management    --Patient is to take all scheduled medications on the day of surgery EXCEPT for modifications listed below.    APPROVAL GIVEN to proceed with proposed procedure, without further diagnostic evaluation       Signed Electronically by: Karla Wade MD    Copy of this evaluation report is provided to requesting  physician.    [facility] Preop Guidelines    Revised Cardiac Risk Index

## 2019-09-27 ENCOUNTER — OFFICE VISIT (OUTPATIENT)
Dept: FAMILY MEDICINE | Facility: CLINIC | Age: 64
End: 2019-09-27
Payer: COMMERCIAL

## 2019-09-27 VITALS
HEIGHT: 64 IN | OXYGEN SATURATION: 99 % | DIASTOLIC BLOOD PRESSURE: 66 MMHG | HEART RATE: 93 BPM | TEMPERATURE: 98.6 F | SYSTOLIC BLOOD PRESSURE: 118 MMHG | BODY MASS INDEX: 20.11 KG/M2 | RESPIRATION RATE: 12 BRPM | WEIGHT: 117.8 LBS

## 2019-09-27 DIAGNOSIS — M25.521 RIGHT ELBOW PAIN: ICD-10-CM

## 2019-09-27 DIAGNOSIS — Z01.818 PREOP GENERAL PHYSICAL EXAM: Primary | ICD-10-CM

## 2019-09-27 DIAGNOSIS — Z23 NEED FOR PROPHYLACTIC VACCINATION AND INOCULATION AGAINST INFLUENZA: ICD-10-CM

## 2019-09-27 DIAGNOSIS — F33.1 MODERATE RECURRENT MAJOR DEPRESSION (H): ICD-10-CM

## 2019-09-27 DIAGNOSIS — M05.79 RHEUMATOID ARTHRITIS INVOLVING MULTIPLE SITES WITH POSITIVE RHEUMATOID FACTOR (H): ICD-10-CM

## 2019-09-27 PROCEDURE — 90471 IMMUNIZATION ADMIN: CPT | Performed by: FAMILY MEDICINE

## 2019-09-27 PROCEDURE — 99214 OFFICE O/P EST MOD 30 MIN: CPT | Mod: 25 | Performed by: FAMILY MEDICINE

## 2019-09-27 PROCEDURE — 90686 IIV4 VACC NO PRSV 0.5 ML IM: CPT | Performed by: FAMILY MEDICINE

## 2019-09-27 RX ORDER — METHOTREXATE 25 MG/ML
4 INJECTION, SOLUTION INTRA-ARTERIAL; INTRAMUSCULAR; INTRAVENOUS
COMMUNITY
Start: 2019-09-09

## 2019-09-27 RX ORDER — FLUOXETINE 40 MG/1
CAPSULE ORAL
COMMUNITY
Start: 2019-09-19 | End: 2020-07-21 | Stop reason: DRUGHIGH

## 2019-09-27 ASSESSMENT — ANXIETY QUESTIONNAIRES
7. FEELING AFRAID AS IF SOMETHING AWFUL MIGHT HAPPEN: SEVERAL DAYS
5. BEING SO RESTLESS THAT IT IS HARD TO SIT STILL: SEVERAL DAYS
6. BECOMING EASILY ANNOYED OR IRRITABLE: SEVERAL DAYS
1. FEELING NERVOUS, ANXIOUS, OR ON EDGE: MORE THAN HALF THE DAYS
3. WORRYING TOO MUCH ABOUT DIFFERENT THINGS: SEVERAL DAYS
GAD7 TOTAL SCORE: 9
2. NOT BEING ABLE TO STOP OR CONTROL WORRYING: SEVERAL DAYS

## 2019-09-27 ASSESSMENT — MIFFLIN-ST. JEOR: SCORE: 1073.31

## 2019-09-27 ASSESSMENT — PATIENT HEALTH QUESTIONNAIRE - PHQ9
SUM OF ALL RESPONSES TO PHQ QUESTIONS 1-9: 8
5. POOR APPETITE OR OVEREATING: MORE THAN HALF THE DAYS

## 2019-09-27 NOTE — NURSING NOTE
"Initial /66   Pulse 93   Temp 98.6  F (37  C) (Tympanic)   Resp 12   Ht 1.632 m (5' 4.25\")   Wt 53.4 kg (117 lb 12.8 oz)   SpO2 99%   BMI 20.06 kg/m   Estimated body mass index is 20.06 kg/m  as calculated from the following:    Height as of this encounter: 1.632 m (5' 4.25\").    Weight as of this encounter: 53.4 kg (117 lb 12.8 oz). .      "

## 2019-09-28 ASSESSMENT — ANXIETY QUESTIONNAIRES: GAD7 TOTAL SCORE: 9

## 2019-10-15 ENCOUNTER — TRANSFERRED RECORDS (OUTPATIENT)
Dept: HEALTH INFORMATION MANAGEMENT | Facility: CLINIC | Age: 64
End: 2019-10-15

## 2019-11-01 ENCOUNTER — HOSPITAL ENCOUNTER (OUTPATIENT)
Dept: BONE DENSITY | Facility: CLINIC | Age: 64
Discharge: HOME | End: 2019-11-01
Attending: INTERNAL MEDICINE | Admitting: INTERNAL MEDICINE
Payer: COMMERCIAL

## 2019-11-01 DIAGNOSIS — M81.0 OSTEOPOROSIS: ICD-10-CM

## 2019-11-01 PROCEDURE — 77080 DXA BONE DENSITY AXIAL: CPT

## 2019-11-02 ENCOUNTER — HEALTH MAINTENANCE LETTER (OUTPATIENT)
Age: 64
End: 2019-11-02

## 2019-11-15 ENCOUNTER — TELEPHONE (OUTPATIENT)
Dept: INTERNAL MEDICINE | Facility: CLINIC | Age: 64
End: 2019-11-15

## 2019-11-15 NOTE — TELEPHONE ENCOUNTER
ADV was drop off today in the clinic, Staff placed the ADV in [facility] as protocol.  Roxane Frost CMA (MACRINA)   (aka: Mary Frost)

## 2019-11-19 ENCOUNTER — TRANSFERRED RECORDS (OUTPATIENT)
Dept: HEALTH INFORMATION MANAGEMENT | Facility: CLINIC | Age: 64
End: 2019-11-19

## 2019-11-22 ENCOUNTER — AMBULATORY - HEALTHEAST (OUTPATIENT)
Dept: OTHER | Facility: CLINIC | Age: 64
End: 2019-11-22

## 2019-11-22 ENCOUNTER — DOCUMENTATION ONLY (OUTPATIENT)
Dept: OTHER | Facility: CLINIC | Age: 64
End: 2019-11-22

## 2019-11-23 DIAGNOSIS — Z79.899 ENCOUNTER FOR LONG-TERM (CURRENT) USE OF OTHER MEDICATIONS: Primary | ICD-10-CM

## 2019-11-25 DIAGNOSIS — Z79.899 ENCOUNTER FOR LONG-TERM (CURRENT) USE OF OTHER MEDICATIONS: ICD-10-CM

## 2019-11-25 LAB
ALBUMIN SERPL-MCNC: 3.6 G/DL (ref 3.4–5)
ALT SERPL W P-5'-P-CCNC: 23 U/L (ref 0–50)
AST SERPL W P-5'-P-CCNC: 23 U/L (ref 0–45)
BASOPHILS # BLD AUTO: 0 10E9/L (ref 0–0.2)
BASOPHILS NFR BLD AUTO: 0.4 %
CREAT SERPL-MCNC: 0.81 MG/DL (ref 0.52–1.04)
DIFFERENTIAL METHOD BLD: NORMAL
EOSINOPHIL # BLD AUTO: 0.1 10E9/L (ref 0–0.7)
EOSINOPHIL NFR BLD AUTO: 0.8 %
ERYTHROCYTE [DISTWIDTH] IN BLOOD BY AUTOMATED COUNT: 13.5 % (ref 10–15)
GFR SERPL CREATININE-BSD FRML MDRD: 76 ML/MIN/{1.73_M2}
HCT VFR BLD AUTO: 39.2 % (ref 35–47)
HGB BLD-MCNC: 13.5 G/DL (ref 11.7–15.7)
LYMPHOCYTES # BLD AUTO: 1.7 10E9/L (ref 0.8–5.3)
LYMPHOCYTES NFR BLD AUTO: 22.7 %
MCH RBC QN AUTO: 30.4 PG (ref 26.5–33)
MCHC RBC AUTO-ENTMCNC: 34.4 G/DL (ref 31.5–36.5)
MCV RBC AUTO: 88 FL (ref 78–100)
MONOCYTES # BLD AUTO: 0.7 10E9/L (ref 0–1.3)
MONOCYTES NFR BLD AUTO: 9.7 %
NEUTROPHILS # BLD AUTO: 5.1 10E9/L (ref 1.6–8.3)
NEUTROPHILS NFR BLD AUTO: 66.4 %
PLATELET # BLD AUTO: 227 10E9/L (ref 150–450)
RBC # BLD AUTO: 4.44 10E12/L (ref 3.8–5.2)
WBC # BLD AUTO: 7.6 10E9/L (ref 4–11)

## 2019-11-25 PROCEDURE — 82565 ASSAY OF CREATININE: CPT | Performed by: INTERNAL MEDICINE

## 2019-11-25 PROCEDURE — 84450 TRANSFERASE (AST) (SGOT): CPT | Performed by: INTERNAL MEDICINE

## 2019-11-25 PROCEDURE — 36415 COLL VENOUS BLD VENIPUNCTURE: CPT | Performed by: INTERNAL MEDICINE

## 2019-11-25 PROCEDURE — 84460 ALANINE AMINO (ALT) (SGPT): CPT | Performed by: INTERNAL MEDICINE

## 2019-11-25 PROCEDURE — 85025 COMPLETE CBC W/AUTO DIFF WBC: CPT | Performed by: INTERNAL MEDICINE

## 2019-11-25 PROCEDURE — 82040 ASSAY OF SERUM ALBUMIN: CPT | Performed by: INTERNAL MEDICINE

## 2019-12-07 DIAGNOSIS — F33.1 MODERATE RECURRENT MAJOR DEPRESSION (H): ICD-10-CM

## 2019-12-09 NOTE — TELEPHONE ENCOUNTER
"Requested Prescriptions   Pending Prescriptions Disp Refills     FLUoxetine (PROZAC) 40 MG capsule [Pharmacy Med Name: FLUOXETIN(P) CAP 40MG] 90 capsule 3     Sig: TAKE 1 CAPSULE DAILY   Last Written Prescription Date:  historical  Last Fill Quantity: 0,  # refills: 0   Last office visit: 9/27/2019 with prescribing provider:  CANDE Wade   Future Office Visit:        SSRIs Protocol Failed - 12/7/2019  2:26 AM        Failed - PHQ-9 score less than 5 in past 6 months     Please review last PHQ-9 score.           Passed - Medication is active on med list        Passed - Patient is age 18 or older        Passed - No active pregnancy on record        Passed - No positive pregnancy test in last 12 months        Passed - Recent (6 mo) or future (30 days) visit within the authorizing provider's specialty     Patient had office visit in the last 6 months or has a visit in the next 30 days with authorizing provider or within the authorizing provider's specialty.  See \"Patient Info\" tab in inbasket, or \"Choose Columns\" in Meds & Orders section of the refill encounter.              "

## 2019-12-10 RX ORDER — FLUOXETINE 40 MG/1
CAPSULE ORAL
Qty: 90 CAPSULE | Refills: 3 | Status: SHIPPED | OUTPATIENT
Start: 2019-12-10 | End: 2020-07-31

## 2019-12-19 NOTE — PROGRESS NOTES
PHYSICAL THERAPY DISCHARGE NOTE  Patient did not return for follow up treatments as directed.  Goal status and current objective information is therefore unknown.  The daily note from the patient's last visit will serve as the discharge note. Discharge from PT services at this time for this episode of treatment. Please see attached documentation under this episode of care for further information including dates of service, start of care date, referring physician, Dx, treatment plan, treatments, etc.    Please contact me with any questions or concerns.  Thank you for your referral.    Sima Morillo PT, DPT, OCS  Physical Therapist, Orthopedic Certified Specialist    [facility]  [address]   [address]  [place] [address]  [email or web address]   Office: [phone]   Employed by [facility]     09/23/19 1300   Signing Clinician's Name / Credentials   Signing clinician's name / credentials Sima Morillo PT DPT, OCS   Session Number   Session Number 1 PO   Progress Note/Recertification   Progress Note Due Date 11/05/19   Adult Goals   PT Ortho Eval Goals 1;2;3;4   Ortho Goal 1   Goal Identifier 1   Goal Description Patient will be able to reach overhead without pain.    Target Date 11/05/19   Ortho Goal 2   Goal Identifier 2   Goal Description Patient will be able to reach behind back without pain.   Target Date 11/05/19   Ortho Goal 3   Goal Identifier 3   Goal Description Patient will be able to sleep on L shoulder without pain.   Target Date 11/05/19   Ortho Goal 4   Goal Identifier 4   Goal Description Patient will be independent with HEP to aid functional recovery.    Target Date 11/05/19   Subjective Report   Subjective Report see eval   Therapeutic Procedure/exercise   Therapeutic Procedures: strength, endurance, ROM, flexibillity minutes (83626) 25   Skilled Intervention HEP instruction, ROM, stretching, strengthening to  decrease pain and improve function   Patient Response toleratd well without pain   Treatment Detail instr pt in performing UT and levator stretching 30' x 2 B, wall slides into flexion 10' holds x 10, resisted ER YTB 3 x 10, resisted shoulder extension YTB 3 x 10, bent over counter horizontal abduction no weight 3 x 10.    Plan   Home program above ex   Plan for next session f/u in 3 weeks after elbow surgery   Total Session Time   Timed Code Treatment Minutes 25   Total Treatment Time (sum of timed and untimed services) 55, 30ev 25 te2   AMBULATORY CLINICS ONLY-MEDICAL AND TREATMENT DIAGNOSIS   Medical Diagnosis rotator cuff tendonitis/impingement - left   PT Diagnosis L shoulder pain

## 2020-02-02 DIAGNOSIS — E78.5 HYPERLIPIDEMIA, UNSPECIFIED HYPERLIPIDEMIA TYPE: ICD-10-CM

## 2020-02-03 DIAGNOSIS — Z79.899 ENCOUNTER FOR LONG-TERM (CURRENT) USE OF OTHER MEDICATIONS: ICD-10-CM

## 2020-02-03 LAB
ALBUMIN SERPL-MCNC: 3.4 G/DL (ref 3.4–5)
ALT SERPL W P-5'-P-CCNC: 32 U/L (ref 0–50)
AST SERPL W P-5'-P-CCNC: 26 U/L (ref 0–45)
BASOPHILS # BLD AUTO: 0 10E9/L (ref 0–0.2)
BASOPHILS NFR BLD AUTO: 0.5 %
CREAT SERPL-MCNC: 0.7 MG/DL (ref 0.52–1.04)
DIFFERENTIAL METHOD BLD: NORMAL
EOSINOPHIL # BLD AUTO: 0.1 10E9/L (ref 0–0.7)
EOSINOPHIL NFR BLD AUTO: 1.2 %
ERYTHROCYTE [DISTWIDTH] IN BLOOD BY AUTOMATED COUNT: 13.4 % (ref 10–15)
GFR SERPL CREATININE-BSD FRML MDRD: >90 ML/MIN/{1.73_M2}
HCT VFR BLD AUTO: 40.2 % (ref 35–47)
HGB BLD-MCNC: 13.7 G/DL (ref 11.7–15.7)
LYMPHOCYTES # BLD AUTO: 1.5 10E9/L (ref 0.8–5.3)
LYMPHOCYTES NFR BLD AUTO: 22.4 %
MCH RBC QN AUTO: 30.6 PG (ref 26.5–33)
MCHC RBC AUTO-ENTMCNC: 34.1 G/DL (ref 31.5–36.5)
MCV RBC AUTO: 90 FL (ref 78–100)
MONOCYTES # BLD AUTO: 0.9 10E9/L (ref 0–1.3)
MONOCYTES NFR BLD AUTO: 13.1 %
NEUTROPHILS # BLD AUTO: 4.1 10E9/L (ref 1.6–8.3)
NEUTROPHILS NFR BLD AUTO: 62.8 %
PLATELET # BLD AUTO: 235 10E9/L (ref 150–450)
RBC # BLD AUTO: 4.47 10E12/L (ref 3.8–5.2)
WBC # BLD AUTO: 6.6 10E9/L (ref 4–11)

## 2020-02-03 PROCEDURE — 85025 COMPLETE CBC W/AUTO DIFF WBC: CPT | Performed by: INTERNAL MEDICINE

## 2020-02-03 PROCEDURE — 84450 TRANSFERASE (AST) (SGOT): CPT | Performed by: INTERNAL MEDICINE

## 2020-02-03 PROCEDURE — 82040 ASSAY OF SERUM ALBUMIN: CPT | Performed by: INTERNAL MEDICINE

## 2020-02-03 PROCEDURE — 84460 ALANINE AMINO (ALT) (SGPT): CPT | Performed by: INTERNAL MEDICINE

## 2020-02-03 PROCEDURE — 36415 COLL VENOUS BLD VENIPUNCTURE: CPT | Performed by: INTERNAL MEDICINE

## 2020-02-03 PROCEDURE — 82565 ASSAY OF CREATININE: CPT | Performed by: INTERNAL MEDICINE

## 2020-02-03 NOTE — TELEPHONE ENCOUNTER
"Requested Prescriptions   Pending Prescriptions Disp Refills     simvastatin (ZOCOR) 20 MG tablet [Pharmacy Med Name: SIMVASTATIN  TAB 20MG] 90 tablet 3     Sig: TAKE 1 TABLET AT BEDTIME       Statins Protocol Failed - 2/2/2020  6:47 AM        Failed - LDL on file in past 12 months     Recent Labs   Lab Test 12/12/18  1021   LDL 97             Failed - Recent (12 mo) or future (30 days) visit within the authorizing provider's specialty     Patient has had an office visit with the authorizing provider or a provider within the authorizing providers department within the previous 12 mos or has a future within next 30 days. See \"Patient Info\" tab in inbasket, or \"Choose Columns\" in Meds & Orders section of the refill encounter.              Passed - No abnormal creatine kinase in past 12 months     No lab results found.             Passed - Medication is active on med list        Passed - Patient is age 18 or older        Passed - No active pregnancy on record        Passed - No positive pregnancy test in past 12 months        Last Written Prescription Date:  1/17/2019  Last Fill Quantity: 90,  # refills: 3   Last office visit: 9/27/2019 with prescribing provider:  Mauro   Future Office Visit:      "

## 2020-02-04 RX ORDER — SIMVASTATIN 20 MG
20 TABLET ORAL AT BEDTIME
Qty: 90 TABLET | Refills: 0 | Status: SHIPPED | OUTPATIENT
Start: 2020-02-04 | End: 2020-05-05

## 2020-02-04 NOTE — TELEPHONE ENCOUNTER
Prescription approved per [facility] Refill Protocol.  [email or web address]

## 2020-02-20 ENCOUNTER — TRANSFERRED RECORDS (OUTPATIENT)
Dept: HEALTH INFORMATION MANAGEMENT | Facility: CLINIC | Age: 65
End: 2020-02-20

## 2020-05-02 DIAGNOSIS — E78.5 HYPERLIPIDEMIA, UNSPECIFIED HYPERLIPIDEMIA TYPE: ICD-10-CM

## 2020-05-04 NOTE — TELEPHONE ENCOUNTER
"Requested Prescriptions   Pending Prescriptions Disp Refills     simvastatin (ZOCOR) 20 MG tablet [Pharmacy Med Name: SIMVASTATIN  TAB 20MG]  Last Written Prescription Date:  2/4/20  Last Fill Quantity: 90,  # refills: 0   Last Office Visit with G, P or [facility] prescribing provider:  9/27/19   Future Office Visit:      90 tablet 0     Sig: TAKE 1 TABLET AT BEDTIME.  CHOLESTEROL LABS DUE       FEBRUARY 2020       Statins Protocol Failed - 5/2/2020  1:37 AM        Failed - LDL on file in past 12 months     Recent Labs   Lab Test 12/12/18  1021   LDL 97             Failed - Recent (12 mo) or future (30 days) visit within the authorizing provider's specialty     Patient has had an office visit with the authorizing provider or a provider within the authorizing providers department within the previous 12 mos or has a future within next 30 days. See \"Patient Info\" tab in inbasket, or \"Choose Columns\" in Meds & Orders section of the refill encounter.              Passed - No abnormal creatine kinase in past 12 months     No lab results found.             Passed - Medication is active on med list        Passed - Patient is age 18 or older        Passed - No active pregnancy on record        Passed - No positive pregnancy test in past 12 months             "

## 2020-05-05 RX ORDER — SIMVASTATIN 20 MG
TABLET ORAL
Qty: 90 TABLET | Refills: 0 | Status: SHIPPED | OUTPATIENT
Start: 2020-05-05 | End: 2020-07-24

## 2020-05-05 NOTE — TELEPHONE ENCOUNTER
"Requested Prescriptions   Pending Prescriptions Disp Refills     simvastatin (ZOCOR) 20 MG tablet [Pharmacy Med Name: SIMVASTATIN  TAB 20MG] 90 tablet 0     Sig: TAKE 1 TABLET AT BEDTIME.  CHOLESTEROL LABS DUE       FEBRUARY 2020       Statins Protocol Failed - 5/4/2020 10:09 AM        Failed - LDL on file in past 12 months     Recent Labs   Lab Test 12/12/18  1021   LDL 97             Failed - Recent (12 mo) or future (30 days) visit within the authorizing provider's specialty     Patient has had an office visit with the authorizing provider or a provider within the authorizing providers department within the previous 12 mos or has a future within next 30 days. See \"Patient Info\" tab in inbasket, or \"Choose Columns\" in Meds & Orders section of the refill encounter.              Passed - No abnormal creatine kinase in past 12 months     No lab results found.             Passed - Medication is active on med list        Passed - Patient is age 18 or older        Passed - No active pregnancy on record        Passed - No positive pregnancy test in past 12 months             "

## 2020-05-28 DIAGNOSIS — Z79.899 ENCOUNTER FOR LONG-TERM (CURRENT) USE OF OTHER MEDICATIONS: ICD-10-CM

## 2020-05-28 LAB
ALBUMIN SERPL-MCNC: 3.3 G/DL (ref 3.4–5)
ALT SERPL W P-5'-P-CCNC: 37 U/L (ref 0–50)
AST SERPL W P-5'-P-CCNC: 40 U/L (ref 0–45)
BASOPHILS # BLD AUTO: 0 10E9/L (ref 0–0.2)
BASOPHILS NFR BLD AUTO: 0.3 %
CREAT SERPL-MCNC: 0.72 MG/DL (ref 0.52–1.04)
DIFFERENTIAL METHOD BLD: NORMAL
EOSINOPHIL # BLD AUTO: 0.1 10E9/L (ref 0–0.7)
EOSINOPHIL NFR BLD AUTO: 1.3 %
ERYTHROCYTE [DISTWIDTH] IN BLOOD BY AUTOMATED COUNT: 13.8 % (ref 10–15)
GFR SERPL CREATININE-BSD FRML MDRD: 88 ML/MIN/{1.73_M2}
HCT VFR BLD AUTO: 39.8 % (ref 35–47)
HGB BLD-MCNC: 13.5 G/DL (ref 11.7–15.7)
LYMPHOCYTES # BLD AUTO: 1.6 10E9/L (ref 0.8–5.3)
LYMPHOCYTES NFR BLD AUTO: 20.5 %
MCH RBC QN AUTO: 29.8 PG (ref 26.5–33)
MCHC RBC AUTO-ENTMCNC: 33.9 G/DL (ref 31.5–36.5)
MCV RBC AUTO: 88 FL (ref 78–100)
MONOCYTES # BLD AUTO: 0.7 10E9/L (ref 0–1.3)
MONOCYTES NFR BLD AUTO: 8.9 %
NEUTROPHILS # BLD AUTO: 5.3 10E9/L (ref 1.6–8.3)
NEUTROPHILS NFR BLD AUTO: 69 %
PLATELET # BLD AUTO: 219 10E9/L (ref 150–450)
RBC # BLD AUTO: 4.53 10E12/L (ref 3.8–5.2)
WBC # BLD AUTO: 7.6 10E9/L (ref 4–11)

## 2020-05-28 PROCEDURE — 85025 COMPLETE CBC W/AUTO DIFF WBC: CPT | Performed by: INTERNAL MEDICINE

## 2020-05-28 PROCEDURE — 82565 ASSAY OF CREATININE: CPT | Performed by: INTERNAL MEDICINE

## 2020-05-28 PROCEDURE — 36415 COLL VENOUS BLD VENIPUNCTURE: CPT | Performed by: INTERNAL MEDICINE

## 2020-05-28 PROCEDURE — 84460 ALANINE AMINO (ALT) (SGPT): CPT | Performed by: INTERNAL MEDICINE

## 2020-05-28 PROCEDURE — 82040 ASSAY OF SERUM ALBUMIN: CPT | Performed by: INTERNAL MEDICINE

## 2020-05-28 PROCEDURE — 84450 TRANSFERASE (AST) (SGOT): CPT | Performed by: INTERNAL MEDICINE

## 2020-06-23 ENCOUNTER — TRANSFERRED RECORDS (OUTPATIENT)
Dept: HEALTH INFORMATION MANAGEMENT | Facility: CLINIC | Age: 65
End: 2020-06-23

## 2020-06-27 ENCOUNTER — MYC MEDICAL ADVICE (OUTPATIENT)
Dept: FAMILY MEDICINE | Facility: CLINIC | Age: 65
End: 2020-06-27

## 2020-06-27 DIAGNOSIS — R41.3 MEMORY LOSS: ICD-10-CM

## 2020-06-27 DIAGNOSIS — R26.89 BALANCE PROBLEMS: Primary | ICD-10-CM

## 2020-06-29 DIAGNOSIS — Z11.59 ENCOUNTER FOR SCREENING FOR OTHER VIRAL DISEASES: Primary | ICD-10-CM

## 2020-06-29 NOTE — TELEPHONE ENCOUNTER
Dr Aviles, please see her mychart note (addressed to you)   I don't see that she has been in to see you since Dec 2018.   Does she need an appt first for this referral?     Kell Helton RNC

## 2020-06-29 NOTE — TELEPHONE ENCOUNTER
If her insurance requires a referral, she should see me. Otherwise I typically refer within the [facility].  Dr. Cedillo at [facility] or any of the general neurologists at the [facility] - there are several locations where she could be seen.    Aziza Aviles M.D.

## 2020-07-06 ENCOUNTER — ANESTHESIA EVENT (OUTPATIENT)
Dept: GASTROENTEROLOGY | Facility: CLINIC | Age: 65
End: 2020-07-06
Payer: COMMERCIAL

## 2020-07-06 DIAGNOSIS — Z11.59 ENCOUNTER FOR SCREENING FOR OTHER VIRAL DISEASES: ICD-10-CM

## 2020-07-06 PROCEDURE — U0003 INFECTIOUS AGENT DETECTION BY NUCLEIC ACID (DNA OR RNA); SEVERE ACUTE RESPIRATORY SYNDROME CORONAVIRUS 2 (SARS-COV-2) (CORONAVIRUS DISEASE [COVID-19]), AMPLIFIED PROBE TECHNIQUE, MAKING USE OF HIGH THROUGHPUT TECHNOLOGIES AS DESCRIBED BY CMS-2020-01-R: HCPCS | Performed by: SURGERY

## 2020-07-06 PROCEDURE — 99207 ZZC NO CHARGE NURSE ONLY: CPT

## 2020-07-06 NOTE — ANESTHESIA PREPROCEDURE EVALUATION
Anesthesia Pre-Procedure Evaluation    Patient: Yaritza Bradley   MRN: [id number] : 1955          Preoperative Diagnosis: FHx: colon cancer [Z80.0]    Procedure(s):  COLONOSCOPY    Past Medical History:   Diagnosis Date     Acetabular labrum tear, right, initial encounter 2016     Arthritis      Depressive disorder      Hemorrhage of rectum and anus      Post-operative nausea and vomiting 2017     Trochanteric bursitis of right hip 2016     Past Surgical History:   Procedure Laterality Date     COLONOSCOPY N/A 10/14/2016    Procedure: COLONOSCOPY;  Surgeon: Gerson Douglas MD;  Location: [facility]     ESOPHAGOSCOPY, GASTROSCOPY, DUODENOSCOPY (EGD), COMBINED  2011    Procedure:COMBINED ESOPHAGOSCOPY, GASTROSCOPY, DUODENOSCOPY (EGD), BIOPSY SINGLE OR MULTIPLE; Gastroscopy with biopsy; Surgeon:FARZAD GARCIA; Location:[facility]     ESOPHAGOSCOPY, GASTROSCOPY, DUODENOSCOPY (EGD), COMBINED  2014    Procedure: COMBINED ESOPHAGOSCOPY, GASTROSCOPY, DUODENOSCOPY (EGD), BIOPSY SINGLE OR MULTIPLE;  Surgeon: Marshall Martinez MD;  Location: [facility]     ESOPHAGOSCOPY, GASTROSCOPY, DUODENOSCOPY (EGD), COMBINED N/A 2018    Procedure: COMBINED ESOPHAGOSCOPY, GASTROSCOPY, DUODENOSCOPY (EGD), BIOPSY SINGLE OR MULTIPLE;  gastroscopy with biopsies;  Surgeon: Jorge Schofield MD;  Location: [facility]     HC DILATION/CURETTAGE DIAG/THER NON OB      for uterine fibroids     OPEN REDUCTION INTERNAL FIXATION ELBOW Right 2016    right THR     ROTATOR CUFF REPAIR RT/LT      left       Anesthesia Evaluation     . Pt has had prior anesthetic. Type: Regional, MAC and General    History of anesthetic complications   - PONV        ROS/MED HX    ENT/Pulmonary:       Neurologic:     (+)other neuro Memory loss    Cardiovascular:     (+) Dyslipidemia, ----. : . . . :. .       METS/Exercise Tolerance:  >4 METS   Hematologic:  - neg hematologic  ROS       Musculoskeletal:   (+) arthritis,  -  "      GI/Hepatic:     (+) GERD       Renal/Genitourinary:  - ROS Renal section negative       Endo:  - neg endo ROS       Psychiatric:     (+) psychiatric history depression and anxiety      Infectious Disease:  - neg infectious disease ROS       Malignancy:      - no malignancy   Other:    - neg other ROS                      Physical Exam  Normal systems: cardiovascular, pulmonary and dental    Airway   Mallampati: II  TM distance: >3 FB  Neck ROM: full    Dental     Cardiovascular   Rhythm and rate: regular and normal      Pulmonary    breath sounds clear to auscultation            Lab Results   Component Value Date    WBC 7.6 05/28/2020    HGB 13.5 05/28/2020    HCT 39.8 05/28/2020     05/28/2020    CRP <5.0 02/09/2012    SED 7 02/09/2012     05/21/2018    POTASSIUM 4.1 05/21/2018    CHLORIDE 111 (H) 05/21/2018    CO2 24 05/21/2018    BUN 8 05/21/2018    CR 0.72 05/28/2020    GLC 99 05/21/2018    EVA 7.7 (L) 05/21/2018    ALBUMIN 3.3 (L) 05/28/2020    PROTTOTAL 5.7 (L) 05/21/2018    ALT 37 05/28/2020    AST 40 05/28/2020    ALKPHOS 54 05/21/2018    BILITOTAL 0.5 05/21/2018    LIPASE 96 02/09/2012    AMYLASE 60 02/09/2012    TIMMY <10 (L) 09/29/2014    TSH 1.60 05/27/2014    T4 1.33 05/27/2014       Preop Vitals  BP Readings from Last 3 Encounters:   09/27/19 118/66   04/10/19 122/71   12/12/18 130/64    Pulse Readings from Last 3 Encounters:   09/27/19 93   04/10/19 93   12/12/18 96      Resp Readings from Last 3 Encounters:   09/27/19 12   04/10/19 18   12/12/18 16    SpO2 Readings from Last 3 Encounters:   09/27/19 99%   11/26/18 97%   11/01/18 99%      Temp Readings from Last 1 Encounters:   09/27/19 37  C (98.6  F) (Tympanic)    Ht Readings from Last 1 Encounters:   09/27/19 1.632 m (5' 4.25\")      Wt Readings from Last 1 Encounters:   09/27/19 53.4 kg (117 lb 12.8 oz)    Estimated body mass index is 20.06 kg/m  as calculated from the following:    Height as of 9/27/19: 1.632 m (5' 4.25\").    " Weight as of 9/27/19: 53.4 kg (117 lb 12.8 oz).       Anesthesia Plan      History & Physical Review  History and physical reviewed and following examination; no interval change.    ASA Status:  2 .        Plan for General with Propofol induction. Maintenance will be TIVA.             Postoperative Care      Consents  Anesthetic plan, risks, benefits and alternatives discussed with:  Patient..                 WENDY Menezes CRNA

## 2020-07-07 LAB
SARS-COV-2 RNA SPEC QL NAA+PROBE: NOT DETECTED
SPECIMEN SOURCE: NORMAL

## 2020-07-08 ENCOUNTER — ANESTHESIA (OUTPATIENT)
Dept: GASTROENTEROLOGY | Facility: CLINIC | Age: 65
End: 2020-07-08
Payer: COMMERCIAL

## 2020-07-08 ENCOUNTER — HOSPITAL ENCOUNTER (OUTPATIENT)
Facility: CLINIC | Age: 65
Discharge: HOME | End: 2020-07-08
Attending: SURGERY | Admitting: SURGERY
Payer: COMMERCIAL

## 2020-07-08 VITALS
RESPIRATION RATE: 16 BRPM | OXYGEN SATURATION: 98 % | HEART RATE: 89 BPM | SYSTOLIC BLOOD PRESSURE: 126 MMHG | HEIGHT: 64 IN | DIASTOLIC BLOOD PRESSURE: 77 MMHG | TEMPERATURE: 98.2 F | WEIGHT: 112 LBS | BODY MASS INDEX: 19.12 KG/M2

## 2020-07-08 LAB — COLONOSCOPY: NORMAL

## 2020-07-08 PROCEDURE — 25000125 ZZHC RX 250: Performed by: NURSE ANESTHETIST, CERTIFIED REGISTERED

## 2020-07-08 PROCEDURE — 25000125 ZZHC RX 250: Performed by: SURGERY

## 2020-07-08 PROCEDURE — 88305 TISSUE EXAM BY PATHOLOGIST: CPT | Mod: 26 | Performed by: SURGERY

## 2020-07-08 PROCEDURE — 88305 TISSUE EXAM BY PATHOLOGIST: CPT | Performed by: SURGERY

## 2020-07-08 PROCEDURE — 37000008 ZZH ANESTHESIA TECHNICAL FEE, 1ST 30 MIN: Performed by: SURGERY

## 2020-07-08 PROCEDURE — 45380 COLONOSCOPY AND BIOPSY: CPT | Performed by: SURGERY

## 2020-07-08 PROCEDURE — 25800030 ZZH RX IP 258 OP 636: Performed by: SURGERY

## 2020-07-08 PROCEDURE — 45380 COLONOSCOPY AND BIOPSY: CPT | Mod: PT | Performed by: SURGERY

## 2020-07-08 PROCEDURE — 25000128 H RX IP 250 OP 636: Performed by: NURSE ANESTHETIST, CERTIFIED REGISTERED

## 2020-07-08 RX ORDER — PROPOFOL 10 MG/ML
INJECTION, EMULSION INTRAVENOUS CONTINUOUS PRN
Status: DISCONTINUED | OUTPATIENT
Start: 2020-07-08 | End: 2020-07-08

## 2020-07-08 RX ORDER — LIDOCAINE 40 MG/G
CREAM TOPICAL
Status: DISCONTINUED | OUTPATIENT
Start: 2020-07-08 | End: 2020-07-08 | Stop reason: HOSPADM

## 2020-07-08 RX ORDER — NALOXONE HYDROCHLORIDE 0.4 MG/ML
.1-.4 INJECTION, SOLUTION INTRAMUSCULAR; INTRAVENOUS; SUBCUTANEOUS
Status: CANCELLED | OUTPATIENT
Start: 2020-07-08 | End: 2020-07-09

## 2020-07-08 RX ORDER — ONDANSETRON 2 MG/ML
4 INJECTION INTRAMUSCULAR; INTRAVENOUS
Status: DISCONTINUED | OUTPATIENT
Start: 2020-07-08 | End: 2020-07-08 | Stop reason: HOSPADM

## 2020-07-08 RX ORDER — GLYCOPYRROLATE 0.2 MG/ML
INJECTION, SOLUTION INTRAMUSCULAR; INTRAVENOUS PRN
Status: DISCONTINUED | OUTPATIENT
Start: 2020-07-08 | End: 2020-07-08

## 2020-07-08 RX ORDER — SODIUM CHLORIDE, SODIUM LACTATE, POTASSIUM CHLORIDE, CALCIUM CHLORIDE 600; 310; 30; 20 MG/100ML; MG/100ML; MG/100ML; MG/100ML
INJECTION, SOLUTION INTRAVENOUS CONTINUOUS
Status: DISCONTINUED | OUTPATIENT
Start: 2020-07-08 | End: 2020-07-08 | Stop reason: HOSPADM

## 2020-07-08 RX ORDER — FLUMAZENIL 0.1 MG/ML
0.2 INJECTION, SOLUTION INTRAVENOUS
Status: CANCELLED | OUTPATIENT
Start: 2020-07-08 | End: 2020-07-08

## 2020-07-08 RX ORDER — PROPOFOL 10 MG/ML
INJECTION, EMULSION INTRAVENOUS PRN
Status: DISCONTINUED | OUTPATIENT
Start: 2020-07-08 | End: 2020-07-08

## 2020-07-08 RX ADMIN — LIDOCAINE HYDROCHLORIDE 0.3 ML: 10 INJECTION, SOLUTION EPIDURAL; INFILTRATION; INTRACAUDAL; PERINEURAL at 09:37

## 2020-07-08 RX ADMIN — GLYCOPYRROLATE 0.2 MG: 0.2 INJECTION, SOLUTION INTRAMUSCULAR; INTRAVENOUS at 10:31

## 2020-07-08 RX ADMIN — LIDOCAINE HYDROCHLORIDE 50 MG: 10 INJECTION, SOLUTION EPIDURAL; INFILTRATION; INTRACAUDAL; PERINEURAL at 10:31

## 2020-07-08 RX ADMIN — PROPOFOL 200 MCG/KG/MIN: 10 INJECTION, EMULSION INTRAVENOUS at 10:31

## 2020-07-08 RX ADMIN — SODIUM CHLORIDE, POTASSIUM CHLORIDE, SODIUM LACTATE AND CALCIUM CHLORIDE: 600; 310; 30; 20 INJECTION, SOLUTION INTRAVENOUS at 09:37

## 2020-07-08 RX ADMIN — PROPOFOL 100 MG: 10 INJECTION, EMULSION INTRAVENOUS at 10:31

## 2020-07-08 ASSESSMENT — MIFFLIN-ST. JEOR: SCORE: 1038.03

## 2020-07-08 NOTE — ANESTHESIA POSTPROCEDURE EVALUATION
Patient: Yaritza Bradley    Procedure(s):  COLONOSCOPY, WITH POLYPECTOMY AND BIOPSY    Diagnosis:FHx: colon cancer [Z80.0]  Diagnosis Additional Information: No value filed.    Anesthesia Type:  General    Note:  Anesthesia Post Evaluation    Patient location during evaluation: Bedside and Phase 2  Patient participation: Able to fully participate in evaluation  Level of consciousness: awake and alert  Pain management: adequate  Airway patency: patent  Cardiovascular status: acceptable  Respiratory status: acceptable  Hydration status: acceptable  PONV: none     Anesthetic complications: None          Last vitals:  Vitals:    07/08/20 1055 07/08/20 1100 07/08/20 1110   BP: 130/73 118/64 126/77   Pulse: 93 84 89   Resp:  15 16   Temp:      SpO2: 98% 99% 98%         Electronically Signed By: Angel Albarran CRNA, APRN CRNA  July 8, 2020  11:14 AM

## 2020-07-08 NOTE — H&P
64 year old year old female here for colonoscopy for family history of colon cancer.  Last coloscopy was 2016 and negative.  She also notes issues with constipation.  Denies blood in stool.  Her colonoscopies have all been negative.      Patient Active Problem List   Diagnosis     Rheumatoid arthritis (H)     Disorder of bone and cartilage     CARDIOVASCULAR SCREENING; LDL GOAL LESS THAN 160     GERD (gastroesophageal reflux disease)     Generalized anxiety disorder     Moderate recurrent major depression (H)     Memory loss or impairment     Femoral acetabular impingement, right     Primary osteoarthritis of right hip, end stage DJD     Cellulitis     Dog bite of hand       Past Medical History:   Diagnosis Date     Acetabular labrum tear, right, initial encounter 4/26/2016     Arthritis      Depressive disorder      Hemorrhage of rectum and anus 2002     Post-operative nausea and vomiting 7/14/2017     Trochanteric bursitis of right hip 4/26/2016       Past Surgical History:   Procedure Laterality Date     COLONOSCOPY N/A 10/14/2016    Procedure: COLONOSCOPY;  Surgeon: Gesron Douglas MD;  Location: [facility]     ESOPHAGOSCOPY, GASTROSCOPY, DUODENOSCOPY (EGD), COMBINED  8/19/2011    Procedure:COMBINED ESOPHAGOSCOPY, GASTROSCOPY, DUODENOSCOPY (EGD), BIOPSY SINGLE OR MULTIPLE; Gastroscopy with biopsy; Surgeon:FARZAD GARCIA; Location:[facility]     ESOPHAGOSCOPY, GASTROSCOPY, DUODENOSCOPY (EGD), COMBINED  7/28/2014    Procedure: COMBINED ESOPHAGOSCOPY, GASTROSCOPY, DUODENOSCOPY (EGD), BIOPSY SINGLE OR MULTIPLE;  Surgeon: Marshall Martinez MD;  Location: [facility]     ESOPHAGOSCOPY, GASTROSCOPY, DUODENOSCOPY (EGD), COMBINED N/A 7/18/2018    Procedure: COMBINED ESOPHAGOSCOPY, GASTROSCOPY, DUODENOSCOPY (EGD), BIOPSY SINGLE OR MULTIPLE;  gastroscopy with biopsies;  Surgeon: Jorge Schofield MD;  Location: [facility]     HC DILATION/CURETTAGE DIAG/THER NON OB  2001    for uterine fibroids     OPEN REDUCTION INTERNAL  "FIXATION ELBOW Right 1/22/2016    right THR     ROTATOR CUFF REPAIR RT/LT  2009    left       Family History   Problem Relation Age of Onset     C.A.D. Father 54     C.A.D. Paternal Grandfather      C.A.D. Brother      Alcohol/Drug Brother      Alzheimer Disease Maternal Grandmother      Alzheimer Disease Maternal Grandfather      Alzheimer Disease Paternal Grandmother      Colon Cancer Mother        No current outpatient medications on file.       Allergies   Allergen Reactions     Nka [No Known Allergies]        Pt reports that she has never smoked. She has never used smokeless tobacco. She reports current alcohol use. She reports that she does not use drugs.    Exam:  /70   Temp 98.2  F (36.8  C) (Oral)   Resp 14   Ht 1.626 m (5' 4\")   Wt 50.8 kg (112 lb)   SpO2 98%   BMI 19.22 kg/m      Awake, Alert OX3  Lungs - CTA bilaterally  CV - RRR, no murmurs, distal pulses intact  Abd - soft, non-distended, non-tender, +BS  Extr - No cyanosis or edema    A/P 64 year old year old female in need of colonoscopy for family history of colon cancer. Risks, benefits, alternatives, and complications were discussed including the possibility of perforation, bleeding, missed lesion and the patient agreed to proceed.    We discussed increasing dietary fiber to 25-30 grams daily and consuming adequate water to prevent constipation irregardless of colonoscopy outcome.    Jerry Cleveland, DO on 7/8/2020 at 9:48 AM    "

## 2020-07-10 LAB — COPATH REPORT: NORMAL

## 2020-07-21 ENCOUNTER — OFFICE VISIT (OUTPATIENT)
Dept: NEUROLOGY | Facility: CLINIC | Age: 65
End: 2020-07-21
Attending: FAMILY MEDICINE
Payer: COMMERCIAL

## 2020-07-21 VITALS
SYSTOLIC BLOOD PRESSURE: 125 MMHG | TEMPERATURE: 98.4 F | HEART RATE: 100 BPM | RESPIRATION RATE: 12 BRPM | DIASTOLIC BLOOD PRESSURE: 77 MMHG

## 2020-07-21 DIAGNOSIS — R41.89 COGNITIVE IMPAIRMENT: Primary | ICD-10-CM

## 2020-07-21 DIAGNOSIS — R26.9 GAIT DIFFICULTY: ICD-10-CM

## 2020-07-21 PROCEDURE — 99215 OFFICE O/P EST HI 40 MIN: CPT | Performed by: PSYCHIATRY & NEUROLOGY

## 2020-07-21 ASSESSMENT — MONTREAL COGNITIVE ASSESSMENT (MOCA)
VISUOSPATIAL/EXECUTIVE SUBSCORE: 3
WHAT IS THE TOTAL SCORE (OUT OF 30): 21
12. MEMORY INDEX SCORE: 2
11. FOR EACH PAIR OF WORDS, WHAT CATEGORY DO THEY BELONG TO (OUT OF 2): 1
4. NAME EACH OF THE THREE ANIMALS SHOWN: 3
13. ORIENTATION SUBSCORE: 6
WHAT LEVEL OF EDUCATION WAS ATTAINED: 0
6. READ LIST OF DIGITS [FORWARD/BACKWARD]: 2
7. [VIGILENCE] TAP WHEN HEARING DESIGNATED LETTER: 1
10. [FLUENCY] NAME WORDS STARTING WITH DESIGNATED LETTER: 0
9. REPEAT EACH SENTENCE: 1
8. SERIAL SUBTRACTION OF 7S: 2

## 2020-07-21 NOTE — NURSING NOTE
"Initial /77 (BP Location: Right arm, Patient Position: Sitting, Cuff Size: Adult Regular)   Pulse 100   Temp 98.4  F (36.9  C) (Tympanic)   Resp 12  Estimated body mass index is 19.22 kg/m  as calculated from the following:    Height as of 7/8/20: 1.626 m (5' 4\").    Weight as of 7/8/20: 50.8 kg (112 lb).     Lucia CONRADA      "

## 2020-07-21 NOTE — LETTER
7/21/2020         RE: Yaritza Bradley  [address] [facility] [address]        Dear Colleague,    Thank you for referring your patient, Yaritza Bradley, to the [facility]. Please see a copy of my visit note below.    ESTABLISHED PATIENT NEUROLOGY NOTE    DATE OF VISIT: 7/21/2020  MRN: [id number]  PATIENT NAME: Yaritza Bradley  YOB: 1955    Chief Complaint   Patient presents with     RECHECK     Balance     SUBJECTIVE:                                                      HISTORY OF PRESENT ILLNESS:  Yaritza is here for follow up regarding balance problems. I met her two years ago in consultation for the same.     As previously documented by me (3.6.18):  In reviewing her chart, I note that the patient was seen by  Dr. Cisneros in 2014 for memory difficulties. The problems with balance were also mentioned at that time. MRI brain showed non-specific white matter changes. Cervical spine imaging later in the year showed normal cord, some degenerative changes, moderate facet involvement at C3-C6. Dr. Cisneros makes note of some brisk reflexes and concern regarding B12. B12 was 408 at the time and does not appear to have been checked since. Neuropsych testing showed subtle diffuse cerebral dysfunction and possibly greater non-dominant parietal involvement (without structural brain findings to explain this); Not dementia. Low mood was felt to have played a role in her performance.  Dr. Adrian (ENT) saw the patient in 8.2016 for concerns about poor balance. She was having trouble if she had to do tasks with arms above her head. He reported one fall over 5-6 months of symptoms. No vertigo described. Otologic exam was normal. He suspected multifactorial balance problems and recommended physical therapy. PT note indicates patient mentioned worsening balance after Right hip replacement surgery. There is also history of sinus headaches. The patient reported some  whoozy  sensations with cervical and  oculomotor testing by PT. Frenzel lens testing was negative. Her case was not felt to be clearly central or peripheral vestibular in origin. In a January 2017 primary care note, the patient was having some orthostatic symptoms. She was again sent to PT in early 2017. She had mild sensorineural hearing loss on audiogram completed in 2016.   Lumbar MRI from 4.2015 showed some multilevel foraminal narrowing, mild.   Additional history includes rheumatoid arthritis, fibromyalgia, SHEA and depression.      The patient tells me that her main concern is the balance issue. She feels like she weaves when she walks and that her body doesn't always work with her brain. She denies vertigo, which she has experienced in the past. She tends to be clumsy in general too. She says that positional changes do seem to make a difference (ie, working above her head and squatting down can make her symptomatic). She says that this is all the same as prior complaints but the reason she is here today is that she is noticing the balance problems more frequently lately. She does feel lightheaded at times. She says that she previously thought it was medication-related, but now she has been on the same medicines for a long time.  She has not had any recent falls. She says she does bump her head occasionally. She denies history of head injury causing concussion, no LOC. No neck injuries. She has had Right hip replacement and does endorse that the balance has been worse since.      She does get abdominal pain and reports some chest pain, but she does not think they are associated with the balance problems necessarily. She does get some lightheadedness and shortness of breath with exertion, along with the chest pain (points to sternum). She has had some remote cardiac testing completed in the past, but nothing . She is concerned about cardiac disease in her family. She says that she has talked to her primary care provider about the abdominal pain.  She also has a lump on her head too that is new. She also mentions the memory problems. She gets up to urinate frequently during the night. She does not have problems with control any longer, though she describes a brief period of time when she had some problems with controlling her bladder. She denies changes in hearing/fullness/tinnitus.      She thinks that physical therapy was helpful for the balance, at least temporarily. She admits that she was not good about continuing the recommended exercises at home.      She does have plans to do some carotid artery testing, as well as coronary testing, bone density and something else soon. This is going to be done through [facility] on March 22. She says it is a mobile screening program for cardiac/vascular problems.      There is positive family history of migraine, headaches, stroke and dementia in grandparents.    I recommended she return to physical therapy, as I was in agreement her balance problems were likely multi-factorial. I also recommended she see Cardiology for SAINI she described. B12 was in the lower range, so I also recommended she start a supplement. Carotid vessel testing showed mild stenosis on the Left, through an outside source. Later MRA head and neck were normal. MRI brain showed increased SVID. She saw physical therapy in early 2019.    It appears that she did see Cardiology in 6.2018 and they recommended echocardiogram and stress testing. I don't see these results or any follow-up with them in the patient's chart.      She comes in with a list of various concerns. She says that she is here for constant fatigue, feeling like her head is full, gait difficulties and the memory problems. She does fall. She has a bruise on her Left eye. She says she walks off to the side.  agrees, she veers off. She notices some lower back pain if she walks too long. She says she did make an appointment to be seen in the [facility] a couple of years  "ago but then broke her knee and was unable to go. She says physical therapy helped a little. She did not follow through after 2 sessions though. She says she is not good at this. She says she has lost weight and she is worried about this. She says her vision has changed, and she feels dizzy. She will be having her eyes checked in October. She admits she did not follow through with the cardiology testing either but does not say why. She tells me that she has not brought up these concerns (which she feels are worsening) to her primary care provider yet, because she hasn't seen her for a while.    She says she has noticed more shaking in her hands. She has trouble with writing. She forgets conversations. She uses the wrong words, per her . She says she no longer drives, because she is nervous about doing so. Her attention is not great, per . She forgets events. Long term memory is good. She has to double check everything. She does rely more on calenders and lists.     She completed a bachelor's degree in early childhood education. 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 She was a good student.    Sleep varies. She gets up several times to use the bathroom. She sometimes has trouble falling asleep. She does not snore. Her  sleeps downstairs with his CPAP.     She says her mood is \"tolerable.\" She tells me she never feels really happy or really sad. She says her fatigue makes this worse. She says the fatigue started when she stopped working.    She does not remember meeting me a couple of years ago.    She is taking [id number] MEDICATIONS:   Cyanocobalamin (B-12) 1000 MCG TBCR, Take 1,000 mcg by mouth daily  FLUoxetine (PROZAC) 40 MG capsule, TAKE 1 CAPSULE DAILY (Patient taking differently: Take 60 mg by mouth daily )  FOLIC ACID 1 MG PO TABS, 1 TABLET DAILY  HUMIRA PEN 40 MG/0.8ML injection, Inject 40 mg Subcutaneous every 14 days   Ibandronate Sodium (BONIVA IV), Inject into the " "vein every 3 months  methotrexate 50 MG/2ML injection CHEMO, Inject 10 mg Subcutaneous every 7 days   Omega-3 Fatty Acids (OMEGA-3 FISH OIL PO), Take 1 g by mouth daily (DRY EYE OMEGA BENEFITS) 667-250 mg-unit cap  RESTASIS 0.05 % ophthalmic emulsion, Place 1 drop into both eyes 2 times daily  simvastatin (ZOCOR) 20 MG tablet, TAKE 1 TABLET AT BEDTIME.  CHOLESTEROL LABS DUE       FEBRUARY 2020  SM CALCIUM SOFT CHEWS 500-100-40 OR CHEW, CHEW 2-3 ORALLY DAILY  TURMERIC PO, Take 1,000 mg by mouth daily   B-D INSULIN SYRINGE 25G X 5/8\" 1 ML MISC, TO BE USED IN WEEKLY METHOTREXATE INJECTIONS  diclofenac (VOLTAREN) 1 % GEL topical gel, Place onto the skin 4 times daily    No current facility-administered medications on file prior to visit.       RECENT DIAGNOSTIC STUDIES:   Labs: No results found for any visits on 07/21/20.    Imaging:   MRI Brain (11.9.18):  IMPRESSION: Mild volume loss. Confluent white matter T2 hyperintensity  likely reflecting chronic small vessel ischemic change, advanced for  age, worse when compared to 2014.    MRA Head and Neck (11.9.18):   FINDINGS: The intracranial vertebral arteries, basilar artery, and  posterior cerebral arteries are patent. Patent bilateral posterior  communicating arteries are present. A patent anterior communicating  artery is present. The internal carotid arteries, anterior cerebral  arteries, and middle cerebral arteries are patent. No aneurysms or  vascular malformations are identified. Right A1 segment is  hypoplastic.                                                                IMPRESSION:  Normal MR angiogram of the head.     IMPRESSION: Mild narrowing of the right vertebral artery origin of  doubtful clinical significance.  Otherwise normal MRA of the neck.    Imaging reviewed by me. Agree with Radiology read.     REVIEW OF SYSTEMS:                                                      10-point review of systems is negative except as mentioned above in HPI. "     EXAM:                                                      Physical Exam:   Vitals: /77 (BP Location: Right arm, Patient Position: Sitting, Cuff Size: Adult Regular)   Pulse 100   Temp 98.4  F (36.9  C) (Tympanic)   Resp 12   BMI= There is no height or weight on file to calculate BMI.  GENERAL: NAD.   HEENT: Left eye/periorbital bruising.   EXTR: Band-aids over both kneecaps.   CV: Slight tachycardia. Regular rhythm. S1, S2.   NECK: No bruits.  Neurologic:  MENTAL STATUS: Alert, attentive. Appears mildly anxious, fidgety. Speech is fluent. Normal comprehension. MoCA: 21/30 (normal is 26 and above). Fair concentration. Adequate fund of knowledge.   CRANIAL NERVES: Discs technically difficult to visualize. Visual fields intact to confrontation. Pupils equally, round and reactive to light. Facial sensation and movement normal. EOM full. Hearing intact to conversation. Trapezius strength intact. Palate moves symmetrically. Tongue midline.  MOTOR: 5/5 in proximal and distal muscle groups of upper and lower extremities. Tone and bulk normal.   DTRs: Brisk at patellae and ankles. Babinski down-going bilaterally.   SENSATION: Normal light touch and pinprick. Intact proprioception. Vibration: Normal at both ankles.   COORDINATION: Tremor (L>R) with finger nose finger. Possible mild dysmetria on Right with FTN.  STATION AND GAIT: Romberg negative. Normal toe walk and heel walk. Tandem minimally unsteady. Gait appears slightly spastic vs impulsive.   Right hand-dominant.    ASSESSMENT and PLAN:                                                      Assessment and Plan:    ICD-10-CM    1. Cognitive impairment  R41.89 NEUROPSYCHOLOGY REFERRAL   2. Gait difficulty  R26.9 PHYSICAL THERAPY REFERRAL        Ms. Bradley is a 63 yo woman with multiple concerns in clinic today. We focused on the memory and gait. MoCA score is below expected. I recommend repeat Neuropsych testing to assess further. I am considering updated  brain imaging as well.     Gait and tremor appear relatively stable on my exam. I recommend more physical therapy. For her other systemic complaints, I recommend she talk to her primary care provider.     We will see her back in a few months.     Patient Instructions:  For the Balance/Gait difficulties:  -- Return to physical therapy.  -- Talk to Dr. Aviles about the back pain, weight loss and fatigue.  -- Consider repeat brain imaging with MRI. We will hold off on this for now.    For the Memory:  -- Repeat Neuropsych testing.   -- Continue the B12.    Return to Neurology in 3 months.     Total Time: >40 minutes were spent with the patient and in chart review/documentation. More than 50% of the time spent on counseling (as described above in Assessment and Plan/Instructions) /coordinating the care.    Carley Cedillo MD  Neurology    CC: Aziza Aviles MD              Again, thank you for allowing me to participate in the care of your patient.        Sincerely,        Carley Cedillo MD

## 2020-07-21 NOTE — PROGRESS NOTES
ESTABLISHED PATIENT NEUROLOGY NOTE    DATE OF VISIT: 7/21/2020  MRN: [id number]  PATIENT NAME: Yaritza Bradley  YOB: 1955    Chief Complaint   Patient presents with     RECHECK     Balance     SUBJECTIVE:                                                      HISTORY OF PRESENT ILLNESS:  Yaritza is here for follow up regarding balance problems. I met her two years ago in consultation for the same.     As previously documented by me (3.6.18):  In reviewing her chart, I note that the patient was seen by  Dr. Cisneros in 2014 for memory difficulties. The problems with balance were also mentioned at that time. MRI brain showed non-specific white matter changes. Cervical spine imaging later in the year showed normal cord, some degenerative changes, moderate facet involvement at C3-C6. Dr. Cisneros makes note of some brisk reflexes and concern regarding B12. B12 was 408 at the time and does not appear to have been checked since. Neuropsych testing showed subtle diffuse cerebral dysfunction and possibly greater non-dominant parietal involvement (without structural brain findings to explain this); Not dementia. Low mood was felt to have played a role in her performance.  Dr. Adrian (ENT) saw the patient in 8.2016 for concerns about poor balance. She was having trouble if she had to do tasks with arms above her head. He reported one fall over 5-6 months of symptoms. No vertigo described. Otologic exam was normal. He suspected multifactorial balance problems and recommended physical therapy. PT note indicates patient mentioned worsening balance after Right hip replacement surgery. There is also history of sinus headaches. The patient reported some  whoozy  sensations with cervical and oculomotor testing by PT. Frenzel lens testing was negative. Her case was not felt to be clearly central or peripheral vestibular in origin. In a January 2017 primary care note, the patient was having some orthostatic symptoms. She was again  sent to PT in early 2017. She had mild sensorineural hearing loss on audiogram completed in 2016.   Lumbar MRI from 4.2015 showed some multilevel foraminal narrowing, mild.   Additional history includes rheumatoid arthritis, fibromyalgia, SHEA and depression.      The patient tells me that her main concern is the balance issue. She feels like she weaves when she walks and that her body doesn't always work with her brain. She denies vertigo, which she has experienced in the past. She tends to be clumsy in general too. She says that positional changes do seem to make a difference (ie, working above her head and squatting down can make her symptomatic). She says that this is all the same as prior complaints but the reason she is here today is that she is noticing the balance problems more frequently lately. She does feel lightheaded at times. She says that she previously thought it was medication-related, but now she has been on the same medicines for a long time.  She has not had any recent falls. She says she does bump her head occasionally. She denies history of head injury causing concussion, no LOC. No neck injuries. She has had Right hip replacement and does endorse that the balance has been worse since.      She does get abdominal pain and reports some chest pain, but she does not think they are associated with the balance problems necessarily. She does get some lightheadedness and shortness of breath with exertion, along with the chest pain (points to sternum). She has had some remote cardiac testing completed in the past, but nothing . She is concerned about cardiac disease in her family. She says that she has talked to her primary care provider about the abdominal pain. She also has a lump on her head too that is new. She also mentions the memory problems. She gets up to urinate frequently during the night. She does not have problems with control any longer, though she describes a brief period of time when she  had some problems with controlling her bladder. She denies changes in hearing/fullness/tinnitus.      She thinks that physical therapy was helpful for the balance, at least temporarily. She admits that she was not good about continuing the recommended exercises at home.      She does have plans to do some carotid artery testing, as well as coronary testing, bone density and something else soon. This is going to be done through [employer] on March 22. She says it is a mobile screening program for cardiac/vascular problems.      There is positive family history of migraine, headaches, stroke and dementia in grandparents.    I recommended she return to physical therapy, as I was in agreement her balance problems were likely multi-factorial. I also recommended she see Cardiology for SAINI she described. B12 was in the lower range, so I also recommended she start a supplement. Carotid vessel testing showed mild stenosis on the Left, through an outside source. Later MRA head and neck were normal. MRI brain showed increased SVID. She saw physical therapy in early 2019.    It appears that she did see Cardiology in 6.2018 and they recommended echocardiogram and stress testing. I don't see these results or any follow-up with them in the patient's chart.      She comes in with a list of various concerns. She says that she is here for constant fatigue, feeling like her head is full, gait difficulties and the memory problems. She does fall. She has a bruise on her Left eye. She says she walks off to the side.  agrees, she veers off. She notices some lower back pain if she walks too long. She says she did make an appointment to be seen in [facility] a couple of years ago but then broke her knee and was unable to go. She says physical therapy helped a little. She did not follow through after 2 sessions though. She says she is not good at this. She says she has lost weight and she is worried about this. She  "says her vision has changed, and she feels dizzy. She will be having her eyes checked in October. She admits she did not follow through with the cardiology testing either but does not say why. She tells me that she has not brought up these concerns (which she feels are worsening) to her primary care provider yet, because she hasn't seen her for a while.    She says she has noticed more shaking in her hands. She has trouble with writing. She forgets conversations. She uses the wrong words, per her . She says she no longer drives, because she is nervous about doing so. Her attention is not great, per . She forgets events. Long term memory is good. She has to double check everything. She does rely more on calenders and lists.     She completed a bachelor's degree in early childhood education. 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 She was a good student.    Sleep varies. She gets up several times to use the bathroom. She sometimes has trouble falling asleep. She does not snore. Her  sleeps downstairs with his CPAP.     She says her mood is \"tolerable.\" She tells me she never feels really happy or really sad. She says her fatigue makes this worse. She says the fatigue started when she stopped working.    She does not remember meeting me a couple of years ago.    She is taking [id number] MEDICATIONS:   Cyanocobalamin (B-12) 1000 MCG TBCR, Take 1,000 mcg by mouth daily  FLUoxetine (PROZAC) 40 MG capsule, TAKE 1 CAPSULE DAILY (Patient taking differently: Take 60 mg by mouth daily )  FOLIC ACID 1 MG PO TABS, 1 TABLET DAILY  HUMIRA PEN 40 MG/0.8ML injection, Inject 40 mg Subcutaneous every 14 days   Ibandronate Sodium (BONIVA IV), Inject into the vein every 3 months  methotrexate 50 MG/2ML injection CHEMO, Inject 10 mg Subcutaneous every 7 days   Omega-3 Fatty Acids (OMEGA-3 FISH OIL PO), Take 1 g by mouth daily (DRY EYE OMEGA BENEFITS) 667-250 mg-unit cap  RESTASIS 0.05 % ophthalmic " "emulsion, Place 1 drop into both eyes 2 times daily  simvastatin (ZOCOR) 20 MG tablet, TAKE 1 TABLET AT BEDTIME.  CHOLESTEROL LABS DUE       FEBRUARY 2020  SM CALCIUM SOFT CHEWS 500-100-40 OR CHEW, CHEW 2-3 ORALLY DAILY  TURMERIC PO, Take 1,000 mg by mouth daily   B-D INSULIN SYRINGE 25G X 5/8\" 1 ML MISC, TO BE USED IN WEEKLY METHOTREXATE INJECTIONS  diclofenac (VOLTAREN) 1 % GEL topical gel, Place onto the skin 4 times daily    No current facility-administered medications on file prior to visit.       RECENT DIAGNOSTIC STUDIES:   Labs: No results found for any visits on 07/21/20.    Imaging:   MRI Brain (11.9.18):  IMPRESSION: Mild volume loss. Confluent white matter T2 hyperintensity  likely reflecting chronic small vessel ischemic change, advanced for  age, worse when compared to 2014.    MRA Head and Neck (11.9.18):   FINDINGS: The intracranial vertebral arteries, basilar artery, and  posterior cerebral arteries are patent. Patent bilateral posterior  communicating arteries are present. A patent anterior communicating  artery is present. The internal carotid arteries, anterior cerebral  arteries, and middle cerebral arteries are patent. No aneurysms or  vascular malformations are identified. Right A1 segment is  hypoplastic.                                                                IMPRESSION:  Normal MR angiogram of the head.     IMPRESSION: Mild narrowing of the right vertebral artery origin of  doubtful clinical significance.  Otherwise normal MRA of the neck.    Imaging reviewed by me. Agree with Radiology read.     REVIEW OF SYSTEMS:                                                      10-point review of systems is negative except as mentioned above in HPI.     EXAM:                                                      Physical Exam:   Vitals: /77 (BP Location: Right arm, Patient Position: Sitting, Cuff Size: Adult Regular)   Pulse 100   Temp 98.4  F (36.9  C) (Tympanic)   Resp 12   BMI= There " is no height or weight on file to calculate BMI.  GENERAL: NAD.   HEENT: Left eye/periorbital bruising.   EXTR: Band-aids over both kneecaps.   CV: Slight tachycardia. Regular rhythm. S1, S2.   NECK: No bruits.  Neurologic:  MENTAL STATUS: Alert, attentive. Appears mildly anxious, fidgety. Speech is fluent. Normal comprehension. MoCA: 21/30 (normal is 26 and above). Fair concentration. Adequate fund of knowledge.   CRANIAL NERVES: Discs technically difficult to visualize. Visual fields intact to confrontation. Pupils equally, round and reactive to light. Facial sensation and movement normal. EOM full. Hearing intact to conversation. Trapezius strength intact. Palate moves symmetrically. Tongue midline.  MOTOR: 5/5 in proximal and distal muscle groups of upper and lower extremities. Tone and bulk normal.   DTRs: Brisk at patellae and ankles. Babinski down-going bilaterally.   SENSATION: Normal light touch and pinprick. Intact proprioception. Vibration: Normal at both ankles.   COORDINATION: Tremor (L>R) with finger nose finger. Possible mild dysmetria on Right with FTN.  STATION AND GAIT: Romberg negative. Normal toe walk and heel walk. Tandem minimally unsteady. Gait appears slightly spastic vs impulsive.   Right hand-dominant.    ASSESSMENT and PLAN:                                                      Assessment and Plan:    ICD-10-CM    1. Cognitive impairment  R41.89 NEUROPSYCHOLOGY REFERRAL   2. Gait difficulty  R26.9 PHYSICAL THERAPY REFERRAL        Ms. Bradley is a 65 yo woman with multiple concerns in clinic today. We focused on the memory and gait. MoCA score is below expected. I recommend repeat Neuropsych testing to assess further. I am considering updated brain imaging as well.     Gait and tremor appear relatively stable on my exam. I recommend more physical therapy. For her other systemic complaints, I recommend she talk to her primary care provider.     We will see her back in a few months.     Patient  Instructions:  For the Balance/Gait difficulties:  -- Return to physical therapy.  -- Talk to Dr. Aviles about the back pain, weight loss and fatigue.  -- Consider repeat brain imaging with MRI. We will hold off on this for now.    For the Memory:  -- Repeat Neuropsych testing.   -- Continue the B12.    Return to Neurology in 3 months.     Total Time: >40 minutes were spent with the patient and in chart review/documentation. More than 50% of the time spent on counseling (as described above in Assessment and Plan/Instructions) /coordinating the care.    Carley Cedillo MD  Neurology    CC: Aziza Aviles MD

## 2020-07-21 NOTE — PATIENT INSTRUCTIONS
Plan:    For the Balance/Gait difficulties:  -- Return to physical therapy   -- Talk to Dr. Aviles about the back pain, weight loss and fatigue.  -- Consider repeat brain imaging with MRI. We will hold off on this for now.    For the Memory:  -- Repeat Neuropsych testing.   -- Continue the B12.    Return to Neurology in 3 months.

## 2020-07-23 ENCOUNTER — TELEPHONE (OUTPATIENT)
Dept: FAMILY MEDICINE | Facility: CLINIC | Age: 65
End: 2020-07-23

## 2020-07-23 DIAGNOSIS — E78.5 HYPERLIPIDEMIA, UNSPECIFIED HYPERLIPIDEMIA TYPE: ICD-10-CM

## 2020-07-24 RX ORDER — SIMVASTATIN 20 MG
20 TABLET ORAL AT BEDTIME
Qty: 30 TABLET | Refills: 0 | Status: SHIPPED | OUTPATIENT
Start: 2020-07-24 | End: 2020-07-31

## 2020-07-24 NOTE — TELEPHONE ENCOUNTER
Routing refill request to provider for review/approval because:  Labs not current:  LDL    Thank you    Ida DAVIS RN

## 2020-07-24 NOTE — TELEPHONE ENCOUNTER
Refilled x 1 month. Due for visit/labs. Could be virtual.  I have virtual slots 7/30/2020.    Aziza Aviles M.D.

## 2020-07-28 ENCOUNTER — HOSPITAL ENCOUNTER (OUTPATIENT)
Dept: PHYSICAL THERAPY | Facility: CLINIC | Age: 65
Setting detail: THERAPIES SERIES
End: 2020-07-28
Attending: PSYCHIATRY & NEUROLOGY
Payer: COMMERCIAL

## 2020-07-28 DIAGNOSIS — R26.9 GAIT DIFFICULTY: ICD-10-CM

## 2020-07-28 PROCEDURE — 97162 PT EVAL MOD COMPLEX 30 MIN: CPT | Mod: GP | Performed by: PHYSICAL THERAPIST

## 2020-07-28 PROCEDURE — 97116 GAIT TRAINING THERAPY: CPT | Mod: GP | Performed by: PHYSICAL THERAPIST

## 2020-07-28 NOTE — PROGRESS NOTES
PHYSICAL THERAPY INITIAL EVALUATION  '07/28/20 1400   Quick Adds   Quick Adds Vestibular Eval   Type of Visit Initial OP PT Evaluation   General Information   Start of Care Date 07/28/20   Referring Physician Dr. Dejuan Cedillo    Orders Evaluate and Treat as Indicated   Order Date 07/21/20   Medical Diagnosis Gait difficulty    Onset of illness/injury or Date of Surgery 07/28/19  (problems worsening)   Surgical/Medical history reviewed Yes   Pertinent history of current problem (include personal factors and/or comorbidities that impact the POC) Same problems for past 5 years. Balance and falls have worsened. Feels dizzy with change in positions. Seems to help a little when she drinks more water. During her walks she will start to get hot and sweaty and feels dizzy, will have to sit down and rest. Back pain also becomes a problem. Denies any vertigo symptoms, has had this in the past. When she walks she always feels she needs to reach for something to maintain balance, difficulty with change in direction. Has some age related hearing loss but no other hearing symptoms. States that she is also having memory issues.    Prior level of function comment independent   Patient role/Employment history Retired   Current Assistive Devices   (not regularly, just bought walking poles for walking outdoor)   Patient/Family Goals Statement to get strength, trying to figure out what can help the dizziness, stick with the program and do it at home   Fall Risk Screen   Fall screen completed by PT   Have you fallen 2 or more times in the past year? Yes   Have you fallen and had an injury in the past year? Yes   Timed Up and Go score (seconds) see FGA score   Is patient a fall risk? Yes;Department fall risk interventions implemented   Pain   Patient currently in pain No   Strength   Strength Comments LE strength: Hip flexion 4/5 B, Hip abduction 4-/5 B, Quads 5/5B, Hamstrings 4+/5 B, DF 5/5 B, PF(in sitting) R 5/5 L 4+/5    Gait    Gait Comments wide JUANITO, slightly ataxic, occasional veer of path, limited trunk rotation or arm swing   Gait Special Tests   Gait Special Tests FUNCTIONAL GAIT ASSESSMENT   Gait Special Tests Functional Gait Assessment Score out of 30   Score out of 30 14   Balance Special Tests   Balance Special Tests Modified CTSIB Conditions   Balance Special Tests Modified CTSIB Conditions   Condition 1, seconds 30 Seconds   Condition 2, seconds 30 Seconds   Condition 4, seconds 30 Seconds   Condition 5, seconds 6 Seconds   Clinical Impression   Criteria for Skilled Therapeutic Interventions Met yes, treatment indicated   PT Diagnosis dizziness, imbalance   Influenced by the following impairments dizziness, imbalance   Functional limitations due to impairments walking, stairs, transfers    Clinical Presentation Evolving/Changing   Clinical Presentation Rationale likely multifactorial cause, unknown cause   Clinical Decision Making (Complexity) Moderate complexity   Therapy Frequency 1 time/week   Predicted Duration of Therapy Intervention (days/wks) 8 weeks   Risk & Benefits of therapy have been explained Yes   Patient, Family & other staff in agreement with plan of care Yes   Clinical Impression Comments Pt presenting with dizziness and balance complaints. Balance scores were quite low indicating increased fall risk. Due to time constraints a thorough vestibular evaluation of the dizziness was not completed today but will be next visit.   Education Assessment   Preferred Learning Style Listening;Demonstration;Pictures/video   Barriers to Learning No barriers   GOALS   PT Eval Goals 1;2;3;4   Goal 1   Goal Identifier 1   Goal Description Patient will improve her FGA score by 5 points or greater to decrease risk for falls.    Target Date 09/22/20   Goal 2   Goal Identifier 2   Goal Description Patient will be able to walk 100ft without LOB or path deviation.   Target Date 09/22/20   Goal 3   Goal Identifier 3   Goal Description  Patient will be able to walk and turn her with only minimal symptoms or gait deviations.   Target Date 09/22/20   Goal 4   Goal Identifier 4   Goal Description Patient will be independent with HEP to aid functional recovery.    Target Date 09/22/20   Total Evaluation Time   PT Eval, Moderate Complexity Minutes (11399) 30       Please contact me with any questions or concerns.  Thank you for your referral.    Sima Morillo, PT, DPT, OCS  Physical Therapist, Orthopedic Certified Specialist    [facility]  [address] [place] [id number]  [email or web address]   Office: [phone]   Employed by [employer]

## 2020-07-29 NOTE — TELEPHONE ENCOUNTER
MAR to schedule an annual appt for further refills.  Also, that 1 month refill of med was sent to Pharmacy to cover until appt.[email or web address]

## 2020-07-31 ENCOUNTER — OFFICE VISIT (OUTPATIENT)
Dept: FAMILY MEDICINE | Facility: CLINIC | Age: 65
End: 2020-07-31
Payer: COMMERCIAL

## 2020-07-31 VITALS
HEART RATE: 83 BPM | OXYGEN SATURATION: 98 % | WEIGHT: 110 LBS | RESPIRATION RATE: 16 BRPM | DIASTOLIC BLOOD PRESSURE: 60 MMHG | TEMPERATURE: 97.8 F | SYSTOLIC BLOOD PRESSURE: 110 MMHG | BODY MASS INDEX: 18.78 KG/M2 | HEIGHT: 64 IN

## 2020-07-31 DIAGNOSIS — R63.4 UNINTENTIONAL WEIGHT LOSS: Primary | ICD-10-CM

## 2020-07-31 DIAGNOSIS — Z12.31 ENCOUNTER FOR SCREENING MAMMOGRAM FOR BREAST CANCER: ICD-10-CM

## 2020-07-31 DIAGNOSIS — F33.1 MODERATE RECURRENT MAJOR DEPRESSION (H): ICD-10-CM

## 2020-07-31 DIAGNOSIS — R41.3 MEMORY LOSS OR IMPAIRMENT: ICD-10-CM

## 2020-07-31 DIAGNOSIS — E53.8 VITAMIN B12 DEFICIENCY (NON ANEMIC): ICD-10-CM

## 2020-07-31 DIAGNOSIS — E78.5 HYPERLIPIDEMIA, UNSPECIFIED HYPERLIPIDEMIA TYPE: ICD-10-CM
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
## 2020-07-31 PROBLEM — S61.459A DOG BITE OF HAND: Status: RESOLVED | Noted: 2018-05-20 | Resolved: 2020-07-31

## 2020-07-31 LAB
CHOLEST SERPL-MCNC: 146 MG/DL
HDLC SERPL-MCNC: 74 MG/DL
LDLC SERPL CALC-MCNC: 58 MG/DL
NONHDLC SERPL-MCNC: 72 MG/DL
TRIGL SERPL-MCNC: 69 MG/DL
TSH SERPL DL<=0.005 MIU/L-ACNC: 0.98 MU/L (ref 0.4–4)
VIT B12 SERPL-MCNC: 2016 PG/ML (ref 193–986)

## 2020-07-31 PROCEDURE — 84443 ASSAY THYROID STIM HORMONE: CPT | Performed by: FAMILY MEDICINE

## 2020-07-31 PROCEDURE — 36415 COLL VENOUS BLD VENIPUNCTURE: CPT | Performed by: FAMILY MEDICINE

## 2020-07-31 PROCEDURE — 99214 OFFICE O/P EST MOD 30 MIN: CPT | Performed by: FAMILY MEDICINE

## 2020-07-31 PROCEDURE — 80061 LIPID PANEL: CPT | Performed by: FAMILY MEDICINE

## 2020-07-31 PROCEDURE — 82607 VITAMIN B-12: CPT | Performed by: FAMILY MEDICINE

## 2020-07-31 RX ORDER — FLUOXETINE 40 MG/1
40 CAPSULE ORAL DAILY
Qty: 90 CAPSULE | Refills: 3 | Status: SHIPPED | OUTPATIENT
Start: 2020-07-31 | End: 2021-02-03

## 2020-07-31 RX ORDER — SIMVASTATIN 20 MG
20 TABLET ORAL AT BEDTIME
Qty: 90 TABLET | Refills: 3 | Status: SHIPPED | OUTPATIENT
Start: 2020-07-31 | End: 2021-01-04

## 2020-07-31 ASSESSMENT — ANXIETY QUESTIONNAIRES
IF YOU CHECKED OFF ANY PROBLEMS ON THIS QUESTIONNAIRE, HOW DIFFICULT HAVE THESE PROBLEMS MADE IT FOR YOU TO DO YOUR WORK, TAKE CARE OF THINGS AT HOME, OR GET ALONG WITH OTHER PEOPLE: SOMEWHAT DIFFICULT
5. BEING SO RESTLESS THAT IT IS HARD TO SIT STILL: MORE THAN HALF THE DAYS
7. FEELING AFRAID AS IF SOMETHING AWFUL MIGHT HAPPEN: MORE THAN HALF THE DAYS
1. FEELING NERVOUS, ANXIOUS, OR ON EDGE: NEARLY EVERY DAY
2. NOT BEING ABLE TO STOP OR CONTROL WORRYING: NEARLY EVERY DAY
6. BECOMING EASILY ANNOYED OR IRRITABLE: MORE THAN HALF THE DAYS
GAD7 TOTAL SCORE: 18
3. WORRYING TOO MUCH ABOUT DIFFERENT THINGS: NEARLY EVERY DAY

## 2020-07-31 ASSESSMENT — PATIENT HEALTH QUESTIONNAIRE - PHQ9
SUM OF ALL RESPONSES TO PHQ QUESTIONS 1-9: 14
5. POOR APPETITE OR OVEREATING: NEARLY EVERY DAY

## 2020-07-31 ASSESSMENT — MIFFLIN-ST. JEOR: SCORE: 1028.96

## 2020-07-31 NOTE — PROGRESS NOTES
Subjective     Yaritza Bradley is a 64 year old female who presents to clinic today for the following health issues:    HPI       Depression and Anxiety Follow-Up    How are you doing with your depression since your last visit? No change    How are you doing with your anxiety since your last visit?  No change    Are you having other symptoms that might be associated with depression or anxiety? No    Have you had a significant life event? Health Concerns     Do you have any concerns with your use of alcohol or other drugs? No    Social History     Tobacco Use     Smoking status: Never Smoker     Smokeless tobacco: Never Used   Substance Use Topics     Alcohol use: Yes     Comment: 1 wine a month hardly     Drug use: No     PHQ 7/16/2018 9/27/2019 7/31/2020   PHQ-9 Total Score 13 8 14   Q9: Thoughts of better off dead/self-harm past 2 weeks Not at all Not at all Several days     SHEA-7 SCORE 7/16/2018 9/27/2019 7/31/2020   Total Score - - -   Total Score 9 9 18     Feels her depression is not well controlled, but mostly because she is so worried about her general health (memory  Loss, gait disturbance, tremor, etc).   She is anxious about what is going on with her health.      Suicide Assessment Five-step Evaluation and Treatment (SAFE-T)      How many servings of fruits and vegetables do you eat daily?  0-1    On average, how many sweetened beverages do you drink each day (Examples: soda, juice, sweet tea, etc.  Do NOT count diet or artificially sweetened beverages)?   1    How many days per week do you exercise enough to make your heart beat faster? 3 or less    How many minutes a day do you exercise enough to make your heart beat faster? 9 or less    How many days per week do you miss taking your medication? 0    Follow up from seeing Dr. Cedillo for Gait difficulty and memory loss.  She is seeing PT, has a referral for neuropsych.    Usual weight is 120    Wt Readings from Last 5 Encounters:   07/31/20 49.9 kg (110  "lb)   07/08/20 50.8 kg (112 lb)   09/27/19 53.4 kg (117 lb 12.8 oz)   04/10/19 55 kg (121 lb 4.8 oz)   12/12/18 57.7 kg (127 lb 3.2 oz)   eating normally but  feels it's \"less\" than her baseline.  She has not kept a food diary or counted calories.  Feels her appetite is \"normal\".   No significant report of night sweats.    Labs reviewed: renal function, liver function, CBC all normal in May.   Hasn't had thyroid checked.         Reviewed and updated as needed this visit by Provider         Review of Systems   Constitutional, HEENT, cardiovascular, pulmonary, gi and gu systems are negative, except as otherwise noted.      Objective    /60 (BP Location: Left arm, Patient Position: Chair, Cuff Size: Adult Regular)   Pulse 83   Temp 97.8  F (36.6  C) (Tympanic)   Resp 16   Ht 1.626 m (5' 4\")   Wt 49.9 kg (110 lb)   LMP  (LMP Unknown)   SpO2 98%   BMI 18.88 kg/m    Body mass index is 18.88 kg/m .  Physical Exam   GENERAL: healthy, alert and no distress  NECK: no adenopathy, no asymmetry, masses, or scars and thyroid normal to palpation  RESP: lungs clear to auscultation - no rales, rhonchi or wheezes  CV: regular rate and rhythm, normal S1 S2, no S3 or S4, no murmur, click or rub, no peripheral edema and peripheral pulses strong  ABDOMEN: soft, nontender, no hepatosplenomegaly, no masses and bowel sounds normal  MS: no gross musculoskeletal defects noted, no edema  NEURO: Normal strength and tone, mentation intact and speech normal    Diagnostic Test Results:  Labs reviewed in Epic        Assessment & Plan     1. Hyperlipidemia, unspecified hyperlipidemia type     - Lipid panel reflex to direct LDL Fasting  - simvastatin (ZOCOR) 20 MG tablet; Take 1 tablet (20 mg) by mouth At Bedtime  Dispense: 90 tablet; Refill: 3    2. Moderate recurrent major depression (H)   we agreed to wait until she has the neuropsych testing completed.   - FLUoxetine (PROZAC) 40 MG capsule; Take 1 capsule (40 mg) by mouth " daily To take with 20 mg for total of 60 mg daily  Dispense: 90 capsule; Refill: 3  - FLUoxetine (PROZAC) 20 MG capsule; Take 1 capsule (20 mg) by mouth daily To take with 40 mg daily for a total of 60 mg daily  Dispense: 90 capsule; Refill: 3    3. Unintentional weight loss     - TSH with free T4 reflex  - NUTRITION REFERRAL    4. Encounter for screening mammogram for breast cancer     - *MA Screening Digital Bilateral; Future    5. Memory loss or impairment   has order for neuropsych, seeing neurologist.   - Vitamin B12    6. Vitamin B12 deficiency (non anemic)   taking oral b12  - Vitamin B12     Depression Screening Follow Up    PHQ 7/31/2020   PHQ-9 Total Score 14   Q9: Thoughts of better off dead/self-harm past 2 weeks Several days            No follow-ups on file.    Aziza Aviles MD  [facility]

## 2020-07-31 NOTE — PATIENT INSTRUCTIONS
Thank you for choosing [facility].  You may be receiving an email and/or telephone survey request from [facility] Customer Experience regarding your visit today.  Please take a few minutes to respond to the survey to let us know how we are doing.      If you have questions or concerns, please contact us via [employer] or you can contact your care team at [phone].    Our Clinic hours are:  Monday 6:40 am  to 7:00 pm  Tuesday -Friday 6:40 am to 5:00 pm    The [place] outpatient lab hours are:  Monday - Friday 6:10 am to 4:45 pm  Saturdays 7:00 am to 11:00 am  Appointments are required, call [phone]    If you have clinical questions after hours or would like to schedule an appointment,  call the clinic at [phone].

## 2020-08-01 ASSESSMENT — ANXIETY QUESTIONNAIRES: GAD7 TOTAL SCORE: 18

## 2020-08-05 ENCOUNTER — HOSPITAL ENCOUNTER (OUTPATIENT)
Dept: NUTRITION | Facility: CLINIC | Age: 65
Discharge: HOME | End: 2020-08-05
Attending: FAMILY MEDICINE | Admitting: FAMILY MEDICINE
Payer: COMMERCIAL

## 2020-08-05 PROCEDURE — 97802 MEDICAL NUTRITION INDIV IN: CPT | Mod: TEL | Performed by: DIETITIAN, REGISTERED

## 2020-08-05 NOTE — PROGRESS NOTES
"[facility]  Medical Nutrition Therapy    Visit Type: Initial Assessment    Yaritza Bradley referred by Aziza Aviles MD for MNT related to Unintentional weight loss    Patient accompanied by , Remi.    Yaritza Bradley is a 64 year old female who is being evaluated via a billable telephone visit.      The patient has been notified of following:     \"This telephone visit will be conducted via a call between you and your physician/provider. We have found that certain health care needs can be provided without the need for a physical exam.  This service lets us provide the care you need with a short phone conversation.  If a prescription is necessary we can send it directly to your pharmacy.  If lab work is needed we can place an order for that and you can then stop by our lab to have the test done at a later time.    Telephone visits are billed at different rates depending on your insurance coverage. During this emergency period, for some insurers they may be billed the same as an in-person visit.  Please reach out to your insurance provider with any questions.    If during the course of the call the physician/provider feels a telephone visit is not appropriate, you will not be charged for this service.\"    Patient has given verbal consent for Telephone visit?  Yes    What phone number would you like to be contacted at? [phone]    How would you like to obtain your AVS? Mail a copy    Phone call duration: 60 minutes    Keslea Martinez RD      Nutrition Assessment:  Anthropometrics  Height: 5' 4\"    BMI:  18.9  (healthy range)      Weight: 50 kg/110 lb    BSA: 1.5m2      IBW (kg): 55 kg/120 lb           Wt Readings from Last 10 Encounters:   07/31/20 49.9 kg (110 lb)   07/08/20 50.8 kg (112 lb)   09/27/19 53.4 kg (117 lb 12.8 oz)   04/10/19 55 kg (121 lb 4.8 oz)   12/12/18 57.7 kg (127 lb 3.2 oz)   11/26/18 54.4 kg (120 lb)   11/01/18 56.2 kg (124 lb)   07/18/18 55.3 kg (122 lb)   07/16/18 55.3 " "kg (122 lb)   06/18/18 55.8 kg (123 lb)   -Pt has lost 11 lb over the past 1 year, which was unintentional.   -Usual body weight reported as 120 lb. Pt would like to get back up to this wt.     Nutrition History  -Pt seeking help with gaining wt. She has unintentionally lost 12 lb over the past few years and would like to gain 10 lb back to get to 120 lb, which is where she is most comfortable.  -Pt lives with her  and he does all the cooking. He is a \"great cook\" per pt and he does a lot of research on healthy eating and tries to prepare healthy meals for himself and pt. Since the pandemic started, and restaurants closed, they have been eating all meals at home, which is less than they were doing before. They used to eat out several times per week, but hey always split an entree because \"the portion sizes are so large.\" They have also been eating less food at each meal,  reports. He believes that pt has had smaller portion sizes than she used to, and pt agrees with this.   -Pt has lactose intolerance. She is able to eat butter and milk as an ingredient in foods if it's not the first ingredient though.  purchases many lactose free foods for her such as Silk almond milk, Silk yogurt, Daiya cheese, and makes his own whipped cream with coconut milk. They even make their own ice cream using [place] Soy coffee creamer.  Pt also notes an intolerance to corn, tomatoes, and chocolate, although she can eat these foods in small amounts.     Physical Activity  -Pt and  walk their new puppy for 1 mile once or twice per day    Nutrition Prescription: Using current BW of 50 kg  Energy: 0888-1477 kcal/day  (30-35 kcal/kg for weight gain)    Protein: 60-75 g/day  (1.2-1.5 g/kg for preservation of LBM and weight gain) Fluid: 2664-0922 mL/day  (1 mL/kcal)         Fiber: 21 g/day               Food Record  B: 7-8 am: coffee, 1 slice of WG toast with peanut butter and honey/jelly  L: cold cut meat sandwich " on 1 slice of bread (usually corned beef or ham), 1 cup green salad (broccoli, cauliflower, nuts, celery, raisins, egg sometimes, homemade oil & stevenson dressing) OR 1/2 a fruit such as pear or apple  Snack: homemade ice cream sandwich (2 cookies and dairy free ice cream)  D: Pork chop or chicken breast or salmon with a green salad OR green salad with egg, carrots, fruit, cameron OR take-out pizza  Snack: applesauce  Fluids: Water. Pt reports that she has been trying to drink more water throughout the day and is up to 48 oz per day.   -Meals prepared with light butter  -Sometimes pt has a second ice cream sandwich, but this not often     Usual dietary intake appears inadequate in overall kcal and protein, adequate in veggies, low in fruit, adequate in dairy substitutes, and low in fluid     Nutrition Diagnosis:  PES: Unintended weight loss r/t inadequate oral intake aeb wt loss of 11 lb/9.1% over the past 1 year, BMI 18.9, and usual dietary intake meeting about 75% estimated needs.     Nutrition Intervention:  -Recommended pt to consume 6 small meals per day, vs three large ones  -Discussed ways to add calories and protein to the diet. For more protein, consume meats, soy, eggs, fish, full-fat dairy alternative products, beans, nuts and seeds, peanut butter, whey protein powder. For more calories, add butter, oils, mayonnaise, avocados, dairy-free cheese, sweetened dairy-free milk, dairy-free ice cream, gravy, and ONS such as Ensure or Boost- discussed the clear options, as pt believes she may be intolerant to an ingredient in the regular ones, even though they are lactose-free.   -Explained ways to add these high protein and high calorie foods to meals. Emphasized trying milk shakes or smoothies for a variety of flavors (add ONS to shakes as well)  -Suggested continuing with current physical activity to help stimulate appetite  -Recommended increasing water intake throughout the day  -Suggested using a calorie tracker  lasha such as My NeoAcc[employer] Pal.  wants to try this option.     Nutrition Goals:  1) Eat 3 meals and 3 snacks per day  2) Try an ONS such as Boost Breeze and aim to have one per day  3) Start using My Fitness Pal and aim to get about 1600 calories per day     Nutrition Follow Up / Monitoring:  Weight, PO intake, PA     Nutrition Recommendations:  Patient to follow-up with RD in 4 weeks. F/U has been scheduled.   Patient has RD contact information to call/email if needed.      Start Time: 10:00  End Time: 11:00      Kelsea Martinez RD, LD  Clinical Dietitian  [facility]: [phone]  [facility]: [phone]

## 2020-08-06 ENCOUNTER — HOSPITAL ENCOUNTER (OUTPATIENT)
Dept: MAMMOGRAPHY | Facility: CLINIC | Age: 65
Discharge: HOME | End: 2020-08-06
Attending: FAMILY MEDICINE | Admitting: FAMILY MEDICINE
Payer: COMMERCIAL

## 2020-08-06 DIAGNOSIS — Z12.31 ENCOUNTER FOR SCREENING MAMMOGRAM FOR BREAST CANCER: ICD-10-CM

## 2020-08-06 PROCEDURE — 77067 SCR MAMMO BI INCL CAD: CPT

## 2020-08-07 ENCOUNTER — HOSPITAL ENCOUNTER (OUTPATIENT)
Dept: PHYSICAL THERAPY | Facility: CLINIC | Age: 65
Setting detail: THERAPIES SERIES
End: 2020-08-07
Attending: PSYCHIATRY & NEUROLOGY
Payer: COMMERCIAL

## 2020-08-07 PROCEDURE — 97112 NEUROMUSCULAR REEDUCATION: CPT | Mod: GP | Performed by: PHYSICAL THERAPIST

## 2020-08-17 ENCOUNTER — MYC MEDICAL ADVICE (OUTPATIENT)
Dept: FAMILY MEDICINE | Facility: CLINIC | Age: 65
End: 2020-08-17

## 2020-08-20 DIAGNOSIS — E78.5 HYPERLIPIDEMIA, UNSPECIFIED HYPERLIPIDEMIA TYPE: ICD-10-CM

## 2020-08-20 RX ORDER — SIMVASTATIN 20 MG
TABLET ORAL
Qty: 30 TABLET | Refills: 0 | OUTPATIENT
Start: 2020-08-20

## 2020-08-20 NOTE — TELEPHONE ENCOUNTER
"Requested Prescriptions   Pending Prescriptions Disp Refills     simvastatin (ZOCOR) 20 MG tablet [Pharmacy Med Name: SIMVASTATIN  TAB 20MG] 30 tablet 0     Sig: TAKE 1 TABLET AT BEDTIME       Statins Protocol Passed - 8/20/2020  4:40 AM        Passed - LDL on file in past 12 months     Recent Labs   Lab Test 07/31/20  0922   LDL 58             Passed - No abnormal creatine kinase in past 12 months     No lab results found.             Passed - Recent (12 mo) or future (30 days) visit within the authorizing provider's specialty     Patient has had an office visit with the authorizing provider or a provider within the authorizing providers department within the previous 12 mos or has a future within next 30 days. See \"Patient Info\" tab in inbasket, or \"Choose Columns\" in Meds & Orders section of the refill encounter.              Passed - Medication is active on med list        Passed - Patient is age 18 or older        Passed - No active pregnancy on record        Passed - No positive pregnancy test in past 12 months             "

## 2020-08-21 DIAGNOSIS — Z79.899 ENCOUNTER FOR LONG-TERM (CURRENT) USE OF OTHER MEDICATIONS: ICD-10-CM

## 2020-08-21 LAB
ALBUMIN SERPL-MCNC: 3.5 G/DL (ref 3.4–5)
ALT SERPL W P-5'-P-CCNC: 51 U/L (ref 0–50)
AST SERPL W P-5'-P-CCNC: 51 U/L (ref 0–45)
BASOPHILS # BLD AUTO: 0 10E9/L (ref 0–0.2)
BASOPHILS NFR BLD AUTO: 0.4 %
CREAT SERPL-MCNC: 0.8 MG/DL (ref 0.52–1.04)
DIFFERENTIAL METHOD BLD: NORMAL
EOSINOPHIL # BLD AUTO: 0 10E9/L (ref 0–0.7)
EOSINOPHIL NFR BLD AUTO: 0.4 %
ERYTHROCYTE [DISTWIDTH] IN BLOOD BY AUTOMATED COUNT: 13.1 % (ref 10–15)
GFR SERPL CREATININE-BSD FRML MDRD: 77 ML/MIN/{1.73_M2}
HCT VFR BLD AUTO: 39.7 % (ref 35–47)
HGB BLD-MCNC: 13.8 G/DL (ref 11.7–15.7)
LYMPHOCYTES # BLD AUTO: 1.6 10E9/L (ref 0.8–5.3)
LYMPHOCYTES NFR BLD AUTO: 19.9 %
MCH RBC QN AUTO: 30.5 PG (ref 26.5–33)
MCHC RBC AUTO-ENTMCNC: 34.8 G/DL (ref 31.5–36.5)
MCV RBC AUTO: 88 FL (ref 78–100)
MONOCYTES # BLD AUTO: 0.8 10E9/L (ref 0–1.3)
MONOCYTES NFR BLD AUTO: 10.3 %
NEUTROPHILS # BLD AUTO: 5.5 10E9/L (ref 1.6–8.3)
NEUTROPHILS NFR BLD AUTO: 69 %
PLATELET # BLD AUTO: 230 10E9/L (ref 150–450)
RBC # BLD AUTO: 4.52 10E12/L (ref 3.8–5.2)
WBC # BLD AUTO: 7.9 10E9/L (ref 4–11)

## 2020-08-21 PROCEDURE — 82565 ASSAY OF CREATININE: CPT | Performed by: INTERNAL MEDICINE

## 2020-08-21 PROCEDURE — 84460 ALANINE AMINO (ALT) (SGPT): CPT | Performed by: INTERNAL MEDICINE

## 2020-08-21 PROCEDURE — 36415 COLL VENOUS BLD VENIPUNCTURE: CPT | Performed by: INTERNAL MEDICINE

## 2020-08-21 PROCEDURE — 85025 COMPLETE CBC W/AUTO DIFF WBC: CPT | Performed by: INTERNAL MEDICINE

## 2020-08-21 PROCEDURE — 84450 TRANSFERASE (AST) (SGOT): CPT | Performed by: INTERNAL MEDICINE

## 2020-08-21 PROCEDURE — 82040 ASSAY OF SERUM ALBUMIN: CPT | Performed by: INTERNAL MEDICINE

## 2020-08-31 ENCOUNTER — OFFICE VISIT (OUTPATIENT)
Dept: FAMILY MEDICINE | Facility: CLINIC | Age: 65
End: 2020-08-31
Payer: COMMERCIAL

## 2020-08-31 VITALS
BODY MASS INDEX: 18.85 KG/M2 | DIASTOLIC BLOOD PRESSURE: 64 MMHG | TEMPERATURE: 99.2 F | SYSTOLIC BLOOD PRESSURE: 124 MMHG | HEART RATE: 100 BPM | OXYGEN SATURATION: 97 % | HEIGHT: 64 IN | WEIGHT: 110.4 LBS | RESPIRATION RATE: 16 BRPM

## 2020-08-31 DIAGNOSIS — F33.1 MODERATE RECURRENT MAJOR DEPRESSION (H): Chronic | ICD-10-CM

## 2020-08-31 DIAGNOSIS — R63.4 UNINTENTIONAL WEIGHT LOSS: ICD-10-CM

## 2020-08-31 DIAGNOSIS — R41.3 MEMORY LOSS OR IMPAIRMENT: ICD-10-CM

## 2020-08-31 DIAGNOSIS — R26.89 BALANCE PROBLEMS: ICD-10-CM

## 2020-08-31 DIAGNOSIS — R06.09 DOE (DYSPNEA ON EXERTION): Primary | ICD-10-CM

## 2020-08-31 DIAGNOSIS — Z82.49 FAMILY HISTORY OF ISCHEMIC HEART DISEASE: ICD-10-CM

## 2020-08-31 PROCEDURE — 99214 OFFICE O/P EST MOD 30 MIN: CPT | Performed by: FAMILY MEDICINE

## 2020-08-31 ASSESSMENT — MIFFLIN-ST. JEOR: SCORE: 1030.77

## 2020-08-31 NOTE — PROGRESS NOTES
SUBJECTIVE:                                                    Yaritza Bradley is 64 year old female   Chief Complaint   Patient presents with     Patient Request     pt. is here today for a follow up after tests, go over tests and what is the next plan? pt. has had mammogram, colonoscopy etc.     Had a visit 1 month ago.  Patient understood that she was to come back after testing (in which case I was meaning her neuropsych testing).     Significant fatigue- hard to get off the couch and do anything.     With handwriting, seems to start out okay and then get sloppier and by the end of what she is writing it seems less clear.    Talked with the nutritionist on the phone, got some high protein recommendations. Started Boost, increased fiber, etc.   They speak with her again on Tuesday.   Lowest weight was 108, highest she's seen is 112.    Has noticed speech difficulties, slurred at times, forgetting words at times. Weaving and taking tentative steps. Feels like balance is off - again, did start PT for strengthening and balance.  Started PT at  with Sima Morillo.     Wondering about heart testing.  She thought I had at one time recommended cardiac testing, I did not recall this.  I reviewed her chart and just over two years ago she was referred to cardiologist and saw Dr. London who recommended lexiscan and echocardiogram given her SAINI and family history of premature CAD.    Problem list and histories reviewed & adjusted, as indicated.  Additional history: as documented    Patient Active Problem List   Diagnosis     Rheumatoid arthritis (H)     Disorder of bone and cartilage     Hyperlipidemia LDL goal <160     GERD (gastroesophageal reflux disease)     Generalized anxiety disorder     Moderate recurrent major depression (H)     Memory loss or impairment     Femoral acetabular impingement, right     Primary osteoarthritis of right hip, end stage DJD     Cellulitis     Past Surgical History:   Procedure Laterality  Date     COLONOSCOPY N/A 10/14/2016    Procedure: COLONOSCOPY;  Surgeon: Gerson Douglas MD;  Location: [facility]     COLONOSCOPY N/A 7/8/2020    Procedure: COLONOSCOPY, WITH POLYPECTOMY AND BIOPSY;  Surgeon: Jerry Cleveland DO;  Location: [facility]     ESOPHAGOSCOPY, GASTROSCOPY, DUODENOSCOPY (EGD), COMBINED  8/19/2011    Procedure:COMBINED ESOPHAGOSCOPY, GASTROSCOPY, DUODENOSCOPY (EGD), BIOPSY SINGLE OR MULTIPLE; Gastroscopy with biopsy; Surgeon:FARZAD GARCIA; Location:[facility]     ESOPHAGOSCOPY, GASTROSCOPY, DUODENOSCOPY (EGD), COMBINED  7/28/2014    Procedure: COMBINED ESOPHAGOSCOPY, GASTROSCOPY, DUODENOSCOPY (EGD), BIOPSY SINGLE OR MULTIPLE;  Surgeon: Marshall Martinez MD;  Location: [facility]     ESOPHAGOSCOPY, GASTROSCOPY, DUODENOSCOPY (EGD), COMBINED N/A 7/18/2018    Procedure: COMBINED ESOPHAGOSCOPY, GASTROSCOPY, DUODENOSCOPY (EGD), BIOPSY SINGLE OR MULTIPLE;  gastroscopy with biopsies;  Surgeon: Jorge Schofield MD;  Location: [facility]     HC DILATION/CURETTAGE DIAG/THER NON OB  2001    for uterine fibroids     OPEN REDUCTION INTERNAL FIXATION ELBOW Right 1/22/2016    right THR     ROTATOR CUFF REPAIR RT/LT  2009    left       Social History     Tobacco Use     Smoking status: Never Smoker     Smokeless tobacco: Never Used   Substance Use Topics     Alcohol use: Yes     Comment: 1 wine a month hardly     Family History   Problem Relation Age of Onset     C.A.D. Father 54     C.A.D. Paternal Grandfather      C.A.D. Brother      Alcohol/Drug Brother      Alzheimer Disease Maternal Grandmother      Alzheimer Disease Maternal Grandfather      Alzheimer Disease Paternal Grandmother      Colon Cancer Mother          Current Outpatient Medications   Medication Sig Dispense Refill     Cyanocobalamin (B-12) 1000 MCG TBCR Take 1,000 mcg by mouth daily 100 tablet 1     FLUoxetine (PROZAC) 20 MG capsule Take 1 capsule (20 mg) by mouth daily To take with 40 mg daily for a total of 60 mg daily 90 capsule 3  "    FLUoxetine (PROZAC) 40 MG capsule Take 1 capsule (40 mg) by mouth daily To take with 20 mg for total of 60 mg daily 90 capsule 3     FOLIC ACID 1 MG PO TABS 1 TABLET DAILY       HUMIRA PEN 40 MG/0.8ML injection Inject 40 mg Subcutaneous every 14 days        Ibandronate Sodium (BONIVA IV) Inject into the vein every 3 months       methotrexate 50 MG/2ML injection CHEMO Inject 4 mg Subcutaneous every 7 days        Omega-3 Fatty Acids (OMEGA-3 FISH OIL PO) Take 1 g by mouth daily (DRY EYE OMEGA BENEFITS) 667-250 mg-unit cap       RESTASIS 0.05 % ophthalmic emulsion Place 1 drop into both eyes 2 times daily       simvastatin (ZOCOR) 20 MG tablet Take 1 tablet (20 mg) by mouth At Bedtime 90 tablet 3     SM CALCIUM SOFT CHEWS 500-100-40 OR CHEW CHEW 2-3 ORALLY DAILY  0     TURMERIC PO Take 1,000 mg by mouth daily        B-D INSULIN SYRINGE 25G X 5/8\" 1 ML MISC TO BE USED IN WEEKLY METHOTREXATE INJECTIONS  1     diclofenac (VOLTAREN) 1 % GEL topical gel Place onto the skin 4 times daily       Allergies   Allergen Reactions     Nka [No Known Allergies]      Recent Labs   Lab Test 08/21/20  0917 07/31/20  0922 05/28/20  0857 02/03/20  1117  12/12/18  1021  05/21/18  0603 05/20/18  0541  10/11/16  0928  05/27/14  1501   LDL  --  58  --   --   --  97  --   --   --   --  86  --   --    HDL  --  74  --   --   --  72  --   --   --   --  68  --   --    TRIG  --  69  --   --   --  129  --   --   --   --  77  --   --    ALT 51*  --  37 32   < >  --    < > 20  --    < >  --    < > 45   CR 0.80  --  0.72 0.70   < >  --    < > 0.56 0.63   < >  --    < > 0.68   GFRESTIMATED 77  --  88 >90   < >  --    < > >90 >90   < >  --    < > 89   GFRESTBLACK 90  --  >90 >90   < >  --    < > >90 >90   < >  --    < > >90   POTASSIUM  --   --   --   --   --   --   --  4.1 3.8  --   --    < > 3.8   TSH  --  0.98  --   --   --   --   --   --   --   --   --   --  1.60    < > = values in this interval not displayed.      BP Readings from Last 3 " "Encounters:   08/31/20 124/64   07/31/20 110/60   07/21/20 125/77    Wt Readings from Last 3 Encounters:   08/31/20 50.1 kg (110 lb 6.4 oz)   07/31/20 49.9 kg (110 lb)   07/08/20 50.8 kg (112 lb)         ROS:  Constitutional, HEENT, cardiovascular, pulmonary, gi and gu systems are negative, except as otherwise noted.        OBJECTIVE:                                                    /64   Pulse 100   Temp 99.2  F (37.3  C) (Tympanic)   Resp 16   Ht 1.626 m (5' 4\")   Wt 50.1 kg (110 lb 6.4 oz)   LMP  (LMP Unknown)   SpO2 97%   BMI 18.95 kg/m    GENERAL APPEARANCE ADULT: Alert, no acute distress  NECK: No adenopathy,masses or thyromegaly  RESP: lungs clear to auscultation   CV: normal rate, regular rhythm, no murmur or gallop  ABDOMEN: soft, no organomegaly, masses or tenderness  NEURO: somewhat forgetful, however seems pretty linear and goal directed in her speech.  At times will search for words.   PSYCH: anxious  Diagnostic Test Results:  none      ASSESSMENT/PLAN:                                                    1. SAINI (dyspnea on exertion)   r/o cardiac abnormalities  Given h/o RA and her family history, it's reasonable to make sure there are no structural abnormalities or significant ischemia  - NM Lexiscan stress test; Future  - Echocardiogram Complete; Future    2. Family history of ischemic heart disease     - NM Lexiscan stress test; Future    3. Moderate recurrent major depression (H)  Continue fluoxetin    4. Memory loss or impairment  Has f/u neuorpsych testing in November, this was the soonest she could get in    5. Balance problems   working with PT    6. Unintentional weight loss  Working with nutritionist  Suspect caloric deficit  Doing boost and working on higher protein      Aziza Aviles MD  [facility]      "

## 2020-09-02 ENCOUNTER — HOSPITAL ENCOUNTER (OUTPATIENT)
Dept: NUTRITION | Facility: CLINIC | Age: 65
Discharge: HOME | End: 2020-09-02
Attending: FAMILY MEDICINE | Admitting: FAMILY MEDICINE
Payer: COMMERCIAL

## 2020-09-02 PROCEDURE — 97803 MED NUTRITION INDIV SUBSEQ: CPT | Mod: TEL | Performed by: DIETITIAN, REGISTERED

## 2020-09-02 NOTE — PROGRESS NOTES
"[facility]  Medical Nutrition Therapy    Visit Type: Reassessment    Yaritza Bradley referred by Aziza Aviles MD for MNT related to Unintentional weight loss     Patient accompanied by , Remi.    Yaritza Bradley is a 64 year old female who is being evaluated via a billable telephone visit.      The patient has been notified of following:     \"This telephone visit will be conducted via a call between you and your physician/provider. We have found that certain health care needs can be provided without the need for a physical exam.  This service lets us provide the care you need with a short phone conversation.  If a prescription is necessary we can send it directly to your pharmacy.  If lab work is needed we can place an order for that and you can then stop by our lab to have the test done at a later time.    Telephone visits are billed at different rates depending on your insurance coverage. During this emergency period, for some insurers they may be billed the same as an in-person visit.  Please reach out to your insurance provider with any questions.    If during the course of the call the physician/provider feels a telephone visit is not appropriate, you will not be charged for this service.\"    Patient has given verbal consent for Telephone visit?  Yes    What phone number would you like to be contacted at? [phone]    How would you like to obtain your AVS? Mail a copy    Phone call duration: 42 minutes    Kelsea Martinez RD      Nutrition Assessment:  Anthropometrics  Height: 5' 4\"    BMI:  18.9  (healthy range)      Weight: 50 kg/110 lb    BSA: 1.5m2      IBW (kg): 55 kg/120 lb           Wt Readings from Last 10 Encounters:   08/31/20 50.1 kg (110 lb 6.4 oz)   07/31/20 49.9 kg (110 lb)   07/08/20 50.8 kg (112 lb)   09/27/19 53.4 kg (117 lb 12.8 oz)   04/10/19 55 kg (121 lb 4.8 oz)   12/12/18 57.7 kg (127 lb 3.2 oz)   11/26/18 54.4 kg (120 lb)   11/01/18 56.2 kg (124 lb)   07/18/18 " 55.3 kg (122 lb)   18 55.3 kg (122 lb)   -Pt has maintained wt over the past 1 month  -Pt has been weighing herself daily at home and reports that her wt fluctuates between 108-112 lb each day     Nutrition History  Last visit w/pt was on  and nutrition goals set at that time were the followin) Eat 3 meals and 3 snacks per day  -Met  2) Try an ONS such as Boost Breeze and aim to have one per day  Met. Pt tried several ONS and disliked many of them. She will drink the peach flavored Breeze but still doesn't care for it. Has been drinking 1 per day usually but forgets to drink them some days.   3) Start using My Fitness Pal and aim to get about 1600 calories per day   Met.  has been tracking her intake almost every day over the past 1 month. He shared her reports with me and pt has been averaging about 0683-1639 kcal per day, which meets 63-81% of her needs)     -Pt reports that she has been forgetting her snacks between meals on occasion, but her  is good about reminding her when they are together  -She feels early satiety at meal times and struggles to increase her serving sizes    Physical Activity  -None. Pt stopped going with her  on their daily walks because she doesn't want to get tangled in the dog leash. She reports being very sedentary over the past 1 month.     Nutrition Prescription: Using current BW of 50 kg  Energy: 5545-6970 kcal/day  (30-35 kcal/kg for weight gain)    Protein: 60-75 g/day  (1.2-1.5 g/kg for preservation of LBM and weight gain) Fluid: 6922-0847 mL/day  (1 mL/kcal)         Fiber: 21 g/day                     Food Record  B: 7-8 am: coffee, 1 slice of WG toast with peanut butter and honey/jelly  Snack: Boost Breeze 4-5 times per week, Kind bar, cashews (handful) or yogurt  L: cold cut meat sandwich on 1 slice of bread (usually corned beef or ham) OR egg salad sandwich, 1 cup green salad (broccoli, cauliflower, nuts, celery, raisins, egg sometimes,  homemade oil & stevenson dressing) and 1/2 a fruit such as pear or apple OR chips  Snack: homemade ice cream sandwich (2 cookies and dairy free ice cream)  D: Pork chop or chicken breast or salmon (or skips the protein source) with a green salad OR green salad with egg, carrots, fruit, cameron OR take-out pizza  Snack: applesauce  Fluids: Water. Hasn't been tracking this.     Usual dietary intake is inadequate in overall kcal and protein (some days) and adequate in fruits and veggies, adequate in dairy alternatives, adequate in fluids  -Not much has changed since our last visit        Nutrition Diagnosis:  PES: Unintended weight loss r/t inadequate oral intake aeb wt loss of 11 lb/9.1% over the past 1 year, BMI 18.9, and usual dietary intake meeting 63-81% estimated needs.     Nutrition Intervention:  Discussed results of food intake logs from My Fitness Pal. Described how her protein intake has only been making up 8-12% of her caloric intake. Suggested striving to get closer to 20%. Also described how she hasn't been meeting her calorie goal of 1600 kcal per day.   -Suggested increasing the calories at each meal and snack to increase total kcal per day by about 300-500, which would get her to her calorie goal and wt gain goal of 0.5 lb per week.   -At breakfast try switching toast to a WG bagel OR double the amount of PB used   -Have 4 oz Boost Breeze with mid-morning snack  -Make mid-morning snack either yogurt and fruit, cashews and rasins, or a Kind bar  -Add nuts to mid-afternoon ice cream snack  -Always have a protein source with dinner (3 oz or 1/2 cup). Try lentils.  -Have 4 oz Boost Breeze with bedtime snack    -Recommended returning to her previous physical activity by going for a 1 mile walk each day. Described how this is important for appetite, muscle maintenance, and overall health.  -Provided ideas on how to serve ONS: shakes and smoothies, popsicles. Suggested different flavor ideas.     Nutrition  Goals:  1) 1600 kcal per day with 20% kcal from protein  2) Wt gain of 0.5 lb per week  3) Add small amounts of calories to each meal and snack:  -At breakfast try switching toast to a WG bagel OR double the amount of PB used   -Have 4 oz Boost Breeze with mid-morning snack  -Make mid-morning snack either yogurt and fruit, cashews and rasins, or a Kind bar  -Add nuts to mid-afternoon ice cream snack  -Always have a protein source with dinner (3 oz or 1/2 cup). Try lentils.  -Have 4 oz Boost Breeze with bedtime snack      Nutrition Follow Up / Monitoring:   Weight, PO intake, PA    Nutrition Recommendations:       Patient to follow-up with RD in 4 weeks or as needed.  Patient has RD contact information to call/email if needed.      Start Time: 10:00  End Time: 10:42    Kelsea Martinez RD, VICENTE  Clinical Dietitian  [facility]: [phone]  [facility]: [phone]

## 2020-09-08 ENCOUNTER — HOSPITAL ENCOUNTER (OUTPATIENT)
Dept: PHYSICAL THERAPY | Facility: CLINIC | Age: 65
Setting detail: THERAPIES SERIES
End: 2020-09-08
Attending: PSYCHIATRY & NEUROLOGY
Payer: COMMERCIAL

## 2020-09-08 PROCEDURE — 97112 NEUROMUSCULAR REEDUCATION: CPT | Mod: GP | Performed by: PHYSICAL THERAPIST

## 2020-09-11 ENCOUNTER — HOSPITAL ENCOUNTER (OUTPATIENT)
Dept: CARDIOLOGY | Facility: CLINIC | Age: 65
Discharge: HOME | End: 2020-09-11
Attending: FAMILY MEDICINE | Admitting: FAMILY MEDICINE
Payer: COMMERCIAL

## 2020-09-11 DIAGNOSIS — R06.09 DOE (DYSPNEA ON EXERTION): ICD-10-CM

## 2020-09-11 PROCEDURE — 93306 TTE W/DOPPLER COMPLETE: CPT

## 2020-09-11 PROCEDURE — 93306 TTE W/DOPPLER COMPLETE: CPT | Mod: 26 | Performed by: INTERNAL MEDICINE

## 2020-09-15 ENCOUNTER — HOSPITAL ENCOUNTER (OUTPATIENT)
Dept: PHYSICAL THERAPY | Facility: CLINIC | Age: 65
Setting detail: THERAPIES SERIES
End: 2020-09-15
Attending: PSYCHIATRY & NEUROLOGY
Payer: COMMERCIAL

## 2020-09-15 PROCEDURE — 97112 NEUROMUSCULAR REEDUCATION: CPT | Mod: GP | Performed by: PHYSICAL THERAPIST

## 2020-09-16 ENCOUNTER — HOSPITAL ENCOUNTER (OUTPATIENT)
Dept: CARDIOLOGY | Facility: CLINIC | Age: 65
End: 2020-09-16
Attending: FAMILY MEDICINE
Payer: COMMERCIAL

## 2020-09-16 ENCOUNTER — HOSPITAL ENCOUNTER (OUTPATIENT)
Dept: NUCLEAR MEDICINE | Facility: CLINIC | Age: 65
Setting detail: NUCLEAR MEDICINE
End: 2020-09-16
Attending: FAMILY MEDICINE
Payer: COMMERCIAL

## 2020-09-16 VITALS — BODY MASS INDEX: 18.78 KG/M2 | HEIGHT: 64 IN | WEIGHT: 110 LBS

## 2020-09-16 DIAGNOSIS — R06.09 DOE (DYSPNEA ON EXERTION): ICD-10-CM

## 2020-09-16 DIAGNOSIS — Z82.49 FAMILY HISTORY OF ISCHEMIC HEART DISEASE: ICD-10-CM

## 2020-09-16 LAB
CV STRESS MAX HR HE: 155
NUC STRESS EJECTION FRACTION: 81 %
RATE PRESSURE PRODUCT: NORMAL
STRESS ECHO BASELINE DIASTOLIC HE: 78
STRESS ECHO BASELINE HR: 78
STRESS ECHO BASELINE SYSTOLIC BP: 140
STRESS ECHO CALCULATED PERCENT HR: 100 %
STRESS ECHO LAST STRESS DIASTOLIC BP: 60
STRESS ECHO LAST STRESS SYSTOLIC BP: 180
STRESS ECHO POST EXERCISE DUR MIN: 4 MIN
STRESS ECHO POST EXERCISE DUR SEC: 2 SEC
STRESS ECHO TARGET HR: 155

## 2020-09-16 PROCEDURE — 93018 CV STRESS TEST I&R ONLY: CPT | Performed by: INTERNAL MEDICINE

## 2020-09-16 PROCEDURE — 78452 HT MUSCLE IMAGE SPECT MULT: CPT

## 2020-09-16 PROCEDURE — 93017 CV STRESS TEST TRACING ONLY: CPT

## 2020-09-16 PROCEDURE — 93016 CV STRESS TEST SUPVJ ONLY: CPT | Performed by: INTERNAL MEDICINE

## 2020-09-16 PROCEDURE — 34300033 ZZH RX 343: Performed by: INTERNAL MEDICINE

## 2020-09-16 PROCEDURE — A9502 TC99M TETROFOSMIN: HCPCS | Performed by: INTERNAL MEDICINE

## 2020-09-16 PROCEDURE — 78452 HT MUSCLE IMAGE SPECT MULT: CPT | Mod: 26 | Performed by: INTERNAL MEDICINE

## 2020-09-16 RX ORDER — REGADENOSON 0.08 MG/ML
0.4 INJECTION, SOLUTION INTRAVENOUS ONCE
Status: DISCONTINUED | OUTPATIENT
Start: 2020-09-16 | End: 2020-09-16 | Stop reason: CLARIF

## 2020-09-16 RX ADMIN — TETROFOSMIN 24.6 MCI.: 1.38 INJECTION, POWDER, LYOPHILIZED, FOR SOLUTION INTRAVENOUS at 10:50

## 2020-09-16 RX ADMIN — TETROFOSMIN 7.9 MCI.: 1.38 INJECTION, POWDER, LYOPHILIZED, FOR SOLUTION INTRAVENOUS at 08:50

## 2020-09-16 ASSESSMENT — MIFFLIN-ST. JEOR: SCORE: 1028.96

## 2020-09-26 ENCOUNTER — IMMUNIZATION (OUTPATIENT)
Dept: FAMILY MEDICINE | Facility: CLINIC | Age: 65
End: 2020-09-26
Payer: COMMERCIAL

## 2020-09-26 PROCEDURE — 90662 IIV NO PRSV INCREASED AG IM: CPT

## 2020-09-26 PROCEDURE — 90471 IMMUNIZATION ADMIN: CPT

## 2020-09-29 ENCOUNTER — HOSPITAL ENCOUNTER (OUTPATIENT)
Dept: PHYSICAL THERAPY | Facility: CLINIC | Age: 65
Setting detail: THERAPIES SERIES
End: 2020-09-29
Attending: PSYCHIATRY & NEUROLOGY
Payer: COMMERCIAL

## 2020-09-29 PROCEDURE — 97112 NEUROMUSCULAR REEDUCATION: CPT | Mod: GP | Performed by: PHYSICAL THERAPIST

## 2020-10-20 ENCOUNTER — TRANSFERRED RECORDS (OUTPATIENT)
Dept: HEALTH INFORMATION MANAGEMENT | Facility: CLINIC | Age: 65
End: 2020-10-20

## 2020-10-20 LAB
ALT SERPL-CCNC: 38 IU/L (ref 5–35)
AST SERPL-CCNC: 49 U/L (ref 5–34)
CREAT SERPL-MCNC: 0.7 MG/DL (ref 0.5–1.3)

## 2020-11-02 ENCOUNTER — VIRTUAL VISIT (OUTPATIENT)
Dept: NEUROPSYCHOLOGY | Facility: CLINIC | Age: 65
End: 2020-11-02
Attending: PSYCHIATRY & NEUROLOGY
Payer: COMMERCIAL

## 2020-11-02 DIAGNOSIS — R41.3 MEMORY LOSS: Primary | ICD-10-CM

## 2020-11-02 PROCEDURE — 96138 PSYCL/NRPSYC TECH 1ST: CPT | Mod: GT

## 2020-11-02 PROCEDURE — 90791 PSYCH DIAGNOSTIC EVALUATION: CPT | Mod: GT

## 2020-11-02 PROCEDURE — 96132 NRPSYC TST EVAL PHYS/QHP 1ST: CPT | Mod: GT

## 2020-11-02 PROCEDURE — 96139 PSYCL/NRPSYC TST TECH EA: CPT | Mod: GT

## 2020-11-02 PROCEDURE — 96133 NRPSYC TST EVAL PHYS/QHP EA: CPT | Mod: GT

## 2020-11-02 NOTE — NURSING NOTE
Pt was seen for neuropsychological evaluation at the request of Dr. Carley Cedillo for the purposes of diagnostic clarification and treatment planning. 175 minutes of video test administration and scoring were provided by this writer. Please see Dr. Jes Jessica's report for a full interpretation of the findings.    Video Start: 1:14  Video Stop: 3:24    Jake Davidson  Psychometrist

## 2020-11-02 NOTE — PROGRESS NOTES
"Yaritza Bradley is a 64 year old female who is being evaluated via a billable video visit.      The patient has been notified of following:     \"This video visit will be conducted via a call between you and your provider. We have found that certain health care needs can be provided without the need for an in-person physical exam.  This service lets us provide the care you need with a video conversation and testing.  If additional testing is needed, that will be scheduled in the clinic at a future date.    Video visits are billed at different rates depending on your insurance coverage.  Please reach out to your insurance provider with any questions.    If during the course of the call the provider feels a video visit is not appropriate, you will not be charged for this service.\"    Patient has given verbal consent for Video visit? Yes  How would you like to obtain your AVS? MyChart  If you are dropped from the video visit, the video invite should be resent to: Send to e-mail at: [email or web address]  Will anyone else be joining your video visit? 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  Information was obtained via video interview with the patient and her , and review of her medical records.  Records indicate that Ms. Bradley has been followed periodically in Neurology for balance and memory problems over the last few years.  She was most recently seen in Neurology on 7/21/2020 with multiple concerns, in particular changes in memory and gait.  Her gait and tremor appeared to be relatively stable.  She was noted to appear mildly anxious and fidgety, and scored 21/30 on the MoCA.  She was referred for neuropsychological evaluation by Carely Cedillo M.D., for further characterization of any cognitive difficulties and to evaluate " "mood.    Ms. Bradley first underwent a neuropsychological evaluation under my direction on 12/12/2014.  Please refer to the report from that date for full details regarding her history and the findings of the evaluation.  Briefly, she had concerns about memory changes over the previous few months, as well as headaches and \"brain fog.\"  She was noted to have a history of depression and anxiety.  She endorsed suicidal ideation the previous year, when she thought about driving in front of a semi-, but thoughts about her mother, children, and family stopped her.  Results of her evaluation indicated a weakness in visual processing, including visual construction  and visual-spatial abilities.  Attention was mildly impaired.  There was slight variability in learning and memory, although overall learning and memory fell well within normal limits.  Language abilities also fell within normal limits.  Personality assessment was suggestive of significant depressed mood and anxiety, as well as marked somatization.    Due to COVID-19 related precautions, a limited evaluation was administered remotely, using video technology.    CLINICAL INTERVIEW FINDINGS    Upon interview, Ms. Bradley and her  noted that her symptoms began with a loss of balance, and dizziness.  Sometimes when she is walking she has to stop and sit and she feels dizzy or like she will pass out or be nauseous.  Her  has noted posture problems when she is walking.  This has been going on for years but seems to be getting worse and is now interfering with what she is able to do.  She is walking less, and can only walk on flat surfaces like of roadway.  She feels dizzy when she gets up and walk.  She has to hold onto something like a railing.  This has been hampering what she is willing and able to do more than it was when she was last seen in 2014.  She is more fearful about going for walks.     Over the same period of time, Ms. Bradley feels that her " cognition has become much worse.  She has a hard time recalling things and remembering what she has done recently.  Her reading and writing seem worse.  She can write a note, but if she writes a whole letter, the letters get mixed up and are not as legible.  Her writing is messier although the size is the same.  She forgets what others have said.  She forgets names of people with whom she has been familiar, such as people with whom she worked until retiring a few years ago.  She has not become lost in familiar places.  She has had greater difficulty with word finding.  Her attention and concentration have not been particularly good.  Her  noted that her attention to detail seems off, and she may do something like use a knife upside down, with a sharp edge up.  She is generally okay using the phone, but occasionally blanks out on which buttons to push.  She does not like to make decisions, stating that she tends to follow her  who is a planner and organizer, and that this is a change for her.    Ms. Bradley lives with her .  He has always managed their finances.  She manages her own medications, only occasionally forgetting to take them.  She has not been driving much.  She drives short distances to her doctors appointments, but does no long distance driving.  Most of the time, Evin together, her  will drive.  She has a lack of confidence that she will see the details such as stop signs and what other people are doing.  She is not as aware as she used to be.  She stated that she used to love to drive and go all over the place, and that she was more independent.  She does not cook anymore.  She had tried a recipe for bran muffins which was her mother's recipe, in which she had successfully baked before.  She was confused and the rest to be turned out as much.  She had difficulty with the measurements and combining them.  For the most part, she handles her personal cares independently.  She  is taking baths more than showers because she feels dizzy and disoriented.    Ms. Bradley has a history of depression and anxiety.  She has not worked with a psychologist in years.  She is not currently working with a psychiatrist.  She has not been hospitalized for psychiatric care.  When asked to describe her mood, she stated that it is crabby and that nothing seems to be of value.  She could sit and watch television all day long, and still not remember what the show was.  She watches a football game or baseball game and does not remember the score the next day.  She does still enjoy watching sports.  Her mood varies.  She stated that some days she sleeps a lot, perhaps 12 hours, gets up and watches television, and gets tired and lies down again.  She stated that she does not have the initiative to learn or do anything new, and that she does what is easy.  This happens most days.  She stated that she feels she is not a complete person.  She is not healthy enough to do certain things she has done in the past that she would like to do, and she leads a more sedentary life than she used to.  She used to bowl and play softball, and was much more active.  She has been doing scrapbooks more recently and just started making homemade cards, and she enjoys doing creative crafts like that.  She is also quite anxious.  She stated that she has always been a worrier, and that she worries about things before they even happen.  She has been quite anxious about this testing.  Her mother is turning 90 and she worries about COVID.  She is in a healthcare center and Ms. Bradley has not been able to see her very much because of the virus.  She is still in independent living and it is harder for Ms. Bradley to keep tabs on her now.  She worries about her own children, and tends to think about the worst case scenario for them.  As noted, she is sleeping around 12 hours a night.  She is trying to get up earlier in the morning after 8 or 10  hours.  She is also trying to get back out and do more walking with her dog and her .  She had been swimming but has not been able to do that since the pandemic started.  She noted that they are home bodies and that they do not want to get the virus and then spread it to her mother.  She has low motivation to do things.  Her  is always doing something when she has no energy or desire to do things.  Her appetite has been low.  She lost 15 pounds, to a weight of 100 pounds.  Her doctor had her meet with a nutritionist, who recommended 3 meals and something in between every meal.  She has found that she does not have the appetite for it.  She now weighs around 110 pounds.  She denied visual or auditory hallucinations.    Ms. Bradley endorsed ongoing suicidal ideation.  Specifically, she has thought about going out in the car and on a two [address] road, she would curve into the other [place]eya if there were an oncoming semi truck.  She would not want to hurt the other , but she thinks that a semi would be something big enough so that she would die.  She stated that she would not hang herself and she does not have guns, so she cannot shoot herself.  She stated, however, that she does not think that she would commit suicide, and thinking of her children and her mother stops her.  She thinks that if she were to do it it would not be planned, and that it would be more of a last-minute decision.  In fact, she thinks that there are times when it is not safe for her to drive, either because of that her because she has dizziness and poor concentration.  Her driving is quite limited now.  Her  was present in the room during this discussion, and stated that they have talked about this before, and that he has never thought that she would actually hurt herself because she has always had a concern for her family.  He agreed that she sometimes gets very frustrated with her health issues which she cannot change.  She  "has an overwhelming sense of frustration.  She has no history of suicide attempts.  She stated that one of her frustrations is that she tends to keep things inside a lot.  Nothing bothers her  and it is hard to communicate and have him understand what she is feeling.  She feels a lot like she is alone.  She stated that she may be open to psychotherapy but she has not found someone with whom she connected for several years.  She tentatively agreed to seek out that therapist, or to take a referral for a different therapist.    Ms. Bradley drinks alcohol a few times a month, perhaps one drink at a time.  She denied illicit drug use or tobacco use.    Ms. Bradley has noted weakness on both sides of her body, perhaps more noticeable in her right hand, but she believes this is only because she is right-handed.  Her  has noted that she has tremor in both of her hands equally, and she does not grab a hold of things as well.  She experiences frequent headaches that are low-grade, for which she may take a Tylenol or Advil.  She was experiencing migraines for a while.  Now, it seems like her head is a \"pile of mulch,\" and when that happens she does not try to challenge herself to do anything more than what she is doing.  She has rheumatoid arthritis which is fairly well controlled with her medications although she periodically has a flare and will go to the emergency department.    PAST MEDICAL HISTORY: Medical records indicate a history of moderate recurrent major depression, generalized anxiety disorder, osteoarthritis, unintentional weight loss, gastroesophageal reflux disease, hyperlipidemia, rheumatoid arthritis.    CURRENT MEDICATIONS:  Include cyanocobalamin, diclofenac, fluoxetine, folic acid, Humira pen, ibandronate, methotrexate, omega-3 fatty acids, Restasis, simvastatin, SM calcium soft chews, turmeric.    FAMILY MEDICAL HISTORY:  Significant for a paternal grandmother and maternal grandmother with " dementia, a maternal aunt with dementia who  in her mid 60s, a great grandfather who had hardening of the arteries, a brother who had a stroke, and a father who had cardiovascular disease and  at the age of 59.     BEHAVIORAL OBSERVATIONS    Behavioral observations are limited as this evaluation took place over a video platform. No technical issues were noted during the visit. During the evaluation, Ms. Bradley was pleasant and cooperative. She often required additional prompting or task instructions to be repeated. She was alert and oriented to person, place, and time. Mood was depressed, and she appeared tense. Tasks were executed slowly. Speech was mildly slowed, with normal articulation and volume. Spontaneous conversation was present and entirely appropriate. Internal performance validity measures fell within normal limits. Results are interpreted with caution given the deviation from standard protocols, but are believed to be a reasonable estimate of her functioning.     MEASURES ADMINISTERED    The following measures were administered by a trained psychometrist, remotely via video technology, under the direct supervision of a licensed psychologist:    Orientation; Subtests of the Wechsler Adult Intelligence Scale - 4; Story Memory and Line Orientation of the Repeatable Battery for the Assessment of Neuropsychological Status (RBANS); Ramirez Verbal Learning Test - Revised, Form 1; McArthur Naming Test, 15 Item; Controlled Oral Word Association Test; Semantic Fluency; Clock Drawing; Oral Trail Making Test; Test of Sustained Attention and Tracking; BDAE Complex Ideational Material; ILS Health and Safety Questions; Geriatric Depression Scale (GDS); Cuenca Anxiety Inventory (TAYLOR).    RESULTS AND INTERPRETATION    Verbal intellectual functioning was estimated to fall in the average range, consistent with premorbid estimates based on single word reading abilities administered at her prior  evaluation.    Confrontation naming was average for her age.  Expressive vocabulary was average.  Verbal abstract reasoning was average.  Letter fluency was average, and generative naming to category was mildly impaired.  Performance on this task was notable for loss of cognitive set.  Comprehension of complex ideational material was average.    Attention span was moderately impaired for her age.  Divided attention was severely impaired; she discontinued this task.  Performance on a measure of sustained attention and tracking was mildly impaired, with slowed information processing speed.    Basic visual perception, including matching lines and angles, was borderline impaired.  Construction of a clock was impaired, and was notable for difficulty with conceptualization.  Nonverbal deductive reasoning was mildly impaired.    Immediate recall of verbal narrative material was moderately impaired, with moderately impaired recall following a brief delay.  On a multiple trial list learning task, immediate recall was moderately impaired, with borderline impaired retention (75%) following a 25-minute delay.  Recognition memory on this task was average.    Responses to various health and safety scenarios fell within normal limits.    On the GDS, she endorsed a severe level of depressive symptomatology.  She also endorsed severe anxiety on the TAYLOR.     IMPRESSIONS AND RECOMMENDATIONS    Yaritza Bradley is a 64-year-old woman with longstanding complaints of memory decline, balance and gait problems.  She believes that these have all been progressively worsening over time.  Results are interpreted with caution.  Due to concerns regarding COVID-19, the evaluation was administered remotely using a video platform. Every attempt was made to administer measures in a standardized manner; however, differences in administration from the standardization samples may affect interpretation.    Current results indicate impairments in visual  processing, including visual spatial abilities and visual construction. Information processing speed is slowed. Attention is impaired, and there is subtle executive dysfunction, manifested in difficulty maintaining cognitive set and trouble with conceptualization. Learning is impaired, as is retrieval of learned information. Basic language reflects a strength and falls within normal limits.  She endorses severe depressive symptomatology and severe levels of anxiety.    Compared to her 2014 evaluation, in cognitive domains that can be compared, she has had declines in attention, information processing speed, and verbal learning and memory.     This pattern of performance is suggestive of frontal and subcortical system involvement, along with greater nondominant, posterior cerebral involvement. Given the presence of gait dysfunction, together with a greater indication of frontal and subcortical system involvement than was present in 2014, a movement disorder should be ruled out. She continues to work with a neurologist. Notably, depressed mood, non-restorative sleep, fatigue, chronic pain, and medications are likely contributing to her performance.     Of particular concern is ongoing, severely depressed mood and anxiety. As she had in 2014, she endorsed suicidal ideation, with thoughts of driving into oncoming traffic. These thoughts are longstanding, since she gave a very similar description in 2014, also indicating that thoughts of her family stop her. At her last evaluation, personality assessment also suggested a psychological contribution to her physical symptoms, with the likelihood of experiencing increases in physical symptoms during time of stress. More aggressive management of her severe psychological symptoms seems warranted. If not already considered, she would likely benefit from referral to Psychiatry, for further evaluation of mood and treatment recommendations. We discussed the possibility of pursuing  psychotherapy. She has struggled to find a psychologist with whom she feels connected, with the last successful treatment occurring several years ago. She had to stop working with that psychotherapist because of distance. We discussed the possibility of re-establishing care with them now, since many psychologists are able to provide telehealth services during the pandemic. Alternatively, we could provide her with the contact information for our health psychologists.     In terms of daily functioning, Ms. Butlers cognitive inefficiencies are not likely to interfere with her ability to actively participate in treatment, or to manage her instrumental activities of daily living independently. In view of her attentional difficulties, she may find it helpful to work on tasks in a quiet environment that is relatively free from distractions, such as noises or other interruptions. She may benefit from structure and routine. She is likely to benefit from the use of written reminders or checklists. It may take her longer to complete tasks, so she may find it helpful to provide herself with ample time when working on a project, so that she does not become overwhelmed and work less efficiently.     It may be helpful to continue to follow her over time. Repeated neuropsychological evaluation in one year may help to determine whether her cognitive difficulties are progressive.    Jes Jessica, Ph.D., ABPP  Licensed Psychologist, LP [id number]  Board Certified in Clinical Neuropsychology      Time spent: One unit professional time (CPT 71725). 60 minutes (1 unit) neuropsychological testing evaluation by licensed and board-certified neuropsychologist, including integration of patient data, interpretation of standardized test results and clinical data, clinical decision-making, treatment planning, report, and interactive feedback to the patient, first hour (CPT 77100). 95 minutes (2 units) of neuropsychological testing evaluation by  licensed and board-certified neuropsychologist, including integration of patient data, interpretation of standardized test results and clinical data, clinical decision-making, treatment planning, report, and interactive feedback to the patient, subsequent hours (CPT 69092). 30 minutes of neuropsychological test administration and scoring by technician, first 30 minutes (CPT 23910). 144 additional minutes (5 units) neuropsychological test administration and scoring by technician, subsequent 30 minutes (CPT 86595). ICD-10 Diagnoses: R41.3    Due to circumstances that prevent in-person clinical visits, this assessment was conducted using telehealth methods (including remote audiovisual presentation of test instructions and test stimuli). The standard administration of these procedures involves in-person, face-to-face methods. The impact of applying non-standard administration methods has been evaluated only in part by scientific research. While every effort was made to simulate standard assessment practices, the diagnostic conclusions and recommendations for treatment provided in this report are being advanced with these reservations.      Video-Visit Details    Type of service:  Video Visit    Video Start Time (interview): 12:28 PM  Video End Time (interview): 1:14 PM    Psychometrist Time:  Prep  Rooming & Test Administration    Scoring/Monitoring       Start: 7:40 Start: 12:20 Start: 13:14 Start: 0:00 Start: 15:30 Start: 0:00 Start: 0:00   Stop: 7:50 Stop: 12:24 Stop: 15:24 Stop: 0:00 Stop: 16:00 Stop: 0:00 Stop: 0:00   Total: 10m Total: 4m Total: 130m Total: 0m Total: 30m Total: 0m Total: 0m   Total Administration Time 134m  Total Scoring/Monitor Time  30m  Total Time Overall 174m      Originating Location (pt. Location): Home    Distant Location (provider location):  [facility] NEUROPSYCHOLOG[facility] [place]     Platform used for Video Visit: Noemy Jessica, PhD LP

## 2020-11-02 NOTE — LETTER
"11/2/2020       RE: Yaritza Bradley  [address] [facility] [address]     Dear Colleague,    Thank you for referring your patient, Yaritza Bradley, to the [facility] NEUROPSYCHOLOGY [place] at [facility]. Please see a copy of my visit note below.    Yaritza Bradley is a 64 year old female who is being evaluated via a billable video visit.      The patient has been notified of following:     \"This video visit will be conducted via a call between you and your provider. We have found that certain health care needs can be provided without the need for an in-person physical exam.  This service lets us provide the care you need with a video conversation and testing.  If additional testing is needed, that will be scheduled in the clinic at a future date.    Video visits are billed at different rates depending on your insurance coverage.  Please reach out to your insurance provider with any questions.    If during the course of the call the provider feels a video visit is not appropriate, you will not be charged for this service.\"    Patient has given verbal consent for Video visit? Yes  How would you like to obtain your AVS? MyChart  If you are dropped from the video visit, the video invite should be resent to: Send to e-mail at: [email or web address]  Will anyone else be joining your video visit? 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  Information was obtained via video interview with the patient and her , and review of her medical records.  Records indicate that Ms. Bradley has been followed periodically in Neurology for balance and memory problems over the last few years.  She was most recently seen in Neurology on 7/21/2020 with multiple concerns, in particular changes in memory and gait.  Her " "gait and tremor appeared to be relatively stable.  She was noted to appear mildly anxious and fidgety, and scored 21/30 on the MoCA.  She was referred for neuropsychological evaluation by Carley Cedillo M.D., for further characterization of any cognitive difficulties and to evaluate mood.    Ms. Bradley first underwent a neuropsychological evaluation under my direction on 12/12/2014.  Please refer to the report from that date for full details regarding her history and the findings of the evaluation.  Briefly, she had concerns about memory changes over the previous few months, as well as headaches and \"brain fog.\"  She was noted to have a history of depression and anxiety.  She endorsed suicidal ideation the previous year, when she thought about driving in front of a semi-, but thoughts about her mother, children, and family stopped her.  Results of her evaluation indicated a weakness in visual processing, including visual construction  and visual-spatial abilities.  Attention was mildly impaired.  There was slight variability in learning and memory, although overall learning and memory fell well within normal limits.  Language abilities also fell within normal limits.  Personality assessment was suggestive of significant depressed mood and anxiety, as well as marked somatization.    Due to COVID-19 related precautions, a limited evaluation was administered remotely, using video technology.    CLINICAL INTERVIEW FINDINGS    Upon interview, Ms. Bradley and her  noted that her symptoms began with a loss of balance, and dizziness.  Sometimes when she is walking she has to stop and sit and she feels dizzy or like she will pass out or be nauseous.  Her  has noted posture problems when she is walking.  This has been going on for years but seems to be getting worse and is now interfering with what she is able to do.  She is walking less, and can only walk on flat surfaces like of roadway.  She " feels dizzy when she gets up and walk.  She has to hold onto something like a railing.  This has been hampering what she is willing and able to do more than it was when she was last seen in 2014.  She is more fearful about going for walks.     Over the same period of time, Ms. Bradley feels that her cognition has become much worse.  She has a hard time recalling things and remembering what she has done recently.  Her reading and writing seem worse.  She can write a note, but if she writes a whole letter, the letters get mixed up and are not as legible.  Her writing is messier although the size is the same.  She forgets what others have said.  She forgets names of people with whom she has been familiar, such as people with whom she worked until retiring a few years ago.  She has not become lost in familiar places.  She has had greater difficulty with word finding.  Her attention and concentration have not been particularly good.  Her  noted that her attention to detail seems off, and she may do something like use a knife upside down, with a sharp edge up.  She is generally okay using the phone, but occasionally blanks out on which buttons to push.  She does not like to make decisions, stating that she tends to follow her  who is a planner and organizer, and that this is a change for her.    Ms. Bradley lives with her .  He has always managed their finances.  She manages her own medications, only occasionally forgetting to take them.  She has not been driving much.  She drives short distances to her doctors appointments, but does no long distance driving.  Most of the time, Evin together, her  will drive.  She has a lack of confidence that she will see the details such as stop signs and what other people are doing.  She is not as aware as she used to be.  She stated that she used to love to drive and go all over the place, and that she was more independent.  She does not cook anymore.  She  had tried a recipe for bran muffins which was her mother's recipe, in which she had successfully baked before.  She was confused and the rest to be turned out as much.  She had difficulty with the measurements and combining them.  For the most part, she handles her personal cares independently.  She is taking baths more than showers because she feels dizzy and disoriented.    Ms. Bradley has a history of depression and anxiety.  She has not worked with a psychologist in years.  She is not currently working with a psychiatrist.  She has not been hospitalized for psychiatric care.  When asked to describe her mood, she stated that it is crabby and that nothing seems to be of value.  She could sit and watch television all day long, and still not remember what the show was.  She watches a football game or baseball game and does not remember the score the next day.  She does still enjoy watching sports.  Her mood varies.  She stated that some days she sleeps a lot, perhaps 12 hours, gets up and watches television, and gets tired and lies down again.  She stated that she does not have the initiative to learn or do anything new, and that she does what is easy.  This happens most days.  She stated that she feels she is not a complete person.  She is not healthy enough to do certain things she has done in the past that she would like to do, and she leads a more sedentary life than she used to.  She used to bowl and play softball, and was much more active.  She has been doing scrapbooks more recently and just started making homemade cards, and she enjoys doing creative crafts like that.  She is also quite anxious.  She stated that she has always been a worrier, and that she worries about things before they even happen.  She has been quite anxious about this testing.  Her mother is turning 90 and she worries about COVID.  She is in a healthcare center and Ms. Bradley has not been able to see her very much because of the virus.   She is still in independent living and it is harder for Ms. Bradley to keep tabs on her now.  She worries about her own children, and tends to think about the worst case scenario for them.  As noted, she is sleeping around 12 hours a night.  She is trying to get up earlier in the morning after 8 or 10 hours.  She is also trying to get back out and do more walking with her dog and her .  She had been swimming but has not been able to do that since the pandemic started.  She noted that they are home bodies and that they do not want to get the virus and then spread it to her mother.  She has low motivation to do things.  Her  is always doing something when she has no energy or desire to do things.  Her appetite has been low.  She lost 15 pounds, to a weight of 100 pounds.  Her doctor had her meet with a nutritionist, who recommended 3 meals and something in between every meal.  She has found that she does not have the appetite for it.  She now weighs around 110 pounds.  She denied visual or auditory hallucinations.    Ms. Bradley endorsed ongoing suicidal ideation.  Specifically, she has thought about going out in the car and on a two [address] road, she would curve into the other ameya if there were an oncoming semi truck.  She would not want to hurt the other , but she thinks that a semi would be something big enough so that she would die.  She stated that she would not hang herself and she does not have guns, so she cannot shoot herself.  She stated, however, that she does not think that she would commit suicide, and thinking of her children and her mother stops her.  She thinks that if she were to do it it would not be planned, and that it would be more of a last-minute decision.  In fact, she thinks that there are times when it is not safe for her to drive, either because of that her because she has dizziness and poor concentration.  Her driving is quite limited now.  Her  was present in the  "room during this discussion, and stated that they have talked about this before, and that he has never thought that she would actually hurt herself because she has always had a concern for her family.  He agreed that she sometimes gets very frustrated with her health issues which she cannot change.  She has an overwhelming sense of frustration.  She has no history of suicide attempts.  She stated that one of her frustrations is that she tends to keep things inside a lot.  Nothing bothers her  and it is hard to communicate and have him understand what she is feeling.  She feels a lot like she is alone.  She stated that she may be open to psychotherapy but she has not found someone with whom she connected for several years.  She tentatively agreed to seek out that therapist, or to take a referral for a different therapist.    Ms. Bradley drinks alcohol a few times a month, perhaps one drink at a time.  She denied illicit drug use or tobacco use.    Ms. Bradley has noted weakness on both sides of her body, perhaps more noticeable in her right hand, but she believes this is only because she is right-handed.  Her  has noted that she has tremor in both of her hands equally, and she does not grab a hold of things as well.  She experiences frequent headaches that are low-grade, for which she may take a Tylenol or Advil.  She was experiencing migraines for a while.  Now, it seems like her head is a \"pile of mulch,\" and when that happens she does not try to challenge herself to do anything more than what she is doing.  She has rheumatoid arthritis which is fairly well controlled with her medications although she periodically has a flare and will go to the emergency department.    PAST MEDICAL HISTORY: Medical records indicate a history of moderate recurrent major depression, generalized anxiety disorder, osteoarthritis, unintentional weight loss, gastroesophageal reflux disease, hyperlipidemia, rheumatoid " arthritis.    CURRENT MEDICATIONS:  Include cyanocobalamin, diclofenac, fluoxetine, folic acid, Humira pen, ibandronate, methotrexate, omega-3 fatty acids, Restasis, simvastatin, SM calcium soft chews, turmeric.    FAMILY MEDICAL HISTORY:  Significant for a paternal grandmother and maternal grandmother with dementia, a maternal aunt with dementia who  in her mid 60s, a great grandfather who had hardening of the arteries, a brother who had a stroke, and a father who had cardiovascular disease and  at the age of 59.     BEHAVIORAL OBSERVATIONS    Behavioral observations are limited as this evaluation took place over a video platform. No technical issues were noted during the visit. During the evaluation, Ms. Bradley was pleasant and cooperative. She often required additional prompting or task instructions to be repeated. She was alert and oriented to person, place, and time. Mood was depressed, and she appeared tense. Tasks were executed slowly. Speech was mildly slowed, with normal articulation and volume. Spontaneous conversation was present and entirely appropriate. Internal performance validity measures fell within normal limits. Results are interpreted with caution given the deviation from standard protocols, but are believed to be a reasonable estimate of her functioning.     MEASURES ADMINISTERED    The following measures were administered by a trained psychometrist, remotely via video technology, under the direct supervision of a licensed psychologist:    Orientation; Subtests of the Wechsler Adult Intelligence Scale - 4; Story Memory and Line Orientation of the Repeatable Battery for the Assessment of Neuropsychological Status (RBANS); Ramirez Verbal Learning Test - Revised, Form 1; Bradford Naming Test, 15 Item; Controlled Oral Word Association Test; Semantic Fluency; Clock Drawing; Oral Trail Making Test; Test of Sustained Attention and Tracking; BDAE Complex Ideational Material; [facility] Health and Safety  Questions; Geriatric Depression Scale (GDS); Cuenca Anxiety Inventory (TAYLOR).    RESULTS AND INTERPRETATION    Verbal intellectual functioning was estimated to fall in the average range, consistent with premorbid estimates based on single word reading abilities administered at her prior evaluation.    Confrontation naming was average for her age.  Expressive vocabulary was average.  Verbal abstract reasoning was average.  Letter fluency was average, and generative naming to category was mildly impaired.  Performance on this task was notable for loss of cognitive set.  Comprehension of complex ideational material was average.    Attention span was moderately impaired for her age.  Divided attention was severely impaired; she discontinued this task.  Performance on a measure of sustained attention and tracking was mildly impaired, with slowed information processing speed.    Basic visual perception, including matching lines and angles, was borderline impaired.  Construction of a clock was impaired, and was notable for difficulty with conceptualization.  Nonverbal deductive reasoning was mildly impaired.    Immediate recall of verbal narrative material was moderately impaired, with moderately impaired recall following a brief delay.  On a multiple trial list learning task, immediate recall was moderately impaired, with borderline impaired retention (75%) following a 25-minute delay.  Recognition memory on this task was average.    Responses to various health and safety scenarios fell within normal limits.    On the GDS, she endorsed a severe level of depressive symptomatology.  She also endorsed severe anxiety on the TAYLOR.     IMPRESSIONS AND RECOMMENDATIONS    Yartiza Bradley is a 64-year-old woman with longstanding complaints of memory decline, balance and gait problems.  She believes that these have all been progressively worsening over time.  Results are interpreted with caution.  Due to concerns regarding COVID-19, the  evaluation was administered remotely using a video platform. Every attempt was made to administer measures in a standardized manner; however, differences in administration from the standardization samples may affect interpretation.    Current results indicate impairments in visual processing, including visual spatial abilities and visual construction. Information processing speed is slowed. Attention is impaired, and there is subtle executive dysfunction, manifested in difficulty maintaining cognitive set and trouble with conceptualization. Learning is impaired, as is retrieval of learned information. Basic language reflects a strength and falls within normal limits.  She endorses severe depressive symptomatology and severe levels of anxiety.    Compared to her 2014 evaluation, in cognitive domains that can be compared, she has had declines in attention, information processing speed, and verbal learning and memory.     This pattern of performance is suggestive of frontal and subcortical system involvement, along with greater nondominant, posterior cerebral involvement. Given the presence of gait dysfunction, together with a greater indication of frontal and subcortical system involvement than was present in 2014, a movement disorder should be ruled out. She continues to work with a neurologist. Notably, depressed mood, non-restorative sleep, fatigue, chronic pain, and medications are likely contributing to her performance.     Of particular concern is ongoing, severely depressed mood and anxiety. As she had in 2014, she endorsed suicidal ideation, with thoughts of driving into oncoming traffic. These thoughts are longstanding, since she gave a very similar description in 2014, also indicating that thoughts of her family stop her. At her last evaluation, personality assessment also suggested a psychological contribution to her physical symptoms, with the likelihood of experiencing increases in physical symptoms  during time of stress. More aggressive management of her severe psychological symptoms seems warranted. If not already considered, she would likely benefit from referral to Psychiatry, for further evaluation of mood and treatment recommendations. We discussed the possibility of pursuing psychotherapy. She has struggled to find a psychologist with whom she feels connected, with the last successful treatment occurring several years ago. She had to stop working with that psychotherapist because of distance. We discussed the possibility of re-establishing care with them now, since many psychologists are able to provide telehealth services during the pandemic. Alternatively, we could provide her with the contact information for our health psychologists.     In terms of daily functioning, Ms. Bradley's cognitive inefficiencies are not likely to interfere with her ability to actively participate in treatment, or to manage her instrumental activities of daily living independently. In view of her attentional difficulties, she may find it helpful to work on tasks in a quiet environment that is relatively free from distractions, such as noises or other interruptions. She may benefit from structure and routine. She is likely to benefit from the use of written reminders or checklists. It may take her longer to complete tasks, so she may find it helpful to provide herself with ample time when working on a project, so that she does not become overwhelmed and work less efficiently.     It may be helpful to continue to follow her over time. Repeated neuropsychological evaluation in one year may help to determine whether her cognitive difficulties are progressive.    Jes Jessica, Ph.D., ABPP  Licensed Psychologist, LP [id number]  Board Certified in Clinical Neuropsychology      Time spent: One unit professional time (CPT 58975). 60 minutes (1 unit) neuropsychological testing evaluation by licensed and board-certified neuropsychologist,  including integration of patient data, interpretation of standardized test results and clinical data, clinical decision-making, treatment planning, report, and interactive feedback to the patient, first hour (CPT 22377). 95 minutes (2 units) of neuropsychological testing evaluation by licensed and board-certified neuropsychologist, including integration of patient data, interpretation of standardized test results and clinical data, clinical decision-making, treatment planning, report, and interactive feedback to the patient, subsequent hours (CPT 20499). 30 minutes of neuropsychological test administration and scoring by technician, first 30 minutes (CPT 20360). 144 additional minutes (5 units) neuropsychological test administration and scoring by technician, subsequent 30 minutes (CPT 09629). ICD-10 Diagnoses: R41.3    Due to circumstances that prevent in-person clinical visits, this assessment was conducted using telehealth methods (including remote audiovisual presentation of test instructions and test stimuli). The standard administration of these procedures involves in-person, face-to-face methods. The impact of applying non-standard administration methods has been evaluated only in part by scientific research. While every effort was made to simulate standard assessment practices, the diagnostic conclusions and recommendations for treatment provided in this report are being advanced with these reservations.      Video-Visit Details    Type of service:  Video Visit    Video Start Time (interview): 12:28 PM  Video End Time (interview): 1:14 PM    Psychometrist Time:  Prep  Rooming & Test Administration    Scoring/Monitoring       Start: 7:40 Start: 12:20 Start: 13:14 Start: 0:00 Start: 15:30 Start: 0:00 Start: 0:00   Stop: 7:50 Stop: 12:24 Stop: 15:24 Stop: 0:00 Stop: 16:00 Stop: 0:00 Stop: 0:00   Total: 10m Total: 4m Total: 130m Total: 0m Total: 30m Total: 0m Total: 0m   Total Administration Time 134m  Total  Scoring/Monitor Time  30m  Total Time Overall 174m      Originating Location (pt. Location): Home    Distant Location (provider location):  [facility] NEUROPSYCHOLOGY [place]     Platform used for Video Visit: Noemy Jessica, PhD NAKITA            Again, thank you for allowing me to participate in the care of your patient.      Sincerely,    Jes Jessica, PhD LP

## 2020-11-12 ENCOUNTER — VIRTUAL VISIT (OUTPATIENT)
Dept: NEUROLOGY | Facility: CLINIC | Age: 65
End: 2020-11-12
Payer: COMMERCIAL

## 2020-11-12 VITALS — HEIGHT: 66 IN | WEIGHT: 108 LBS | BODY MASS INDEX: 17.36 KG/M2

## 2020-11-12 DIAGNOSIS — R26.89 BALANCE PROBLEMS: ICD-10-CM

## 2020-11-12 DIAGNOSIS — F32.A ANXIETY AND DEPRESSION: Primary | ICD-10-CM

## 2020-11-12 DIAGNOSIS — F41.9 ANXIETY AND DEPRESSION: Primary | ICD-10-CM

## 2020-11-12 DIAGNOSIS — R41.89 COGNITIVE IMPAIRMENT: ICD-10-CM

## 2020-11-12 PROCEDURE — 99214 OFFICE O/P EST MOD 30 MIN: CPT | Mod: 95 | Performed by: PSYCHIATRY & NEUROLOGY

## 2020-11-12 ASSESSMENT — MIFFLIN-ST. JEOR: SCORE: 1046.63

## 2020-11-12 NOTE — PROGRESS NOTES
Physician Note:    Ms. Bradley is followed in Neurology for gait problems and memory concerns. Please see my 7.21.20 for historical details.    The purpose of today s visit is to review the Neuropsych test results.     Per Dr. Jessica's 11.2.20 Assessment:   RESULTS AND INTERPRETATION     Verbal intellectual functioning was estimated to fall in the average range, consistent with premorbid estimates based on single word reading abilities administered at her prior evaluation.     Confrontation naming was average for her age.  Expressive vocabulary was average.  Verbal abstract reasoning was average.  Letter fluency was average, and generative naming to category was mildly impaired.  Performance on this task was notable for loss of cognitive set.  Comprehension of complex ideational material was average.     Attention span was moderately impaired for her age.  Divided attention was severely impaired; she discontinued this task.  Performance on a measure of sustained attention and tracking was mildly impaired, with slowed information processing speed.     Basic visual perception, including matching lines and angles, was borderline impaired.  Construction of a clock was impaired, and was notable for difficulty with conceptualization.  Nonverbal deductive reasoning was mildly impaired.     Immediate recall of verbal narrative material was moderately impaired, with moderately impaired recall following a brief delay.  On a multiple trial list learning task, immediate recall was moderately impaired, with borderline impaired retention (75%) following a 25-minute delay.  Recognition memory on this task was average.     Responses to various health and safety scenarios fell within normal limits.     On the GDS, she endorsed a severe level of depressive symptomatology.  She also endorsed severe anxiety on the TAYLOR.     IMPRESSIONS AND RECOMMENDATIONS     Yaritza Bradley is a 64-year-old woman with longstanding complaints of memory  decline, balance and gait problems.  She believes that these have all been progressively worsening over time.  Results are interpreted with caution.  Due to concerns regarding COVID-19, the evaluation was administered remotely using a video platform. Every attempt was made to administer measures in a standardized manner; however, differences in administration from the standardization samples may affect interpretation.     Current results indicate impairments in visual processing, including visual spatial abilities and visual construction. Information processing speed is slowed. Attention is impaired, and there is subtle executive dysfunction, manifested in difficulty maintaining cognitive set and trouble with conceptualization. Basic language reflects a strength and falls within normal limits.  She endorses severe depressive symptomatology and severe levels of anxiety.     Compared to her 2014 evaluation, **.     This pattern of performance is suggestive of diffuse cerebral involvement, although there continues to be a suggestion of greater nondominant hemisphere posterior cerebral involvement. The pattern is similar to her 2014 evaluation, and there is no suggestion of a progressive neurodegenerative disorder. Rather, depressed mood, non-restorative sleep, fatigue, chronic pain, and medications are likely contributing to her performance. Lateralized cerebral involvement suggests an acquired, perhaps cerebrovascular contribution. It is notable, however, that there is no significant change in visual processing since her 2014 evaluation, suggesting a static etiology.      Of particular concern is ongoing, severely depressed mood and anxiety. As she had in 2014, she endorsed suicidal ideation, with thoughts of driving into oncoming traffic. These thoughts are longstanding, since she gave a very similar description in 2014, also indicating that thoughts of her family stop her. At her last evaluation, personality  assessment also suggested a psychological contribution to her physical symptoms, with the likelihood of experiencing increases in physical symptoms during time of stress. More aggressive management of her severe psychological symptoms seems warranted. If not already considered, she would likely benefit from referral to Psychiatry, for further evaluation of mood and treatment recommendations. We discussed the possibility of pursuing psychotherapy. She has struggled to find a psychologist with whom she feels connected, with the last successful treatment occurring several years ago. She had to stop working with that psychotherapist because of distance. We discussed the possibility of re-establishing care with them now, since many psychologists are able to provide telehealth services during the pandemic. Alternatively, we could provide her with the contact information for our health psychologists.      In terms of daily functioning, Ms. Bradley's cognitive inefficiencies are not likely to interfere with her ability to actively participate in treatment, or to manage her instrumental activities of daily living independently. In view of her attentional difficulties, she may find it helpful to work on tasks in a quiet environment that is relatively free from distractions, such as noises or other interruptions. She may benefit from structure and routine. She is likely to benefit from the use of written reminders or checklists. It may take her longer to complete tasks, so she may find it helpful to provide herself with ample time when working on a project, so that she does not become overwhelmed and work less efficiently.     I spoke with Nicole via video conference today.  She does not have any specific new concerns.  Walking is still unsteady, and she admits that she has not been doing any PT or exercise in recent weeks.  No recent falls.  No specific concerns about mood or memory today.  Her  joins the video conference,  "and asks about whether it is subtle changes noted on her brain MRI are related to either her balance problems or the memory problems.    Nicole mentions that she has been on an antidepressant for years (Prozac currently).  She wonders if this needs to be adjusted, or changed.    Current Outpatient Medications   Medication     B-D INSULIN SYRINGE 25G X 5/8\" 1 ML MISC     Cyanocobalamin (B-12) 1000 MCG TBCR     FLUoxetine (PROZAC) 20 MG capsule     FLUoxetine (PROZAC) 40 MG capsule     FOLIC ACID 1 MG PO TABS     HUMIRA PEN 40 MG/0.8ML injection     methotrexate 50 MG/2ML injection CHEMO     Omega-3 Fatty Acids (OMEGA-3 FISH OIL PO)     RESTASIS 0.05 % ophthalmic emulsion     simvastatin (ZOCOR) 20 MG tablet     SM CALCIUM SOFT CHEWS 500-100-40 OR CHEW     TURMERIC PO     diclofenac (VOLTAREN) 1 % GEL topical gel     Ibandronate Sodium (BONIVA IV)     No current facility-administered medications for this visit.      EXAM: Alert, attentive.  Patient takes notes throughout our encounter.  Facial movements and EOMs appear normal.  Fair fund of knowledge.  Speech is fluent.  Normal upper extremity coordination.    Assessment and Plan:  Encounter Diagnoses   Name Primary?     Anxiety and depression Yes     Cognitive impairment      Balance problems        Ms. Bradley is a pleasant 65-year-old woman with depression and anxiety following neurology for multifactorial balance problems, and cognitive concerns.  We spent the majority of today's visit reviewing the results of the recent neuropsych testing.  I explained to Nicole and her  that the changes on her brain MRI do not really explain her current problems.  I am referring Nicole on to psychiatry for evaluation.  I have also put in a referral for medication therapy management, to assess the need for medication changes, as these too could be playing a role in her memory problems.  I would like to see Nicole back in my clinic in about 3 months for follow-up.  She and her  " understanding and agreement with the plan.    Patient Instructions:  -- Referral to Psychiatry/Behavioral health for assessment.  As we discussed today, it seems that you are depressed mood and anxiety are playing a role in your cognitive dysfunction.  This means that if we can more aggressively treat your mood, it would be expected that your memory and thinking would also improve.  --I have also referred you for review of medications with the pharmacist, medication therapy management.  --Return to neurology clinic in 3 to 4 months for follow-up.    Video call duration: 17 minutes.  Additional 15 minutes spent in chart review and documentation by me.    Carley Cedillo MD  Neurology    CC: Aziza Aviles MD    Dragon software used in the dictation of this note.

## 2020-11-12 NOTE — LETTER
"    11/12/2020         RE: Yaritza Bradley  [address] [place] [id number]        Dear Colleague,    Thank you for referring your patient, Yaritza Bradley, to the [facility] NEUROLOGY [facility]. Please see a copy of my visit note below.    Yaritza Bradley is a 65 year old female who is being evaluated via a billable video visit.      The patient has been notified of following:     \"This video visit will be conducted via a call between you and your physician/provider. We have found that certain health care needs can be provided without the need for an in-person physical exam.  This service lets us provide the care you need with a video conversation.  If a prescription is necessary we can send it directly to your pharmacy.  If lab work is needed we can place an order for that and you can then stop by our lab to have the test done at a later time.    Video visits are billed at different rates depending on your insurance coverage.  Please reach out to your insurance provider with any questions.    If during the course of the call the physician/provider feels a video visit is not appropriate, you will not be charged for this service.\"    Patient has given verbal consent for Video visit? Yes  How would you like to obtain your AVS? MyChart  If you are dropped from the video visit, the video invite should be resent to: Send to e-mail at: [email or web address]   Will anyone else be joining your video visit? No        Video-Visit Details    Type of service:  Video Visit    Video Start Time: 10:00 AM  Video End Time: 10:17 AM    Originating Location (pt. Location): Home    Distant Location (provider location):  [facility] NEUROLOGY [facility]     Platform used for Video Visit: Noemy Cedillo MD    *Please see accompanying physician note for additional visit details.        Physician Note:    Ms. Bradley is followed in Neurology for gait problems and memory concerns. Please " see my 7.21.20 for historical details.    The purpose of today s visit is to review the Neuropsych test results.     Per Dr. Jessica's 11.2.20 Assessment:   RESULTS AND INTERPRETATION     Verbal intellectual functioning was estimated to fall in the average range, consistent with premorbid estimates based on single word reading abilities administered at her prior evaluation.     Confrontation naming was average for her age.  Expressive vocabulary was average.  Verbal abstract reasoning was average.  Letter fluency was average, and generative naming to category was mildly impaired.  Performance on this task was notable for loss of cognitive set.  Comprehension of complex ideational material was average.     Attention span was moderately impaired for her age.  Divided attention was severely impaired; she discontinued this task.  Performance on a measure of sustained attention and tracking was mildly impaired, with slowed information processing speed.     Basic visual perception, including matching lines and angles, was borderline impaired.  Construction of a clock was impaired, and was notable for difficulty with conceptualization.  Nonverbal deductive reasoning was mildly impaired.     Immediate recall of verbal narrative material was moderately impaired, with moderately impaired recall following a brief delay.  On a multiple trial list learning task, immediate recall was moderately impaired, with borderline impaired retention (75%) following a 25-minute delay.  Recognition memory on this task was average.     Responses to various health and safety scenarios fell within normal limits.     On the GDS, she endorsed a severe level of depressive symptomatology.  She also endorsed severe anxiety on the TAYLOR.     IMPRESSIONS AND RECOMMENDATIONS     Yaritza Bradley is a 64-year-old woman with longstanding complaints of memory decline, balance and gait problems.  She believes that these have all been progressively worsening over  time.  Results are interpreted with caution.  Due to concerns regarding COVID-19, the evaluation was administered remotely using a video platform. Every attempt was made to administer measures in a standardized manner; however, differences in administration from the standardization samples may affect interpretation.     Current results indicate impairments in visual processing, including visual spatial abilities and visual construction. Information processing speed is slowed. Attention is impaired, and there is subtle executive dysfunction, manifested in difficulty maintaining cognitive set and trouble with conceptualization. Basic language reflects a strength and falls within normal limits.  She endorses severe depressive symptomatology and severe levels of anxiety.     Compared to her 2014 evaluation, **.     This pattern of performance is suggestive of diffuse cerebral involvement, although there continues to be a suggestion of greater nondominant hemisphere posterior cerebral involvement. The pattern is similar to her 2014 evaluation, and there is no suggestion of a progressive neurodegenerative disorder. Rather, depressed mood, non-restorative sleep, fatigue, chronic pain, and medications are likely contributing to her performance. Lateralized cerebral involvement suggests an acquired, perhaps cerebrovascular contribution. It is notable, however, that there is no significant change in visual processing since her 2014 evaluation, suggesting a static etiology.      Of particular concern is ongoing, severely depressed mood and anxiety. As she had in 2014, she endorsed suicidal ideation, with thoughts of driving into oncoming traffic. These thoughts are longstanding, since she gave a very similar description in 2014, also indicating that thoughts of her family stop her. At her last evaluation, personality assessment also suggested a psychological contribution to her physical symptoms, with the likelihood of  experiencing increases in physical symptoms during time of stress. More aggressive management of her severe psychological symptoms seems warranted. If not already considered, she would likely benefit from referral to Psychiatry, for further evaluation of mood and treatment recommendations. We discussed the possibility of pursuing psychotherapy. She has struggled to find a psychologist with whom she feels connected, with the last successful treatment occurring several years ago. She had to stop working with that psychotherapist because of distance. We discussed the possibility of re-establishing care with them now, since many psychologists are able to provide telehealth services during the pandemic. Alternatively, we could provide her with the contact information for our health psychologists.      In terms of daily functioning, Ms. Bradley's cognitive inefficiencies are not likely to interfere with her ability to actively participate in treatment, or to manage her instrumental activities of daily living independently. In view of her attentional difficulties, she may find it helpful to work on tasks in a quiet environment that is relatively free from distractions, such as noises or other interruptions. She may benefit from structure and routine. She is likely to benefit from the use of written reminders or checklists. It may take her longer to complete tasks, so she may find it helpful to provide herself with ample time when working on a project, so that she does not become overwhelmed and work less efficiently.     I spoke with Nicole via video conference today.  She does not have any specific new concerns.  Walking is still unsteady, and she admits that she has not been doing any PT or exercise in recent weeks.  No recent falls.  No specific concerns about mood or memory today.  Her  joins the video conference, and asks about whether it is subtle changes noted on her brain MRI are related to either her balance  "problems or the memory problems.    Nicole mentions that she has been on an antidepressant for years (Prozac currently).  She wonders if this needs to be adjusted, or changed.    Current Outpatient Medications   Medication     B-D INSULIN SYRINGE 25G X 5/8\" 1 ML MISC     Cyanocobalamin (B-12) 1000 MCG TBCR     FLUoxetine (PROZAC) 20 MG capsule     FLUoxetine (PROZAC) 40 MG capsule     FOLIC ACID 1 MG PO TABS     HUMIRA PEN 40 MG/0.8ML injection     methotrexate 50 MG/2ML injection CHEMO     Omega-3 Fatty Acids (OMEGA-3 FISH OIL PO)     RESTASIS 0.05 % ophthalmic emulsion     simvastatin (ZOCOR) 20 MG tablet     SM CALCIUM SOFT CHEWS 500-100-40 OR CHEW     TURMERIC PO     diclofenac (VOLTAREN) 1 % GEL topical gel     Ibandronate Sodium (BONIVA IV)     No current facility-administered medications for this visit.      EXAM: Alert, attentive.  Patient takes notes throughout our encounter.  Facial movements and EOMs appear normal.  Fair fund of knowledge.  Speech is fluent.  Normal upper extremity coordination.    Assessment and Plan:  Encounter Diagnoses   Name Primary?     Anxiety and depression Yes     Cognitive impairment      Balance problems        Ms. Bradley is a pleasant 65-year-old woman with depression and anxiety following neurology for multifactorial balance problems, and cognitive concerns.  We spent the majority of today's visit reviewing the results of the recent neuropsych testing.  I explained to Nicole and her  that the changes on her brain MRI do not really explain her current problems.  I am referring Nicole on to psychiatry for evaluation.  I have also put in a referral for medication therapy management, to assess the need for medication changes, as these too could be playing a role in her memory problems.  I would like to see Nicole back in my clinic in about 3 months for follow-up.  She and her  understanding and agreement with the plan.    Patient Instructions:  -- Referral to " Psychiatry/Behavioral health for assessment.  As we discussed today, it seems that you are depressed mood and anxiety are playing a role in your cognitive dysfunction.  This means that if we can more aggressively treat your mood, it would be expected that your memory and thinking would also improve.  --I have also referred you for review of medications with the pharmacist, medication therapy management.  --Return to neurology clinic in 3 to 4 months for follow-up.    Video call duration: 17 minutes.  Additional 15 minutes spent in chart review and documentation by me.    Carley Cedillo MD  Neurology    CC: Aziza Aviles MD    Dragon software used in the dictation of this note.      Again, thank you for allowing me to participate in the care of your patient.        Sincerely,        Carley Cedillo MD

## 2020-11-12 NOTE — PATIENT INSTRUCTIONS
Patient Instructions:  -- Referral to Psychiatry/Behavioral health for assessment.  As we discussed today, it seems that you are depressed mood and anxiety are playing a role in your cognitive dysfunction.  This means that if we can more aggressively treat your mood, it would be expected that your memory and thinking would also improve.  -- I have also referred you for review of medications with the pharmacist, medication therapy management.  -- Return to neurology clinic in 3 to 4 months for follow-up.

## 2020-11-14 ENCOUNTER — HEALTH MAINTENANCE LETTER (OUTPATIENT)
Age: 65
End: 2020-11-14

## 2020-11-16 ENCOUNTER — VIRTUAL VISIT (OUTPATIENT)
Dept: PHARMACY | Facility: CLINIC | Age: 65
End: 2020-11-16
Payer: COMMERCIAL

## 2020-11-16 DIAGNOSIS — R41.3 MEMORY LOSS OR IMPAIRMENT: ICD-10-CM

## 2020-11-16 DIAGNOSIS — E78.5 HYPERLIPIDEMIA LDL GOAL <160: ICD-10-CM

## 2020-11-16 DIAGNOSIS — E53.8 VITAMIN B12 DEFICIENCY (NON ANEMIC): ICD-10-CM

## 2020-11-16 DIAGNOSIS — F41.1 GENERALIZED ANXIETY DISORDER: ICD-10-CM

## 2020-11-16 DIAGNOSIS — H04.123 DRY EYES: ICD-10-CM

## 2020-11-16 DIAGNOSIS — M81.0 OSTEOPOROSIS, UNSPECIFIED OSTEOPOROSIS TYPE, UNSPECIFIED PATHOLOGICAL FRACTURE PRESENCE: ICD-10-CM

## 2020-11-16 DIAGNOSIS — M05.79 RHEUMATOID ARTHRITIS INVOLVING MULTIPLE SITES WITH POSITIVE RHEUMATOID FACTOR (H): ICD-10-CM

## 2020-11-16 DIAGNOSIS — F33.1 MODERATE RECURRENT MAJOR DEPRESSION (H): Primary | ICD-10-CM

## 2020-11-16 DIAGNOSIS — R53.83 FATIGUE, UNSPECIFIED TYPE: ICD-10-CM

## 2020-11-16 PROCEDURE — 99607 MTMS BY PHARM ADDL 15 MIN: CPT | Mod: TEL | Performed by: PHARMACIST

## 2020-11-16 PROCEDURE — 99605 MTMS BY PHARM NP 15 MIN: CPT | Mod: TEL | Performed by: PHARMACIST

## 2020-11-16 NOTE — PATIENT INSTRUCTIONS
Recommendations from today's MTM visit:                                                    MTM (medication therapy management) is a service provided by a clinical pharmacist designed to help you get the most of out of your medicines.     1. Consider taking fluoxetine in the morning rather than evening.     2. Would recommend pursuing cognitive behavioral therapy (counseling/therapy) for anxiety and depression.     3. Consideration for sleep study to evaluate for possible sleep apnea or other cause of significant daytime fatigue/cognitive impairment.     4. Alternative antidepressants could include:     Sertraline (Zoloft) - minimal effect on appetite and minimal sedation (may have taken in the past)    Bupropion (Wellbutrin) - may decrease appetite and could increase energy; can be given in combination with fluoxetine or different antidepressant    Duloxetine (Cymbalta)/venlafaxine (Effexor) - minimal effect on appetite and could slightly increase energy    It was great to speak with you today.  I value your experience and would be very thankful for your time with providing feedback on our clinic survey. You may receive a survey via email or text message in the next few days.     Next MTM visit: 1 year or sooner if needed    To schedule another MTM appointment, please call the clinic directly or you may call the MTM scheduling line at [phone] or toll-free at [phone].     My Clinical Pharmacist's contact information:                                                      It was a pleasure talking with you today!  Please feel free to contact me with any questions or concerns you have.      Luz Grayson, Pharm.D.  Medication Therapy Management Pharmacist  Phone: [phone]

## 2020-11-16 NOTE — Clinical Note
Dr. Aviles/Dr. Cedillo -- I put some recommendations in my note re: her anxiety/depression and medication management. She seemed open to seeing a therapist but not necessarily a psychiatrist. I also wonder whether she would benefit from a sleep study in case we are missing sleep apnea? Let me know if there is anything else I can do to help!    Luz Grayson, Pharm.D.  Medication Therapy Management Pharmacist  Phone: [phone]

## 2020-11-16 NOTE — PROGRESS NOTES
MTM ENCOUNTER  SUBJECTIVE/OBJECTIVE:                           Yaritza Bradley is a 65 year old female called for an initial visit. She was referred to me from Dr. Cedillo.      Reason for visit: initial medication review; wondering if her medications are contributing to cognitive impairment and/or significant daytime fatigue    Allergies/ADRs: None  Tobacco: She reports that she has never smoked. She has never used smokeless tobacco.  Alcohol: Less than 1 beverages / week  Caffeine: 1 cups/day of coffee (mug)  Activity: in physical therapy  Past Medical History: Reviewed in chart    Medication Adherence/Access:   Patient uses pill box(es) and Humira is designated in her calendar.  Patient takes medications 2 time(s) per day.   Per patient, misses medication 0 times per week.   Medication barriers: none.   The patient fills medications at [facility]: NO, fills medications at [facility].    Depression/anxiety/congitive impairment:  Current medications include: Fluoxetine 60 mg once daily (evening). Patient states she has been on this medication for many years and was increased from 40 mg to 60 mg at least 2 years ago. She states that her mood has been worse lately but admits she is not as active as she was pre-COVID19 pandemic. She is not walking outside in the winter due to fall risk and not able to do exercise classes in the pool due to the pandemic. She is in physical therapy. She is unsure how much fluoxetine helps with her mood. Per Epic she may have been on the following in the past: paroxetine, escitalopram, amitriptyline, sertraline - but she cannot recall how these worked for her in the past. She also endorses ongoing fatigue during the day despite sleeping 9-12 hours most nights. She has never had a sleep study done and does not know if she has sleep apnea. Patient is concerned about cognitive changes in the last 6-12 months - not knowing someone's name, difficulty remembering things day to day, put keys in the  lloyd one time. She did have a neuropsych evaluation done in early November and there was concern that the medications may be contributing to her cognitive changes as well as uncontrolled anxiety and depression. She is not currently in psychotherapy and is concerned she may not have a good connection with a therapist. Of note, patient also mentioned she has lost weight recently (about 10 pounds in the last year) and did try working with a nutritionist.   PHQ-9 SCORE 7/16/2018 9/27/2019 7/31/2020   PHQ-9 Total Score - - -   PHQ-9 Total Score 13 8 14     SHEA-7 SCORE 7/16/2018 9/27/2019 7/31/2020   Total Score - - -   Total Score 9 9 18       Fatigue: As mentioned above, patient states she has a lot of fatigue during the day. She does not feel refreshed in the mornings when she wakes up despite sleeping many hours over night. She has physical fatigue as well as mental tiredness.     Hyperlipidemia: Current therapy includes simvastatin 20 mg daily.  Patient reports no significant myalgias or other side effects.    Recent Labs   Lab Test 07/31/20  0922 12/12/18  1021   CHOL 146 195   HDL 74 72   LDL 58 97   TRIG 69 129       Rheumatoid arthritis: Currently taking methotrexate once weekly (0.2 mL) and Humira every other week. She is taking folic acid 1 mg daily and turmeric as needed. Patient states that the methotrexate dose has been decreased but no change noted. She does state that the medications seem to help with her RA quite a bit. She admits that RA may also be contributing to her overall fatigue.     Osteoporosis: Current therapy includes: calcium 500 mg twice daily. Boniva is currently on hold. Pt is not experiencing side effects.  DEXA History: 11/1/19 - lowest T score was -2.5  Patient is getting the following sources of dietary calcium: not discussed today  Risk factors: post-menopausal  Last vitamin D level: not available    Dry eyes: Taking omega3 supplement, per ophthalmologist, no concerns noted.      Vitamin B12 deficiency: Taking Vitamin B12 1000 mcg daily. Last B12 level was 2,016 pg/mL on 7/31/20.     Today's Vitals: LMP  (LMP Unknown)     ASSESSMENT:                            Medication Adherence: No issues identified    Depression/anxiety/congitive impairment:  Mood does not appear to be sufficiently controlled with fluoxetine at this time and increased anxiety/depression may cause cognitive impairment. It is possible that she would benefit from trying an alternate selective serotonin reuptake inhibitor or SNRI medication or that she needs a second medication to augment the fluoxetine. I am concerned that fluoxetine may be contributing to weight loss and she could try taking it in the morning in the meantime as it is an activating antidepressant. We discussed possible alternate antidepressants but she does not recall which ones worked for her in the past. Sertraline may be a better antidepressant given minimal effect on appetite and should not be activating or sedating. Alternatively, bupropion could be considered to improve fatigue but may also decrease her appetite. Additionally, she may benefit from cognitive behavioral therapy to improve mood.     Fatigue: I do not think the patient's current medications are significantly contributing to fatigue. Fluoxetine tends to be an activating antidepressant so I advised she try taking it in the morning. Better control of anxiety/depression may also improve her fatigue. Additionally, RA could be contributing to fatigue. She has not had a sleep study and may benefit from this to determine whether she has sleep apnea or another cause for the daytime tiredness/fatigue.     Hyperlipidemia: stable    Rheumatoid arthritis: appears stable    Osteoporosis: stable    Dry eyes: stable    Vitamin B12 deficiency: stable    PLAN:                            1. Consider taking fluoxetine in the morning rather than evening.   2. Would recommend pursuing cognitive behavioral  therapy (counseling/therapy) for anxiety and depression.   3. Consideration for sleep study to evaluate for possible sleep apnea or other cause of significant daytime fatigue/cognitive impairment.   4. Alternative antidepressants could include:     Sertraline (Zoloft) - minimal effect on appetite and minimal sedation (may have taken in the past)    Bupropion (Wellbutrin) - may decrease appetite and could increase energy; can be given in combination with fluoxetine or different antidepressant    Duloxetine (Cymbalta)/venlafaxine (Effexor) - minimal effect on appetite and could slightly increase energy    I spent 24 minutes with this patient today. I offer these suggestions for consideration by Dr. Cedillo/Dr. Aviles. A copy of the visit note was provided to the patient's primary care and referring providers.    Will follow up in 1 year or sooner if needed.    The patient was sent via [employer] a summary of these recommendations.     Luz Grayson, Pharm.D.  Medication Therapy Management Pharmacist  Phone: [phone]    Patient consented to a telehealth visit: yes  Telemedicine Visit Details  Type of service:  Telephone visit  Start Time: 12:31 PM  End Time: 12:55 PM  Originating Location (patient location): [place]  Distant Location (provider location):  [facility]  Mode of Communication:  Telephone

## 2020-11-25 NOTE — PROGRESS NOTES
"Patient:  Yaritza Bradley \"Nicole\" MRN:  [id number] :  53 SAINI:  20   Education: 16 Handedness:  Right  Provider: BIANCA Psychometrist:  DAMON   Station: Outpatient Age: 64 Visit Type: (tel/vid) Video Platform (if video) [employer]   ORIENTATION    WAIS-IV Raw SS RDS   Personal Info 4   Digit Span 13 3 7   Place 2   Vocabulary 42 11    Time 0   Matrix Reasoning 5 5 Est VIQ   Presidents 5   Similarities 24 10 103   BOSTON NAMING TEST  COWAT  SEMANTIC FLUENCY      Raw 54 Form CFL Raw 26     SS/MAS 9.00 Raw 30 SS 5     T/%ile 29-40 SS/MAS/z 9 %ile 3-5     Total Stim Correct 2 T/%ile 29-40       Total Phonemic Correct 3         COMPLEX IDEATIONAL MATERIAL CLOCK DRAWING  RBANS LINE ORIENTATION     Raw 11 Command 2 Raw 13     SS 9   z -1.24     T 39   %ile 10-16     ORAL TRAILS    TSAT      Trails A 9 Z 2 Total time 171 Z -2.15   Trails B PT DC  Z  Total Errors 7 Z -1.88   HVLT           Trial 1 5   T-Score   T-Score   Trial 2 4 Total Recall 17 28 True Positives 11    Trial 3 8 Delayed Recall 6 32 False Positives 1    Learning 3 Percent Retention 75 39 Discrim. Index 10 45   RBANS STORY           Total Immediate 10 z Score -2.4 Scaled Score 4     Total Delay 5 z Score -2.05 Scaled Score 5     ILS HEALTH AND SAFETY QUESTIONNAIRE         Total 36 Classification HIGH       TAYLOR          Total 38 Interpretation SEVERE       GDS          Total 26 Interpretation SEVERE           "

## 2020-11-30 ENCOUNTER — TELEPHONE (OUTPATIENT)
Dept: NEUROLOGY | Facility: CLINIC | Age: 65
End: 2020-11-30

## 2020-11-30 NOTE — TELEPHONE ENCOUNTER
Please notify Nicole that I received her Neuropsych test results.  I would like her to schedule follow-up with me to discuss.  Virtual visit is okay, please use a 40-minute slot.    Dr. Cedillo

## 2020-12-01 NOTE — TELEPHONE ENCOUNTER
I spoke with Nicole this morning by phone.  She said she had done a virtual visit with Dr. Cedillo shortly after the Neuropsych testing and they reviewed the results.  She said her understanding would be that she'd follow up every few months.    She will call to schedule an appointment after the first of the year.

## 2020-12-22 ENCOUNTER — TELEPHONE (OUTPATIENT)
Dept: FAMILY MEDICINE | Facility: CLINIC | Age: 65
End: 2020-12-22

## 2020-12-22 NOTE — TELEPHONE ENCOUNTER
Patient Quality Outreach      Summary:    Patient has the following on her problem list/HM:   Depression / Dysthymia review    6 Month Remission: 4-8 month window range: 12/2/2020- 4/21/2021         PHQ-9 SCORE 7/16/2018 9/27/2019 7/31/2020   PHQ-9 Total Score - - -   PHQ-9 Total Score 13 8 14       If PHQ-9 recheck is 5 or more, route to provider for next steps.    Patient is due/failing the following:   PHQ-9 Needed    Type of outreach:    Sent On-Q-ity message.    Questions for provider review:    None                                                                                                                                     Sima Ybarra MA

## 2021-01-01 ENCOUNTER — MYC MEDICAL ADVICE (OUTPATIENT)
Dept: FAMILY MEDICINE | Facility: CLINIC | Age: 66
End: 2021-01-01

## 2021-01-01 DIAGNOSIS — E78.5 HYPERLIPIDEMIA, UNSPECIFIED HYPERLIPIDEMIA TYPE: ICD-10-CM

## 2021-01-04 RX ORDER — SIMVASTATIN 20 MG
20 TABLET ORAL AT BEDTIME
Qty: 90 TABLET | Refills: 1 | Status: SHIPPED | OUTPATIENT
Start: 2021-01-04 | End: 2021-04-15

## 2021-01-14 DIAGNOSIS — Z79.899 ENCOUNTER FOR LONG-TERM (CURRENT) USE OF HIGH-RISK MEDICATION: ICD-10-CM

## 2021-01-14 DIAGNOSIS — M05.79 RHEUMATOID ARTHRITIS WITH RHEUMATOID FACTOR OF MULTIPLE SITES WITHOUT ORGAN OR SYSTEMS INVOLVEMENT (H): Primary | ICD-10-CM

## 2021-01-15 DIAGNOSIS — Z79.899 ENCOUNTER FOR LONG-TERM (CURRENT) USE OF HIGH-RISK MEDICATION: ICD-10-CM

## 2021-01-15 DIAGNOSIS — M05.79 RHEUMATOID ARTHRITIS WITH RHEUMATOID FACTOR OF MULTIPLE SITES WITHOUT ORGAN OR SYSTEMS INVOLVEMENT (H): ICD-10-CM

## 2021-01-15 LAB
ALBUMIN SERPL-MCNC: 3.5 G/DL (ref 3.4–5)
ALT SERPL W P-5'-P-CCNC: 39 U/L (ref 0–50)
AST SERPL W P-5'-P-CCNC: 37 U/L (ref 0–45)
BASOPHILS # BLD AUTO: 0 10E9/L (ref 0–0.2)
BASOPHILS NFR BLD AUTO: 0.4 %
CREAT SERPL-MCNC: 0.69 MG/DL (ref 0.52–1.04)
DIFFERENTIAL METHOD BLD: NORMAL
EOSINOPHIL # BLD AUTO: 0.1 10E9/L (ref 0–0.7)
EOSINOPHIL NFR BLD AUTO: 1.2 %
ERYTHROCYTE [DISTWIDTH] IN BLOOD BY AUTOMATED COUNT: 13.1 % (ref 10–15)
GFR SERPL CREATININE-BSD FRML MDRD: >90 ML/MIN/{1.73_M2}
HCT VFR BLD AUTO: 40.8 % (ref 35–47)
HGB BLD-MCNC: 14.2 G/DL (ref 11.7–15.7)
LYMPHOCYTES # BLD AUTO: 2 10E9/L (ref 0.8–5.3)
LYMPHOCYTES NFR BLD AUTO: 18.8 %
MCH RBC QN AUTO: 30.3 PG (ref 26.5–33)
MCHC RBC AUTO-ENTMCNC: 34.8 G/DL (ref 31.5–36.5)
MCV RBC AUTO: 87 FL (ref 78–100)
MONOCYTES # BLD AUTO: 1.2 10E9/L (ref 0–1.3)
MONOCYTES NFR BLD AUTO: 11.3 %
NEUTROPHILS # BLD AUTO: 7.1 10E9/L (ref 1.6–8.3)
NEUTROPHILS NFR BLD AUTO: 68.3 %
PLATELET # BLD AUTO: 224 10E9/L (ref 150–450)
RBC # BLD AUTO: 4.69 10E12/L (ref 3.8–5.2)
WBC # BLD AUTO: 10.4 10E9/L (ref 4–11)

## 2021-01-15 PROCEDURE — 82040 ASSAY OF SERUM ALBUMIN: CPT | Performed by: INTERNAL MEDICINE

## 2021-01-15 PROCEDURE — 36415 COLL VENOUS BLD VENIPUNCTURE: CPT | Performed by: INTERNAL MEDICINE

## 2021-01-15 PROCEDURE — 84460 ALANINE AMINO (ALT) (SGPT): CPT | Performed by: INTERNAL MEDICINE

## 2021-01-15 PROCEDURE — 85025 COMPLETE CBC W/AUTO DIFF WBC: CPT | Performed by: INTERNAL MEDICINE

## 2021-01-15 PROCEDURE — 82565 ASSAY OF CREATININE: CPT | Performed by: INTERNAL MEDICINE

## 2021-01-15 PROCEDURE — 84450 TRANSFERASE (AST) (SGOT): CPT | Performed by: INTERNAL MEDICINE

## 2021-01-30 ENCOUNTER — MYC MEDICAL ADVICE (OUTPATIENT)
Dept: FAMILY MEDICINE | Facility: CLINIC | Age: 66
End: 2021-01-30

## 2021-01-30 DIAGNOSIS — F33.1 MODERATE RECURRENT MAJOR DEPRESSION (H): ICD-10-CM

## 2021-02-02 ENCOUNTER — MYC MEDICAL ADVICE (OUTPATIENT)
Dept: FAMILY MEDICINE | Facility: CLINIC | Age: 66
End: 2021-02-02

## 2021-02-02 DIAGNOSIS — F33.1 MODERATE RECURRENT MAJOR DEPRESSION (H): ICD-10-CM

## 2021-02-03 RX ORDER — FLUOXETINE 40 MG/1
40 CAPSULE ORAL DAILY
Qty: 90 CAPSULE | Refills: 1 | Status: SHIPPED | OUTPATIENT
Start: 2021-02-03 | End: 2021-07-07

## 2021-03-29 ENCOUNTER — OFFICE VISIT (OUTPATIENT)
Dept: FAMILY MEDICINE | Facility: CLINIC | Age: 66
End: 2021-03-29
Payer: COMMERCIAL

## 2021-03-29 VITALS
SYSTOLIC BLOOD PRESSURE: 120 MMHG | WEIGHT: 112.8 LBS | BODY MASS INDEX: 18.21 KG/M2 | DIASTOLIC BLOOD PRESSURE: 72 MMHG | RESPIRATION RATE: 14 BRPM | HEART RATE: 94 BPM | TEMPERATURE: 97.9 F | OXYGEN SATURATION: 98 %

## 2021-03-29 DIAGNOSIS — R55 PRE-SYNCOPE: Primary | ICD-10-CM

## 2021-03-29 DIAGNOSIS — R68.89 DECREASED EXERCISE TOLERANCE: ICD-10-CM

## 2021-03-29 PROCEDURE — 99214 OFFICE O/P EST MOD 30 MIN: CPT | Performed by: FAMILY MEDICINE

## 2021-03-29 NOTE — LETTER
[facility]  [address]  [facility] [address]  [phone]        March 29, 2021    Regarding:  Yaritza Bradley  [address]  [facility] [address]              To Whom It May Concern;      RE: Mobility Assistance Service Dog for Yaritza Bradley is under my medical care.  I am a licensed physician in the state of [place] and am affiliated with [facility].    Yaritza has been diagnosed with rheumatoid arthritis and neurologic symptoms.  She does meet criteria and definition of disabled under the American with Disabilities Act, the Rehabilitation Act of 1973, the Fair Housing Act and Social Security Disability Insurance criteria.     In addition to her current treatment plan, I recommend the use of a mobility assistance trained service dog.     Specific skills provided by a mobility service dog would include:   - Retrieval of objection that would require changes from standing position   - assistance in balance, stability and mobility   - recovery from falls      Please contact my office with questions or concerns.      Aziza Aviles MD

## 2021-03-29 NOTE — PROGRESS NOTES
"    Assessment & Plan     Pre-syncope   not orthostatic here in clinic  - E-CONSULT TO CARDIOLOGY (ADULT OUTPATIENT PCP TO SPECIALIST)    Decreased exercise tolerance     - E-CONSULT TO CARDIOLOGY (ADULT OUTPATIENT PCP TO SPECIALIST)    Patient would like to be evaluated further for possible POTS.  Unsure if this is just a general cardiology evaluation vs someone with special interest in this, I will do an e-consult to cardiology for their thoughts on this.        No follow-ups on file.    Aziza Aviles MD  [facility]    Prosper Rivera is a 65 year old who presents for the following health issues     HPI     Dizziness  Onset/Duration: 7 years   Description:   Do you feel faint: YES  Does it feel like the surroundings (bed, room) are moving: no  Unsteady/off balance: YES  Have you passed out or fallen: YES- fallen, and have to sit down or lay down during an episode.   Intensity: Getting more severe   Progression of Symptoms: worsening and constant  Accompanying Signs & Symptoms:  Heart palpitations or chest pain: no  Nausea, vomiting: YES  Weakness or lack of coordination in arms or legs: YES  Vision or speech changes: YES  Numbness or tingling: YES  Ringing in ears (Tinnitus): no  Hearing Loss: YES- but unsure if connected.   History:   Head trauma/concussion history: YES- in the past.   Previous similar symptoms: no  Recent bleeding history: no  Any new medications (BP?): no  Precipitating factors:   Worse with activity: YES  Worse with head movement: YES  Alleviating factors:   Does staying in a fixed position give relief: YES  Therapies tried and outcome: None    Pt has some suspicion about a diagnosis of POTS.     In Feb was in [employer] walking around the store. Felt \"intoxicated\" was walking from rack to rack and then had to sit on the floor.    An employee at the store told her the symptoms could be due to POTS.    Symptoms seemed to be similar to what she is experiencing in many " "ways after they researched this diagnosis on their own.     She has had several or many of these types of episodes over the past few years.   This can last 40 minutes or so.      When she has this - gets flushed in the face, needs to sit down.  20-30 minutes her color will restore  She remains weak  Takes another 20-30 more minutes to fully recover.    Has been trying to drink more water.     Balance feels \"off\" all of the time, not just with these episodes.     Temperature seems to exaggerate symptoms.   Last fall about 3/4 of the way in her walk has postural problems -  will note her listing to the side and then she will need to sit down - often can't make it back home quickly enough.  Has never been evaluated during the episodes (vital signs), etc.       Patient has had Echo (normal) and Lexiscan this fall, both normal.   Has seen neurology and neuropsych - has some memory problems and balance issues for quite some time.         Review of Systems   Constitutional, HEENT, cardiovascular, pulmonary, gi and gu systems are negative, except as otherwise noted.      Objective    /72   Pulse 94   Temp 97.9  F (36.6  C) (Tympanic)   Resp 14   Wt 51.2 kg (112 lb 12.8 oz)   LMP  (LMP Unknown)   SpO2 98%   BMI 18.21 kg/m    Body mass index is 18.21 kg/m .\    Repeat Blood Pressure:  BP Pulse Site Cuff Size Time Date   120/72 94 --- ---  9:07 AM 3/29/2021     Orthostatic Vitals  BP Pulse Position Site Cuff Size Time Date   130/74 87 Supine Right arm Adult Regular  9:55 AM 3/29/2021   124/78 89 Sitting Right arm Adult Regular  9:56 AM 3/29/2021   119/76 101 Standing Right arm Adult Regular  9:57 AM 3/29/2021     Peak Flow Information  Peak Flow Resp Time Date   --- 14  9:07 AM 3/29/2021   No pain information filed.      Physical Exam   GENERAL: healthy, alert and no distress  NECK: no adenopathy, no asymmetry, masses, or scars and thyroid normal to palpation  RESP: lungs clear to auscultation - no rales, " rhonchi or wheezes  CV: regular rate and rhythm, normal S1 S2, no S3 or S4, no murmur, click or rub, no peripheral edema and peripheral pulses strong  ABDOMEN: soft, nontender, no hepatosplenomegaly, no masses and bowel sounds normal  MS: no gross musculoskeletal defects noted, no edema

## 2021-03-30 ENCOUNTER — E-CONSULT (OUTPATIENT)
Dept: CARDIOLOGY | Facility: CLINIC | Age: 66
End: 2021-03-30
Payer: COMMERCIAL

## 2021-03-30 PROCEDURE — 99207 PR NO CHARGE LOS: CPT | Performed by: INTERNAL MEDICINE

## 2021-03-30 NOTE — PROGRESS NOTES
3/30/2021     E-Consult has been accepted.    Interprofessional consultation requested by:  Aziza Aviles MD      Clinical Question/Purpose:     Patient assessment and information reviewed:     Recommendations:     I would recommend a referral to our electrophysiology physicians. They can arrange further testing (such as tilt table testing) if indicated. Thanks.       The recommendations provided in this E-Consult are based on the clinical data available to me at this time, and are furnished without the benefit of a comprehensive in-person or virtual patient evaluation, Any new clinical issues or changes in patient status since the filing of this E-Consult will need to be taken into account when assessing these recommendations. Please contact me if you have further questions.    My total time spent reviewing clinical information and formulating assessment was 5 minutes.    Report sent automatically to requesting provider once signed.         Eder Arzola MD

## 2021-03-31 ENCOUNTER — TELEPHONE (OUTPATIENT)
Dept: FAMILY MEDICINE | Facility: CLINIC | Age: 66
End: 2021-03-31

## 2021-03-31 DIAGNOSIS — R68.89 DECREASED EXERCISE TOLERANCE: ICD-10-CM

## 2021-03-31 DIAGNOSIS — R55 PRE-SYNCOPE: Primary | ICD-10-CM

## 2021-03-31 NOTE — TELEPHONE ENCOUNTER
Notify patient/:    I heard back from the e-consult with cardiology.  The recommendation was to see one of the electrophysiology (EP) cardiologists.  I did place that order.    [phone] to schedule.      Aziza Aviles M.D.

## 2021-04-12 DIAGNOSIS — E78.5 HYPERLIPIDEMIA, UNSPECIFIED HYPERLIPIDEMIA TYPE: ICD-10-CM

## 2021-04-12 NOTE — TELEPHONE ENCOUNTER
simvastatin (ZOCOR) 20 MG tablet  Last Written Prescription Date:  1/4/21  Last Fill Quantity: 90,  # refills: 1   Last office visit: 3/29/2021 with prescribing provider:  aj   Future Office Visit:

## 2021-04-15 RX ORDER — SIMVASTATIN 20 MG
20 TABLET ORAL AT BEDTIME
Qty: 90 TABLET | Refills: 0 | Status: SHIPPED | OUTPATIENT
Start: 2021-04-15 | End: 2021-07-07

## 2021-04-15 NOTE — TELEPHONE ENCOUNTER
"Prescription approved per [facility] Refill Protocol.Batsheva Orozco RN    Requested Prescriptions   Pending Prescriptions Disp Refills     simvastatin (ZOCOR) 20 MG tablet 90 tablet 1     Sig: Take 1 tablet (20 mg) by mouth At Bedtime       Statins Protocol Passed - 4/12/2021 12:47 PM        Passed - LDL on file in past 12 months     Recent Labs   Lab Test 07/31/20  0922   LDL 58             Passed - No abnormal creatine kinase in past 12 months     No lab results found.             Passed - Recent (12 mo) or future (30 days) visit within the authorizing provider's specialty     Patient has had an office visit with the authorizing provider or a provider within the authorizing providers department within the previous 12 mos or has a future within next 30 days. See \"Patient Info\" tab in inbasket, or \"Choose Columns\" in Meds & Orders section of the refill encounter.              Passed - Medication is active on med list        Passed - Patient is age 18 or older        Passed - No active pregnancy on record        Passed - No positive pregnancy test in past 12 months             "

## 2021-04-20 ENCOUNTER — OFFICE VISIT (OUTPATIENT)
Dept: CARDIOLOGY | Facility: CLINIC | Age: 66
End: 2021-04-20
Attending: FAMILY MEDICINE
Payer: COMMERCIAL

## 2021-04-20 ENCOUNTER — DOCUMENTATION ONLY (OUTPATIENT)
Dept: OTHER | Facility: CLINIC | Age: 66
End: 2021-04-20

## 2021-04-20 ENCOUNTER — AMBULATORY - HEALTHEAST (OUTPATIENT)
Dept: OTHER | Facility: CLINIC | Age: 66
End: 2021-04-20

## 2021-04-20 VITALS
OXYGEN SATURATION: 98 % | DIASTOLIC BLOOD PRESSURE: 74 MMHG | TEMPERATURE: 97.5 F | WEIGHT: 113 LBS | HEART RATE: 86 BPM | BODY MASS INDEX: 18.24 KG/M2 | SYSTOLIC BLOOD PRESSURE: 127 MMHG

## 2021-04-20 DIAGNOSIS — R68.89 DECREASED EXERCISE TOLERANCE: ICD-10-CM

## 2021-04-20 DIAGNOSIS — R55 PRE-SYNCOPE: ICD-10-CM

## 2021-04-20 PROCEDURE — 99204 OFFICE O/P NEW MOD 45 MIN: CPT | Performed by: INTERNAL MEDICINE

## 2021-04-20 NOTE — PROGRESS NOTES
"Service Date: 04/20/2021      HISTORY OF PRESENT ILLNESS:  Thank you for allowing me to participate in the care of your very delightful patient.  As you know, Nicole is a 65-year-old female with a history of fibromyalgia, depression/anxiety, rheumatoid arthritis for which she is on methotrexate and Humira who has been having \"dizziness\" for years now.  She had seen my partner, Dr. London, about 3 years ago for a similar condition and lost to followup since then.  She is known to have normal cardiac structure and function based on the previous cardiac evaluation including a nuclear stress test and echocardiogram.  Baseline EKG last done in 01/2017 showed normal sinus rhythm with normal conduction intervals.      The patient describes these episodes as severe lightheadedness to the point that she had to sit down or lie down immediately.  Otherwise, she would pass out.  Most of these episodes are triggered when she walks for a mile or two, especially in nice, somewhat hot weather in the 70s.  She is afraid to take a hot shower because it also triggers it as well.  The patient wants to know whether a tilt-table study can be helpful for this condition to assess for \"POTS.\"  She does note some flushing with these episodes along with palpitations.  It does not appear to occur immediately after getting up from a sitting position.      The patient never wore a cardiac monitor when she had these episodes.  I think rather focus on the tilt-table study, looking for \"POTS,\" I would like to get some subjective information by having her wear a Zio Patch monitor for 2 weeks, especially when the weather is nicer because I want her to do everything she can to trigger her symptoms.  Should she have severe tonya or tachyarrhythmias to explain her symptoms, then we can act on it.  On the other hand, I would not be surprised if she has no arrhythmia at all to explain her symptoms.  If that is the case, we have to use deduction by blaming it " on her blood pressure.  Her symptoms are not consistent for postural orthostatic tachycardia syndrome, as in those patients, it usually occurs when changing positions, especially with prolonged standing.  We will contact the patient regarding result of the monitor and treatment options going forward depending on the results.         JOVANNA GARCIA MD             D: 2021   T: 2021   MT: FRANCISCO      Name:     TETE OLVERA   MRN:      [id number]        Account:      [id number]   :      1955           Service Date: 2021      Document: [id number]

## 2021-04-20 NOTE — PROGRESS NOTES
HPI and Plan:   See dictation  [id number]  Orders Placed This Encounter   Procedures     Leadless EKG Monitor 8 to 14 Days       No orders of the defined types were placed in this encounter.      There are no discontinued medications.      Encounter Diagnoses   Name Primary?     Pre-syncope      Decreased exercise tolerance        CURRENT MEDICATIONS:  Current Outpatient Medications   Medication Sig Dispense Refill     Cyanocobalamin (B-12) 1000 MCG TBCR Take 1,000 mcg by mouth daily 100 tablet 1     FLUoxetine (PROZAC) 20 MG capsule Take 1 capsule (20 mg) by mouth daily To take with 40 mg daily for a total of 60 mg daily 90 capsule 1     FLUoxetine (PROZAC) 40 MG capsule Take 1 capsule (40 mg) by mouth daily To take with 20 mg for total of 60 mg daily 90 capsule 1     FOLIC ACID 1 MG PO TABS 1 TABLET DAILY       HUMIRA PEN 40 MG/0.8ML injection Inject 40 mg Subcutaneous every 14 days        methotrexate 50 MG/2ML injection CHEMO Inject 4 mg Subcutaneous every 7 days        Omega-3 Fatty Acids (OMEGA-3 FISH OIL PO) Take 1 g by mouth daily (DRY EYE OMEGA BENEFITS) 667-250 mg-unit cap       RESTASIS 0.05 % ophthalmic emulsion Place 1 drop into both eyes 2 times daily       simvastatin (ZOCOR) 20 MG tablet Take 1 tablet (20 mg) by mouth At Bedtime 90 tablet 0     SM CALCIUM SOFT CHEWS 500-100-40 OR CHEW Take 1 tablet by mouth 2 times daily   0     TURMERIC PO Take 1,000 mg by mouth daily          ALLERGIES     Allergies   Allergen Reactions     Nka [No Known Allergies]        PAST MEDICAL HISTORY:  Past Medical History:   Diagnosis Date     Acetabular labrum tear, right, initial encounter 4/26/2016     Arthritis      Depressive disorder      Hemorrhage of rectum and anus 2002     Post-operative nausea and vomiting 7/14/2017     Trochanteric bursitis of right hip 4/26/2016       PAST SURGICAL HISTORY:  Past Surgical History:   Procedure Laterality Date     COLONOSCOPY N/A 10/14/2016    Procedure: COLONOSCOPY;  Surgeon:  Gerson Douglas MD;  Location: [facility]     COLONOSCOPY N/A 7/8/2020    Procedure: COLONOSCOPY, WITH POLYPECTOMY AND BIOPSY;  Surgeon: Jerry Cleveland DO;  Location: [facility]     ESOPHAGOSCOPY, GASTROSCOPY, DUODENOSCOPY (EGD), COMBINED  8/19/2011    Procedure:COMBINED ESOPHAGOSCOPY, GASTROSCOPY, DUODENOSCOPY (EGD), BIOPSY SINGLE OR MULTIPLE; Gastroscopy with biopsy; Surgeon:FARZAD GARCIA; Location:[facility]     ESOPHAGOSCOPY, GASTROSCOPY, DUODENOSCOPY (EGD), COMBINED  7/28/2014    Procedure: COMBINED ESOPHAGOSCOPY, GASTROSCOPY, DUODENOSCOPY (EGD), BIOPSY SINGLE OR MULTIPLE;  Surgeon: Marshall Martinez MD;  Location: [facility]     ESOPHAGOSCOPY, GASTROSCOPY, DUODENOSCOPY (EGD), COMBINED N/A 7/18/2018    Procedure: COMBINED ESOPHAGOSCOPY, GASTROSCOPY, DUODENOSCOPY (EGD), BIOPSY SINGLE OR MULTIPLE;  gastroscopy with biopsies;  Surgeon: Jorge Schofield MD;  Location: [facility]     HC DILATION/CURETTAGE DIAG/THER NON OB  2001    for uterine fibroids     OPEN REDUCTION INTERNAL FIXATION ELBOW Right 1/22/2016    right THR     ROTATOR CUFF REPAIR RT/LT  2009    left       FAMILY HISTORY:  Family History   Problem Relation Age of Onset     C.A.D. Father 54     C.A.D. Paternal Grandfather      C.A.D. Brother      Alcohol/Drug Brother      Alzheimer Disease Maternal Grandmother      Alzheimer Disease Maternal Grandfather      Alzheimer Disease Paternal Grandmother      Colon Cancer Mother        SOCIAL HISTORY:  Social History     Socioeconomic History     Marital status:      Spouse name: None     Number of children: None     Years of education: None     Highest education level: None   Occupational History     None   Social Needs     Financial resource strain: None     Food insecurity     Worry: None     Inability: None     Transportation needs     Medical: None     Non-medical: None   Tobacco Use     Smoking status: Never Smoker     Smokeless tobacco: Never Used   Substance and Sexual Activity      Alcohol use: Yes     Comment: 1 wine a month hardly     Drug use: No     Sexual activity: Not Currently   Lifestyle     Physical activity     Days per week: None     Minutes per session: None     Stress: None   Relationships     Social connections     Talks on phone: None     Gets together: None     Attends Worship service: None     Active member of club or organization: None     Attends meetings of clubs or organizations: None     Relationship status: None     Intimate partner violence     Fear of current or ex partner: None     Emotionally abused: None     Physically abused: None     Forced sexual activity: None   Other Topics Concern      Service No     Blood Transfusions No     Caffeine Concern Yes     Comment: 3-4 cups a day coffee and pop     Occupational Exposure No     Hobby Hazards No     Sleep Concern Yes     Stress Concern Yes     Weight Concern No     Comment: fluctuates up and down     Special Diet Yes     Comment: multi vit calcium and vit D chewable 3 a day     Back Care No     Exercise Yes     Comment: walk     Bike Helmet Yes     Seat Belt Yes     Self-Exams No     Comment: Patient does not do self breast exams, pamphlet given-instructions     Parent/sibling w/ CABG, MI or angioplasty before 65F 55M? Yes     Comment: Father-54 Bypass   Social History Narrative     None       Review of Systems:  Skin:  Negative       Eyes:  Positive for glasses;visual blurring    ENT:  Negative      Respiratory:  Positive for dyspnea on exertion     Cardiovascular:    fatigue;lightheadedness;dizziness;Positive for;syncope or near-syncope    Gastroenterology: Negative      Genitourinary:  Positive for urgency;urinary frequency    Musculoskeletal:  Positive for joint pain;joint swelling;muscular weakness;nocturnal cramping;joint stiffness    Neurologic:  Positive for headaches    Psychiatric:  Positive for anxiety;depression;sleep disturbances    Heme/Lymph/Imm:  Negative      Endocrine:  Negative         Physical Exam:  Vitals: /74   Pulse 86   Temp 97.5  F (36.4  C) (Tympanic)   Wt 51.3 kg (113 lb)   LMP  (LMP Unknown)   SpO2 98%   BMI 18.24 kg/m      Constitutional:  cooperative, alert and oriented, well developed, well nourished, in no acute distress        Skin:  warm and dry to the touch, no apparent skin lesions or masses noted          Head:  normocephalic, no masses or lesions        Eyes:  pupils equal and round, conjunctivae and lids unremarkable, sclera white, no xanthalasma, EOMS intact, no nystagmus        Lymph:No Cervical lymphadenopathy present     ENT:  no pallor or cyanosis        Neck:           Respiratory:  normal breath sounds, clear to auscultation, normal A-P diameter, normal symmetry, normal respiratory excursion, no use of accessory muscles         Cardiac: regular rhythm, normal S1/S2, no S3 or S4, apical impulse not displaced, no murmurs, gallops or rubs                                                         GI:           Extremities and Muscular Skeletal:                 Neurological:           Psych:  affect appropriate, oriented to time, person and place        CC  Aziza Aviles MD  [id number] [place] [address]

## 2021-04-20 NOTE — LETTER
4/20/2021    Aziza Aviles MD  [address]  [facility] [address]    RE: Yaritza Bradley       Dear Colleague,    I had the pleasure of seeing Yaritza Bradley in the [facility].    HPI and Plan:   See dictation  [id number]  Orders Placed This Encounter   Procedures     Leadless EKG Monitor 8 to 14 Days       No orders of the defined types were placed in this encounter.      There are no discontinued medications.      Encounter Diagnoses   Name Primary?     Pre-syncope      Decreased exercise tolerance        CURRENT MEDICATIONS:  Current Outpatient Medications   Medication Sig Dispense Refill     Cyanocobalamin (B-12) 1000 MCG TBCR Take 1,000 mcg by mouth daily 100 tablet 1     FLUoxetine (PROZAC) 20 MG capsule Take 1 capsule (20 mg) by mouth daily To take with 40 mg daily for a total of 60 mg daily 90 capsule 1     FLUoxetine (PROZAC) 40 MG capsule Take 1 capsule (40 mg) by mouth daily To take with 20 mg for total of 60 mg daily 90 capsule 1     FOLIC ACID 1 MG PO TABS 1 TABLET DAILY       HUMIRA PEN 40 MG/0.8ML injection Inject 40 mg Subcutaneous every 14 days        methotrexate 50 MG/2ML injection CHEMO Inject 4 mg Subcutaneous every 7 days        Omega-3 Fatty Acids (OMEGA-3 FISH OIL PO) Take 1 g by mouth daily (DRY EYE OMEGA BENEFITS) 667-250 mg-unit cap       RESTASIS 0.05 % ophthalmic emulsion Place 1 drop into both eyes 2 times daily       simvastatin (ZOCOR) 20 MG tablet Take 1 tablet (20 mg) by mouth At Bedtime 90 tablet 0     SM CALCIUM SOFT CHEWS 500-100-40 OR CHEW Take 1 tablet by mouth 2 times daily   0     TURMERIC PO Take 1,000 mg by mouth daily          ALLERGIES     Allergies   Allergen Reactions     Nka [No Known Allergies]        PAST MEDICAL HISTORY:  Past Medical History:   Diagnosis Date     Acetabular labrum tear, right, initial encounter 4/26/2016     Arthritis      Depressive disorder      Hemorrhage of rectum and anus 2002      Post-operative nausea and vomiting 7/14/2017     Trochanteric bursitis of right hip 4/26/2016       PAST SURGICAL HISTORY:  Past Surgical History:   Procedure Laterality Date     COLONOSCOPY N/A 10/14/2016    Procedure: COLONOSCOPY;  Surgeon: Gerson Douglas MD;  Location: [facility]     COLONOSCOPY N/A 7/8/2020    Procedure: COLONOSCOPY, WITH POLYPECTOMY AND BIOPSY;  Surgeon: Jerry Cleveland DO;  Location: [facility]     ESOPHAGOSCOPY, GASTROSCOPY, DUODENOSCOPY (EGD), COMBINED  8/19/2011    Procedure:COMBINED ESOPHAGOSCOPY, GASTROSCOPY, DUODENOSCOPY (EGD), BIOPSY SINGLE OR MULTIPLE; Gastroscopy with biopsy; Surgeon:FARZAD GARCIA; Location:[facility]     ESOPHAGOSCOPY, GASTROSCOPY, DUODENOSCOPY (EGD), COMBINED  7/28/2014    Procedure: COMBINED ESOPHAGOSCOPY, GASTROSCOPY, DUODENOSCOPY (EGD), BIOPSY SINGLE OR MULTIPLE;  Surgeon: Marshall Martinez MD;  Location: [facility]     ESOPHAGOSCOPY, GASTROSCOPY, DUODENOSCOPY (EGD), COMBINED N/A 7/18/2018    Procedure: COMBINED ESOPHAGOSCOPY, GASTROSCOPY, DUODENOSCOPY (EGD), BIOPSY SINGLE OR MULTIPLE;  gastroscopy with biopsies;  Surgeon: Jorge Schofield MD;  [facility]     HC DILATION/CURETTAGE DIAG/THER NON OB  2001    for uterine fibroids     OPEN REDUCTION INTERNAL FIXATION ELBOW Right 1/22/2016    right THR     ROTATOR CUFF REPAIR RT/LT  2009    left       FAMILY HISTORY:  Family History   Problem Relation Age of Onset     C.A.D. Father 54     C.A.D. Paternal Grandfather      C.A.D. Brother      Alcohol/Drug Brother      Alzheimer Disease Maternal Grandmother      Alzheimer Disease Maternal Grandfather      Alzheimer Disease Paternal Grandmother      Colon Cancer Mother        SOCIAL HISTORY:  Social History     Socioeconomic History     Marital status:      Spouse name: None     Number of children: None     Years of education: None     Highest education level: None   Occupational History     None   Social Needs     Financial resource strain: None      Food insecurity     Worry: None     Inability: None     Transportation needs     Medical: None     Non-medical: None   Tobacco Use     Smoking status: Never Smoker     Smokeless tobacco: Never Used   Substance and Sexual Activity     Alcohol use: Yes     Comment: 1 wine a month hardly     Drug use: No     Sexual activity: Not Currently   Lifestyle     Physical activity     Days per week: None     Minutes per session: None     Stress: None   Relationships     Social connections     Talks on phone: None     Gets together: None     Attends Mandaen service: None     Active member of club or organization: None     Attends meetings of clubs or organizations: None     Relationship status: None     Intimate partner violence     Fear of current or ex partner: None     Emotionally abused: None     Physically abused: None     Forced sexual activity: None   Other Topics Concern      Service No     Blood Transfusions No     Caffeine Concern Yes     Comment: 3-4 cups a day coffee and pop     Occupational Exposure No     Hobby Hazards No     Sleep Concern Yes     Stress Concern Yes     Weight Concern No     Comment: fluctuates up and down     Special Diet Yes     Comment: multi vit calcium and vit D chewable 3 a day     Back Care No     Exercise Yes     Comment: walk     Bike Helmet Yes     Seat Belt Yes     Self-Exams No     Comment: Patient does not do self breast exams, pamphlet given-instructions     Parent/sibling w/ CABG, MI or angioplasty before 65F 55M? Yes     Comment: Father-54 Bypass   Social History Narrative     None       Review of Systems:  Skin:  Negative       Eyes:  Positive for glasses;visual blurring    ENT:  Negative      Respiratory:  Positive for dyspnea on exertion     Cardiovascular:    fatigue;lightheadedness;dizziness;Positive for;syncope or near-syncope    Gastroenterology: Negative      Genitourinary:  Positive for urgency;urinary frequency    Musculoskeletal:  Positive for joint  pain;joint swelling;muscular weakness;nocturnal cramping;joint stiffness    Neurologic:  Positive for headaches    Psychiatric:  Positive for anxiety;depression;sleep disturbances    Heme/Lymph/Imm:  Negative      Endocrine:  Negative        Physical Exam:  Vitals: /74   Pulse 86   Temp 97.5  F (36.4  C) (Tympanic)   Wt 51.3 kg (113 lb)   LMP  (LMP Unknown)   SpO2 98%   BMI 18.24 kg/m      Constitutional:  cooperative, alert and oriented, well developed, well nourished, in no acute distress        Skin:  warm and dry to the touch, no apparent skin lesions or masses noted          Head:  normocephalic, no masses or lesions        Eyes:  pupils equal and round, conjunctivae and lids unremarkable, sclera white, no xanthalasma, EOMS intact, no nystagmus        Lymph:No Cervical lymphadenopathy present     ENT:  no pallor or cyanosis        Neck:           Respiratory:  normal breath sounds, clear to auscultation, normal A-P diameter, normal symmetry, normal respiratory excursion, no use of accessory muscles         Cardiac: regular rhythm, normal S1/S2, no S3 or S4, apical impulse not displaced, no murmurs, gallops or rubs                                                         GI:           Extremities and Muscular Skeletal:                 Neurological:           Psych:  affect appropriate, oriented to time, person and place      Thank you for allowing me to participate in the care of your patient.      Sincerely,     Los Garcia MD     [facility]    cc:   Aziza Aviles MD  [address] [place] [address]

## 2021-05-17 ENCOUNTER — HOSPITAL ENCOUNTER (OUTPATIENT)
Dept: CARDIOLOGY | Facility: CLINIC | Age: 66
Discharge: HOME | End: 2021-05-17
Attending: INTERNAL MEDICINE | Admitting: INTERNAL MEDICINE
Payer: COMMERCIAL

## 2021-05-17 DIAGNOSIS — R55 PRE-SYNCOPE: ICD-10-CM

## 2021-05-17 PROCEDURE — 93248 EXT ECG>7D<15D REV&INTERPJ: CPT | Performed by: INTERNAL MEDICINE

## 2021-05-17 PROCEDURE — 93246 EXT ECG>7D<15D RECORDING: CPT

## 2021-06-16 DIAGNOSIS — F33.1 MODERATE RECURRENT MAJOR DEPRESSION (H): ICD-10-CM

## 2021-06-16 DIAGNOSIS — E78.5 HYPERLIPIDEMIA, UNSPECIFIED HYPERLIPIDEMIA TYPE: ICD-10-CM

## 2021-06-16 NOTE — TELEPHONE ENCOUNTER
Routing refill request to provider for review/approval because:  Last PHQ-9 was 14 on 7/31/20.  Danye.  Abdias

## 2021-06-17 RX ORDER — FLUOXETINE 40 MG/1
CAPSULE ORAL
Qty: 90 CAPSULE | Refills: 1 | OUTPATIENT
Start: 2021-06-17

## 2021-06-17 RX ORDER — SIMVASTATIN 20 MG/1
TABLET, FILM COATED ORAL
Qty: 90 TABLET | Refills: 0 | OUTPATIENT
Start: 2021-06-17

## 2021-07-01 NOTE — RESULT ENCOUNTER NOTE
Disc results and recommendations with patient. States she has already had a Neuro consult. Disc Florinef. She has a visit with her PCP--Dr Aziza Aviles--next week and would like to discuss this with her. Will route results to Dr Aviles

## 2021-07-07 ENCOUNTER — TELEPHONE (OUTPATIENT)
Dept: FAMILY MEDICINE | Facility: CLINIC | Age: 66
End: 2021-07-07

## 2021-07-07 ENCOUNTER — OFFICE VISIT (OUTPATIENT)
Dept: FAMILY MEDICINE | Facility: CLINIC | Age: 66
End: 2021-07-07
Payer: COMMERCIAL

## 2021-07-07 VITALS
HEIGHT: 65 IN | SYSTOLIC BLOOD PRESSURE: 102 MMHG | TEMPERATURE: 98.3 F | DIASTOLIC BLOOD PRESSURE: 58 MMHG | OXYGEN SATURATION: 98 % | HEART RATE: 94 BPM | RESPIRATION RATE: 16 BRPM | BODY MASS INDEX: 18.99 KG/M2 | WEIGHT: 114 LBS

## 2021-07-07 DIAGNOSIS — Z00.00 ENCOUNTER FOR MEDICARE ANNUAL WELLNESS EXAM: Primary | ICD-10-CM

## 2021-07-07 DIAGNOSIS — M05.79 RHEUMATOID ARTHRITIS INVOLVING MULTIPLE SITES WITH POSITIVE RHEUMATOID FACTOR (H): Chronic | ICD-10-CM

## 2021-07-07 DIAGNOSIS — E78.5 HYPERLIPIDEMIA, UNSPECIFIED HYPERLIPIDEMIA TYPE: ICD-10-CM

## 2021-07-07 DIAGNOSIS — F33.1 MODERATE RECURRENT MAJOR DEPRESSION (H): ICD-10-CM

## 2021-07-07 DIAGNOSIS — I95.9 TRANSIENT HYPOTENSION: ICD-10-CM

## 2021-07-07 PROCEDURE — G0402 INITIAL PREVENTIVE EXAM: HCPCS | Performed by: FAMILY MEDICINE

## 2021-07-07 PROCEDURE — 99214 OFFICE O/P EST MOD 30 MIN: CPT | Mod: 25 | Performed by: FAMILY MEDICINE

## 2021-07-07 RX ORDER — FLUOXETINE 40 MG/1
40 CAPSULE ORAL DAILY
Qty: 90 CAPSULE | Refills: 1 | Status: SHIPPED | OUTPATIENT
Start: 2021-07-07 | End: 2021-08-30

## 2021-07-07 RX ORDER — FLUDROCORTISONE ACETATE 0.1 MG/1
0.1 TABLET ORAL DAILY
Qty: 30 TABLET | Refills: 0 | Status: SHIPPED | OUTPATIENT
Start: 2021-07-07 | End: 2022-08-25

## 2021-07-07 RX ORDER — SIMVASTATIN 20 MG
20 TABLET ORAL AT BEDTIME
Qty: 90 TABLET | Refills: 3 | Status: SHIPPED | OUTPATIENT
Start: 2021-07-07 | End: 2021-11-09

## 2021-07-07 RX ORDER — FOLIC ACID 1 MG/1
1000 TABLET ORAL DAILY
Qty: 90 TABLET | Refills: 3 | Status: SHIPPED | OUTPATIENT
Start: 2021-07-07

## 2021-07-07 ASSESSMENT — ANXIETY QUESTIONNAIRES
6. BECOMING EASILY ANNOYED OR IRRITABLE: SEVERAL DAYS
IF YOU CHECKED OFF ANY PROBLEMS ON THIS QUESTIONNAIRE, HOW DIFFICULT HAVE THESE PROBLEMS MADE IT FOR YOU TO DO YOUR WORK, TAKE CARE OF THINGS AT HOME, OR GET ALONG WITH OTHER PEOPLE: SOMEWHAT DIFFICULT
5. BEING SO RESTLESS THAT IT IS HARD TO SIT STILL: SEVERAL DAYS
1. FEELING NERVOUS, ANXIOUS, OR ON EDGE: NEARLY EVERY DAY
7. FEELING AFRAID AS IF SOMETHING AWFUL MIGHT HAPPEN: SEVERAL DAYS
2. NOT BEING ABLE TO STOP OR CONTROL WORRYING: SEVERAL DAYS
GAD7 TOTAL SCORE: 11
3. WORRYING TOO MUCH ABOUT DIFFERENT THINGS: SEVERAL DAYS

## 2021-07-07 ASSESSMENT — PATIENT HEALTH QUESTIONNAIRE - PHQ9
SUM OF ALL RESPONSES TO PHQ QUESTIONS 1-9: 12
5. POOR APPETITE OR OVEREATING: NEARLY EVERY DAY

## 2021-07-07 ASSESSMENT — MIFFLIN-ST. JEOR: SCORE: 1057.98

## 2021-07-07 NOTE — PATIENT INSTRUCTIONS
Patient Education   Personalized Prevention Plan  You are due for the preventive services outlined below.  Your care team is available to assist you in scheduling these services.  If you have already completed any of these items, please share that information with your care team to update in your medical record.  Health Maintenance Due   Topic Date Due     ANNUAL REVIEW OF HM ORDERS  Never done     Annual Wellness Visit  04/10/2020     Osteoporosis Screening  11/01/2020     Depression Assessment  01/31/2021     FALL RISK ASSESSMENT  07/31/2021     Mammogram  08/06/2021

## 2021-07-07 NOTE — PROGRESS NOTES
"  SUBJECTIVE:   Yaritza Bradley is a 65 year old female who presents for Preventive Visit.      Patient has been advised of split billing requirements and indicates understanding: Yes  Are you in the first 12 months of your Medicare Part B coverage?  Yes,  Visual Acuity:  Right Eye: 20/20   Left Eye: 20/20  Both Eyes: 20/20    Physical Health:    In general, how would you rate your overall physical health? fair    Outside of work, how many days during the week do you exercise? none    Outside of work, approximately how many minutes a day do you exercise?not applicable    If you drink alcohol do you typically have >3 drinks per day or >7 drinks per week? No    Do you usually eat at least 4 servings of fruit and vegetables a day, include whole grains & fiber and avoid regularly eating high fat or \"junk\" foods? Yes    Do you have any problems taking medications regularly?  No    Do you have any side effects from medications? none    Needs assistance for the following daily activities: no assistance needed    Which of the following safety concerns are present in your home?  none identified     Hearing impairment: Yes, Difficulty following a conversation in a noisy restaurant or crowded room.    In the past 6 months, have you been bothered by leaking of urine? yes    Mental Health:    In general, how would you rate your overall mental or emotional health? fair  PHQ-2 Score:  1    Do you feel safe in your environment? Yes    Have you ever done Advance Care Planning? (For example, a Health Directive, POLST, or a discussion with a medical provider or your loved ones about your wishes): Yes, advance care planning is on file.    Additional concerns to address?  YES    Fall risk:1       Cognitive Screenin) Repeat 3 items (Leader, Season, Table)    2) Clock draw: NORMAL  3) 3 item recall: Recalls 3 objects  Results: 3 items recalled: COGNITIVE IMPAIRMENT LESS LIKELY    Mini-CogTM Copyright S Jhonatan. Licensed by the author " for use in [facility]; reprinted with permission (kasey@.[facility]). All rights reserved.      Do you have sleep apnea, excessive snoring or daytime drowsiness?: no        Depression and Anxiety Follow-Up    How are you doing with your depression since your last visit? No change    How are you doing with your anxiety since your last visit?  No change    Are you having other symptoms that might be associated with depression or anxiety? No    Have you had a significant life event? Health Concerns     Do you have any concerns with your use of alcohol or other drugs? No    Social History     Tobacco Use     Smoking status: Never Smoker     Smokeless tobacco: Never Used   Substance Use Topics     Alcohol use: Yes     Comment: 1 wine a month hardly     Drug use: No     PHQ 9/27/2019 7/31/2020 7/7/2021   PHQ-9 Total Score 8 14 12   Q9: Thoughts of better off dead/self-harm past 2 weeks Not at all Several days Not at all     SHEA-7 SCORE 9/27/2019 7/31/2020 7/7/2021   Total Score - - -   Total Score 9 18 11     Last PHQ-9 7/7/2021   1.  Little interest or pleasure in doing things 0   2.  Feeling down, depressed, or hopeless 1   3.  Trouble falling or staying asleep, or sleeping too much 1   4.  Feeling tired or having little energy 3   5.  Poor appetite or overeating 0   6.  Feeling bad about yourself 1   7.  Trouble concentrating 3   8.  Moving slowly or restless 3   Q9: Thoughts of better off dead/self-harm past 2 weeks 0   PHQ-9 Total Score 12   Difficulty at work, home, or with people Not difficult at all     SHEA-7  7/7/2021   1. Feeling nervous, anxious, or on edge 3   2. Not being able to stop or control worrying 1   3. Worrying too much about different things 1   4. Trouble relaxing 3   5. Being so restless that it is hard to sit still 1   6. Becoming easily annoyed or irritable 1   7. Feeling afraid, as if something awful might happen 1   SHEA-7 Total Score 11   If you checked any problems, how difficult have  they made it for you to do your work, take care of things at home, or get along with other people? Somewhat difficult       Suicide Assessment Five-step Evaluation and Treatment (SAFE-T)      Reviewed and updated as needed this visit by clinical staff  Tobacco   Meds   Med Hx  Surg Hx  Fam Hx  Soc Hx        Reviewed and updated as needed this visit by Provider                Social History     Tobacco Use     Smoking status: Never Smoker     Smokeless tobacco: Never Used   Substance Use Topics     Alcohol use: Yes     Comment: 1 wine a month hardly                           Current providers sharing in care for this patient include:   Patient Care Team:  Aziza Aviles MD as PCP - General (Family Practice)  Brina Gonsalez MD as PCP - Internal Medicine  StrandKarla MD as MD (Family Practice)  Aziza Aviles MD as Assigned PCP  Carley Carr MD as Assigned Neuroscience Provider  Luz Grayson [facility] as Pharmacist (Pharmacist)  Los Wallace MD as Assigned Heart and Vascular Provider    The following health maintenance items are reviewed in Epic and correct as of today:  Health Maintenance   Topic Date Due     ANNUAL REVIEW OF HM ORDERS  Never done     MEDICARE ANNUAL WELLNESS VISIT  04/10/2020     DEXA  11/01/2020     FALL RISK ASSESSMENT  07/31/2021     MAMMO SCREENING  08/06/2021     INFLUENZA VACCINE (1) 09/01/2021     PHQ-9  01/07/2022     COLORECTAL CANCER SCREENING  07/08/2023     Pneumococcal Vaccine: 65+ Years (1 of 1 - PPSV23) 11/01/2023     LIPID  07/31/2025     ADVANCE CARE PLANNING  04/20/2026     DTAP/TDAP/TD IMMUNIZATION (4 - Td) 05/20/2028     HEPATITIS C SCREENING  Completed     DEPRESSION ACTION PLAN  Completed     ZOSTER IMMUNIZATION  Completed     COVID-19 Vaccine  Completed     IPV IMMUNIZATION  Aged Out     MENINGITIS IMMUNIZATION  Aged Out     HEPATITIS B IMMUNIZATION  Aged Out     Lab work is in process      ROS:  Constitutional, HEENT,  "cardiovascular, pulmonary, gi and gu systems are negative, except as otherwise noted.    Had another episode of near syncope just a few days ago while shopping at [employer].  She felt well until she was standing in line and then felt like she was going to pass out.  Had to lie down for 30 minutes or so. She declined EMS at the time.  Felt heart was racing for 30 minutes    Had a 14 day zio patch this spring and SVT noted x 2 about 19 beats each episode, but she did not have presyncope with either episode.      Dr. Wallace indicated the a trial of florinef may be useful and she would like to go forward with that.     OBJECTIVE:   /58   Pulse 94   Temp 98.3  F (36.8  C)   Resp 16   Ht 1.651 m (5' 5\")   Wt 51.7 kg (114 lb)   LMP  (LMP Unknown)   SpO2 98%   BMI 18.97 kg/m   Estimated body mass index is 18.97 kg/m  as calculated from the following:    Height as of this encounter: 1.651 m (5' 5\").    Weight as of this encounter: 51.7 kg (114 lb).  EXAM:   GENERAL: healthy, alert and no distress  NECK: no adenopathy, no asymmetry, masses, or scars and thyroid normal to palpation  RESP: lungs clear to auscultation - no rales, rhonchi or wheezes  CV: regular rate and rhythm, normal S1 S2, no S3 or S4, no murmur, click or rub, no peripheral edema and peripheral pulses strong  ABDOMEN: soft, nontender, no hepatosplenomegaly, no masses and bowel sounds normal  MS: no gross musculoskeletal defects noted, no edema        ASSESSMENT / PLAN:   1. Encounter for Medicare annual wellness exam       2. Hyperlipidemia, unspecified hyperlipidemia type   will get labs in few weeks, she will fast  - simvastatin (ZOCOR) 20 MG tablet; Take 1 tablet (20 mg) by mouth At Bedtime  Dispense: 90 tablet; Refill: 3  - **Basic metabolic panel FUTURE anytime; Future  - Lipid panel reflex to direct LDL Fasting; Future    3. Moderate recurrent major depression (H)  Stable, not particularly well controlled but she does feel the medication is " "helpful.   - FLUoxetine (PROZAC) 20 MG capsule; Take 1 capsule (20 mg) by mouth daily To take with 40 mg daily for a total of 60 mg daily  Dispense: 90 capsule; Refill: 1  - FLUoxetine (PROZAC) 40 MG capsule; Take 1 capsule (40 mg) by mouth daily To take with 20 mg for total of 60 mg daily  Dispense: 90 capsule; Refill: 1    4. Transient hypotension   will check with Dr. Wallace on wether or not a loop recorder may be helpful for the episodic presyncopal episodes.    - fludrocortisone (FLORINEF) 0.1 MG tablet; Take 1 tablet (0.1 mg) by mouth daily  Dispense: 30 tablet; Refill: 0    5. Rheumatoid arthritis involving multiple sites with positive rheumatoid factor (H)   sees rheumatologist  - folic acid (FOLVITE) 1 MG tablet; Take 1 tablet (1,000 mcg) by mouth daily  Dispense: 90 tablet; Refill: 3    Patient has been advised of split billing requirements and indicates understanding: Yes    COUNSELING:  Reviewed preventive health counseling, as reflected in patient instructions       Regular exercise       Healthy diet/nutrition    Estimated body mass index is 18.97 kg/m  as calculated from the following:    Height as of this encounter: 1.651 m (5' 5\").    Weight as of this encounter: 51.7 kg (114 lb).        She reports that she has never smoked. She has never used smokeless tobacco.    Appropriate preventive services were discussed with this patient, including applicable screening as appropriate for cardiovascular disease, diabetes, osteopenia/osteoporosis, and glaucoma.  As appropriate for age/gender, discussed screening for colorectal cancer, prostate cancer, breast cancer, and cervical cancer. Checklist reviewing preventive services available has been given to the patient.    Reviewed patients plan of care and provided an AVS. The Basic Care plan for Yaritza meets the Care Plan requirement. This Care Plan has been established and reviewed with the Patient and spouse.    Counseling Resources:  ATP IV Guidelines  Pooled " Cohorts Equation Calculator  Breast Cancer Risk Calculator  BRCA-Related Cancer Risk Assessment: FHS-7 Tool  FRAX Risk Assessment  ICSI Preventive Guidelines  Dietary Guidelines for Americans, 2010  USDA's MyPlate  ASA Prophylaxis  Lung CA Screening    Aziza Aviles MD  [facility]

## 2021-07-08 ASSESSMENT — ANXIETY QUESTIONNAIRES: GAD7 TOTAL SCORE: 11

## 2021-07-08 NOTE — TELEPHONE ENCOUNTER
Hi, I believe she felt similar symptoms of lightheadedness while wearing ZIO and no arrhythmias were detected. If she truly had the same sxs and no arrhythmias noted I don't think an ILR would add any additional information. We can try Florinef for now.

## 2021-07-13 ENCOUNTER — TRANSFERRED RECORDS (OUTPATIENT)
Dept: HEALTH INFORMATION MANAGEMENT | Facility: CLINIC | Age: 66
End: 2021-07-13

## 2021-07-13 LAB
ALT SERPL-CCNC: 27 IU/L (ref 5–35)
AST SERPL-CCNC: 36 U/L (ref 5–34)
CREATININE (EXTERNAL): 0.64 MG/DL (ref 0.5–1.3)
GFR ESTIMATED (EXTERNAL): 98.7 ML/MIN/1.73M2

## 2021-07-20 NOTE — TELEPHONE ENCOUNTER
Dr. Wallace-    I saw Nicole today, I see you recently looked at her Zio Patch results and indicated that maybe florinef would be an option to try as this may be hypotension.    She did have another pre-syncopal event while out shopping. No LOC.  She had to lay down for 30 minutes, felt her heart was racing (declined EMS coming to evaluate so we don't have a rhythm strip.    Is this something you'd potentially put in a loop recorder for?    I did order the Florinef and will see her back in a few weeks, but it seem the spells are infrequent enough (q 3 months or so) that we otherwise may not capture what is happening.    Thanks for your thoughts.    Aziza Aviles M.D.  Family Medicine  [facility]     weakness

## 2021-08-19 ENCOUNTER — LAB (OUTPATIENT)
Dept: LAB | Facility: CLINIC | Age: 66
End: 2021-08-19
Payer: COMMERCIAL

## 2021-08-19 DIAGNOSIS — E87.1 HYPONATREMIA: Primary | ICD-10-CM

## 2021-08-19 DIAGNOSIS — E78.5 HYPERLIPIDEMIA, UNSPECIFIED HYPERLIPIDEMIA TYPE: ICD-10-CM

## 2021-08-19 LAB
ANION GAP SERPL CALCULATED.3IONS-SCNC: 4 MMOL/L (ref 3–14)
BUN SERPL-MCNC: 9 MG/DL (ref 7–30)
CALCIUM SERPL-MCNC: 9.2 MG/DL (ref 8.5–10.1)
CHLORIDE BLD-SCNC: 98 MMOL/L (ref 94–109)
CHOLEST SERPL-MCNC: 118 MG/DL
CO2 SERPL-SCNC: 28 MMOL/L (ref 20–32)
CREAT SERPL-MCNC: 0.78 MG/DL (ref 0.52–1.04)
FASTING STATUS PATIENT QL REPORTED: YES
GFR SERPL CREATININE-BSD FRML MDRD: 79 ML/MIN/1.73M2
GLUCOSE BLD-MCNC: 84 MG/DL (ref 70–99)
HDLC SERPL-MCNC: 77 MG/DL
LDLC SERPL CALC-MCNC: 23 MG/DL
NONHDLC SERPL-MCNC: 41 MG/DL
POTASSIUM BLD-SCNC: 4 MMOL/L (ref 3.4–5.3)
SODIUM SERPL-SCNC: 130 MMOL/L (ref 133–144)
TRIGL SERPL-MCNC: 88 MG/DL

## 2021-08-19 PROCEDURE — 36415 COLL VENOUS BLD VENIPUNCTURE: CPT

## 2021-08-19 PROCEDURE — 80048 BASIC METABOLIC PNL TOTAL CA: CPT

## 2021-08-19 PROCEDURE — 80061 LIPID PANEL: CPT

## 2021-08-23 ENCOUNTER — TRANSFERRED RECORDS (OUTPATIENT)
Dept: HEALTH INFORMATION MANAGEMENT | Facility: CLINIC | Age: 66
End: 2021-08-23

## 2021-08-28 ENCOUNTER — MYC MEDICAL ADVICE (OUTPATIENT)
Dept: FAMILY MEDICINE | Facility: CLINIC | Age: 66
End: 2021-08-28

## 2021-08-28 DIAGNOSIS — F33.1 MODERATE RECURRENT MAJOR DEPRESSION (H): ICD-10-CM

## 2021-08-30 RX ORDER — FLUOXETINE 40 MG/1
40 CAPSULE ORAL DAILY
Qty: 90 CAPSULE | Refills: 1 | Status: SHIPPED | OUTPATIENT
Start: 2021-08-30 | End: 2022-08-25

## 2021-08-30 NOTE — TELEPHONE ENCOUNTER
Fluoxetine 20 mg and 40 mg refills cancelled from [facility] and new order sent to [facility], [place]. KPavelRLEW

## 2021-09-12 ENCOUNTER — HEALTH MAINTENANCE LETTER (OUTPATIENT)
Age: 66
End: 2021-09-12

## 2021-10-11 ENCOUNTER — MYC MEDICAL ADVICE (OUTPATIENT)
Dept: FAMILY MEDICINE | Facility: CLINIC | Age: 66
End: 2021-10-11

## 2021-10-11 DIAGNOSIS — M89.9 DISORDER OF BONE AND CARTILAGE: ICD-10-CM

## 2021-10-11 DIAGNOSIS — M94.9 DISORDER OF BONE AND CARTILAGE: ICD-10-CM

## 2021-10-11 DIAGNOSIS — M05.79 RHEUMATOID ARTHRITIS INVOLVING MULTIPLE SITES WITH POSITIVE RHEUMATOID FACTOR (H): Primary | ICD-10-CM

## 2021-10-11 DIAGNOSIS — E28.39 ESTROGEN DEFICIENCY: ICD-10-CM

## 2021-10-12 ENCOUNTER — MEDICAL CORRESPONDENCE (OUTPATIENT)
Dept: SCHEDULING | Facility: CLINIC | Age: 66
End: 2021-10-12
Payer: COMMERCIAL

## 2021-10-12 NOTE — PROGRESS NOTES
PHYSICAL THERAPY DISCHARGE NOTE  Patient did not return for follow up treatments.  Goal status and current objective information is therefore unknown.  The daily note from the patient's last visit will serve as the discharge note. Discharge from PT services at this time for this episode of treatment. Please see attached documentation under this episode of care for further information including dates of service, start of care date, referring physician, Dx, treatment plan, treatments, etc.    Please contact me with any questions or concerns.  Thank you for your referral.    Sima Morillo PT, DPT, OCS  Physical Therapist, Orthopedic Certified Specialist    [facility]  [address]   [address]  [place] [address]  [email or web address]   Office: [phone]   Employed by [employer]     09/29/20 1100   Signing Clinician's Name / Credentials   Signing clinician's name / credentials Sima Morillo PT, DPT, OCS   Session Number   Session Number 5 PO   Progress Note/Recertification   Progress Note Due Date 09/22/20   Adult Goals   PT Eval Goals 1;2;3;4   Goal 1   Goal Identifier 1   Goal Description Patient will improve her FGA score by 5 points or greater to decrease risk for falls.    Target Date 09/22/20   Goal 2   Goal Identifier 2   Goal Description Patient will be able to walk 100ft without LOB or path deviation.   Target Date 09/22/20   Goal 3   Goal Identifier 3   Goal Description Patient will be able to walk and turn her with only minimal symptoms or gait deviations.   Target Date 09/22/20   Goal 4   Goal Identifier 4   Goal Description Patient will be independent with HEP to aid functional recovery.    Target Date 09/22/20   Subjective Report   Subjective Report Feels typical dizzy and tired today. Went through all the cardiac testing and results were normal. had a couple days this week where she was too tired, spent 2 days in  "bed.   Treatment Interventions   Interventions Gait Training;Infrared Goggle Exam or Frenzel Lense Exam;Oculomotor Exam;Dynamic Visual Acuity (DVA);Neuromuscular Re-education;Therapeutic Procedure/Exercise   Neuromuscular Re-education   Neuromuscular re-ed of mvmt, balance, coord, kinesthetic sense, posture, proprioception minutes (23851) 30   Skilled Intervention balance, vestibular ex to decrease dizziness and to decrease fall risk   Patient Response mod cues for coordination, sequencing, neuro-control    Treatment Detail Bike L3 x 4 min. carioca, cuing for sequencing, inc JUANITO. sit to stands (arms crossed over chest) x 20 - cues to no let knees come together. standing band walks YTB at ankles knees 20' B. 6\" forward step ups 1 hand support x 20 B. instr pt in bridge on ball x 20 - difficult, gave feet flat on floor for home. core stab marching - cues for sequencing. standing on BOSU 1 hand support, stand on BOSU Ws side/side 2 hand support needed.    Education   Learner Patient   Readiness Eager   Method Booklet/handout;Explanation;Demonstration   Response Verbalizes Understanding;Demonstrates Understanding   Plan   Homework [email or web address]   Home program walking program w/hiking poles, PTRX    Updates to plan of care added step ups    Plan for next session prog balance and strength ex   Total Session Time   Timed Code Treatment Minutes 30   Total Treatment Time (sum of timed and untimed services) 30   AMBULATORY CLINICS ONLY-MEDICAL AND TREATMENT DIAGNOSIS   Medical Diagnosis Gait difficulty    PT Diagnosis dizziness, imbalance     "

## 2021-11-02 ENCOUNTER — HOSPITAL ENCOUNTER (OUTPATIENT)
Dept: BONE DENSITY | Facility: CLINIC | Age: 66
Discharge: HOME | End: 2021-11-02
Attending: FAMILY MEDICINE | Admitting: FAMILY MEDICINE
Payer: COMMERCIAL

## 2021-11-02 DIAGNOSIS — E28.39 ESTROGEN DEFICIENCY: ICD-10-CM

## 2021-11-02 DIAGNOSIS — M94.9 DISORDER OF BONE AND CARTILAGE: ICD-10-CM

## 2021-11-02 DIAGNOSIS — M05.79 RHEUMATOID ARTHRITIS INVOLVING MULTIPLE SITES WITH POSITIVE RHEUMATOID FACTOR (H): ICD-10-CM

## 2021-11-02 DIAGNOSIS — M89.9 DISORDER OF BONE AND CARTILAGE: ICD-10-CM

## 2021-11-02 PROCEDURE — 77080 DXA BONE DENSITY AXIAL: CPT

## 2021-11-04 ENCOUNTER — TRANSFERRED RECORDS (OUTPATIENT)
Dept: HEALTH INFORMATION MANAGEMENT | Facility: CLINIC | Age: 66
End: 2021-11-04
Payer: COMMERCIAL

## 2021-11-04 LAB
ALT SERPL-CCNC: 19 LU/L (ref 5–35)
AST SERPL-CCNC: 32 U/L (ref 5–34)
CREATININE (EXTERNAL): 0.62 MG/DL (ref 0.5–1.3)

## 2021-11-07 ENCOUNTER — HEALTH MAINTENANCE LETTER (OUTPATIENT)
Age: 66
End: 2021-11-07

## 2021-11-09 ENCOUNTER — MYC MEDICAL ADVICE (OUTPATIENT)
Dept: FAMILY MEDICINE | Facility: CLINIC | Age: 66
End: 2021-11-09
Payer: COMMERCIAL

## 2021-11-09 DIAGNOSIS — E78.5 HYPERLIPIDEMIA, UNSPECIFIED HYPERLIPIDEMIA TYPE: ICD-10-CM

## 2021-11-09 RX ORDER — SIMVASTATIN 20 MG/1
TABLET, FILM COATED ORAL
Qty: 90 TABLET | Refills: 2 | Status: SHIPPED | OUTPATIENT
Start: 2021-11-09 | End: 2022-08-08

## 2021-12-02 ENCOUNTER — HOSPITAL ENCOUNTER (OUTPATIENT)
Dept: MAMMOGRAPHY | Facility: CLINIC | Age: 66
Discharge: HOME | End: 2021-12-02
Attending: FAMILY MEDICINE | Admitting: FAMILY MEDICINE
Payer: COMMERCIAL

## 2021-12-02 DIAGNOSIS — Z12.31 SCREENING MAMMOGRAM FOR HIGH-RISK PATIENT: ICD-10-CM

## 2021-12-02 PROCEDURE — 77063 BREAST TOMOSYNTHESIS BI: CPT

## 2021-12-14 ENCOUNTER — MYC MEDICAL ADVICE (OUTPATIENT)
Dept: FAMILY MEDICINE | Facility: CLINIC | Age: 66
End: 2021-12-14
Payer: COMMERCIAL

## 2021-12-14 DIAGNOSIS — F40.240 CLAUSTROPHOBIA: Primary | ICD-10-CM

## 2021-12-14 RX ORDER — LORAZEPAM 0.5 MG/1
0.5 TABLET ORAL ONCE
Qty: 1 TABLET | Refills: 0 | Status: SHIPPED | OUTPATIENT
Start: 2021-12-14 | End: 2021-12-14

## 2021-12-16 ENCOUNTER — HOSPITAL ENCOUNTER (OUTPATIENT)
Dept: MRI IMAGING | Facility: CLINIC | Age: 66
Discharge: HOME | End: 2021-12-16
Attending: PSYCHIATRY & NEUROLOGY | Admitting: PSYCHIATRY & NEUROLOGY
Payer: COMMERCIAL

## 2021-12-16 DIAGNOSIS — R25.2 SPASTICITY: ICD-10-CM

## 2021-12-16 DIAGNOSIS — I67.81 LEUKOARAIOSIS: ICD-10-CM

## 2021-12-16 PROCEDURE — 72141 MRI NECK SPINE W/O DYE: CPT

## 2022-03-15 ENCOUNTER — TRANSCRIBE ORDERS (OUTPATIENT)
Dept: OTHER | Age: 67
End: 2022-03-15
Payer: COMMERCIAL

## 2022-03-15 DIAGNOSIS — I67.81 LEUKOARAIOSIS: Primary | ICD-10-CM

## 2022-03-15 DIAGNOSIS — R25.2 SPASTICITY: ICD-10-CM

## 2022-04-06 ENCOUNTER — MEDICAL CORRESPONDENCE (OUTPATIENT)
Dept: HEALTH INFORMATION MANAGEMENT | Facility: CLINIC | Age: 67
End: 2022-04-06
Payer: COMMERCIAL

## 2022-04-07 DIAGNOSIS — M05.79 SEROPOSITIVE RHEUMATOID ARTHRITIS OF MULTIPLE SITES (H): Primary | ICD-10-CM

## 2022-04-07 DIAGNOSIS — Z79.899 DRUG THERAPY: ICD-10-CM

## 2022-04-11 ENCOUNTER — OFFICE VISIT (OUTPATIENT)
Dept: DERMATOLOGY | Facility: CLINIC | Age: 67
End: 2022-04-11
Payer: COMMERCIAL

## 2022-04-11 VITALS — DIASTOLIC BLOOD PRESSURE: 76 MMHG | HEART RATE: 92 BPM | OXYGEN SATURATION: 98 % | SYSTOLIC BLOOD PRESSURE: 131 MMHG

## 2022-04-11 DIAGNOSIS — D18.01 ANGIOMA OF SKIN: ICD-10-CM

## 2022-04-11 DIAGNOSIS — D22.9 NEVUS: Primary | ICD-10-CM

## 2022-04-11 DIAGNOSIS — L81.4 LENTIGO: ICD-10-CM

## 2022-04-11 DIAGNOSIS — L57.0 ACTINIC KERATOSIS: ICD-10-CM

## 2022-04-11 DIAGNOSIS — L82.1 SEBORRHEIC KERATOSIS: ICD-10-CM

## 2022-04-11 DIAGNOSIS — L82.0 INFLAMED SEBORRHEIC KERATOSIS: ICD-10-CM

## 2022-04-11 PROCEDURE — 17000 DESTRUCT PREMALG LESION: CPT | Mod: 59 | Performed by: PHYSICIAN ASSISTANT

## 2022-04-11 PROCEDURE — 17110 DESTRUCTION B9 LES UP TO 14: CPT | Performed by: PHYSICIAN ASSISTANT

## 2022-04-11 PROCEDURE — 17003 DESTRUCT PREMALG LES 2-14: CPT | Mod: 59 | Performed by: PHYSICIAN ASSISTANT

## 2022-04-11 PROCEDURE — 99203 OFFICE O/P NEW LOW 30 MIN: CPT | Mod: 25 | Performed by: PHYSICIAN ASSISTANT

## 2022-04-11 RX ORDER — SIMVASTATIN 20 MG
20 TABLET ORAL
COMMUNITY
End: 2022-08-08

## 2022-04-11 NOTE — PROGRESS NOTES
HPI:   Chief complaints: Yaritza Bradley is a pleasant 66 year old female who presents for Full skin cancer screening to rule out skin cancer   Last Skin Exam: n/a      1st Baseline: yes  Personal HX of Skin Cancer: no   Personal HX of Malignant Melanoma: no   Family HX of Skin Cancer / Malignant Melanoma: no  Personal HX of Atypical Moles:   no  Risk factors: history of sun exposure  New / Changing lesions:yes spot on the scalp and left knee  Social History:   On review of systems, there are no further skin complaints, patient is feeling otherwise well.   ROS of the following were done and are negative: Constitutional, Eyes, Ears, Nose,   Mouth, Throat, Cardiovascular, Respiratory, GI, Genitourinary, Musculoskeletal,   Psychiatric, Endocrine, Allergic/Immunologic.    PHYSICAL EXAM:   /76   Pulse 92   LMP  (LMP Unknown)   SpO2 98%   Skin exam performed as follows: Type 2 skin. Mood appropriate  Alert and Oriented X 3. Well developed, well nourished in no distress.  General appearance: Normal  Head including face: Normal  Eyes: conjunctiva and lids: Normal  Mouth: Lips, teeth, gums: Normal  Neck: Normal  Chest-breast/axillae: Normal  Back: Normal  Spleen and liver: Normal  Cardiovascular: Exam of peripheral vascular system by observation for swelling, varicosities, edema: Normal  Genitalia: groin, buttocks: Normal  Extremities: digits/nails (clubbing): Normal  Eccrine and Apocrine glands: Normal  Right upper extremity: Normal  Left upper extremity: Normal  Right lower extremity: Normal  Left lower extremity: Normal  Skin: Scalp and body hair: See below    Pt deferred exam of breasts, groin, buttocks: No    Other physical findings:  1. Multiple pigmented macules on extremities and trunk  2. Multiple pigmented macules on face, trunk and extremities  3. Multiple vascular papules on trunk, arms and legs  4. Multiple scattered keratotic plaques  5. Inflamed keratotic papule on the left anterior knee x 1  6. Pink  gritty papule on the forehead x 3, left nare x 1       Except as noted above, no other signs of skin cancer or melanoma.     ASSESSMENT/PLAN:   Benign Full skin cancer screening today. . Patient with history of none  Advised on monthly self exams and 1 year  Patient Education: Appropriate brochures given.    1. Multiple benign appearing melanocytic nevi on arms, legs and trunk. Discussed ABCDEs of melanoma and sunscreen.   2. Multiple lentigos on arms, legs and trunk. Advised benign, no treatment needed.  3. Multiple scattered angiomas. Advised benign, no treatment needed.   4. Seborrheic keratosis on arms, legs and trunk. Advised benign, no treatment needed.  5. Inflamed seborrheic keratosis on the left anterior knee x 1. As physically tender cryosurgery performed. Advised on post op care.   6. Actinic keratosis on the forehead x 3, left nare x 1. As precancerous, cryosurgery performed. Advised on blistering and post-op care. Advised if not resolved in 1-2 months to return for evaluation              Follow-up: yearly FSE/PRN sooner    1.) Patient was asked about new and changing moles. YES  2.) Patient received a complete physical skin examination: YES  3.) Patient was counseled to perform a monthly self skin examination: YES  Scribed By: An Yi MS, PAVERENA

## 2022-04-11 NOTE — LETTER
4/11/2022         RE: Yaritza Bradley  [address] [address]        Dear Colleague,    Thank you for referring your patient, Yaritza Bradley, to the [facility]. Please see a copy of my visit note below.    HPI:   Chief complaints: Yaritza Bradley is a pleasant 66 year old female who presents for Full skin cancer screening to rule out skin cancer   Last Skin Exam: n/a      1st Baseline: yes  Personal HX of Skin Cancer: no   Personal HX of Malignant Melanoma: no   Family HX of Skin Cancer / Malignant Melanoma: no  Personal HX of Atypical Moles:   no  Risk factors: history of sun exposure  New / Changing lesions:yes spot on the scalp and left knee  Social History:   On review of systems, there are no further skin complaints, patient is feeling otherwise well.   ROS of the following were done and are negative: Constitutional, Eyes, Ears, Nose,   Mouth, Throat, Cardiovascular, Respiratory, GI, Genitourinary, Musculoskeletal,   Psychiatric, Endocrine, Allergic/Immunologic.    PHYSICAL EXAM:   /76   Pulse 92   LMP  (LMP Unknown)   SpO2 98%   Skin exam performed as follows: Type 2 skin. Mood appropriate  Alert and Oriented X 3. Well developed, well nourished in no distress.  General appearance: Normal  Head including face: Normal  Eyes: conjunctiva and lids: Normal  Mouth: Lips, teeth, gums: Normal  Neck: Normal  Chest-breast/axillae: Normal  Back: Normal  Spleen and liver: Normal  Cardiovascular: Exam of peripheral vascular system by observation for swelling, varicosities, edema: Normal  Genitalia: groin, buttocks: Normal  Extremities: digits/nails (clubbing): Normal  Eccrine and Apocrine glands: Normal  Right upper extremity: Normal  Left upper extremity: Normal  Right lower extremity: Normal  Left lower extremity: Normal  Skin: Scalp and body hair: See below    Pt deferred exam of breasts, groin, buttocks: No    Other physical findings:  1. Multiple pigmented macules on  extremities and trunk  2. Multiple pigmented macules on face, trunk and extremities  3. Multiple vascular papules on trunk, arms and legs  4. Multiple scattered keratotic plaques  5. Inflamed keratotic papule on the left anterior knee x 1  6. Pink gritty papule on the forehead x 3, left nare x 1       Except as noted above, no other signs of skin cancer or melanoma.     ASSESSMENT/PLAN:   Benign Full skin cancer screening today. . Patient with history of none  Advised on monthly self exams and 1 year  Patient Education: Appropriate brochures given.    1. Multiple benign appearing melanocytic nevi on arms, legs and trunk. Discussed ABCDEs of melanoma and sunscreen.   2. Multiple lentigos on arms, legs and trunk. Advised benign, no treatment needed.  3. Multiple scattered angiomas. Advised benign, no treatment needed.   4. Seborrheic keratosis on arms, legs and trunk. Advised benign, no treatment needed.  5. Inflamed seborrheic keratosis on the left anterior knee x 1. As physically tender cryosurgery performed. Advised on post op care.   6. Actinic keratosis on the forehead x 3, left nare x 1. As precancerous, cryosurgery performed. Advised on blistering and post-op care. Advised if not resolved in 1-2 months to return for evaluation              Follow-up: yearly FSE/PRN sooner    1.) Patient was asked about new and changing moles. YES  2.) Patient received a complete physical skin examination: YES  3.) Patient was counseled to perform a monthly self skin examination: YES  Scribed By: An Yi, MS, PABessieC          Again, thank you for allowing me to participate in the care of your patient.        Sincerely,        An Yi PA-C

## 2022-04-12 ENCOUNTER — HOSPITAL ENCOUNTER (OUTPATIENT)
Dept: PHYSICAL THERAPY | Facility: CLINIC | Age: 67
Setting detail: THERAPIES SERIES
Discharge: HOME | End: 2022-04-12
Attending: PSYCHIATRY & NEUROLOGY
Payer: COMMERCIAL

## 2022-04-12 DIAGNOSIS — R25.2 SPASTICITY: ICD-10-CM

## 2022-04-12 DIAGNOSIS — I67.81 LEUKOARAIOSIS: ICD-10-CM

## 2022-04-12 PROCEDURE — 97161 PT EVAL LOW COMPLEX 20 MIN: CPT | Mod: GP | Performed by: PHYSICAL THERAPIST

## 2022-04-14 NOTE — PROGRESS NOTES
PHYSICAL THERAPY INITIAL EVALUATION  04/12/22 1100   Quick Adds   Quick Adds Certification;Vestibular Eval   Type of Visit Initial OP PT Evaluation   General Information   Start of Care Date 04/12/22   Referring Physician Dr. 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   Onset of illness/injury or Date of Surgery 03/14/22  (order date)   Pertinent history of current problem (include personal factors and/or comorbidities that impact the POC) Years of balance issues. Finally given a diagnosis, Leukoaraiosis. Did a course of PT in [place]  and did well. Dizziness is better. Has felt her strength is better. She reports she would like to work a little more on balance and wanted to do therapy closer to home. Has a big trip planned for [place] in September and wants to be able to manage the uneven ground.   Prior level of function comment independent   Patient role/Employment history Retired   Current Assistive Devices   (none currently)   Patient/Family Goals Statement improve balance on uneven ground, stairs, increasing walking distance   Fall Risk Screen   Fall screen completed by PT   Have you fallen 2 or more times in the past year? No   Have you fallen and had an injury in the past year? No   Is patient a fall risk? No   Pain   Patient currently in pain Yes   Pain location L knee, B shoulders   Pain rating Shoulders were up to 8/10, better now with ex modification, currently 3/10.   Strength   Strength Comments Hip flexion 4+/5 B, Hip abduction R 4-/5 L 4/5 , Knee extension 5/5 B, Knee flexion 5/5 B, DF 5-/5 B, PF R 5/5, L 5-/5   Gait Special Tests   Gait Special Tests FUNCTIONAL GAIT ASSESSMENT   Gait Special Tests Functional Gait Assessment Score out of 30   Score out of 30 20   Balance Special Tests   Balance Special Tests Timed up and go;Sit to stand reps   Balance Special Tests Timed Up and Go   Seconds 9.8 Seconds    Comments Functional TU.4 seconds, Cognitive TU.09 seconds   Balance Special Tests Sit to Stand Reps in 30 Seconds   Reps in 30 seconds 12   Oculomotor Exam   Smooth Pursuit Normal   Saccades Abnormal   Saccades Comments overshooting, no symptoms   VOR Abnormal   VOR Comments saccadic intrusions, no symptoms   VOR Cancellation Abnormal   VOR Cancellation Comments saccadic instrusions, no symptoms   Rapid Head Thrust Normal   Convergence Testing Abnormal   Convergence Testing Comments 10 cm   Infrared Goggle Exam or Frenzel Lense Exam   Exam completed with Room Light   Spontaneous Nystagmus Negative   Gaze Evoked Nystagmus Negative   Planned Therapy Interventions   Planned Therapy Interventions balance training;neuromuscular re-education;ROM;strengthening;stretching   Clinical Impression   Criteria for Skilled Therapeutic Interventions Met yes, treatment indicated   PT Diagnosis balance and gait impairments, dizziness   Influenced by the following impairments balance and gait impairments, dizziness   Functional limitations due to impairments uneven surfaces, stairs, dynamic gait, dual tasks   Clinical Presentation Stable/Uncomplicated   Clinical Presentation Rationale sx currently stable, improved   Clinical Decision Making (Complexity) Low complexity   Therapy Frequency 1 time/week   Predicted Duration of Therapy Intervention (days/wks) 8 weeks and reassess   Risk & Benefits of therapy have been explained Yes   Patient, Family & other staff in agreement with plan of care Yes   Education Assessment   Preferred Learning Style Listening;Demonstration;Pictures/video   Barriers to Learning No barriers   GOALS   PT Eval Goals 1;2;3;4   Goal 1   Goal Identifier 1   Goal Description Patient will score a 24 or greater on the FGA to decrease risk for falls.   Target Date 22   Goal 2   Goal Identifier 2   Goal Description Patient will increase sit to stand score to 16 or > to improve functional leg strength and  endurance for balance.   Target Date 06/09/22   Goal 3   Goal Identifier 3   Goal Description Patient will be able to negotiate uneven terrain outdoors independently and safely.   Target Date 06/09/22   Goal 4   Goal Identifier 4   Goal Description Patient will be independent and consistent with HEP at least 5x a week.   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       Please contact me with any questions or concerns.  Thank you for your referral.    Sima Morillo, PT, DPT, OCS  Physical Therapist, Orthopedic [place]ified [place] [address]  [email or web address]   Office: [phone]   Employed by [employer]

## 2022-04-14 NOTE — PROGRESS NOTES
[facility]                                                                                   OUTPATIENT PHYSICAL THERAPY FUNCTIONAL EVALUATION  PLAN OF TREATMENT FOR OUTPATIENT REHABILITATION  (COMPLETE FOR INITIAL CLAIMS ONLY)  Patient's Last Name, First Name, M.I.  YOB: 1955  Yaritza Bradley     Provider's Name   [facility]   Medical Record No.  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     PT Diagnosis:  balance and gait impairments, dizziness Visits from SOC:  1                              __________________________________________________________________________________  Plan of Treatment/Functional Goals:  balance training, neuromuscular re-education, ROM, strengthening, stretching           GOALS  1  Patient will score a 24 or greater on the FGA to decrease risk for falls.  06/09/22    2  Patient will increase sit to stand score to 16 or > to improve functional leg strength and endurance for balance.  06/09/22    3  Patient will be able to negotiate uneven terrain outdoors independently and safely.  06/09/22    4  Patient will be independent and consistent with HEP at least 5x a week.  06/09/22             Therapy Frequency:  1 time/week   Predicted Duration of Therapy Intervention:  8 weeks and reassess    Sima Morillo, PT                                    I CERTIFY THE NEED FOR THESE SERVICES FURNISHED UNDER        THIS PLAN OF TREATMENT AND WHILE UNDER MY CARE .             Physician Signature               Date    X_____________________________________________________                      Certification Date From:  04/12/22   Certification Date To:  06/07/22    Referring Provider:  Dr. Kristen Ruiz  Assessment  See Epic Evaluation- Start of Care Date: 04/12/22

## 2022-04-20 ENCOUNTER — MYC MEDICAL ADVICE (OUTPATIENT)
Dept: DERMATOLOGY | Facility: CLINIC | Age: 67
End: 2022-04-20
Payer: COMMERCIAL

## 2022-04-20 DIAGNOSIS — L30.9 DERMATITIS: Primary | ICD-10-CM

## 2022-04-21 NOTE — TELEPHONE ENCOUNTER
Yes absolutely. We will use topical steroids - she can use this BID for 1-2 weeks at a time when itchy/irritated.     Order pended. Ok to send to pharmacy of choice.

## 2022-04-22 RX ORDER — FLUOCINONIDE TOPICAL SOLUTION USP, 0.05% 0.5 MG/ML
SOLUTION TOPICAL
Qty: 50 ML | Refills: 3 | Status: SHIPPED | OUTPATIENT
Start: 2022-04-22 | End: 2022-08-25

## 2022-04-30 ENCOUNTER — MYC MEDICAL ADVICE (OUTPATIENT)
Dept: FAMILY MEDICINE | Facility: CLINIC | Age: 67
End: 2022-04-30
Payer: COMMERCIAL

## 2022-04-30 DIAGNOSIS — H91.90 HEARING LOSS, UNSPECIFIED HEARING LOSS TYPE, UNSPECIFIED LATERALITY: Primary | ICD-10-CM

## 2022-05-02 NOTE — TELEPHONE ENCOUNTER
Routing to Provider to review and advise.     Refer to patients mychart message.     Shell Sullivan RN BSN PHN

## 2022-05-17 ENCOUNTER — HOSPITAL ENCOUNTER (OUTPATIENT)
Dept: PHYSICAL THERAPY | Facility: CLINIC | Age: 67
Setting detail: THERAPIES SERIES
Discharge: HOME | End: 2022-05-17
Attending: PSYCHIATRY & NEUROLOGY
Payer: COMMERCIAL

## 2022-05-17 PROCEDURE — 97112 NEUROMUSCULAR REEDUCATION: CPT | Mod: GP | Performed by: PHYSICAL THERAPIST

## 2022-05-17 PROCEDURE — 97110 THERAPEUTIC EXERCISES: CPT | Mod: GP | Performed by: PHYSICAL THERAPIST

## 2022-05-24 ENCOUNTER — HOSPITAL ENCOUNTER (OUTPATIENT)
Dept: PHYSICAL THERAPY | Facility: CLINIC | Age: 67
Setting detail: THERAPIES SERIES
Discharge: HOME | End: 2022-05-24
Attending: PSYCHIATRY & NEUROLOGY
Payer: COMMERCIAL

## 2022-05-24 PROCEDURE — 97112 NEUROMUSCULAR REEDUCATION: CPT | Mod: GP | Performed by: PHYSICAL THERAPIST

## 2022-05-24 PROCEDURE — 97110 THERAPEUTIC EXERCISES: CPT | Mod: GP | Performed by: PHYSICAL THERAPIST

## 2022-06-07 ENCOUNTER — HOSPITAL ENCOUNTER (OUTPATIENT)
Dept: PHYSICAL THERAPY | Facility: CLINIC | Age: 67
Setting detail: THERAPIES SERIES
Discharge: HOME | End: 2022-06-07
Attending: PSYCHIATRY & NEUROLOGY
Payer: COMMERCIAL

## 2022-06-07 PROCEDURE — 97110 THERAPEUTIC EXERCISES: CPT | Mod: GP | Performed by: PHYSICAL THERAPIST

## 2022-06-07 PROCEDURE — 97112 NEUROMUSCULAR REEDUCATION: CPT | Mod: GP | Performed by: PHYSICAL THERAPIST

## 2022-06-08 ENCOUNTER — MYC MEDICAL ADVICE (OUTPATIENT)
Dept: FAMILY MEDICINE | Facility: CLINIC | Age: 67
End: 2022-06-08
Payer: COMMERCIAL

## 2022-06-08 ENCOUNTER — HOSPITAL ENCOUNTER (EMERGENCY)
Facility: CLINIC | Age: 67
Discharge: HOME | End: 2022-06-08
Attending: PHYSICIAN ASSISTANT | Admitting: PHYSICIAN ASSISTANT
Payer: COMMERCIAL

## 2022-06-08 VITALS
SYSTOLIC BLOOD PRESSURE: 153 MMHG | HEART RATE: 85 BPM | TEMPERATURE: 98 F | WEIGHT: 125 LBS | BODY MASS INDEX: 21.34 KG/M2 | RESPIRATION RATE: 16 BRPM | HEIGHT: 64 IN | OXYGEN SATURATION: 99 % | DIASTOLIC BLOOD PRESSURE: 73 MMHG
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
## 2022-06-08 PROCEDURE — 99214 OFFICE O/P EST MOD 30 MIN: CPT | Performed by: PHYSICIAN ASSISTANT

## 2022-06-08 PROCEDURE — G0463 HOSPITAL OUTPT CLINIC VISIT: HCPCS | Performed by: PHYSICIAN ASSISTANT

## 2022-06-08 RX ORDER — DOXYCYCLINE 100 MG/1
100 CAPSULE ORAL 2 TIMES DAILY
Qty: 28 CAPSULE | Refills: 0 | Status: SHIPPED | OUTPATIENT
Start: 2022-06-08 | End: 2022-06-22

## 2022-06-08 NOTE — TELEPHONE ENCOUNTER
Please see [facility] [employer] communication.    Oli GRISSOM [facility]

## 2022-06-09 NOTE — ED TRIAGE NOTES
Tick removed tonight, pt noted redness approx for 1 week prior.  Pt thought it was a pimple. Deer tick was removed.      Triage Assessment     Row Name 06/08/22 1941       Triage Assessment (Adult)    Airway WDL WDL       Respiratory WDL    Respiratory WDL WDL       Skin Circulation/Temperature WDL    Skin Circulation/Temperature WDL WDL       Cardiac WDL    Cardiac WDL WDL       Peripheral/Neurovascular WDL    Peripheral Neurovascular WDL WDL       Cognitive/Neuro/Behavioral WDL    Cognitive/Neuro/Behavioral WDL WDL

## 2022-06-10 NOTE — ED PROVIDER NOTES
History     Chief Complaint   Patient presents with     Tick Bite     HPI  Yaritza Bradley is a 66 year old female who presents to the urgent care accompanied by  spent with concern over tick bite to the left side of her chest.  Patient's  reportedly removed a deer tick just prior to arrival.  Noted lesion for approximately the last week however thought that it may have been a mole.  She denies any significant pain, pruritus however has noted some erythema surrounding the lesion.  She does have multiple chronic medical problems including rheumatoid arthritis, GERD, balance problems which make it difficult for her to differentiate if she has had any new onset joint pains, headaches, fever, chills, myalgias or abdominal symptoms.  She denies any new onset cough, cheat pains, dyspnea, wheezing.  Patient is concerned that she is on immunosuppressants for her rheumatoid arthritis with methotrexate and Humira.      Allergies:  Allergies   Allergen Reactions     Nka [No Known Allergies]      Problem List:    Patient Active Problem List    Diagnosis Date Noted     Balance problems 08/31/2020     Priority: Medium     Cellulitis 05/20/2018     Priority: Medium     Femoral acetabular impingement, right 04/26/2016     Priority: Medium     Primary osteoarthritis of right hip, end stage DJD 04/26/2016     Priority: Medium     Memory loss or impairment 07/07/2014     Priority: Medium     Unintentional weight loss 07/07/2012     Priority: Medium     Moderate recurrent major depression (H) 07/07/2012     Priority: Medium     Generalized anxiety disorder 04/10/2012     Priority: Medium     GERD (gastroesophageal reflux disease) 03/17/2011     Priority: Medium     Hyperlipidemia LDL goal <160 10/31/2010     Priority: Medium     Disorder of bone and cartilage 02/20/2007     Priority: Medium     H/o osteopenia; previously on Actonel       Rheumatoid arthritis (H) 04/26/2006     Priority: Medium     Dr. Ariela Chau,  [place]          Past Medical History:    Past Medical History:   Diagnosis Date     Acetabular labrum tear, right, initial encounter 4/26/2016     Arthritis      Depressive disorder      Hemorrhage of rectum and anus 2002     Post-operative nausea and vomiting 7/14/2017     Trochanteric bursitis of right hip 4/26/2016       Past Surgical History:    Past Surgical History:   Procedure Laterality Date     ARTHROSCOPY SHOULDER ROTATOR CUFF REPAIR       COLONOSCOPY N/A 10/14/2016    Procedure: COLONOSCOPY;  Surgeon: Gerson Douglas MD;  Location: [facility]     COLONOSCOPY N/A 7/8/2020    Procedure: COLONOSCOPY, WITH POLYPECTOMY AND BIOPSY;  Surgeon: Jerry Cleveland DO;  Location: [facility]     DILATION AND CURETTAGE       ELBOW SURGERY       ESOPHAGOSCOPY, GASTROSCOPY, DUODENOSCOPY (EGD), COMBINED  8/19/2011    Procedure:COMBINED ESOPHAGOSCOPY, GASTROSCOPY, DUODENOSCOPY (EGD), BIOPSY SINGLE OR MULTIPLE; Gastroscopy with biopsy; Surgeon:FARZAD GARCIA; Location:[facility]     ESOPHAGOSCOPY, GASTROSCOPY, DUODENOSCOPY (EGD), COMBINED  7/28/2014    Procedure: COMBINED ESOPHAGOSCOPY, GASTROSCOPY, DUODENOSCOPY (EGD), BIOPSY SINGLE OR MULTIPLE;  Surgeon: Marshall Martinez MD;  Location: [facility]     ESOPHAGOSCOPY, GASTROSCOPY, DUODENOSCOPY (EGD), COMBINED N/A 7/18/2018    Procedure: COMBINED ESOPHAGOSCOPY, GASTROSCOPY, DUODENOSCOPY (EGD), BIOPSY SINGLE OR MULTIPLE;  gastroscopy with biopsies;  Surgeon: Jorge Schofield MD;  Location: [facility]     HC DILATION/CURETTAGE DIAG/THER NON OB  2001    for uterine fibroids     OPEN REDUCTION INTERNAL FIXATION ELBOW Right 1/22/2016    right THR     ROTATOR CUFF REPAIR RT/LT  2009    left     TOTAL HIP ARTHROPLASTY Right 5/3/2016    Procedure: RIGHT TOTAL HIP ARTHROPLASTY;  Surgeon: Jeffrey Swann MD;  Location: [facility] OR;  Service:        Family History:    Family History   Problem Relation Age of Onset     Cancer Mother         skin     Colon Cancer Mother       "ESPERANZA Father 54     C.A.CANDE. Brother      Alcohol/Drug Brother      Alzheimer Disease Maternal Grandmother      Alzheimer Disease Maternal Grandfather      Alzheimer Disease Paternal Grandmother      C.ACHERRIE. Paternal Grandfather      Anesthesia Reaction No family hx of      Clotting Disorder No family hx of        Social History:  Marital Status:   [2]  Social History     Tobacco Use     Smoking status: Never Smoker     Smokeless tobacco: Never Used   Substance Use Topics     Alcohol use: Yes     Comment: 1 wine a month hardly     Drug use: No        Medications:    doxycycline hyclate (VIBRAMYCIN) 100 MG capsule  Cyanocobalamin (B-12) 1000 MCG TBCR  fludrocortisone (FLORINEF) 0.1 MG tablet  fluocinonide (LIDEX) 0.05 % external solution  FLUoxetine (PROZAC) 20 MG capsule  FLUoxetine (PROZAC) 40 MG capsule  folic acid (FOLVITE) 1 MG tablet  HUMIRA PEN 40 MG/0.8ML injection  methotrexate 50 MG/2ML injection CHEMO  Omega-3 Fatty Acids (OMEGA-3 FISH OIL PO)  RESTASIS 0.05 % ophthalmic emulsion  simvastatin (ZOCOR) 20 MG tablet  SM CALCIUM SOFT CHEWS 500-100-40 OR CHEW  TURMERIC PO  ZOCOR 20 MG tablet      Review of Systems  CONSTITUTIONAL:NEGATIVE for fever, chills, change in weight  INTEGUMENTARY/SKIN: POSITIVE for deer tick bite to left chest wall, erythema NEGATIVE for ecchymosis, lacerations   EYES: NEGATIVE for vision changes or irritation  ENT/MOUTH: NEGATIVE for ear, mouth and throat problems  RESP:NEGATIVE for significant cough or SOB  GI: NEGATIVE for nausea, abdominal pain, heartburn, or change in bowel habits  Physical Exam   BP: (!) 170/75  Pulse: 85  Temp: 98  F (36.7  C)  Resp: 16  Height: 162.6 cm (5' 4\")  Weight: 56.7 kg (125 lb)  SpO2: 99 %  Physical Exam  GENERAL APPEARANCE:alert, cooperative and no acute  distress  EYES: EOMI,  PERRL, conjunctiva clear  RESP: lungs clear to auscultation - no rales, rhonchi or wheezes  CV: regular rates and rhythm, normal S1 S2, no murmur noted  ABDOMEN:  soft, " nontender, no HSM or masses and bowel sounds normal  SKIN: Patient has approximately 2 cm of erythema surrounding a central punctate lesion on the superior left chest wall  ED Course           Procedures       Critical Care time:  none            No results found for this or any previous visit (from the past 24 hour(s)).  Medications - No data to display    Assessments & Plan (with Medical Decision Making)     I have reviewed the nursing notes.  I have reviewed the findings, diagnosis, plan and need for follow up with the patient.       Discharge Medication List as of 6/8/2022  8:17 PM      START taking these medications    Details   doxycycline hyclate (VIBRAMYCIN) 100 MG capsule Take 1 capsule (100 mg) by mouth 2 times daily for 14 days, Disp-28 capsule, R-0, E-Prescribe           Final diagnoses:   Tick bite     66-year-old female presents to the urgent care with concern over deer tick that she removed just prior to arrival that she believes had been present for approximately 1 week.  Physical exam findings as described above were consistent tick bite with likely secondary localized inflammatory reaction.  I have low suspicion for cellulitis.. There was no concerning skin changes to suggest erythema migrans, or developing abscess at this time.  I did discuss options following deer tick bite including watchful waiting, single dose of doxycycline or full course of antibiotics and patient elected to initiate full course of treatment with doxycycline. Given that patient is on Humira and methotrexate, I did confirm dosing of medication with pharmacist on call.  Patient was discharged home stable. Signs/symptoms of lyme disease/tickbrone illness, worrisome reasons to return to ER/UC discussed. Follow up as needed.      Disclaimer: This note consists of symbols derived from keyboarding, dictation, and/or voice recognition software. As a result, there may be errors in the script that have gone undetected.  Please  consider this when interpreting information found in the chart.    6/8/2022   [facility] EMERGENCY DEPT     Ronel Servin PA-C  06/09/22 1925

## 2022-06-14 ENCOUNTER — HOSPITAL ENCOUNTER (OUTPATIENT)
Dept: PHYSICAL THERAPY | Facility: CLINIC | Age: 67
Setting detail: THERAPIES SERIES
Discharge: HOME | End: 2022-06-14
Attending: PSYCHIATRY & NEUROLOGY
Payer: COMMERCIAL

## 2022-06-14 PROCEDURE — 97112 NEUROMUSCULAR REEDUCATION: CPT | Mod: GP | Performed by: PHYSICAL THERAPIST

## 2022-06-14 NOTE — PROGRESS NOTES
[facility]    OUTPATIENT PHYSICAL THERAPY  PLAN OF TREATMENT FOR OUTPATIENT REHABILITATION AND PROGRESS NOTE           Patient's Last Name, First Name, Yaritza Abdalla Date of Birth  1955   Provider's Name  [facility] Medical Record No.  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   PT Diagnosis  balance and gait impairments, dizziness Plan of Treatment  Frequency/Duration: 1x a week for 8 weeks  Certification date from 6/7/22 to 8/9/22     Goals:  Goal Identifier 1   Goal Description Patient will score a 24 or greater on the FGA to decrease risk for falls.   Target Date 08/09/22   Date Met      Progress (detail required for progress note): 22/30     Goal Identifier 2   Goal Description Patient will increase sit to stand score to 16 or > to improve functional leg strength and endurance for balance.   Target Date 08/09/22   Date Met      Progress (detail required for progress note): 14     Goal Identifier 3   Goal Description Patient will be able to negotiate uneven terrain outdoors independently and safely.   Target Date 08/09/22   Date Met      Progress (detail required for progress note): Still feeling unsteady, but notices improvement by less assistance needed and decreased LOB     Goal Identifier 4   Goal Description Patient will be independent and consistent with HEP at least 5x a week.   Target Date 08/09/22   Date Met      Progress (detail required for progress note): reports being physically active at least 3/5         Beginning/End Dates of Progress Note Reporting Period:  4/12/22 to 6/14/22    Progress Toward Goals:   Progress this reporting period: Nicole has made good progress in physical therapy thus far. She feels her  endurance has improved and she feels more steady with walking on uneven ground. Her FGA score has improved from 20 to 22 and her sit to stand score improved from 12 to 14. She is still scoring bordeline for increased fall risk on the FGA and she reports still feeling unsteady with uneven terrain. Skilled physical therapy still appropriate.     Client Self (Subjective) Report for Progress Note Reporting Period: Reports she is feeling much more steady on her feet and is able to ambulate farther distances. Went for a 3 mile walk the other night and could tell she got a good work out. Still a little unsure about uneven terrain, but feels she is progressing in the right direction since starting PT.    Objective Measurements:   Objective Measure: FGA  Details: 22/30    Objective Measure: 30 sec Sit to Stand  Details: 14               I CERTIFY THE NEED FOR THESE SERVICES FURNISHED UNDER        THIS PLAN OF TREATMENT AND WHILE UNDER MY CARE .             Physician Signature               Date    X_____________________________________________________                      Referring Provider: Dr. Kristen Morillo, PT          Please contact me with any questions or concerns.  Thank you for your referral.    Sima Morillo, PT, DPT, OCS  Physical Therapist, Orthopedic Certified Specialist    [facility]  [address] [place] [id number]  [email or web address]   Office: [phone]   Employed by [facility] [employer]

## 2022-06-21 ENCOUNTER — HOSPITAL ENCOUNTER (OUTPATIENT)
Dept: PHYSICAL THERAPY | Facility: CLINIC | Age: 67
Setting detail: THERAPIES SERIES
Discharge: HOME | End: 2022-06-21
Attending: PSYCHIATRY & NEUROLOGY
Payer: COMMERCIAL

## 2022-06-21 PROCEDURE — 97110 THERAPEUTIC EXERCISES: CPT | Mod: GP | Performed by: PHYSICAL THERAPIST

## 2022-06-21 PROCEDURE — 97112 NEUROMUSCULAR REEDUCATION: CPT | Mod: GP | Performed by: PHYSICAL THERAPIST

## 2022-06-23 ENCOUNTER — OFFICE VISIT (OUTPATIENT)
Dept: AUDIOLOGY | Facility: CLINIC | Age: 67
End: 2022-06-23
Payer: COMMERCIAL

## 2022-06-23 DIAGNOSIS — H90.3 SENSORINEURAL HEARING LOSS, BILATERAL: Primary | ICD-10-CM

## 2022-06-23 DIAGNOSIS — H91.90 HEARING LOSS, UNSPECIFIED HEARING LOSS TYPE, UNSPECIFIED LATERALITY: ICD-10-CM

## 2022-06-23 PROCEDURE — 92567 TYMPANOMETRY: CPT | Performed by: AUDIOLOGIST

## 2022-06-23 PROCEDURE — 92557 COMPREHENSIVE HEARING TEST: CPT | Performed by: AUDIOLOGIST

## 2022-06-23 NOTE — PROGRESS NOTES
AUDIOLOGY REPORT    SUBJECTIVE:  Yaritza Bradley is a 66 year old female who was seen in the Audiology Clinic [facility] on 6/23/22 for audiologic evaluation, referred by Aziza Aviles MD.  The patient has been seen previously in this clinic on 8/17/2016 for assessment and results indicated high frequency hearing loss bilaterally. The patient reports a possible decline in hearing and dizziness for which she is under the care of a physician. The patient denies  bilateral tinnitus, bilateral otalgia, bilateral drainage, bilateral aural fullness, family history of hearing loss, and history of noise exposure.  They were accompanied today by their .    Abuse Screening:  Do you feel unsafe at home or work/school? No  Do you feel threatened by someone? No  Does anyone try to keep you from having contact with others, or doing things outside of your home? No  Physical signs of abuse present? No     OBJECTIVE:    Otoscopic exam indicates Right ear mostly occluded with cerumen, left ear clear     Pure Tone Thresholds assessed using standard techniques  audiometry with good  reliability from 250-8000 Hz bilaterally using insert earphones and circumaural headphones     RIGHT:  normal hearing sensitivity through 4000 Hz then a mild most likely sensorineural hearing loss    LEFT:    normal and borderline-normal hearing sensitivity through 4000 Hz then a mild most likely sensorineural hearing loss    NOTE: Change in transducers did not merit a change in thresholds.     Tympanogram:    RIGHT: normal eardrum mobility    LEFT:   normal eardrum mobility    Reflexes (reported by stimulus ear): 1000 Hz   Could not maintain seal to obtain     Speech Reception Threshold:    RIGHT: 15 dB HL    LEFT:   10 dB HL    Word Recognition Score:     RIGHT: 100% at 55 dB HL using NU-6 recorded word list.    LEFT:   100% at 55 dB HL using NU-6 recorded word list.    ASSESSMENT:   Bilateral sensorineural hearing  loss      Compared to patient's previous audiogram dated 8/17/2016, hearing has declined slightly in the high frequencies. Today s results were discussed with the patient in detail.     PLAN:  Patient was counseled regarding hearing loss and impact on communication. It is recommended that the patient return in 2-3 years or sooner if concerns arise.  Please call this clinic with questions regarding these results or recommendations.    Jerry Fan [facility]  Licensed Audiologist #[id number]  6/23/2022    CC: Dr. Aviles

## 2022-06-28 ENCOUNTER — HOSPITAL ENCOUNTER (OUTPATIENT)
Dept: PHYSICAL THERAPY | Facility: CLINIC | Age: 67
Setting detail: THERAPIES SERIES
Discharge: HOME | End: 2022-06-28
Attending: PSYCHIATRY & NEUROLOGY
Payer: COMMERCIAL

## 2022-06-28 PROCEDURE — 97112 NEUROMUSCULAR REEDUCATION: CPT | Mod: GP | Performed by: PHYSICAL THERAPIST

## 2022-06-28 PROCEDURE — 97110 THERAPEUTIC EXERCISES: CPT | Mod: GP | Performed by: PHYSICAL THERAPIST

## 2022-07-05 ENCOUNTER — HOSPITAL ENCOUNTER (OUTPATIENT)
Dept: PHYSICAL THERAPY | Facility: CLINIC | Age: 67
Setting detail: THERAPIES SERIES
Discharge: HOME | End: 2022-07-05
Attending: PSYCHIATRY & NEUROLOGY
Payer: COMMERCIAL

## 2022-07-05 PROCEDURE — 97112 NEUROMUSCULAR REEDUCATION: CPT | Mod: GP | Performed by: PHYSICAL THERAPIST

## 2022-07-12 ENCOUNTER — HOSPITAL ENCOUNTER (OUTPATIENT)
Dept: PHYSICAL THERAPY | Facility: CLINIC | Age: 67
Setting detail: THERAPIES SERIES
Discharge: HOME | End: 2022-07-12
Attending: PSYCHIATRY & NEUROLOGY
Payer: COMMERCIAL

## 2022-07-12 PROCEDURE — 97112 NEUROMUSCULAR REEDUCATION: CPT | Mod: GP | Performed by: PHYSICAL THERAPIST

## 2022-07-12 PROCEDURE — 97110 THERAPEUTIC EXERCISES: CPT | Mod: GP | Performed by: PHYSICAL THERAPIST

## 2022-08-01 ENCOUNTER — LAB (OUTPATIENT)
Dept: LAB | Facility: CLINIC | Age: 67
End: 2022-08-01
Payer: COMMERCIAL

## 2022-08-01 DIAGNOSIS — M05.79 SEROPOSITIVE RHEUMATOID ARTHRITIS OF MULTIPLE SITES (H): ICD-10-CM

## 2022-08-01 DIAGNOSIS — Z79.899 DRUG THERAPY: ICD-10-CM

## 2022-08-01 LAB
ALBUMIN SERPL-MCNC: 4.1 G/DL (ref 3.4–5)
ALT SERPL W P-5'-P-CCNC: 21 U/L (ref 0–50)
AST SERPL W P-5'-P-CCNC: 20 U/L (ref 0–45)
BASOPHILS # BLD AUTO: 0 10E3/UL (ref 0–0.2)
BASOPHILS NFR BLD AUTO: 0 %
CREAT SERPL-MCNC: 0.73 MG/DL (ref 0.52–1.04)
EOSINOPHIL # BLD AUTO: 0.1 10E3/UL (ref 0–0.7)
EOSINOPHIL NFR BLD AUTO: 2 %
ERYTHROCYTE [DISTWIDTH] IN BLOOD BY AUTOMATED COUNT: 12.7 % (ref 10–15)
GFR SERPL CREATININE-BSD FRML MDRD: 90 ML/MIN/1.73M2
HCT VFR BLD AUTO: 40.5 % (ref 35–47)
HGB BLD-MCNC: 14 G/DL (ref 11.7–15.7)
LYMPHOCYTES # BLD AUTO: 2.9 10E3/UL (ref 0.8–5.3)
LYMPHOCYTES NFR BLD AUTO: 36 %
MCH RBC QN AUTO: 30.8 PG (ref 26.5–33)
MCHC RBC AUTO-ENTMCNC: 34.6 G/DL (ref 31.5–36.5)
MCV RBC AUTO: 89 FL (ref 78–100)
MONOCYTES # BLD AUTO: 0.7 10E3/UL (ref 0–1.3)
MONOCYTES NFR BLD AUTO: 8 %
NEUTROPHILS # BLD AUTO: 4.3 10E3/UL (ref 1.6–8.3)
NEUTROPHILS NFR BLD AUTO: 54 %
PLATELET # BLD AUTO: 226 10E3/UL (ref 150–450)
RBC # BLD AUTO: 4.54 10E6/UL (ref 3.8–5.2)
WBC # BLD AUTO: 8 10E3/UL (ref 4–11)

## 2022-08-01 PROCEDURE — 84450 TRANSFERASE (AST) (SGOT): CPT

## 2022-08-01 PROCEDURE — 82040 ASSAY OF SERUM ALBUMIN: CPT

## 2022-08-01 PROCEDURE — 85025 COMPLETE CBC W/AUTO DIFF WBC: CPT

## 2022-08-01 PROCEDURE — 84460 ALANINE AMINO (ALT) (SGPT): CPT

## 2022-08-01 PROCEDURE — 36415 COLL VENOUS BLD VENIPUNCTURE: CPT

## 2022-08-01 PROCEDURE — 82565 ASSAY OF CREATININE: CPT

## 2022-08-04 ENCOUNTER — HOSPITAL ENCOUNTER (OUTPATIENT)
Dept: PHYSICAL THERAPY | Facility: CLINIC | Age: 67
Setting detail: THERAPIES SERIES
Discharge: HOME | End: 2022-08-04
Attending: PSYCHIATRY & NEUROLOGY
Payer: COMMERCIAL

## 2022-08-04 PROCEDURE — 97112 NEUROMUSCULAR REEDUCATION: CPT | Mod: GP | Performed by: PHYSICAL THERAPIST

## 2022-08-04 NOTE — PROGRESS NOTES
[facility]    Outpatient Physical Therapy Discharge Note  Patient: Yaritza Bradley  : 1955    Beginning/End Dates of Reporting Period:  22 to 22  Total visits: 10    Referring Provider: Dr. Peterson    Therapy Diagnosis: balance and gait impairments, dizziness     Client Self Report: Patient reports that she has been doing really well and feels ready to be done with PT. She states she is a lot more confident in her balance as well as improved balance overall.    Objective Measurements:  Objective Measure: FGA  Details:   Objective Measure: 30 sec Sit to Stand  Details: 14        Goals:  Goal Identifier 1   Goal Description Patient will score a 24 or greater on the FGA to decrease risk for falls.   Target Date 22   Date Met  22   Progress (detail required for progress note):      Goal Identifier 2   Goal Description Patient will increase sit to stand score to 16 or > to improve functional leg strength and endurance for balance.   Target Date 22   Date Met      Progress (detail required for progress note): 14     Goal Identifier 3   Goal Description Patient will be able to negotiate uneven terrain outdoors independently and safely.   Target Date 22   Date Met  22   Progress (detail required for progress note): improved, feels more confident     Goal Identifier 4   Goal Description Patient will be independent and consistent with HEP at least 5x a week.   Target Date 22   Date Met      Progress (detail required for progress note): reports being physically active at least 3/5       Nicole has made excellent progress since initiating physical therapy. Her FGA score has improved from 20 on initial, which placed her at increased risk for falls, to now a 25 and is well above the cut off. She is reporting more confidence in her balance and her abilities as well as  feeling overall stronger. Her 30 second sit to stand score improved from 12 to 14. She demonstrates increased endurance and is able to walk farther. She is also reporting a great improvement in her dizziness.        Plan:  Discharge from therapy.    Discharge:    Reason for Discharge: Most goals met and patient feels comfortable with discharge    Equipment Issued: theraband    Discharge Plan: Patient to continue home program.      Please contact me with any questions or concerns.  Thank you for your referral.    Sima Morillo, PT, DPT, OCS  Physical Therapist, Orthopedic Certified Specialist    [facility]  [address] [place] [id number]  [email or web address]   Office: [phone]   Employed by [facility] [employer]

## 2022-08-07 ENCOUNTER — MYC MEDICAL ADVICE (OUTPATIENT)
Dept: FAMILY MEDICINE | Facility: CLINIC | Age: 67
End: 2022-08-07

## 2022-08-07 DIAGNOSIS — E78.5 HYPERLIPIDEMIA LDL GOAL <160: Primary | ICD-10-CM

## 2022-08-07 DIAGNOSIS — E78.5 HYPERLIPIDEMIA, UNSPECIFIED HYPERLIPIDEMIA TYPE: ICD-10-CM

## 2022-08-08 RX ORDER — SIMVASTATIN 20 MG/1
TABLET, FILM COATED ORAL
Qty: 90 TABLET | Refills: 2 | Status: SHIPPED | OUTPATIENT
Start: 2022-08-08 | End: 2022-08-25

## 2022-08-08 RX ORDER — SIMVASTATIN 20 MG
20 TABLET ORAL AT BEDTIME
Qty: 90 TABLET | Refills: 0 | Status: SHIPPED | OUTPATIENT
Start: 2022-08-08 | End: 2022-08-25

## 2022-08-14 ENCOUNTER — HEALTH MAINTENANCE LETTER (OUTPATIENT)
Age: 67
End: 2022-08-14

## 2022-08-19 ASSESSMENT — ENCOUNTER SYMPTOMS
SORE THROAT: 0
HEARTBURN: 0
CONSTIPATION: 0
DIARRHEA: 0
MYALGIAS: 1
HEMATURIA: 0
PARESTHESIAS: 1
NERVOUS/ANXIOUS: 1
ARTHRALGIAS: 1
FEVER: 0
CHILLS: 0
EYE PAIN: 0
DYSURIA: 0
HEMATOCHEZIA: 0
HEADACHES: 0
ABDOMINAL PAIN: 0
BREAST MASS: 0
NAUSEA: 0
PALPITATIONS: 0
DIZZINESS: 1
SHORTNESS OF BREATH: 0
COUGH: 0
JOINT SWELLING: 1
FREQUENCY: 1

## 2022-08-19 ASSESSMENT — ACTIVITIES OF DAILY LIVING (ADL)
CURRENT_FUNCTION: MONEY MANAGEMENT REQUIRES ASSISTANCE
CURRENT_FUNCTION: TRANSPORTATION REQUIRES ASSISTANCE
CURRENT_FUNCTION: MEDICATION ADMINISTRATION REQUIRES ASSISTANCE
CURRENT_FUNCTION: HOUSEWORK REQUIRES ASSISTANCE
CURRENT_FUNCTION: PREPARING MEALS REQUIRES ASSISTANCE

## 2022-08-19 ASSESSMENT — PATIENT HEALTH QUESTIONNAIRE - PHQ9
10. IF YOU CHECKED OFF ANY PROBLEMS, HOW DIFFICULT HAVE THESE PROBLEMS MADE IT FOR YOU TO DO YOUR WORK, TAKE CARE OF THINGS AT HOME, OR GET ALONG WITH OTHER PEOPLE: SOMEWHAT DIFFICULT
SUM OF ALL RESPONSES TO PHQ QUESTIONS 1-9: 19
SUM OF ALL RESPONSES TO PHQ QUESTIONS 1-9: 19

## 2022-08-25 ENCOUNTER — OFFICE VISIT (OUTPATIENT)
Dept: FAMILY MEDICINE | Facility: CLINIC | Age: 67
End: 2022-08-25
Payer: COMMERCIAL

## 2022-08-25 VITALS
DIASTOLIC BLOOD PRESSURE: 66 MMHG | RESPIRATION RATE: 16 BRPM | HEIGHT: 64 IN | WEIGHT: 131 LBS | OXYGEN SATURATION: 97 % | BODY MASS INDEX: 22.36 KG/M2 | TEMPERATURE: 98 F | SYSTOLIC BLOOD PRESSURE: 128 MMHG | HEART RATE: 90 BPM

## 2022-08-25 DIAGNOSIS — M05.79 RHEUMATOID ARTHRITIS INVOLVING MULTIPLE SITES WITH POSITIVE RHEUMATOID FACTOR (H): ICD-10-CM

## 2022-08-25 DIAGNOSIS — I67.81 LEUKOARAIOSIS: ICD-10-CM

## 2022-08-25 DIAGNOSIS — F33.1 MODERATE RECURRENT MAJOR DEPRESSION (H): ICD-10-CM

## 2022-08-25 DIAGNOSIS — G62.9 PERIPHERAL POLYNEUROPATHY: ICD-10-CM

## 2022-08-25 DIAGNOSIS — E78.5 HYPERLIPIDEMIA LDL GOAL <160: ICD-10-CM

## 2022-08-25 DIAGNOSIS — G89.29 OTHER CHRONIC PAIN: ICD-10-CM

## 2022-08-25 DIAGNOSIS — Z00.00 ENCOUNTER FOR MEDICARE ANNUAL WELLNESS EXAM: Primary | ICD-10-CM

## 2022-08-25 PROBLEM — L03.90 CELLULITIS: Status: RESOLVED | Noted: 2018-05-20 | Resolved: 2022-08-25

## 2022-08-25 LAB
ANION GAP SERPL CALCULATED.3IONS-SCNC: 3 MMOL/L (ref 3–14)
BUN SERPL-MCNC: 12 MG/DL (ref 7–30)
CALCIUM SERPL-MCNC: 9.9 MG/DL (ref 8.5–10.1)
CHLORIDE BLD-SCNC: 107 MMOL/L (ref 94–109)
CHOLEST SERPL-MCNC: 112 MG/DL
CO2 SERPL-SCNC: 29 MMOL/L (ref 20–32)
CREAT SERPL-MCNC: 0.71 MG/DL (ref 0.52–1.04)
FASTING STATUS PATIENT QL REPORTED: NO
GFR SERPL CREATININE-BSD FRML MDRD: >90 ML/MIN/1.73M2
GLUCOSE BLD-MCNC: 98 MG/DL (ref 70–99)
HDLC SERPL-MCNC: 66 MG/DL
LDLC SERPL CALC-MCNC: 26 MG/DL
NONHDLC SERPL-MCNC: 46 MG/DL
POTASSIUM BLD-SCNC: 4.8 MMOL/L (ref 3.4–5.3)
SODIUM SERPL-SCNC: 139 MMOL/L (ref 133–144)
TRIGL SERPL-MCNC: 98 MG/DL
TSH SERPL DL<=0.005 MIU/L-ACNC: 1.43 MU/L (ref 0.4–4)
VIT B12 SERPL-MCNC: 2265 PG/ML (ref 232–1245)

## 2022-08-25 PROCEDURE — 84443 ASSAY THYROID STIM HORMONE: CPT | Performed by: FAMILY MEDICINE

## 2022-08-25 PROCEDURE — G0439 PPPS, SUBSEQ VISIT: HCPCS | Performed by: FAMILY MEDICINE

## 2022-08-25 PROCEDURE — 80061 LIPID PANEL: CPT | Performed by: FAMILY MEDICINE

## 2022-08-25 PROCEDURE — 82607 VITAMIN B-12: CPT | Performed by: FAMILY MEDICINE

## 2022-08-25 PROCEDURE — 0064A COVID-19,PF,MODERNA (18+ YRS BOOSTER .25ML): CPT | Performed by: FAMILY MEDICINE

## 2022-08-25 PROCEDURE — 91306 COVID-19,PF,MODERNA (18+ YRS BOOSTER .25ML): CPT | Performed by: FAMILY MEDICINE

## 2022-08-25 PROCEDURE — 36415 COLL VENOUS BLD VENIPUNCTURE: CPT | Performed by: FAMILY MEDICINE

## 2022-08-25 PROCEDURE — 80048 BASIC METABOLIC PNL TOTAL CA: CPT | Performed by: FAMILY MEDICINE

## 2022-08-25 PROCEDURE — 99214 OFFICE O/P EST MOD 30 MIN: CPT | Mod: 25 | Performed by: FAMILY MEDICINE

## 2022-08-25 RX ORDER — DULOXETIN HYDROCHLORIDE 30 MG/1
30 CAPSULE, DELAYED RELEASE ORAL DAILY
Qty: 30 CAPSULE | Refills: 1 | Status: SHIPPED | OUTPATIENT
Start: 2022-08-25 | End: 2022-09-22 | Stop reason: SINTOL

## 2022-08-25 RX ORDER — SIMVASTATIN 20 MG
20 TABLET ORAL AT BEDTIME
Qty: 90 TABLET | Refills: 3 | Status: SHIPPED | OUTPATIENT
Start: 2022-08-25 | End: 2022-09-30 | Stop reason: SINTOL

## 2022-08-25 ASSESSMENT — ANXIETY QUESTIONNAIRES
1. FEELING NERVOUS, ANXIOUS, OR ON EDGE: NEARLY EVERY DAY
5. BEING SO RESTLESS THAT IT IS HARD TO SIT STILL: NEARLY EVERY DAY
GAD7 TOTAL SCORE: 20
6. BECOMING EASILY ANNOYED OR IRRITABLE: NEARLY EVERY DAY
2. NOT BEING ABLE TO STOP OR CONTROL WORRYING: NEARLY EVERY DAY
GAD7 TOTAL SCORE: 20
7. FEELING AFRAID AS IF SOMETHING AWFUL MIGHT HAPPEN: MORE THAN HALF THE DAYS
3. WORRYING TOO MUCH ABOUT DIFFERENT THINGS: NEARLY EVERY DAY

## 2022-08-25 ASSESSMENT — ACTIVITIES OF DAILY LIVING (ADL)
CURRENT_FUNCTION: TRANSPORTATION REQUIRES ASSISTANCE
CURRENT_FUNCTION: HOUSEWORK REQUIRES ASSISTANCE
CURRENT_FUNCTION: MEDICATION ADMINISTRATION REQUIRES ASSISTANCE
CURRENT_FUNCTION: PREPARING MEALS REQUIRES ASSISTANCE
CURRENT_FUNCTION: MONEY MANAGEMENT REQUIRES ASSISTANCE

## 2022-08-25 ASSESSMENT — PAIN SCALES - GENERAL: PAINLEVEL: SEVERE PAIN (6)

## 2022-08-25 ASSESSMENT — PATIENT HEALTH QUESTIONNAIRE - PHQ9: 5. POOR APPETITE OR OVEREATING: NEARLY EVERY DAY

## 2022-08-25 NOTE — PROGRESS NOTES
"SUBJECTIVE:   Yaritza Bradley is a 66 year old female who presents for Preventive Visit.      Patient has been advised of split billing requirements and indicates understanding: Yes  Are you in the first 12 months of your Medicare coverage?  No    Healthy Habits:     In general, how would you rate your overall health?  Fair    Frequency of exercise:  2-3 days/week    Duration of exercise:  30-45 minutes    Do you usually eat at least 4 servings of fruit and vegetables a day, include whole grains    & fiber and avoid regularly eating high fat or \"junk\" foods?  No    Taking medications regularly:  Yes    Medication side effects:  Not applicable    Ability to successfully perform activities of daily living:  Transportation requires assistance, preparing meals requires assistance, housework requires assistance, medication administration requires assistance and money management requires assistance    Home Safety:  No safety concerns identified    Hearing Impairment:  No hearing concerns    In the past 6 months, have you been bothered by leaking of urine? Yes    In general, how would you rate your overall mental or emotional health?  Fair      PHQ-2 Total Score: 5    Additional concerns today:  Yes    Do you feel safe in your environment? Yes    Have you ever done Advance Care Planning? (For example, a Health Directive, POLST, or a discussion with a medical provider or your loved ones about your wishes): Yes, advance care planning is on file.       Fall risk  Fallen 2 or more times in the past year?: No  Any fall with injury in the past year?: No       Cognitive Screening   1) Repeat 3 items (Leader, Season, Table)    2) Clock draw: ABNORMAL incorrect hand placement  3) 3 item recall: Recalls 1 object   Results: ABNORMAL clock, 1-2 items recalled: PROBABLE COGNITIVE IMPAIRMENT, **INFORM PROVIDER**    Mini-CogTM Copyright [employer]NA[employer]. Licensed by the author for use in [facility]; reprinted with permission " ([email or web address]). All rights reserved.      Do you have sleep apnea, excessive snoring or daytime drowsiness?: no    Reviewed and updated as needed this visit by clinical staff   Tobacco  Allergies  Meds                Reviewed and updated as needed this visit by Provider                   Social History     Tobacco Use     Smoking status: Never Smoker     Smokeless tobacco: Never Used   Substance Use Topics     Alcohol use: Yes     Comment: 1 wine a month hardly     If you drink alcohol do you typically have >3 drinks per day or >7 drinks per week? No    Alcohol Use 8/25/2022   Prescreen: >3 drinks/day or >7 drinks/week? -   Prescreen: >3 drinks/day or >7 drinks/week? No       - Stiffness, swelling, numbness and tingling in bilateral feet, hands and wrists. There is dull, annoying pain.      Hyperlipidemia Follow-Up  Zocor 20mg at bedtime     Are you regularly taking any medication or supplement to lower your cholesterol?   Yes- Statin    Are you having muscle aches or other side effects that you think could be caused by your cholesterol lowering medication?  Yes- aching    Depression and Anxiety Follow-Up  Prozac 60mg every day - She stopped taking Prozac last winter due to causing brain fog    - Weight gain of about 10lb over the past 6 months. She notes that she is eating a regular diet but that she is eating more since last November/December after stopping Prozac. She notes some headaches, fatigue and that her vision has not been as clear over the past 2 weeks. Last eye exam was about a year ago. No dry skin, constipation, loose stools, urinary sx, tremors. She denies family hx of thyroid problems or diabetes. She does go walking with the dog 2 times daily and goes swimming weekly.       How are you doing with your depression since your last visit? She notes not having depression    How are you doing with your anxiety since your last visit?  Worsened, she notes she is a worrier    Are you having other symptoms  that might be associated with depression or anxiety? Yes:  sleep problems due to mind not shutting off    Have you had a significant life event? No but they are getting ready to go on vacation     Do you have any concerns with your use of alcohol or other drugs? No    Answers for HPI/ROS submitted by the patient on 8/19/2022  If you checked off any problems, how difficult have these problems made it for you to do your work, take care of things at home, or get along with other people?: Somewhat difficult  PHQ9 TOTAL SCORE: 19      Social History     Tobacco Use     Smoking status: Never Smoker     Smokeless tobacco: Never Used   Substance Use Topics     Alcohol use: Yes     Comment: 1 wine a month hardly     Drug use: No     PHQ 7/31/2020 7/7/2021 8/19/2022   PHQ-9 Total Score 14 12 19   Q9: Thoughts of better off dead/self-harm past 2 weeks Several days Not at all Not at all     SHEA-7 SCORE 9/27/2019 7/31/2020 7/7/2021   Total Score - - -   Total Score 9 18 11     Last PHQ-9 8/25/2022   1.  Little interest or pleasure in doing things 1   2.  Feeling down, depressed, or hopeless 1   3.  Trouble falling or staying asleep, or sleeping too much 3   4.  Feeling tired or having little energy 3   5.  Poor appetite or overeating 3   6.  Feeling bad about yourself 2   7.  Trouble concentrating 2   8.  Moving slowly or restless 2   Q9: Thoughts of better off dead/self-harm past 2 weeks 1   PHQ-9 Total Score 18   Difficulty at work, home, or with people Somewhat difficult     SHEA-7  8/25/2022   1. Feeling nervous, anxious, or on edge 3   2. Not being able to stop or control worrying 3   3. Worrying too much about different things 3   4. Trouble relaxing 3   5. Being so restless that it is hard to sit still 3   6. Becoming easily annoyed or irritable 3   7. Feeling afraid, as if something awful might happen 2   SHEA-7 Total Score 20   If you checked any problems, how difficult have they made it for you to do your work, take care  "of things at home, or get along with other people? -       Suicide Assessment Five-step Evaluation and Treatment (SAFE-T)      Current providers sharing in care for this patient include:   Patient Care Team:  Aziza Aviles MD as PCP - General (Family Practice)  Brina Gonsalez MD as PCP - Internal Medicine  Strand, Karla Alicia MD as MD ([facility])  Aziza Aviles MD as Assigned PCP  Los Wallace MD as Assigned Heart and Vascular Provider  An Yi PA-C as Physician Assistant (Dermatology)  An Yi PA-C as Assigned Surgical Provider    The following health maintenance items are reviewed in Epic and correct as of today:  Health Maintenance Due   Topic Date Due     ANNUAL REVIEW OF HM ORDERS  Never done     Pneumococcal Vaccine: 65+ Years (1 - PCV) 11/11/2019     COVID-19 Vaccine (5 - Booster for Moderna series) 08/14/2022     INFLUENZA VACCINE (1) 09/01/2022     Lab work is in process  Labs reviewed in EPIC      FHS-7:   Breast CA Risk Assessment (FHS-7) 12/2/2021 8/19/2022   Did any of your first-degree relatives have breast or ovarian cancer? No No   Did any of your relatives have bilateral breast cancer? No No   Did any man in your family have breast cancer? No No   Did any woman in your family have breast and ovarian cancer? No No   Did any woman in your family have breast cancer before age 50 y? No No   Do you have 2 or more relatives with breast and/or ovarian cancer? No No   Do you have 2 or more relatives with breast and/or bowel cancer? No No         Pertinent mammograms are reviewed under the imaging tab.    Review of Systems  Constitutional, HEENT, cardiovascular, pulmonary, gi and gu systems are negative, except as otherwise noted.    Feels \"stiff\" and \"sore\" but doesn't seem consistent with her RA.  Is considering taking daily low dose prednisone from her rheumatologist.     Wondering if it could be the statin, she's been on this 2 years.  It is recommended from the " "standpoint of her leukoaraiosis but we did discuss trial off to see if this is contributing.     Feels less in a \"fog\" since going off her fluoxetine, but is more anxious and irritable.  Recognizes that this may be due to untreated anxiety.  We discussed SNRI for treatment of the depression/anxiey AND chronic pain    Has gained weight.  Does feel she's eating more when she's bored/anxious.     Wt Readings from Last 5 Encounters:   08/25/22 59.4 kg (131 lb)   06/08/22 56.7 kg (125 lb)   07/07/21 51.7 kg (114 lb)   04/20/21 51.3 kg (113 lb)   03/29/21 51.2 kg (112 lb 12.8 oz)         OBJECTIVE:   /66   Pulse 90   Temp 98  F (36.7  C) (Tympanic)   Resp 16   Ht 1.626 m (5' 4\")   Wt 59.4 kg (131 lb)   LMP  (LMP Unknown)   SpO2 97%   Breastfeeding No   BMI 22.49 kg/m   Estimated body mass index is 22.49 kg/m  as calculated from the following:    Height as of this encounter: 1.626 m (5' 4\").    Weight as of this encounter: 59.4 kg (131 lb).  Physical Exam  GENERAL: healthy, alert and no distress  NECK: no adenopathy, no asymmetry, masses, or scars and thyroid normal to palpation  RESP: lungs clear to auscultation - no rales, rhonchi or wheezes  CV: regular rate and rhythm, normal S1 S2, no S3 or S4, no murmur, click or rub, no peripheral edema and peripheral pulses strong  ABDOMEN: soft, nontender, no hepatosplenomegaly, no masses and bowel sounds normal  MS: no gross musculoskeletal defects noted, no edema  NEURO: Normal strength and tone, mentation intact and speech normal  Psych: mentation intact, speech fluent and normal  Good eye contact        ASSESSMENT / PLAN:   (Z00.00) Encounter for Medicare annual wellness exam  (primary encounter diagnosis)  Comment:    Plan: Basic metabolic panel  (Ca, Cl, CO2, Creat,         Gluc, K, Na, BUN), TSH with free T4 reflex             (E78.5) Hyperlipidemia LDL goal <160  Comment: going to try going off statin x 3-4 weeks to see if this helps with the aches. If it " "does, will try low dose rosuvastatin.  If no change in pain, continue on the simvastatin.   Plan: simvastatin (ZOCOR) 20 MG tablet, Lipid panel         reflex to direct LDL Fasting, TSH with free T4         reflex, OFFICE/OUTPT VISIT,EST,LEVL III             (M05.79) Rheumatoid arthritis involving multiple sites with positive rheumatoid factor (H)  Comment:  Sees rheumatology for Humira/methotrexate/steoroids  Plan: DULoxetine (CYMBALTA) 30 MG capsule, TSH with         free T4 reflex             (F33.1) Moderate recurrent major depression (H)  Comment:  Risks, benefits and alternatives discussed   Start Cymbalta  Understands it may take 4-6 weeks to have effect  Follow up with me in 5 weeks or so  Plan: DULoxetine (CYMBALTA) 30 MG capsule, TSH with         free T4 reflex, OFFICE/OUTPT VISIT,EST,LEVL III             (G89.29) Other chronic pain  Comment:  As above, will see if the SNRI helps with overall pain  Plan: DULoxetine (CYMBALTA) 30 MG capsule, TSH with         free T4 reflex, OFFICE/OUTPT VISIT,EST,LEVL III             (G62.9) Peripheral polyneuropathy  Comment:  New in the past several months  Follow up with neurologist  R/o hypothyroidism, B12 deficiency, etc   Plan: TSH with free T4 reflex, Vitamin B12,         OFFICE/OUTPT VISIT,EST,LEVL III             (I67.81) Leukoaraiosis  Comment:    Plan: managed by neurology    Patient has been advised of split billing requirements and indicates understanding: Yes    COUNSELING:  Reviewed preventive health counseling, as reflected in patient instructions       Regular exercise       Healthy diet/nutrition    Estimated body mass index is 22.49 kg/m  as calculated from the following:    Height as of this encounter: 1.626 m (5' 4\").    Weight as of this encounter: 59.4 kg (131 lb).        She reports that she has never smoked. She has never used smokeless tobacco.      Appropriate preventive services were discussed with this patient, including applicable screening as " appropriate for cardiovascular disease, diabetes, osteopenia/osteoporosis, and glaucoma.  As appropriate for age/gender, discussed screening for colorectal cancer, prostate cancer, breast cancer, and cervical cancer. Checklist reviewing preventive services available has been given to the patient.    Reviewed patients plan of care and provided an AVS. The Basic Care Plan (routine screening as documented in Health Maintenance) for Yaritza meets the Care Plan requirement. This Care Plan has been established and reviewed with the Patient and spouse.    Counseling Resources:  ATP IV Guidelines  Pooled Cohorts Equation Calculator  Breast Cancer Risk Calculator  Breast Cancer: Medication to Reduce Risk  FRAX Risk Assessment  ICSI Preventive Guidelines  Dietary Guidelines for Americans, 2010  USDA's MyPlate  ASA Prophylaxis  Lung CA Screening    Aziza Aviles MD  [facility]    Identified Health Risks:

## 2022-08-25 NOTE — PATIENT INSTRUCTIONS
"  Patient Education   Personalized Prevention Plan  You are due for the preventive services outlined below.  Your care team is available to assist you in scheduling these services.  If you have already completed any of these items, please share that information with your care team to update in your medical record.  Health Maintenance Due   Topic Date Due     ANNUAL REVIEW OF HM ORDERS  Never done     Pneumococcal Vaccine (1 - PCV) 11/11/2019     COVID-19 Vaccine (5 - Booster for Moderna series) 08/14/2022     Flu Vaccine (1) 09/01/2022       Depression and Suicide in Older Adults    Nearly 2 million older Americans have some type of depression. Some of them even take their own lives. Yet depression among older adults is often ignored. Learn the warning signs. You may help spare a loved one needless pain. You may also save a life.   What is depression?  Depression is a common and serious illness that affects the way you think and feel. It is not a normal part of aging, nor is it a sign of weakness, a character flaw, or something you can snap out of. Most people with depression need treatment to get better. The most common symptom is a feeling of deep sadness. People who are depressed also may seem tired and listless. And nothing seems to give them pleasure. It s normal to grieve or be sad sometimes. But sadness lessens or passes with time. Depression rarely goes away or improves on its own. A person with clinical depression can't \"snap out of it.\" Other symptoms of depression are:     Sleeping more or less than normal    Eating more or less than normal    Having headaches, stomachaches, or other pains that don t go away    Feeling nervous,  empty,  or worthless    Crying a great deal    Thinking or talking about suicide or death    Loss of interest in activities previously enjoyed    Social isolation    Feeling confused or forgetful  What causes it?  The causes of depression aren t fully known. But it is thought to " result from a complex blend of these factors:     Biochemistry. Certain chemicals in the brain play a role.    Genes. Depression does run in families.    Life stress. Life stresses can also trigger depression in some people. Older adults often face many stressors, such as death of friends or a spouse, health problems, and financial concerns.    Chronic conditions. This includes conditions such as diabetes, heart disease, or cancer. These can cause symptoms of depression. Medicine side effects can cause changes in thoughts and behaviors.  How you can help  Often, depressed people may not want to ask for help. When they do, they may be ignored. Or, they may receive the wrong treatment. You can help by showing parents and older friends love and support. If they seem depressed, don t lecture the person, ignore the symptoms, or discount the symptoms as a  normal  part of aging -which they are not. Get involved, listen, and show interest and support.   Help them understand that depression is a treatable illness. Tell them you can help them find the right treatment. Offer to go to their healthcare provider's appointment with them for support when the symptoms are discussed. With their approval, contact a local mental health center, social service agency, or hospital about services.   You can be an advocate for him or her at healthcare appointments. Many older adults have chronic illnesses that can cause symptoms of depression. Medicine side effects can change thoughts and behaviors. You can help make sure that the healthcare provider looks at all of these factors. He or she should refer your family member or friend to a mental healthcare provider when needed. in some cases, untreated depression can lead to a misdiagnosis. A person may be diagnosed with a brain disorder such as dementia. If the healthcare provider does not take the issue of depression seriously, help your family member or friend to find another provider.    Don't be afraid to ask  If you think an older person you care about could be suicidal, ask,  Have you thought about suicide?  Most people will tell you the truth. If they say  yes,  they may already have a plan for how and when they will attempt it. Find out as much as you can. The more detailed the plan, and the easier it is to carry out, the more danger the person is in right now. Tell the person you are there for them and do not want them to harm him or herself. Don't wait to get help for the person. Call the person's healthcare provider, local hospital, or emergency services.   To learn more    National Suicide Prevention Lifeline (crisis hotline) 611-345-VGUA (078-158-9579)    National [place] of Mental Cmhfqg288-343-7719ihf.[facility].nih.gov    National [place] on Mental Hvyknna077-271-9393kpz.[place].org    Mental Health Zztmdgo048-326-3897gnj.[facility].org    National Suicide Sugjjta265-BQJYCAQ (349-490-4856)    Call 911  Never leave the person alone. A person who is actively suicidal needs psychiatric care right away. They will need constant supervision. Never leave the person out of sight. Call 911 or the national 24-hour suicide crisis hotline at 752-692-WJAI (254-090-9657). You can also take the person to the closest emergency room.   Mery last reviewed this educational content on 5/1/2020 2000-2021 [employer]. All rights reserved. This information is not intended as a substitute for professional medical care. Always follow your healthcare professional's instructions.

## 2022-09-21 ENCOUNTER — MYC MEDICAL ADVICE (OUTPATIENT)
Dept: FAMILY MEDICINE | Facility: CLINIC | Age: 67
End: 2022-09-21

## 2022-09-22 NOTE — TELEPHONE ENCOUNTER
Please see my chart message  Patient is off Simvaststin end of August  Has open orders for AST and ALT  Creat, albumin and CBC ordered also  Has lab appointment today  Okay to continue with open orders? Any other lab orders needed?    Dali Benz RN on 9/22/2022 at 9:43 AM

## 2022-09-30 ENCOUNTER — OFFICE VISIT (OUTPATIENT)
Dept: FAMILY MEDICINE | Facility: CLINIC | Age: 67
End: 2022-09-30
Payer: COMMERCIAL

## 2022-09-30 VITALS
SYSTOLIC BLOOD PRESSURE: 104 MMHG | HEIGHT: 64 IN | TEMPERATURE: 98.3 F | DIASTOLIC BLOOD PRESSURE: 66 MMHG | RESPIRATION RATE: 14 BRPM | OXYGEN SATURATION: 99 % | HEART RATE: 81 BPM | BODY MASS INDEX: 22.24 KG/M2 | WEIGHT: 130.25 LBS

## 2022-09-30 DIAGNOSIS — M54.50 ACUTE BILATERAL LOW BACK PAIN WITHOUT SCIATICA: Primary | ICD-10-CM

## 2022-09-30 PROCEDURE — 99214 OFFICE O/P EST MOD 30 MIN: CPT | Performed by: FAMILY MEDICINE

## 2022-09-30 ASSESSMENT — PAIN SCALES - GENERAL: PAINLEVEL: EXTREME PAIN (8)

## 2022-09-30 NOTE — PROGRESS NOTES
Assessment & Plan     Acute bilateral low back pain without sciatica     Patient Instructions   Acetaminophen/tylenol 1000 mg 2-3x/day - scheduled    Aleve/naproxen 1 pill twice a day    Salonpas - patches    Start PT    - XR Lumbar Spine 2/3 Views; Future  - Physical Therapy Referral; Future                 No follow-ups on file.    Aziza Aviles MD  [facility]    Prosper Rivera is a 66 year old, presenting for the following health issues:  Arthritis      History of Present Illness       Back Pain:  She presents for follow up of back pain. Patient's back pain is a new problem.    Original cause of back pain: not sure  First noticed back pain: more than 1 month ago  Patient feels back pain: constantlyLocation of back pain:  Right lower back and left lower back  Description of back pain: dull ache  Back pain spreads: nowhere    Since patient first noticed back pain, pain is: gradually worsening  Does back pain interfere with her job:  Not applicable  On a scale of 1-10 (10 being the worst), patient describes pain as:  6  What makes back pain worse: bending, certain positions, sitting, standing and twisting  Acupuncture: not tried  Acetaminophen: not tried  Activity or exercise: not helpful  Chiropractor:  Not tried  Cold: not helpful  Heat: helpful  Massage: not tried  Muscle relaxants: not tried  NSAIDS: not tried  Opioids: not tried  Physical Therapy: not tried  Rest: helpful  Steroid Injection: not tried  Stretching: not tried  Surgery: not tried  TENS unit: not tried  Topical pain relievers: helpful  Other healthcare providers patient is seeing for back pain: Physical Therapist    Reason for visit:  Follow up to prior appointments    She eats 0-1 servings of fruits and vegetables daily.She consumes 1 sweetened beverage(s) daily.She exercises with enough effort to increase her heart rate 10 to 19 minutes per day.  She exercises with enough effort to increase her heart rate 5 days  "per week.   She is taking medications regularly.       She is not taking Cymbalta and Simvastatin  cymbalta caused her to be dizzy/lightheaded/off feeling  Simvastatin does contribute to her pain - neurologist agreed she could stay off it.       3 weeks ago  Painful with sleep, walking, aching regularly    No pain radiation into legs    Sitting and bending both exacerbate the pain  Standing all day yesterday, worsened the pain    Has tried tylenol intermittently    Has tried heat, voltaren gel and bengay  bengay seems to help temporarily  Heat helps a bit.       Pain History:  When did you first notice your pain? - Chronic Pain   Have you seen this provider for your pain in the past?   Yes   Where in your body do you have pain? Low back  Are you seeing anyone else for your pain? Yes - Neurology    PHQ-9 SCORE 7/7/2021 8/19/2022 8/25/2022   PHQ-9 Total Score - - -   PHQ-9 Total Score MyChart - 19 (Moderately severe depression) -   PHQ-9 Total Score 12 19 18       SHEA-7 SCORE 7/31/2020 7/7/2021 8/25/2022   Total Score - - -   Total Score 18 11 20         Review of Systems   Constitutional, HEENT, cardiovascular, pulmonary, gi and gu systems are negative, except as otherwise noted.      Objective    /66   Pulse 81   Temp 98.3  F (36.8  C) (Tympanic)   Resp 14   Ht 1.626 m (5' 4\")   Wt 59.1 kg (130 lb 4 oz)   LMP  (LMP Unknown)   SpO2 99%   Breastfeeding No   BMI 22.36 kg/m    Body mass index is 22.36 kg/m .  Physical Exam   GENERAL APPEARANCE: healthy, alert and no distress  Comprehensive back pain exam:  Tenderness of L2-4 midline, Range of motion not limited by pain, Lower extremity strength functional and equal on both sides, Lower extremity reflexes within normal limits bilaterally, Lower extremity sensation normal and equal on both sides and Straight leg raise negative bilaterally    Xray: mild degenerative changes  No compression fractures                "

## 2022-09-30 NOTE — PATIENT INSTRUCTIONS
"Acetaminophen/tylenol 1000 mg 2-3x/day - scheduled    Aleve/naproxen 1 pill twice a day    Salonpas - patches      Thank you for choosing [facility].      When you are out of refills or the refills say \"zero\", it is time to schedule your next appointment in clinic!    Our Clinic hours are:  Mondays    7:20 am - 7 pm  Tues -  Fri  7:20 am - 5 pm    To speak to the care team, call [phone] option #2      The clinic lab opens at 7:30 am Mon - Fri and appointments are required.    [facility]  Ph. [phone]  Monday-Thursday 8 am - 7pm  Tues/Wed/Fri 8 am - 5:30 pm       "

## 2022-10-05 ENCOUNTER — IMMUNIZATION (OUTPATIENT)
Dept: FAMILY MEDICINE | Facility: CLINIC | Age: 67
End: 2022-10-05
Payer: COMMERCIAL

## 2022-10-05 DIAGNOSIS — Z23 NEED FOR PROPHYLACTIC VACCINATION AND INOCULATION AGAINST INFLUENZA: Primary | ICD-10-CM

## 2022-10-05 PROCEDURE — 90662 IIV NO PRSV INCREASED AG IM: CPT

## 2022-10-05 PROCEDURE — G0008 ADMIN INFLUENZA VIRUS VAC: HCPCS

## 2022-10-05 PROCEDURE — 99207 PR NO CHARGE NURSE ONLY: CPT

## 2022-10-20 ENCOUNTER — HOSPITAL ENCOUNTER (OUTPATIENT)
Dept: PHYSICAL THERAPY | Facility: CLINIC | Age: 67
Setting detail: THERAPIES SERIES
Discharge: HOME | End: 2022-10-20
Attending: FAMILY MEDICINE
Payer: COMMERCIAL

## 2022-10-20 DIAGNOSIS — M54.50 ACUTE BILATERAL LOW BACK PAIN WITHOUT SCIATICA: ICD-10-CM

## 2022-10-20 PROCEDURE — 97110 THERAPEUTIC EXERCISES: CPT | Mod: GP | Performed by: PHYSICAL THERAPIST

## 2022-10-20 PROCEDURE — 97161 PT EVAL LOW COMPLEX 20 MIN: CPT | Mod: GP | Performed by: PHYSICAL THERAPIST

## 2022-10-20 PROCEDURE — 97140 MANUAL THERAPY 1/> REGIONS: CPT | Mod: GP | Performed by: PHYSICAL THERAPIST

## 2022-10-21 NOTE — PROGRESS NOTES
[facility]    OUTPATIENT PHYSICAL THERAPY ORTHOPEDIC EVALUATION  PLAN OF TREATMENT FOR OUTPATIENT REHABILITATION  (COMPLETE FOR INITIAL CLAIMS ONLY)  Patient's Last Name, First Name, M.I.  YOB: 1955  KirkYaritza  ROSE    Provider s Name:  [facility]   Medical Record No.  [id number]   Start of Care Date:  10/20/22   Onset Date:  09/15/22   Type:     _X__PT   ___OT   ___SLP Medical Diagnosis:  Acute bilateral low back pain without sciatica (M54.50)     PT Diagnosis:  LBP   Visits from SOC:  1      _________________________________________________________________________________  Plan of Treatment/Functional Goals:  balance training, joint mobilization, manual therapy, neuromuscular re-education, ROM, strengthening, stretching           Goals  Goal Identifier: 1  Goal Description: Patient will be able to bend down to tie shoes without pain.  Target Date: 12/30/22    Goal Identifier: 2  Goal Description: Patient will report no pain with sit to stand transfers.  Target Date: 12/30/22    Goal Identifier: 3  Goal Description: Patient will be independent and consistent with HEP 5x a week to aid functional recovery.  Target Date: 12/30/22                  Therapy Frequency:  other (see comments) (everyother week)  Predicted Duration of Therapy Intervention:  10 weeks    Sima Morillo, PT                 I CERTIFY THE NEED FOR THESE SERVICES FURNISHED UNDER        THIS PLAN OF TREATMENT AND WHILE UNDER MY CARE     (Physician co-signature of this document indicates review and certification of the therapy plan).                       Certification Date From:  10/20/22   Certification Date To:  12/30/22    Referring Provider:  Aziza Aviles MD    Initial Assessment        See Epic Evaluation Start of Care Date: 10/20/22

## 2022-10-21 NOTE — PROGRESS NOTES
PHYSICAL THERAPY INITIAL EVALUATION  10/20/22 1600   General Information   Type of Visit Initial OP Ortho PT Evaluation   Start of Care Date 10/20/22   Referring Physician Aziza Aviles MD   Patient/Family Goals Statement would like some exercises that she can do at home to strengthen the back   Orders Evaluate and Treat   Date of Order 09/30/22   Certification Required? Yes   Medical Diagnosis Acute bilateral low back pain without sciatica (M54.50)   Surgical/Medical history reviewed Yes   Precautions/Limitations no known precautions/limitations   Body Part(s)   Body Part(s) Lumbar Spine/SI   Presentation and Etiology   Pertinent history of current problem (include personal factors and/or comorbidities that impact the POC) Patient reports her back got sore a couple months ago. No specific injury. States that it has improved some. Using heating pad and taking tylenol. Worse with bending over or standing up. Notes that she also has bursitis in both of her hips.   Impairments A. Pain;F. Decreased strength and endurance;G. Impaired balance;J. Burning   Functional Limitations perform activities of daily living;perform desired leisure / sports activities   Symptom Location central low back, radiates into L buttock and lateral hip   How/Where did it occur From insidious onset   Onset date of current episode/exacerbation 09/15/22   Chronicity New   Pain rating (0-10 point scale) Best (/10)   Best (/10) 4   Pain quality B. Dull;C. Aching   Frequency of pain/symptoms A. Constant   Pain/symptoms exacerbated by A. Sitting;I. Bending;K. Home tasks   Pain/symptoms eased by C. Rest;I. OTC medication(s)   Progression of symptoms since onset: Improved   Prior Level of Function   Prior Level of Function-Mobility independent   Prior Level of Function-ADLs independent   Fall Risk Screen   Fall screen completed by PT   Have you fallen 2 or more times in the past year? No   Have you fallen and had an injury in the past year? No    Is patient a fall risk? No   Abuse Screen (yes response referral indicated)   Feels Unsafe at Home or Work/School no   Feels Threatened by Someone no   Does Anyone Try to Keep You From Having Contact with Others or Doing Things Outside Your Home? no   Physical Signs of Abuse Present no   Lumbar Spine/SI Objective Findings   Gait/Locomotion antalgic   Hamstring Flexibility tight L > R   Hip Flexor Flexibility WNL   Quadricep Flexibility WNL   Piriformis Flexibility tight L > L   Flexion ROM fingertips to mid shin, inc pain   Extension ROM 25%, inc pain   Right Side Bending ROM fingertips to superior pole of patella   Left Side Bending ROM fingertips to superior pole of patella, inc pain   Pelvic Screen + supine to sit, - thigh thrust, - SI thrust, - SI gapping   Hip Flexion (L2) Strength R 4, L 4 with pain   Hip Abduction Strength 4 B   Knee Flexion Strength 5   Knee Extension (L3) Strength 5   Ankle Dorsiflexion (L4) Strength 5   Great Toe Extension (L5) Strength 5   Ankle Plantar Flexion (S1) Strength 5   SLR + L   Spring Test +L4   Segmental Mobility hypomobile especially L4/5 and T12 junction   Slump Test + L   Posture R iliac crest higher, R shoulder depressed   Sensation Testing WNL   Patellar Tendon Reflexes  2+ B   Achilles Tendon Reflexes 2+ B   Planned Therapy Interventions   Planned Therapy Interventions balance training;joint mobilization;manual therapy;neuromuscular re-education;ROM;strengthening;stretching   Clinical Impression   Criteria for Skilled Therapeutic Interventions Met yes, treatment indicated   PT Diagnosis LBP   Influenced by the following impairments pain, decreased ROM, decreased strength decreased flexibility   Functional limitations due to impairments bending, sitting, household tasks   Clinical Presentation Stable/Uncomplicated   Clinical Presentation Rationale improving sx   Clinical Decision Making (Complexity) Low complexity   Therapy Frequency other (see comments)  (everyother  week)   Predicted Duration of Therapy Intervention (days/wks) 10 weeks   Risk & Benefits of therapy have been explained Yes   Patient, Family & other staff in agreement with plan of care Yes   Education Assessment   Preferred Learning Style Listening;Demonstration;Pictures/video   Barriers to Learning No barriers   ORTHO GOALS   PT Ortho Eval Goals 1;2;3   Ortho Goal 1   Goal Identifier 1   Goal Description Patient will be able to bend down to tie shoes without pain.   Target Date 12/30/22   Ortho Goal 2   Goal Identifier 2   Goal Description Patient will report no pain with sit to stand transfers.   Target Date 12/30/22   Ortho Goal 3   Goal Identifier 3   Goal Description Patient will be independent and consistent with HEP 5x a week to aid functional recovery.   Target Date 12/30/22   Total Evaluation Time   PT Eval, Low Complexity Minutes (55636) 30   Therapy Certification   Certification date from 10/20/22   Certification date to 12/30/22   Medical Diagnosis Acute bilateral low back pain without sciatica (M54.50)       Please contact me with any questions or concerns.  Thank you for your referral.    Sima Morillo, PT, DPT, OCS  Physical Therapist, Orthopedic Certified Specialist    [facility]  [address] [address]  [email or web address]   Office: [phone]   Employed by [employer]

## 2022-11-18 ENCOUNTER — LAB (OUTPATIENT)
Dept: LAB | Facility: CLINIC | Age: 67
End: 2022-11-18
Payer: COMMERCIAL

## 2022-11-18 DIAGNOSIS — Z79.899 DRUG THERAPY: ICD-10-CM

## 2022-11-18 DIAGNOSIS — M05.79 SEROPOSITIVE RHEUMATOID ARTHRITIS OF MULTIPLE SITES (H): ICD-10-CM

## 2022-11-18 LAB
ALBUMIN SERPL BCG-MCNC: 4.2 G/DL (ref 3.5–5.2)
ALT SERPL W P-5'-P-CCNC: 11 U/L (ref 10–35)
AST SERPL W P-5'-P-CCNC: 21 U/L (ref 10–35)
BASOPHILS # BLD AUTO: 0.1 10E3/UL (ref 0–0.2)
BASOPHILS NFR BLD AUTO: 1 %
CREAT SERPL-MCNC: 0.85 MG/DL (ref 0.51–0.95)
EOSINOPHIL # BLD AUTO: 0.1 10E3/UL (ref 0–0.7)
EOSINOPHIL NFR BLD AUTO: 1 %
ERYTHROCYTE [DISTWIDTH] IN BLOOD BY AUTOMATED COUNT: 12.8 % (ref 10–15)
GFR SERPL CREATININE-BSD FRML MDRD: 74 ML/MIN/1.73M2
HCT VFR BLD AUTO: 39.9 % (ref 35–47)
HGB BLD-MCNC: 13.7 G/DL (ref 11.7–15.7)
IMM GRANULOCYTES # BLD: 0 10E3/UL
IMM GRANULOCYTES NFR BLD: 0 %
LYMPHOCYTES # BLD AUTO: 3.7 10E3/UL (ref 0.8–5.3)
LYMPHOCYTES NFR BLD AUTO: 36 %
MCH RBC QN AUTO: 30.4 PG (ref 26.5–33)
MCHC RBC AUTO-ENTMCNC: 34.3 G/DL (ref 31.5–36.5)
MCV RBC AUTO: 89 FL (ref 78–100)
MONOCYTES # BLD AUTO: 0.8 10E3/UL (ref 0–1.3)
MONOCYTES NFR BLD AUTO: 8 %
NEUTROPHILS # BLD AUTO: 5.7 10E3/UL (ref 1.6–8.3)
NEUTROPHILS NFR BLD AUTO: 55 %
PLATELET # BLD AUTO: 232 10E3/UL (ref 150–450)
RBC # BLD AUTO: 4.51 10E6/UL (ref 3.8–5.2)
WBC # BLD AUTO: 10.4 10E3/UL (ref 4–11)

## 2022-11-18 PROCEDURE — 84450 TRANSFERASE (AST) (SGOT): CPT

## 2022-11-18 PROCEDURE — 82565 ASSAY OF CREATININE: CPT

## 2022-11-18 PROCEDURE — 82040 ASSAY OF SERUM ALBUMIN: CPT

## 2022-11-18 PROCEDURE — 36415 COLL VENOUS BLD VENIPUNCTURE: CPT

## 2022-11-18 PROCEDURE — 85025 COMPLETE CBC W/AUTO DIFF WBC: CPT

## 2022-11-18 PROCEDURE — 84460 ALANINE AMINO (ALT) (SGPT): CPT

## 2022-12-05 NOTE — TELEPHONE ENCOUNTER
"Subjective:       Patient ID: Mckenna Peraza is a 47 y.o. female.    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   HENT:  Negative for mouth sores and trouble swallowing.    Eyes:  Negative for redness.   Respiratory:  Negative for cough and shortness of breath.    Cardiovascular:  Positive for chest pain.   Gastrointestinal:  Negative for constipation and diarrhea.   Genitourinary:  Negative for dysuria and genital sores.   Skin:  Positive for rash.   Neurological:  Positive for headaches.   Hematological:  Does not bruise/bleed easily.         Objective:   Ht 5' 1" (1.549 m)   Wt 69.4 kg (153 lb)   BMI 28.91 kg/m²      Physical Exam      Normal x eczema back of neck      Assessment:       1. Other systemic lupus erythematosus with other organ involvement    2. Systemic lupus erythematosus, unspecified SLE type, unspecified organ involvement status            Plan:       Problem List Items Addressed This Visit          Active Problems    Systemic lupus erythematosus     She is symptoms free on 300 mg/d hydroxychloroquine and has normal pe and labs    Will reduce hydroxychloroquine to 200 mg/d  Will repeat labs including SSA and APA in 6 mo and reassess then         Relevant Medications    hydrOXYchloroQUINE (PLAQUENIL) 200 mg tablet    Other Relevant Orders    Cardiolipin antibody    DRVVT    Beta-2 Glycoprotein Abs (IgA, IgG, IgM)    Sjogrens syndrome-A extractable nuclear antibody    C4 Complement    C3 Complement    Sedimentation rate    CBC Auto Differential    Comprehensive " Routing refill request to provider for review/approval because:  Medication is reported/historical  LOV 09/27/19 Dr. Wade       Metabolic Panel    Anti-DNA Ab, Double-Stranded

## 2022-12-06 NOTE — PROGRESS NOTES
PHYSICAL THERAPY DISCHARGE NOTE  Patient seen from 1020/22-10/20/22. Patient did not return for follow up treatments. The daily note from the patient's last visit will serve as the discharge note. Discharge from PT services at this time for this episode of treatment. Please see attached documentation under this episode of care for further information including dates of service, start of care date, referring physician, Dx, treatment plan, treatments, etc.    Please contact me with any questions or concerns.  Thank you for your referral.    Sima Morillo PT, DPT, OCS  Physical Therapist, Orthopedic Certified Specialist    [facility]  [address] [facility]   [address] [place] [address]  [email or web address]   Office: [phone]   Employed by [employer]     10/20/22 1600   Signing Clinician's Name / Credentials   Signing clinician's name / credentials Sima Morillo PT, DPT, OCS   Session Number   Session Number 1   Authorization status Authorized - [employer] Med Adv, cert req   Progress Note/Recertification   Progress Note Due Date 12/30/22   Recertification Due Date 12/30/22   Adult Goals   PT Ortho Eval Goals 1;2;3   Ortho Goal 1   Goal Identifier 1   Goal Description Patient will be able to bend down to tie shoes without pain.   Target Date 12/30/22   Ortho Goal 2   Goal Identifier 2   Goal Description Patient will report no pain with sit to stand transfers.   Target Date 12/30/22   Ortho Goal 3   Goal Identifier 3   Goal Description Patient will be independent and consistent with HEP 5x a week to aid functional recovery.   Target Date 12/30/22   Subjective Report   Subjective Report see eval   Treatment Interventions   Interventions Therapeutic Procedure/Exercise;Manual Therapy   Therapeutic Procedure/exercise   Therapeutic Procedures: strength, endurance, ROM, flexibillity minutes (10052) 15   Skilled Intervention HEP instruction   Patient  Response good demo   Treatment Detail Exercise Name: Nerve Mobility Sciatic Position 5 - Reps: 10-15 on each leg  - Sessions: 2  Exercise Name: Piriformis Stretch Above 90 Degress Supine, Sets: 2 on each side - Reps: Hold 20-30 seconds - Sessions: 2  Exercise Name: Lumbar Flexion Rotation, Sets: 1 - Reps: 5-10 to each side - Sessions: 2, Notes: Hold 5-10 seconds. Keep your feet on the bed and just roll knees side to side.  Exercise Name: Bridging #1, Sets: 1-2 - Reps: 10-15 - Sessions: 1, Notes: Hold 5 seconds.  Exercise Name: Clamshell Feet together, Sets: 1-2 - Reps: 10-15 - Sessions: 1   Manual Therapy   Manual Therapy: Mobilization, MFR, MLD, friction massage minutes (25642) 10   Skilled Intervention MT to improve mobility and decrease pain   Patient Response less tenderness post   Treatment Detail MET: pubic shotgun and rotated innominante   Education   Learner Patient   Readiness Eager   Method Booklet/handout;Explanation;Demonstration   Response Verbalizes Understanding;Demonstrates Understanding   Plan   Homework PTRX   Updates to plan of care pt to f/u in 2-3 weeks   Plan for next session recheck pelvis, review HEP, add hamstring stretching and core stabs (90/90)   Total Session Time   Timed Code Treatment Minutes 25   Total Treatment Time (sum of timed and untimed services) 55   Medicare Claim Information   Medical Diagnosis Acute bilateral low back pain without sciatica (M54.50)   PT Diagnosis LBP   Start of Care Date 10/20/22   Onset date of current episode/exacerbation 09/15/22   Certification date from 10/20/22

## 2023-01-20 ENCOUNTER — LAB (OUTPATIENT)
Dept: LAB | Facility: CLINIC | Age: 68
End: 2023-01-20
Payer: COMMERCIAL

## 2023-01-20 DIAGNOSIS — M05.79 SEROPOSITIVE RHEUMATOID ARTHRITIS OF MULTIPLE SITES (H): ICD-10-CM

## 2023-01-20 DIAGNOSIS — Z79.899 DRUG THERAPY: ICD-10-CM

## 2023-01-20 LAB
ALBUMIN SERPL BCG-MCNC: 4.3 G/DL (ref 3.5–5.2)
ALT SERPL W P-5'-P-CCNC: 47 U/L (ref 10–35)
AST SERPL W P-5'-P-CCNC: 52 U/L (ref 10–35)
BASOPHILS # BLD AUTO: 0.1 10E3/UL (ref 0–0.2)
BASOPHILS NFR BLD AUTO: 1 %
CREAT SERPL-MCNC: 0.83 MG/DL (ref 0.51–0.95)
EOSINOPHIL # BLD AUTO: 0.1 10E3/UL (ref 0–0.7)
EOSINOPHIL NFR BLD AUTO: 2 %
ERYTHROCYTE [DISTWIDTH] IN BLOOD BY AUTOMATED COUNT: 13.6 % (ref 10–15)
GFR SERPL CREATININE-BSD FRML MDRD: 76 ML/MIN/1.73M2
HCT VFR BLD AUTO: 39.3 % (ref 35–47)
HGB BLD-MCNC: 13.9 G/DL (ref 11.7–15.7)
IMM GRANULOCYTES # BLD: 0 10E3/UL
IMM GRANULOCYTES NFR BLD: 0 %
LYMPHOCYTES # BLD AUTO: 3.3 10E3/UL (ref 0.8–5.3)
LYMPHOCYTES NFR BLD AUTO: 52 %
MCH RBC QN AUTO: 30.4 PG (ref 26.5–33)
MCHC RBC AUTO-ENTMCNC: 35.4 G/DL (ref 31.5–36.5)
MCV RBC AUTO: 86 FL (ref 78–100)
MONOCYTES # BLD AUTO: 0.3 10E3/UL (ref 0–1.3)
MONOCYTES NFR BLD AUTO: 5 %
NEUTROPHILS # BLD AUTO: 2.5 10E3/UL (ref 1.6–8.3)
NEUTROPHILS NFR BLD AUTO: 40 %
NRBC # BLD AUTO: 0 10E3/UL
NRBC BLD AUTO-RTO: 0 /100
PLATELET # BLD AUTO: 243 10E3/UL (ref 150–450)
RBC # BLD AUTO: 4.57 10E6/UL (ref 3.8–5.2)
WBC # BLD AUTO: 6.2 10E3/UL (ref 4–11)

## 2023-01-20 PROCEDURE — 36415 COLL VENOUS BLD VENIPUNCTURE: CPT

## 2023-01-20 PROCEDURE — 85025 COMPLETE CBC W/AUTO DIFF WBC: CPT

## 2023-01-20 PROCEDURE — 84450 TRANSFERASE (AST) (SGOT): CPT

## 2023-01-20 PROCEDURE — 82040 ASSAY OF SERUM ALBUMIN: CPT

## 2023-01-20 PROCEDURE — 82565 ASSAY OF CREATININE: CPT

## 2023-01-20 PROCEDURE — 84460 ALANINE AMINO (ALT) (SGPT): CPT

## 2023-04-09 ENCOUNTER — HEALTH MAINTENANCE LETTER (OUTPATIENT)
Age: 68
End: 2023-04-09

## 2023-04-18 ENCOUNTER — LAB (OUTPATIENT)
Dept: LAB | Facility: CLINIC | Age: 68
End: 2023-04-18
Payer: COMMERCIAL

## 2023-04-18 DIAGNOSIS — Z79.899 DRUG THERAPY: ICD-10-CM

## 2023-04-18 DIAGNOSIS — M05.79 SEROPOSITIVE RHEUMATOID ARTHRITIS OF MULTIPLE SITES (H): ICD-10-CM

## 2023-04-18 LAB
ALBUMIN SERPL BCG-MCNC: 4.1 G/DL (ref 3.5–5.2)
ALT SERPL W P-5'-P-CCNC: 18 U/L (ref 10–35)
AST SERPL W P-5'-P-CCNC: 21 U/L (ref 10–35)
BASOPHILS # BLD AUTO: 0 10E3/UL (ref 0–0.2)
BASOPHILS NFR BLD AUTO: 1 %
CREAT SERPL-MCNC: 0.86 MG/DL (ref 0.51–0.95)
EOSINOPHIL # BLD AUTO: 0.1 10E3/UL (ref 0–0.7)
EOSINOPHIL NFR BLD AUTO: 2 %
ERYTHROCYTE [DISTWIDTH] IN BLOOD BY AUTOMATED COUNT: 13 % (ref 10–15)
GFR SERPL CREATININE-BSD FRML MDRD: 73 ML/MIN/1.73M2
HCT VFR BLD AUTO: 39.2 % (ref 35–47)
HGB BLD-MCNC: 13.5 G/DL (ref 11.7–15.7)
IMM GRANULOCYTES # BLD: 0 10E3/UL
IMM GRANULOCYTES NFR BLD: 0 %
LYMPHOCYTES # BLD AUTO: 2.4 10E3/UL (ref 0.8–5.3)
LYMPHOCYTES NFR BLD AUTO: 45 %
MCH RBC QN AUTO: 31.5 PG (ref 26.5–33)
MCHC RBC AUTO-ENTMCNC: 34.4 G/DL (ref 31.5–36.5)
MCV RBC AUTO: 91 FL (ref 78–100)
MONOCYTES # BLD AUTO: 0.5 10E3/UL (ref 0–1.3)
MONOCYTES NFR BLD AUTO: 9 %
NEUTROPHILS # BLD AUTO: 2.3 10E3/UL (ref 1.6–8.3)
NEUTROPHILS NFR BLD AUTO: 43 %
PLATELET # BLD AUTO: 225 10E3/UL (ref 150–450)
RBC # BLD AUTO: 4.29 10E6/UL (ref 3.8–5.2)
WBC # BLD AUTO: 5.3 10E3/UL (ref 4–11)

## 2023-04-18 PROCEDURE — 36415 COLL VENOUS BLD VENIPUNCTURE: CPT

## 2023-04-18 PROCEDURE — 84450 TRANSFERASE (AST) (SGOT): CPT

## 2023-04-18 PROCEDURE — 84460 ALANINE AMINO (ALT) (SGPT): CPT

## 2023-04-18 PROCEDURE — 82040 ASSAY OF SERUM ALBUMIN: CPT

## 2023-04-18 PROCEDURE — 85025 COMPLETE CBC W/AUTO DIFF WBC: CPT

## 2023-04-18 PROCEDURE — 82565 ASSAY OF CREATININE: CPT

## 2023-05-11 ENCOUNTER — TRANSFERRED RECORDS (OUTPATIENT)
Dept: HEALTH INFORMATION MANAGEMENT | Facility: CLINIC | Age: 68
End: 2023-05-11
Payer: COMMERCIAL

## 2023-05-16 ENCOUNTER — TRANSFERRED RECORDS (OUTPATIENT)
Dept: HEALTH INFORMATION MANAGEMENT | Facility: CLINIC | Age: 68
End: 2023-05-16
Payer: COMMERCIAL

## 2023-05-17 ENCOUNTER — TRANSFERRED RECORDS (OUTPATIENT)
Dept: HEALTH INFORMATION MANAGEMENT | Facility: CLINIC | Age: 68
End: 2023-05-17
Payer: COMMERCIAL

## 2023-07-24 NOTE — PROGRESS NOTES
"Yaritza Bradley is a 65 year old female who is being evaluated via a billable video visit.      The patient has been notified of following:     \"This video visit will be conducted via a call between you and your physician/provider. We have found that certain health care needs can be provided without the need for an in-person physical exam.  This service lets us provide the care you need with a video conversation.  If a prescription is necessary we can send it directly to your pharmacy.  If lab work is needed we can place an order for that and you can then stop by our lab to have the test done at a later time.    Video visits are billed at different rates depending on your insurance coverage.  Please reach out to your insurance provider with any questions.    If during the course of the call the physician/provider feels a video visit is not appropriate, you will not be charged for this service.\"    Patient has given verbal consent for Video visit? Yes  How would you like to obtain your AVS? MyChart  If you are dropped from the video visit, the video invite should be resent to: Send to e-mail at: [email or web address]   Will anyone else be joining your video visit? No        Video-Visit Details    Type of service:  Video Visit    Video Start Time: 10:00 AM  Video End Time: 10:17 AM    Originating Location (pt. Location): Home    Distant Location (provider location):  [facility] [place]     Platform used for Video Visit: Noemy Cedillo MD    *Please see accompanying physician note for additional visit details.      " [MELD Score: ___] : MELD Score is [unfilled] [de-identified] : FELICITY RUBIN is a 66 year old female with a PMH significant for decompensated Cirrhosis c/b Ascites, Hepatic Encephalopathy, HTN, DM, HLD, Asthma, and Anemia.\par \par 7/14/22: Pt reports that she has known fatty liver for many years and was diagnosed with cirrhosis in the last few years. Previous management included Lasix 40 mg daily and ursodiol 500 mg daily. Pt is unsure why she was prescribed ursodiol. Current daily smoker. Denies alcohol consumption, past or present. Denies risk factors for viral hepatitis. Pt states, and  Kai agrees, that pt has become more confused and foggy in the past year. She also reports bladder and bowel incontinence and unsteady gait. She has a history of falls, most recent being last year in which she fractured her shoulder. Pt feels these symptoms started after the most recent fall. \par \par On chart review: Pt has had elevated LFTs dating as far back as 2016 in the form of alk phos elevation ranging from 120-200 with normal transaminases and bilirubin. In 2020, bilirubin is seen to elevate to 1.7 and fluctuate from 0.8 to 2.0 since then. ALT remained normal. AST mostly in the 30s-40s. Last INR done in 2020 was 1.4. Negative for HBV and HCV in 2020. Plt count ranging from 44-77 since 2017. \par Last US abdomen from 2020 revealed lobulated liver with a diffuse heterogeneous/nodular echotexture is compatible with underlying hepatocellular disease/cirrhosis, no focal hepatic abnormality, extatic CBD (11 mm) likely related to prior cholecystectomy/cholecystitis, splenomegaly and mild ascites\par \par Last EGD 9/2021 no varices, portal hypertensive gastropathy\par No previous paracentesis \par \par 8/11/22: Since last visit Labs done 7/18/22: bilirubin 1.6, AST 38, ALT 9, AlkPhos 146, INR 1.33, AFP 2, ABDIEL + 1:160, ASMA + 1:120, viral hepatitis panel negative, iron studies negative.\par US abdomen 7/18/22: cirrhotic liver without focal abnormality, mild abdominal ascites.\par Pt reports she has quit smoking. She started Xifaxan 550mg BID which her  states has helped with her confusion. Lasix was decreased to 20 mg daily due to pt complaints of frequent urination. Denies hematemesis.\par \par 10/20/22: Pt reports she went to [facility] in September for increased confusion. She was given a prescription for lactulose and discharged home. Pt is taking lactulose and Xifaxan and reports her confusion has improved. Denies fatigue, malaise, arthralgias, myalgias, recent infection, abdominal pain or distension, jaundice, hematemesis, hematochezia, dark urine, confusion, unintentional weight loss or gain. She is currently taking Spironolactone 50 mg daily. Pt is also taking Ursodiol for pruritus and reports positive relief.\par \par Labs 9/20/22 - bilirubin 1.7, AST 39, ALT 10, , INR 1.33, PLT 41\par EGD 10/19/22 - no varices, portal hypertensive gastropathy\par \par 1/3/23: She is status post discharge from [facility] where she had been transferred for management of GI bleed from [facility]. S/P banding at [facility] with recs to repeat EGD 2 weeks after DC. Deemed not a TIPS candidate due to h/o Encephalopathy.\par \par 4/21/23: Pt presents today for follow up visit, accompanied by her . Pt reports she is feeling well. She continues to take Lactulose and Xifaxan. Denies confusion. She is currently on Furosemide 20 mg daily and Spironolactone 25 mg daily. Denies abdominal distension or LE edema. Pt is currently on Carvedilol 3.125 mg daily. HR in office today is 58. Denies hematemesis or black stools. LFTs from 2/23 - Tbili 1.4, AST 25, ALT 6, . Pt is currently on Ursodiol 500 mg daily.

## 2023-07-26 ENCOUNTER — PATIENT OUTREACH (OUTPATIENT)
Dept: CARE COORDINATION | Facility: CLINIC | Age: 68
End: 2023-07-26
Payer: COMMERCIAL

## 2023-07-27 ENCOUNTER — LAB (OUTPATIENT)
Dept: LAB | Facility: CLINIC | Age: 68
End: 2023-07-27
Payer: COMMERCIAL

## 2023-07-27 DIAGNOSIS — M05.79 SEROPOSITIVE RHEUMATOID ARTHRITIS OF MULTIPLE SITES (H): ICD-10-CM

## 2023-07-27 DIAGNOSIS — Z79.899 ENCOUNTER FOR LONG-TERM (CURRENT) USE OF MEDICATIONS: Primary | ICD-10-CM

## 2023-07-27 LAB
ALBUMIN SERPL BCG-MCNC: 4.4 G/DL (ref 3.5–5.2)
ALT SERPL W P-5'-P-CCNC: 29 U/L (ref 0–50)
AST SERPL W P-5'-P-CCNC: 41 U/L (ref 0–45)
BASOPHILS # BLD AUTO: 0 10E3/UL (ref 0–0.2)
BASOPHILS NFR BLD AUTO: 0 %
CREAT SERPL-MCNC: 0.85 MG/DL (ref 0.51–0.95)
EOSINOPHIL # BLD AUTO: 0.1 10E3/UL (ref 0–0.7)
EOSINOPHIL NFR BLD AUTO: 1 %
ERYTHROCYTE [DISTWIDTH] IN BLOOD BY AUTOMATED COUNT: 13 % (ref 10–15)
GFR SERPL CREATININE-BSD FRML MDRD: 74 ML/MIN/1.73M2
HCT VFR BLD AUTO: 38.4 % (ref 35–47)
HGB BLD-MCNC: 13 G/DL (ref 11.7–15.7)
IMM GRANULOCYTES # BLD: 0 10E3/UL
IMM GRANULOCYTES NFR BLD: 0 %
LYMPHOCYTES # BLD AUTO: 2.3 10E3/UL (ref 0.8–5.3)
LYMPHOCYTES NFR BLD AUTO: 27 %
MCH RBC QN AUTO: 30.6 PG (ref 26.5–33)
MCHC RBC AUTO-ENTMCNC: 33.9 G/DL (ref 31.5–36.5)
MCV RBC AUTO: 90 FL (ref 78–100)
MONOCYTES # BLD AUTO: 0.6 10E3/UL (ref 0–1.3)
MONOCYTES NFR BLD AUTO: 6 %
NEUTROPHILS # BLD AUTO: 5.7 10E3/UL (ref 1.6–8.3)
NEUTROPHILS NFR BLD AUTO: 66 %
PLATELET # BLD AUTO: 282 10E3/UL (ref 150–450)
RBC # BLD AUTO: 4.25 10E6/UL (ref 3.8–5.2)
WBC # BLD AUTO: 8.7 10E3/UL (ref 4–11)

## 2023-07-27 PROCEDURE — 84450 TRANSFERASE (AST) (SGOT): CPT

## 2023-07-27 PROCEDURE — 84460 ALANINE AMINO (ALT) (SGPT): CPT

## 2023-07-27 PROCEDURE — 82565 ASSAY OF CREATININE: CPT

## 2023-07-27 PROCEDURE — 85025 COMPLETE CBC W/AUTO DIFF WBC: CPT

## 2023-07-27 PROCEDURE — 82040 ASSAY OF SERUM ALBUMIN: CPT

## 2023-07-27 PROCEDURE — 36415 COLL VENOUS BLD VENIPUNCTURE: CPT

## 2023-07-28 DIAGNOSIS — Z79.899 DRUG THERAPY: Primary | ICD-10-CM

## 2023-08-24 ENCOUNTER — OFFICE VISIT (OUTPATIENT)
Dept: PODIATRY | Facility: CLINIC | Age: 68
End: 2023-08-24
Payer: COMMERCIAL

## 2023-08-24 VITALS
BODY MASS INDEX: 22.2 KG/M2 | SYSTOLIC BLOOD PRESSURE: 121 MMHG | WEIGHT: 130 LBS | DIASTOLIC BLOOD PRESSURE: 74 MMHG | HEART RATE: 78 BPM | HEIGHT: 64 IN

## 2023-08-24 DIAGNOSIS — M54.10 RADICULOPATHY WITH LOWER EXTREMITY SYMPTOMS: Primary | ICD-10-CM

## 2023-08-24 PROCEDURE — 99203 OFFICE O/P NEW LOW 30 MIN: CPT | Performed by: PODIATRIST

## 2023-08-24 NOTE — PROGRESS NOTES
PATIENT HISTORY:  Yaritza Bradley is a 67 year old female who presents to clinic as a self referral with a chief complaint of pain and numbness to the toes on both feet.  The patient is seen by themselves.  The patient relates the pain is primarily located around the forefoot bilaterally.  Reports insidious onset without acute precipitating event.  The patient relates that the symptoms have been going on for several month(s).  The patient has previously tried different shoes with little relief.   Any previous notes and studies that pertain to the patient's condition were reviewed.    Pertinent medical, surgical and family history was reviewed in the [facility] chart.    Past Medical History:   Past Medical History:   Diagnosis Date    Acetabular labrum tear, right, initial encounter 4/26/2016    Arthritis     Cellulitis 5/20/2018    Depressive disorder     Hemorrhage of rectum and anus 2002    Post-operative nausea and vomiting 7/14/2017    Trochanteric bursitis of right hip 4/26/2016       Medications:   Current Outpatient Medications:     Cyanocobalamin (B-12) 1000 MCG TBCR, Take 1,000 mcg by mouth daily, Disp: 100 tablet, Rfl: 1    folic acid (FOLVITE) 1 MG tablet, Take 1 tablet (1,000 mcg) by mouth daily, Disp: 90 tablet, Rfl: 3    methotrexate 50 MG/2ML injection CHEMO, Inject 4 mg Subcutaneous every 7 days , Disp: , Rfl:     Omega-3 Fatty Acids (OMEGA-3 FISH OIL PO), Take 1 g by mouth daily (DRY EYE OMEGA BENEFITS) 667-250 mg-unit cap, Disp: , Rfl:     RESTASIS 0.05 % ophthalmic emulsion, Place 1 drop into both eyes 2 times daily, Disp: , Rfl:     SM CALCIUM SOFT CHEWS 500-100-40 OR CHEW, Take 1 tablet by mouth 2 times daily , Disp: , Rfl: 0    gabapentin (NEURONTIN) 100 MG capsule, Take 1 capsule (100 mg) by mouth 3 times daily, Disp: 90 capsule, Rfl: 3    traZODone (DESYREL) 50 MG tablet, Take 50 mg by mouth nightly as needed for sleep, Disp: , Rfl:      Allergies:    Allergies   Allergen Reactions    Nka [No Known  "Allergies]        Vitals: /74   Pulse 78   Ht 1.626 m (5' 4\")   Wt 59 kg (130 lb)   LMP  (LMP Unknown)   BMI 22.31 kg/m    BMI= Body mass index is 22.31 kg/m .    LOWER EXTREMITY PHYSICAL EXAM    Dermatologic: Skin is intact to right and left lower extremity without significant lesions, rash or abrasion.        Vascular: DP & PT pulses are intact & regular on the right and left.   CFT and skin temperature is normal to the right and left lower extremity.     Neurologic: Lower extremity sensation is intact to light touch.  No evidence of weakness in the right and left lower extremity.   Noted positive straight leg raise exam bilaterally.     Musculoskeletal: Patient is ambulatory without assistive device or brace.  No gross ankle deformity noted.  No foot or ankle joint effusion is noted.              ASSESSMENT / PLAN:     ICD-10-CM    1. Radiculopathy with lower extremity symptoms  M54.10 Spine  Referral          I have explained to Yaritza about the conditions.  We discussed the underlying contributing factors to the condition as well as both conservative and surgical treatment options along with expected length of recovery.  At this time, the patient was referred to spine clinic for further evaluation of the lumbar vertebrae for possible nerve root impingement.    Yaritza verbalized agreement with and understanding of the rational for the diagnosis and treatment plan.  All questions were answered to best of my ability and the patient's satisfaction. The patient was advised to contact the clinic with any questions that may arise after the clinic visit.      Disclaimer: This note consists of symbols derived from keyboarding, dictation and/or voice recognition software. As a result, there may be errors in the script that have gone undetected. Please consider this when interpreting information found in this chart.       YESI Kruger D.P.M., FPAULINE.F.A.S.    "

## 2023-08-24 NOTE — NURSING NOTE
"Chief Complaint   Patient presents with    Consult     Bl feet- burning aching and numbness- ongoing 1 year       Initial /74   Pulse 78   Ht 1.626 m (5' 4\")   Wt 59 kg (130 lb)   LMP  (LMP Unknown)   BMI 22.31 kg/m   Estimated body mass index is 22.31 kg/m  as calculated from the following:    Height as of this encounter: 1.626 m (5' 4\").    Weight as of this encounter: 59 kg (130 lb).  Medications and allergies reviewed.      Umu CASTELLON MA    "

## 2023-08-24 NOTE — Clinical Note
8/24/2023         RE: Yaritza Bradley  [address] [address]        Dear Colleague,    Thank you for referring your patient, Yaritza Bradley, to the [facility] [place]. Please see a copy of my visit note below.    PATIENT HISTORY:  Yaritza Bradley is a 67 year old female who presents to clinic {FSOC CONSULT:409832} with a chief complaint of ***.  The patient is seen {sjaparent:747079}.  The patient relates the pain is primarily located around the ***.  {Precipitating Event:838763}  The patient relates that the symptoms have been going on for several {DAYS:107656}.  The patient has previously tried different shoes, *** with little relief.  The patient is {FSOCWORK:777737}.  Any previous notes and studies that pertain to the patient's condition were reviewed.    Pertinent medical, surgical and family history was reviewed in the Epic chart.    Past Medical History:   Past Medical History:   Diagnosis Date    Acetabular labrum tear, right, initial encounter 4/26/2016    Arthritis     Cellulitis 5/20/2018    Depressive disorder     Hemorrhage of rectum and anus 2002    Post-operative nausea and vomiting 7/14/2017    Trochanteric bursitis of right hip 4/26/2016       Medications:   Current Outpatient Medications:     Cyanocobalamin (B-12) 1000 MCG TBCR, Take 1,000 mcg by mouth daily, Disp: 100 tablet, Rfl: 1    folic acid (FOLVITE) 1 MG tablet, Take 1 tablet (1,000 mcg) by mouth daily, Disp: 90 tablet, Rfl: 3    HUMIRA PEN 40 MG/0.8ML injection, Inject 40 mg Subcutaneous every 14 days , Disp: , Rfl:     methotrexate 50 MG/2ML injection CHEMO, Inject 4 mg Subcutaneous every 7 days , Disp: , Rfl:     Omega-3 Fatty Acids (OMEGA-3 FISH OIL PO), Take 1 g by mouth daily (DRY EYE OMEGA BENEFITS) 667-250 mg-unit cap, Disp: , Rfl:     RESTASIS 0.05 % ophthalmic emulsion, Place 1 drop into both eyes 2 times daily, Disp: , Rfl:     SM CALCIUM SOFT CHEWS 500-100-40 OR CHEW, Take 1 tablet by  mouth 2 times daily , Disp: , Rfl: 0     Allergies:    Allergies   Allergen Reactions    Nka [No Known Allergies]        Vitals: LMP  (LMP Unknown)   BMI= There is no height or weight on file to calculate BMI.    LOWER EXTREMITY PHYSICAL EXAM    Dermatologic: Skin is intact to {RIGHT /LEFT:142843} lower extremity without significant lesions, rash or abrasion.        Vascular: DP & PT pulses are intact & regular on the {RIGHT /LEFT:952565}.   CFT and skin temperature is normal to the {RIGHT /LEFT:541268} lower extremity.     Neurologic: Lower extremity sensation is intact to light touch.  No evidence of weakness in the {RIGHT /LEFT:158503} lower extremity.        Musculoskeletal: Patient is ambulatory without assistive device or brace.  No gross ankle deformity noted.  No foot or ankle joint effusion is noted.  Noted ***    Diagnostics:  Radiographs included three views of the {RIGHT LEFT BOTH NO:321165} foot demonstrating *** no cortical erosions or periosteal elevation.  All joint margins appear stable.  There is no apparent fracture or tumor formation noted.  There is no evidence of foreign body.  The images were independently reviewed by myself along with the patient explaining the findings.      ASSESSMENT / PLAN:   No diagnosis found.    I have explained to Yaritza about the conditions.  We discussed the underlying contributing factors to the condition as well as both conservative and surgical treatment options along with expected length of recovery.  At this time, ***    Yaritza verbalized agreement with and understanding of the rational for the diagnosis and treatment plan.  All questions were answered to best of my ability and the patient's satisfaction. The patient was advised to contact the clinic with any questions that may arise after the clinic visit.      Disclaimer: This note consists of symbols derived from keyboarding, dictation and/or voice recognition software. As a result, there may be errors in the  script that have gone undetected. Please consider this when interpreting information found in this chart.       YESI Kruger D.P.M., F.SAMANTHA.C.F.A.S.        Again, thank you for allowing me to participate in the care of your patient.        Sincerely,        Yefri Kruger DPM

## 2023-09-03 ASSESSMENT — ENCOUNTER SYMPTOMS
ARTHRALGIAS: 1
CONSTIPATION: 1
HEMATOCHEZIA: 0
FEVER: 0
ABDOMINAL PAIN: 0
MYALGIAS: 0
DYSURIA: 0
SHORTNESS OF BREATH: 0
HEADACHES: 1
SORE THROAT: 1
JOINT SWELLING: 1
PARESTHESIAS: 1
HEARTBURN: 0
DIZZINESS: 1
PALPITATIONS: 0
HEMATURIA: 0
BREAST MASS: 0
FREQUENCY: 1
NERVOUS/ANXIOUS: 1
NAUSEA: 0
CHILLS: 0

## 2023-09-03 ASSESSMENT — ACTIVITIES OF DAILY LIVING (ADL)
CURRENT_FUNCTION: MONEY MANAGEMENT REQUIRES ASSISTANCE
CURRENT_FUNCTION: TRANSPORTATION REQUIRES ASSISTANCE
CURRENT_FUNCTION: HOUSEWORK REQUIRES ASSISTANCE
CURRENT_FUNCTION: MEDICATION ADMINISTRATION REQUIRES ASSISTANCE

## 2023-09-05 ENCOUNTER — OFFICE VISIT (OUTPATIENT)
Dept: FAMILY MEDICINE | Facility: CLINIC | Age: 68
End: 2023-09-05
Payer: COMMERCIAL

## 2023-09-05 ENCOUNTER — HOSPITAL ENCOUNTER (OUTPATIENT)
Dept: MAMMOGRAPHY | Facility: CLINIC | Age: 68
Discharge: HOME | End: 2023-09-05
Attending: FAMILY MEDICINE | Admitting: FAMILY MEDICINE
Payer: COMMERCIAL

## 2023-09-05 VITALS
TEMPERATURE: 98.4 F | DIASTOLIC BLOOD PRESSURE: 70 MMHG | SYSTOLIC BLOOD PRESSURE: 130 MMHG | HEART RATE: 85 BPM | RESPIRATION RATE: 22 BRPM | BODY MASS INDEX: 20.49 KG/M2 | HEIGHT: 64 IN | OXYGEN SATURATION: 97 % | WEIGHT: 120 LBS

## 2023-09-05 DIAGNOSIS — I67.81 LEUKOARAIOSIS: ICD-10-CM

## 2023-09-05 DIAGNOSIS — F33.1 MODERATE RECURRENT MAJOR DEPRESSION (H): ICD-10-CM

## 2023-09-05 DIAGNOSIS — Z12.31 VISIT FOR SCREENING MAMMOGRAM: ICD-10-CM

## 2023-09-05 DIAGNOSIS — R26.89 BALANCE PROBLEMS: ICD-10-CM

## 2023-09-05 DIAGNOSIS — Z00.00 ENCOUNTER FOR MEDICARE ANNUAL WELLNESS EXAM: Primary | ICD-10-CM

## 2023-09-05 DIAGNOSIS — M05.79 RHEUMATOID ARTHRITIS INVOLVING MULTIPLE SITES WITH POSITIVE RHEUMATOID FACTOR (H): ICD-10-CM

## 2023-09-05 PROCEDURE — G0439 PPPS, SUBSEQ VISIT: HCPCS | Mod: 25 | Performed by: FAMILY MEDICINE

## 2023-09-05 PROCEDURE — 77067 SCR MAMMO BI INCL CAD: CPT

## 2023-09-05 PROCEDURE — 0134A COVID-19 BIVALENT 18+ (MODERNA): CPT | Performed by: FAMILY MEDICINE

## 2023-09-05 PROCEDURE — 91313 COVID-19 BIVALENT 18+ (MODERNA): CPT | Performed by: FAMILY MEDICINE

## 2023-09-05 RX ORDER — TRAZODONE HYDROCHLORIDE 50 MG/1
50 TABLET, FILM COATED ORAL
COMMUNITY
End: 2024-08-28

## 2023-09-05 ASSESSMENT — ENCOUNTER SYMPTOMS
HEADACHES: 1
PARESTHESIAS: 1
HEMATURIA: 0
FEVER: 0
CHILLS: 0
CONSTIPATION: 1
ARTHRALGIAS: 1
BREAST MASS: 0
PALPITATIONS: 0
HEARTBURN: 0
SHORTNESS OF BREATH: 0
FREQUENCY: 1
HEMATOCHEZIA: 0
DYSURIA: 0
NAUSEA: 0
ABDOMINAL PAIN: 0
MYALGIAS: 0
SORE THROAT: 1
JOINT SWELLING: 1
DIZZINESS: 1
NERVOUS/ANXIOUS: 1

## 2023-09-05 ASSESSMENT — PATIENT HEALTH QUESTIONNAIRE - PHQ9
SUM OF ALL RESPONSES TO PHQ QUESTIONS 1-9: 14
SUM OF ALL RESPONSES TO PHQ QUESTIONS 1-9: 14
10. IF YOU CHECKED OFF ANY PROBLEMS, HOW DIFFICULT HAVE THESE PROBLEMS MADE IT FOR YOU TO DO YOUR WORK, TAKE CARE OF THINGS AT HOME, OR GET ALONG WITH OTHER PEOPLE: SOMEWHAT DIFFICULT

## 2023-09-05 ASSESSMENT — PAIN SCALES - GENERAL: PAINLEVEL: NO PAIN (0)

## 2023-09-05 ASSESSMENT — ACTIVITIES OF DAILY LIVING (ADL)
CURRENT_FUNCTION: MEDICATION ADMINISTRATION REQUIRES ASSISTANCE
CURRENT_FUNCTION: HOUSEWORK REQUIRES ASSISTANCE
CURRENT_FUNCTION: TRANSPORTATION REQUIRES ASSISTANCE
CURRENT_FUNCTION: MONEY MANAGEMENT REQUIRES ASSISTANCE

## 2023-09-05 NOTE — PATIENT INSTRUCTIONS
Patient Education   Personalized Prevention Plan  You are due for the preventive services outlined below.  Your care team is available to assist you in scheduling these services.  If you have already completed any of these items, please share that information with your care team to update in your medical record.  Health Maintenance Due   Topic Date Due     Pneumococcal Vaccine (2 - PCV) 11/01/2019     COVID-19 Vaccine (6 - Moderna risk series) 10/20/2022     Osteoporosis Screening  11/02/2022     Mammogram  12/02/2022     Depression Assessment  02/25/2023     ANNUAL REVIEW OF HM ORDERS  08/25/2023     Flu Vaccine (1) 09/01/2023

## 2023-09-05 NOTE — PROGRESS NOTES
"SUBJECTIVE:   Nicole is a 67 year old who presents for Preventive Visit.      9/5/2023    11:19 AM   Additional Questions   Roomed by Sophia COFFMAN CMA   Accompanied by        Are you in the first 12 months of your Medicare coverage?  No    Healthy Habits:     In general, how would you rate your overall health?  Fair    Frequency of exercise:  4-5 days/week    Duration of exercise:  30-45 minutes    Do you usually eat at least 4 servings of fruit and vegetables a day, include whole grains    & fiber and avoid regularly eating high fat or \"junk\" foods?  No    Taking medications regularly:  Yes    Medication side effects:  None    Ability to successfully perform activities of daily living:  Transportation requires assistance, housework requires assistance, medication administration requires assistance and money management requires assistance    Home Safety:  No safety concerns identified    Hearing Impairment:  No hearing concerns    In the past 6 months, have you been bothered by leaking of urine?  No    In general, how would you rate your overall mental or emotional health?  Fair    Additional concerns today:  Yes        Have you ever done Advance Care Planning? (For example, a Health Directive, POLST, or a discussion with a medical provider or your loved ones about your wishes): Yes, advance care planning is on file.       Fall risk  Fallen 2 or more times in the past year?: No  Any fall with injury in the past year?: No    Cognitive Screening   1) Repeat 3 items (Leader, Season, Table)    2) Clock draw: NORMAL  3) 3 item recall: Recalls NO objects   Results: 0 items recalled: PROBABLE COGNITIVE IMPAIRMENT, **INFORM PROVIDER**    Mini-CogTM Copyright ANALI [employer]. Licensed by the author for use in [facility]; reprinted with permission (kasey@.[email or web address]). All rights reserved.      Do you have sleep apnea, excessive snoring or daytime drowsiness? : no    Reviewed and updated as needed this visit by clinical staff   " "Tobacco  Allergies  Meds              Reviewed and updated as needed this visit by Provider                 Social History     Tobacco Use    Smoking status: Never    Smokeless tobacco: Never   Substance Use Topics    Alcohol use: Yes     Comment: 1 wine a month hardly             9/3/2023     2:35 PM   Alcohol Use   Prescreen: >3 drinks/day or >7 drinks/week? No     Do you have a current opioid prescription? No  Do you use any other controlled substances or medications that are not prescribed by a provider? None      - Lump in throat. She notes that area is painful when chewing or swallowing  Globus sensation  Back of throat- harder to swallow  Feels \"bloody\" - taste is off  This has been going on \"a while\" - first told  last week but she states it has been a few weeks.  Has tried salt water gargles      Seeing [facility] -   Tried escitalopram - that didn't work and increased dizziness  Trazodone - helping significantly for sleep  Sees them again next month    Sees Neurologist again in October.   - luekoariosis-  having more dizziness/balance issues    Current providers sharing in care for this patient include:   Patient Care Team:  Aziza Aviles MD as PCP - General (Family Practice)  Aziza Aviles MD as Assigned PCP  An Yi PA-C as Physician Assistant (Dermatology)  An Yi PA-C as Assigned Surgical Provider    The following health maintenance items are reviewed in Epic and correct as of today:  Health Maintenance   Topic Date Due    Pneumococcal Vaccine: 65+ Years (2 - PCV) 11/01/2019    COVID-19 Vaccine (6 - Moderna risk series) 10/20/2022    DEXA  11/02/2022    MAMMO SCREENING  12/02/2022    ANNUAL REVIEW OF HM ORDERS  08/25/2023    INFLUENZA VACCINE (1) 09/01/2023    PHQ-9  03/05/2024    MEDICARE ANNUAL WELLNESS VISIT  09/05/2024    FALL RISK ASSESSMENT  09/05/2024    COLORECTAL CANCER SCREENING  07/08/2025    LIPID  08/25/2027    ADVANCE CARE PLANNING  " 08/25/2027    DTAP/TDAP/TD IMMUNIZATION (3 - Td or Tdap) 05/20/2028    HEPATITIS C SCREENING  Completed    DEPRESSION ACTION PLAN  Completed    ZOSTER IMMUNIZATION  Completed    IPV IMMUNIZATION  Aged Out    HPV IMMUNIZATION  Aged Out    MENINGITIS IMMUNIZATION  Aged Out           FHS-7:       12/2/2021    11:29 AM 8/19/2022    12:42 PM 9/3/2023     2:36 PM   Breast CA Risk Assessment (FHS-7)   Did any of your first-degree relatives have breast or ovarian cancer? No No No   Did any of your relatives have bilateral breast cancer? No No No   Did any man in your family have breast cancer? No No No   Did any woman in your family have breast and ovarian cancer? No No No   Did any woman in your family have breast cancer before age 50 y? No No No   Do you have 2 or more relatives with breast and/or ovarian cancer? No No No   Do you have 2 or more relatives with breast and/or bowel cancer? No No No       Mammogram Screening: Recommended mammography every 1-2 years with patient discussion and risk factor consideration  Pertinent mammograms are reviewed under the imaging tab.    Review of Systems   Constitutional:  Negative for chills and fever.   HENT:  Positive for congestion and sore throat. Negative for ear pain and hearing loss.    Eyes:  Negative for visual disturbance.   Respiratory:  Negative for shortness of breath.    Cardiovascular:  Negative for chest pain, palpitations and peripheral edema.   Gastrointestinal:  Positive for constipation. Negative for abdominal pain, heartburn, hematochezia and nausea.   Breasts:  Negative for tenderness, breast mass and discharge.   Genitourinary:  Positive for frequency. Negative for dysuria, genital sores, hematuria, pelvic pain, urgency, vaginal bleeding and vaginal discharge.   Musculoskeletal:  Positive for arthralgias and joint swelling. Negative for myalgias.   Skin:  Negative for rash.   Neurological:  Positive for dizziness, headaches and paresthesias.  "  Psychiatric/Behavioral:  Positive for mood changes. The patient is nervous/anxious.          OBJECTIVE:   /70   Pulse 85   Temp 98.4  F (36.9  C) (Tympanic)   Resp 22   Ht 1.626 m (5' 4\")   Wt 54.4 kg (120 lb)   LMP  (LMP Unknown)   SpO2 97%   BMI 20.60 kg/m   Estimated body mass index is 20.6 kg/m  as calculated from the following:    Height as of this encounter: 1.626 m (5' 4\").    Weight as of this encounter: 54.4 kg (120 lb).  Physical Exam  GENERAL: healthy, alert and no distress  NECK: no adenopathy, no asymmetry, masses, or scars and thyroid normal to palpation  RESP: lungs clear to auscultation - no rales, rhonchi or wheezes  CV: regular rate and rhythm, normal S1 S2, no S3 or S4, no murmur, click or rub, no peripheral edema and peripheral pulses strong  ABDOMEN: soft, nontender, no hepatosplenomegaly, no masses and bowel sounds normal  MS: no gross musculoskeletal defects noted, no edema  PSYCH: mentation appears normal, affect normal/bright    Diagnostic Test Results:  Labs reviewed in Epic    ASSESSMENT / PLAN:   (Z00.00) Encounter for Medicare annual wellness exam  (primary encounter diagnosis)  Comment:    Plan:      (Z12.31) Visit for screening mammogram  Comment:    Plan: MA SCREENING DIGITAL BILAT - Future  (s+30)             (M05.79) Rheumatoid arthritis involving multiple sites with positive rheumatoid factor (H)  Comment: on methotrexate  Sees rheumatologist  Plan:      (F33.1) Moderate recurrent major depression (H)  Comment: seeing [facility]  Didn't tolerate escitalopram  Using trazodone successfully for sleep  Plan:      (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 Has done PT, is not currently seeing PT or doing balance home exercises  Plan:      (R26.89) Balance problems  Comment:    Plan: as above  Offered PT  Recommended she do her home exercise    Patient has been advised of split billing requirements and " indicates understanding: Yes      COUNSELING:  Reviewed preventive health counseling, as reflected in patient instructions       Regular exercise       Healthy diet/nutrition        She reports that she has never smoked. She has never used smokeless tobacco.      Appropriate preventive services were discussed with this patient, including applicable screening as appropriate for cardiovascular disease, diabetes, osteopenia/osteoporosis, and glaucoma.  As appropriate for age/gender, discussed screening for colorectal cancer, prostate cancer, breast cancer, and cervical cancer. Checklist reviewing preventive services available has been given to the patient.    Reviewed patients plan of care and provided an AVS. The Basic Care Plan (routine screening as documented in Health Maintenance) for Yaritza meets the Care Plan requirement. This Care Plan has been established and reviewed with the Patient and spouse.          Aziza Aviles MD  [facility]    Identified Health Risks:

## 2023-09-11 ASSESSMENT — ENCOUNTER SYMPTOMS
INCREASED ENERGY: 1
WEIGHT GAIN: 0
WEAKNESS: 1
MYALGIAS: 0
SORE THROAT: 1
COUGH: 0
CHILLS: 0
SPUTUM PRODUCTION: 0
POLYPHAGIA: 0
DIZZINESS: 1
HEADACHES: 1
TASTE DISTURBANCE: 0
SMELL DISTURBANCE: 0
POLYDIPSIA: 0
TREMORS: 0
DECREASED CONCENTRATION: 1
WEIGHT LOSS: 1
NIGHT SWEATS: 0
FLANK PAIN: 0
WHEEZING: 0
MEMORY LOSS: 1
LOSS OF CONSCIOUSNESS: 0
SHORTNESS OF BREATH: 0
STIFFNESS: 1
NECK PAIN: 1
SINUS CONGESTION: 1
POSTURAL DYSPNEA: 0
EYE PAIN: 0
PARALYSIS: 0
HOARSE VOICE: 0
EYE REDNESS: 0
MUSCLE WEAKNESS: 1
INSOMNIA: 1
DISTURBANCES IN COORDINATION: 1
PANIC: 1
HEMOPTYSIS: 0
ALTERED TEMPERATURE REGULATION: 1
COUGH DISTURBING SLEEP: 0
TINGLING: 1
SINUS PAIN: 0
FATIGUE: 1
DEPRESSION: 1
SPEECH CHANGE: 0
JOINT SWELLING: 1
NERVOUS/ANXIOUS: 1
DECREASED APPETITE: 1
DIFFICULTY URINATING: 0
NUMBNESS: 1
EYE IRRITATION: 1
ARTHRALGIAS: 1
BACK PAIN: 1
FEVER: 0
DYSURIA: 0
DOUBLE VISION: 0
NECK MASS: 0
HEMATURIA: 0
TROUBLE SWALLOWING: 1
EYE WATERING: 0
SNORES LOUDLY: 0
DYSPNEA ON EXERTION: 0
SEIZURES: 0
MUSCLE CRAMPS: 0

## 2023-09-12 ENCOUNTER — OFFICE VISIT (OUTPATIENT)
Dept: PHYSICAL MEDICINE AND REHAB | Facility: CLINIC | Age: 68
End: 2023-09-12
Attending: PODIATRIST
Payer: COMMERCIAL

## 2023-09-12 VITALS
DIASTOLIC BLOOD PRESSURE: 62 MMHG | OXYGEN SATURATION: 100 % | WEIGHT: 120 LBS | SYSTOLIC BLOOD PRESSURE: 137 MMHG | HEIGHT: 64 IN | BODY MASS INDEX: 20.49 KG/M2 | HEART RATE: 81 BPM

## 2023-09-12 DIAGNOSIS — G89.29 CHRONIC BILATERAL LOW BACK PAIN WITHOUT SCIATICA: Primary | ICD-10-CM

## 2023-09-12 DIAGNOSIS — M50.30 DDD (DEGENERATIVE DISC DISEASE), CERVICAL: ICD-10-CM

## 2023-09-12 DIAGNOSIS — M47.816 LUMBAR FACET ARTHROPATHY: ICD-10-CM

## 2023-09-12 DIAGNOSIS — R20.0 NUMBNESS AND TINGLING OF BOTH FEET: ICD-10-CM

## 2023-09-12 DIAGNOSIS — M54.2 CHRONIC NECK PAIN: ICD-10-CM

## 2023-09-12 DIAGNOSIS — M54.50 CHRONIC BILATERAL LOW BACK PAIN WITHOUT SCIATICA: Primary | ICD-10-CM

## 2023-09-12 DIAGNOSIS — M43.16 SPONDYLOLISTHESIS OF LUMBAR REGION: ICD-10-CM

## 2023-09-12 DIAGNOSIS — R20.2 NUMBNESS AND TINGLING OF BOTH FEET: ICD-10-CM

## 2023-09-12 DIAGNOSIS — M54.10 RADICULOPATHY WITH LOWER EXTREMITY SYMPTOMS: ICD-10-CM

## 2023-09-12 DIAGNOSIS — G89.29 CHRONIC NECK PAIN: ICD-10-CM

## 2023-09-12 PROCEDURE — 99205 OFFICE O/P NEW HI 60 MIN: CPT | Performed by: NURSE PRACTITIONER

## 2023-09-12 RX ORDER — GABAPENTIN 100 MG/1
100 CAPSULE ORAL 3 TIMES DAILY
Qty: 90 CAPSULE | Refills: 3 | Status: SHIPPED | OUTPATIENT
Start: 2023-09-12 | End: 2024-03-27

## 2023-09-12 ASSESSMENT — PAIN SCALES - GENERAL: PAINLEVEL: MODERATE PAIN (5)

## 2023-09-12 NOTE — LETTER
9/12/2023         RE: Yaritza Bradley  [address] [facility] [address]        Dear Colleague,    Thank you for referring your patient, Yaritza Bradley, to the [facility]. Please see a copy of my visit note below.    ASSESSMENT: Yaritza Bradley is a 67 year old female who presents for consultation at the request of PCP Aziza Aviles, with a past medical history significant for hyperlipidemia, rheumatoid arthritis, GERD, right total hip replacement 2016, generalized anxiety disorder, moderate recurrent major depression, who presents today for new patient evaluation of:    -Chronic bilateral low back pain.    -Numbness and tingling bilateral feet x1 year.    Patient is neurologically intact on exam. No myelopathic or red flag symptoms.         9/12/2023     3:08 PM   OSWESTRY DISABILITY INDEX   Count 10   Sum 17   Oswestry Score (%) 34 %            Diagnoses and all orders for this visit:  Chronic bilateral low back pain without sciatica  -     MR Lumbar Spine w/o Contrast; Future  -     XR Lumbar Spine G/E 4 Views; Future  -     gabapentin (NEURONTIN) 100 MG capsule; Take 1 capsule (100 mg) by mouth 3 times daily  Numbness and tingling of both feet  -     MR Lumbar Spine w/o Contrast; Future  -     gabapentin (NEURONTIN) 100 MG capsule; Take 1 capsule (100 mg) by mouth 3 times daily  Radiculopathy with lower extremity symptoms  -     Spine  Referral  -     MR Lumbar Spine w/o Contrast; Future  -     XR Lumbar Spine G/E 4 Views; Future  -     gabapentin (NEURONTIN) 100 MG capsule; Take 1 capsule (100 mg) by mouth 3 times daily  Spondylolisthesis of lumbar region  -     MR Lumbar Spine w/o Contrast; Future  -     XR Lumbar Spine G/E 4 Views; Future  Lumbar facet arthropathy  -     MR Lumbar Spine w/o Contrast; Future  -     XR Lumbar Spine G/E 4 Views; Future  Chronic neck pain  DDD (degenerative disc disease), cervical    PLAN:  Reviewed spine anatomy and disease  process. Discussed diagnosis and treatment options with the patient today. A shared decision making model was used.  The patient's values and choices were respected. The following represents what was discussed and decided upon by the provider and the patient.      -DIAGNOSTIC TESTS:  Images were personally reviewed and interpreted and explained to patient today using spine model.   -- Ordered lumbar spine MRI and flexion-extension x-ray to evaluate spondylolisthesis stability at L4-5 and ongoing LBP for possible intervention.  -- Discussed a bilateral lower extremity EMG to evaluate for possible peripheral neuropathy for her bilateral foot numbness and tingling.  Patient wants to hold off on this at this time and trial imaging first.  --Lumbar spine x-ray 9/30/2022 with mild anterolisthesis L4-5.  Disc height loss with endplate spurring at L2-3 and L3-4.  -- Lumbar spine MRI in 2015 with degenerative changes at L2-3 and L3-4 with mild left foraminal stenosis.  L4-5 grade 1 spondylolisthesis with moderate facet arthropathy with synovial cyst on the right.  No high-grade nerve compression noted.  -- Cervical spine MRI 2021 with multilevel degenerative changes with facet arthropathy.  C3-4 moderate to severe right and moderate left foraminal stenosis.  C4-5 moderate right foraminal stenosis.  No high-grade central stenosis.    -PHYSICAL THERAPY: Did discuss revisiting physical therapy, she defers this option today.  Discussed the importance of core strengthening, ROM, stretching exercises with the patient and how each of these entities is important in decreasing pain.  Explained to the patient that the purpose of physical therapy is to teach the patient a home exercise program.  These exercises need to be performed every day in order to decrease pain and prevent future occurrences of pain.        -MEDICATIONS: Prescribed gabapentin 100 mg 1 capsule 3 times daily as tolerated.    She can continue with Aleve and  acetaminophen as needed.  Discussed multiple medication options today with patient. Discussed risks, side effects, and proper use of medications. Patient verbalized understanding.    -INTERVENTIONS: Could consider injections, potential bilateral L4-5 TFESI pending imaging review.  Discussed risks and benefits of injections with patient today.    -PATIENT EDUCATION:  Total time of 66 minutes, on the day of service, spent with the patient, reviewing the chart, placing orders, and documenting.     -FOLLOW-UP:   Follow-up [employer] message for imaging results or in person visit which she would prefer as well.    Advised patient to call the [facility] Center if symptoms worsen or you have problems controlling bladder and bowel function.   ______________________________________________________________________    SUBJECTIVE:  HPI:  Yaritza Bradley  Is a 67 year old female who presents today for new patient evaluation of low back pain bilateral lower lumbar spine at the belt line to the lumbosacral junction equal bilaterally ongoing for the last year.  She had back pain in the past however its been more intermittent and more constant now for the last 1 year.  Currently her back pain is a 5/10, and 8 at its worst, 2 at its best.  She does report some pain occasionally into the lateral thighs but the majority of her pain is in her back.  She also endorses numbness and tingling ongoing for the last year that is constant bilateral feet nonspecifically, equal bilaterally.  Denies lower extremity weakness.  Denies any recent trips or falls or balance changes.  Denies bowel or bladder loss control.    Patient's  Remi was present today during entire visit and added to past medical/surgical/family/social history and history of presenting illness.     -Treatment to Date: No prior spinal surgery or spinal injections  Physical therapy x1 sessions 10/20/2022 [facility]    -Medications:    *Methotrexate for rheumatoid arthritis  treatment    Current Outpatient Medications   Medication     gabapentin (NEURONTIN) 100 MG capsule     Cyanocobalamin (B-12) 1000 MCG TBCR     folic acid (FOLVITE) 1 MG tablet     methotrexate 50 MG/2ML injection CHEMO     Omega-3 Fatty Acids (OMEGA-3 FISH OIL PO)     RESTASIS 0.05 % ophthalmic emulsion     SM CALCIUM SOFT CHEWS 500-100-40 OR CHEW     traZODone (DESYREL) 50 MG tablet     No current facility-administered medications for this visit.       Allergies   Allergen Reactions     Nka [No Known Allergies]        Past Medical History:   Diagnosis Date     Acetabular labrum tear, right, initial encounter 4/26/2016     Arthritis      Cellulitis 5/20/2018     Depressive disorder      Hemorrhage of rectum and anus 2002     Post-operative nausea and vomiting 7/14/2017     Trochanteric bursitis of right hip 4/26/2016        Patient Active Problem List   Diagnosis     Rheumatoid arthritis (H)     Disorder of bone and cartilage     Hyperlipidemia LDL goal <160     GERD (gastroesophageal reflux disease)     Generalized anxiety disorder     Unintentional weight loss     Moderate recurrent major depression (H)     Memory loss or impairment     Femoral acetabular impingement, right     Primary osteoarthritis of right hip, end stage DJD     Balance problems     Leukoaraiosis       Past Surgical History:   Procedure Laterality Date     ARTHROSCOPY SHOULDER ROTATOR CUFF REPAIR       COLONOSCOPY N/A 10/14/2016    Procedure: COLONOSCOPY;  Surgeon: Gerson Douglas MD;  Location: [facility]     COLONOSCOPY N/A 7/8/2020    Procedure: COLONOSCOPY, WITH POLYPECTOMY AND BIOPSY;  Surgeon: Jerry Cleveland DO;  Location: [facility]     DILATION AND CURETTAGE       ELBOW SURGERY       ESOPHAGOSCOPY, GASTROSCOPY, DUODENOSCOPY (EGD), COMBINED  8/19/2011    Procedure:COMBINED ESOPHAGOSCOPY, GASTROSCOPY, DUODENOSCOPY (EGD), BIOPSY SINGLE OR MULTIPLE; Gastroscopy with biopsy; Surgeon:FARZAD GARCIA; Location:[facility]      ESOPHAGOSCOPY, GASTROSCOPY, DUODENOSCOPY (EGD), COMBINED  7/28/2014    Procedure: COMBINED ESOPHAGOSCOPY, GASTROSCOPY, DUODENOSCOPY (EGD), BIOPSY SINGLE OR MULTIPLE;  Surgeon: Marshall Martinez MD;  Location: [facility]     ESOPHAGOSCOPY, GASTROSCOPY, DUODENOSCOPY (EGD), COMBINED N/A 7/18/2018    Procedure: COMBINED ESOPHAGOSCOPY, GASTROSCOPY, DUODENOSCOPY (EGD), BIOPSY SINGLE OR MULTIPLE;  gastroscopy with biopsies;  Surgeon: Jorge Schofield MD;  Location: [facility]     HC DILATION/CURETTAGE DIAG/THER NON OB  2001    for uterine fibroids     OPEN REDUCTION INTERNAL FIXATION ELBOW Right 1/22/2016    right THR     ROTATOR CUFF REPAIR RT/LT  2009    left     TOTAL HIP ARTHROPLASTY Right 5/3/2016    Procedure: RIGHT TOTAL HIP ARTHROPLASTY;  Surgeon: Jeffrey Swann MD;  Location: [facility] OR;  Service:        Family History   Problem Relation Age of Onset     Cancer Mother         skin     Colon Cancer Mother      C.A.D. Father 54     C.A.D. Brother      Alcohol/Drug Brother      Alzheimer Disease Maternal Grandmother      Alzheimer Disease Maternal Grandfather      Alzheimer Disease Paternal Grandmother      C.A.D. Paternal Grandfather      Anesthesia Reaction No family hx of      Clotting Disorder No family hx of        Reviewed past medical, surgical, and family history with patient found on new patient intake packet located in EMR Media tab.     SOCIAL HX: Patient is .  Patient is retired.  Patient denies smoking/tobacco use.  Denies alcohol use, denies history being a heavy drinker.  Denies recreational drug use.    ROS: Positive for joint pain, muscle pain, muscle fatigue, imbalance, dizziness, insomnia, excessive tiredness, anxiety/depression, headache, constipation.  Specifically negative for bowel/bladder dysfunction, balance changes, foot drop, fevers, chills, appetite changes, nausea/vomiting, unexplained weight loss. Otherwise 13 systems reviewed are negative. Please see the patient's intake  "questionnaire from today for details.    OBJECTIVE:  /62 (BP Location: Right arm, Patient Position: Sitting)   Pulse 81   Ht 5' 3.75\" (1.619 m)   Wt 120 lb (54.4 kg)   LMP  (LMP Unknown)   SpO2 100%   BMI 20.76 kg/m      PHYSICAL EXAMINATION:    --CONSTITUTIONAL:  Vital signs as above.  No acute distress.  The patient is well nourished and well groomed.  --PSYCHIATRIC:  Appropriate mood and affect. The patient is awake, alert, oriented to person, place, time and answering questions appropriately with clear speech.    --SKIN:  Skin over the face, bilateral lower extremities, and posterior torso is clean, dry, intact without rashes.    --RESPIRATORY: Normal rhythm and effort. No abnormal accessory muscle breathing patterns noted.   --STANDING EXAMINATION:  Normal lumbar lordosis noted, no lateral shift.  --MUSCULOSKELETAL: Range of motion is not limited in lumbar flexion, increased pain with lumbar extension and lateral rotation simultaneously laterally.  Tenderness to palpation at the L4-5 and L5-S1 region as well as sacroiliac notch bilaterally. Straight leg raising is negative to radicular pain.   --GROSS MOTOR: Gait is non-antalgic. Easily arises from a seated position.   --LOWER EXTREMITY MOTOR TESTING:  Plantar flexion left 5/5, right 5/5   Dorsiflexion left 5/5, right 5/5   Great toe MTP extension left 5/5, right 5/5  Knee flexion left 5/5, right 5/5  Knee extension left 5/5, right 5/5   Hip flexion left 5/5, right 5/5  Hip abduction left 5/5, right 5/5  --HIPS: Full range of motion bilaterally. Negative FABERs on the involved lower extremity.   --NEUROLOGICAL:  2/4 patellar, medial hamstring, and achilles reflexes bilaterally.  Sensation to light touch is intact in the bilateral L4, L5, and S1 dermatomes. Babinski is negative. No clonus.  Negative Mckenzie reflex bilaterally.  --VASCULAR:  Bilateral lower extremities are warm.  There is no pitting edema of the bilateral lower " extremities.    RESULTS: Prior medical records from [facility] and Care Everywhere were reviewed today.    Imaging: Spine imaging was personally reviewed and interpreted today. The images were shown to the patient and the findings were explained using a spine model.      Lumbar spine MRI from 2015 and recent x-ray reviewed.               Again, thank you for allowing me to participate in the care of your patient.        Sincerely,        Marcela Ruiz, CNP

## 2023-09-12 NOTE — PROGRESS NOTES
ASSESSMENT: Yaritza Bradley is a 67 year old female who presents for consultation at the request of PCP Aziza Aviles, with a past medical history significant for hyperlipidemia, rheumatoid arthritis, GERD, right total hip replacement 2016, generalized anxiety disorder, moderate recurrent major depression, who presents today for new patient evaluation of:    -Chronic bilateral low back pain.    -Numbness and tingling bilateral feet x1 year.    Patient is neurologically intact on exam. No myelopathic or red flag symptoms.         9/12/2023     3:08 PM   OSWESTRY DISABILITY INDEX   Count 10   Sum 17   Oswestry Score (%) 34 %            Diagnoses and all orders for this visit:  Chronic bilateral low back pain without sciatica  -     MR Lumbar Spine w/o Contrast; Future  -     XR Lumbar Spine G/E 4 Views; Future  -     gabapentin (NEURONTIN) 100 MG capsule; Take 1 capsule (100 mg) by mouth 3 times daily  Numbness and tingling of both feet  -     MR Lumbar Spine w/o Contrast; Future  -     gabapentin (NEURONTIN) 100 MG capsule; Take 1 capsule (100 mg) by mouth 3 times daily  Radiculopathy with lower extremity symptoms  -     Spine  Referral  -     MR Lumbar Spine w/o Contrast; Future  -     XR Lumbar Spine G/E 4 Views; Future  -     gabapentin (NEURONTIN) 100 MG capsule; Take 1 capsule (100 mg) by mouth 3 times daily  Spondylolisthesis of lumbar region  -     MR Lumbar Spine w/o Contrast; Future  -     XR Lumbar Spine G/E 4 Views; Future  Lumbar facet arthropathy  -     MR Lumbar Spine w/o Contrast; Future  -     XR Lumbar Spine G/E 4 Views; Future  Chronic neck pain  DDD (degenerative disc disease), cervical    PLAN:  Reviewed spine anatomy and disease process. Discussed diagnosis and treatment options with the patient today. A shared decision making model was used.  The patient's values and choices were respected. The following represents what was discussed and decided upon by the provider and the patient.       -DIAGNOSTIC TESTS:  Images were personally reviewed and interpreted and explained to patient today using spine model.   -- Ordered lumbar spine MRI and flexion-extension x-ray to evaluate spondylolisthesis stability at L4-5 and ongoing LBP for possible intervention.  -- Discussed a bilateral lower extremity EMG to evaluate for possible peripheral neuropathy for her bilateral foot numbness and tingling.  Patient wants to hold off on this at this time and trial imaging first.  --Lumbar spine x-ray 9/30/2022 with mild anterolisthesis L4-5.  Disc height loss with endplate spurring at L2-3 and L3-4.  -- Lumbar spine MRI in 2015 with degenerative changes at L2-3 and L3-4 with mild left foraminal stenosis.  L4-5 grade 1 spondylolisthesis with moderate facet arthropathy with synovial cyst on the right.  No high-grade nerve compression noted.  -- Cervical spine MRI 2021 with multilevel degenerative changes with facet arthropathy.  C3-4 moderate to severe right and moderate left foraminal stenosis.  C4-5 moderate right foraminal stenosis.  No high-grade central stenosis.    -PHYSICAL THERAPY: Did discuss revisiting physical therapy, she defers this option today.  Discussed the importance of core strengthening, ROM, stretching exercises with the patient and how each of these entities is important in decreasing pain.  Explained to the patient that the purpose of physical therapy is to teach the patient a home exercise program.  These exercises need to be performed every day in order to decrease pain and prevent future occurrences of pain.        -MEDICATIONS: Prescribed gabapentin 100 mg 1 capsule 3 times daily as tolerated.    She can continue with Aleve and acetaminophen as needed.  Discussed multiple medication options today with patient. Discussed risks, side effects, and proper use of medications. Patient verbalized understanding.    -INTERVENTIONS: Could consider injections, potential bilateral L4-5 TFESI pending  imaging review.  Discussed risks and benefits of injections with patient today.    -PATIENT EDUCATION:  Total time of 66 minutes, on the day of service, spent with the patient, reviewing the chart, placing orders, and documenting.     -FOLLOW-UP:   Follow-up [employer] message for imaging results or in person visit which she would prefer as well.    Advised patient to call the [facility] Center if symptoms worsen or you have problems controlling bladder and bowel function.   ______________________________________________________________________    SUBJECTIVE:  HPI:  Yaritza Bradley  Is a 67 year old female who presents today for new patient evaluation of low back pain bilateral lower lumbar spine at the belt line to the lumbosacral junction equal bilaterally ongoing for the last year.  She had back pain in the past however its been more intermittent and more constant now for the last 1 year.  Currently her back pain is a 5/10, and 8 at its worst, 2 at its best.  She does report some pain occasionally into the lateral thighs but the majority of her pain is in her back.  She also endorses numbness and tingling ongoing for the last year that is constant bilateral feet nonspecifically, equal bilaterally.  Denies lower extremity weakness.  Denies any recent trips or falls or balance changes.  Denies bowel or bladder loss control.    Patient's  Remi was present today during entire visit and added to past medical/surgical/family/social history and history of presenting illness.     -Treatment to Date: No prior spinal surgery or spinal injections  Physical therapy x1 sessions 10/20/2022 [facility]    -Medications:    *Methotrexate for rheumatoid arthritis treatment    Current Outpatient Medications   Medication    gabapentin (NEURONTIN) 100 MG capsule    Cyanocobalamin (B-12) 1000 MCG TBCR    folic acid (FOLVITE) 1 MG tablet    methotrexate 50 MG/2ML injection CHEMO    Omega-3 Fatty Acids (OMEGA-3 FISH OIL PO)    RESTASIS 0.05 %  ophthalmic emulsion    SM CALCIUM SOFT CHEWS 500-100-40 OR CHEW    traZODone (DESYREL) 50 MG tablet     No current facility-administered medications for this visit.       Allergies   Allergen Reactions    Nka [No Known Allergies]        Past Medical History:   Diagnosis Date    Acetabular labrum tear, right, initial encounter 4/26/2016    Arthritis     Cellulitis 5/20/2018    Depressive disorder     Hemorrhage of rectum and anus 2002    Post-operative nausea and vomiting 7/14/2017    Trochanteric bursitis of right hip 4/26/2016        Patient Active Problem List   Diagnosis    Rheumatoid arthritis (H)    Disorder of bone and cartilage    Hyperlipidemia LDL goal <160    GERD (gastroesophageal reflux disease)    Generalized anxiety disorder    Unintentional weight loss    Moderate recurrent major depression (H)    Memory loss or impairment    Femoral acetabular impingement, right    Primary osteoarthritis of right hip, end stage DJD    Balance problems    Leukoaraiosis       Past Surgical History:   Procedure Laterality Date    ARTHROSCOPY SHOULDER ROTATOR CUFF REPAIR      COLONOSCOPY N/A 10/14/2016    Procedure: COLONOSCOPY;  Surgeon: Gerson Douglas MD;  Location: [facility]    COLONOSCOPY N/A 7/8/2020    Procedure: COLONOSCOPY, WITH POLYPECTOMY AND BIOPSY;  Surgeon: Jerry Cleveland DO;  Location: [facility]    DILATION AND CURETTAGE      ELBOW SURGERY      ESOPHAGOSCOPY, GASTROSCOPY, DUODENOSCOPY (EGD), COMBINED  8/19/2011    Procedure:COMBINED ESOPHAGOSCOPY, GASTROSCOPY, DUODENOSCOPY (EGD), BIOPSY SINGLE OR MULTIPLE; Gastroscopy with biopsy; Surgeon:FARZAD GARCIA; Location:[facility]    ESOPHAGOSCOPY, GASTROSCOPY, DUODENOSCOPY (EGD), COMBINED  7/28/2014    Procedure: COMBINED ESOPHAGOSCOPY, GASTROSCOPY, DUODENOSCOPY (EGD), BIOPSY SINGLE OR MULTIPLE;  Surgeon: Marshall Martinez MD;  Location: [facility]    ESOPHAGOSCOPY, GASTROSCOPY, DUODENOSCOPY (EGD), COMBINED N/A 7/18/2018    Procedure: COMBINED  "ESOPHAGOSCOPY, GASTROSCOPY, DUODENOSCOPY (EGD), BIOPSY SINGLE OR MULTIPLE;  gastroscopy with biopsies;  Surgeon: Jorge Schofield MD;  Location: [facility]    HC DILATION/CURETTAGE DIAG/THER NON OB  2001    for uterine fibroids    OPEN REDUCTION INTERNAL FIXATION ELBOW Right 1/22/2016    right THR    ROTATOR CUFF REPAIR RT/LT  2009    left    TOTAL HIP ARTHROPLASTY Right 5/3/2016    Procedure: RIGHT TOTAL HIP ARTHROPLASTY;  Surgeon: Jeffrey Swann MD;  Location: [facility] OR;  Service:        Family History   Problem Relation Age of Onset    Cancer Mother         skin    Colon Cancer Mother     C.A.D. Father 54    C.A.D. Brother     Alcohol/Drug Brother     Alzheimer Disease Maternal Grandmother     Alzheimer Disease Maternal Grandfather     Alzheimer Disease Paternal Grandmother     C.A.D. Paternal Grandfather     Anesthesia Reaction No family hx of     Clotting Disorder No family hx of        Reviewed past medical, surgical, and family history with patient found on new patient intake packet located in [employer]MR [employer] tab.     SOCIAL HX: Patient is .  Patient is retired.  Patient denies smoking/tobacco use.  Denies alcohol use, denies history being a heavy drinker.  Denies recreational drug use.    ROS: Positive for joint pain, muscle pain, muscle fatigue, imbalance, dizziness, insomnia, excessive tiredness, anxiety/depression, headache, constipation.  Specifically negative for bowel/bladder dysfunction, balance changes, foot drop, fevers, chills, appetite changes, nausea/vomiting, unexplained weight loss. Otherwise 13 systems reviewed are negative. Please see the patient's intake questionnaire from today for details.    OBJECTIVE:  /62 (BP Location: Right arm, Patient Position: Sitting)   Pulse 81   Ht 5' 3.75\" (1.619 m)   Wt 120 lb (54.4 kg)   LMP  (LMP Unknown)   SpO2 100%   BMI 20.76 kg/m      PHYSICAL EXAMINATION:    --CONSTITUTIONAL:  Vital signs as above.  No acute distress.  The " patient is well nourished and well groomed.  --PSYCHIATRIC:  Appropriate mood and affect. The patient is awake, alert, oriented to person, place, time and answering questions appropriately with clear speech.    --SKIN:  Skin over the face, bilateral lower extremities, and posterior torso is clean, dry, intact without rashes.    --RESPIRATORY: Normal rhythm and effort. No abnormal accessory muscle breathing patterns noted.   --STANDING EXAMINATION:  Normal lumbar lordosis noted, no lateral shift.  --MUSCULOSKELETAL: Range of motion is not limited in lumbar flexion, increased pain with lumbar extension and lateral rotation simultaneously laterally.  Tenderness to palpation at the L4-5 and L5-S1 region as well as sacroiliac notch bilaterally. Straight leg raising is negative to radicular pain.   --GROSS MOTOR: Gait is non-antalgic. Easily arises from a seated position.   --LOWER EXTREMITY MOTOR TESTING:  Plantar flexion left 5/5, right 5/5   Dorsiflexion left 5/5, right 5/5   Great toe MTP extension left 5/5, right 5/5  Knee flexion left 5/5, right 5/5  Knee extension left 5/5, right 5/5   Hip flexion left 5/5, right 5/5  Hip abduction left 5/5, right 5/5  --HIPS: Full range of motion bilaterally. Negative FABERs on the involved lower extremity.   --NEUROLOGICAL:  2/4 patellar, medial hamstring, and achilles reflexes bilaterally.  Sensation to light touch is intact in the bilateral L4, L5, and S1 dermatomes. Babinski is negative. No clonus.  Negative Mckenzie reflex bilaterally.  --VASCULAR:  Bilateral lower extremities are warm.  There is no pitting edema of the bilateral lower extremities.    RESULTS: Prior medical records from [facility] and [facility] Everywhere were reviewed today.    Imaging: Spine imaging was personally reviewed and interpreted today. The images were shown to the patient and the findings were explained using a spine model.      Lumbar spine MRI from 2015 and recent x-ray reviewed.

## 2023-09-12 NOTE — PATIENT INSTRUCTIONS
~Spine Center Scheduling #[phone].  ~Please call our [facility] Spine Nurse Navigation #[phone] with any questions or concerns about your treatment plan, if symptoms worsen and you would like to be seen urgently, or if you have problems controlling bladder and bowel function.  ~For any future flareups or new symptoms, patients must follow-up in clinic or contact the nurse navigator line.  ~Please note that any My Chart messages may take multiple days for a response due to the high volume of patients seen in clinic.  Anything sent Thursday night or after will be answered the following week when able, as Marcela Ruiz CNP does not work in clinic on Fridays.   ~Marcela Ruiz CNP is at the [facility] on the first and third Tuesdays of the month only, otherwise primarily at the [facility].        Importance of specialized Physical Therapy: Discussed the importance of core strengthening, ROM, stretching exercises with the patient and how each of these entities is important in decreasing pain.  Explained to the patient that the purpose of physical therapy is to teach the patient a home exercise program individualized to them and their specific health concerns.  These exercises need to be performed every day in order to decrease pain and prevent future occurrences of pain.           Imaging has been ordered. Radiology will call you to schedule. Please call below if you do not hear from them in the next couple of days.     [facility] Radiology Scheduling    Please call [phone] to schedule your image(s) (select option #1). There are 3 different locations, see below.     [facility]  [address] [place], [place] [id number]    [facility] [place]  [address], [address]   [facility] [id number]    [place] [id number] [place] [id number]       Prescribed Gabapentin today, 100 mg tablets, to be titrated up to 1  tablets 3 times a day as tolerated for your nerve pain. Please follow Gabapentin dosing chart below.    Gabapentin 100mg Dosing Chart    DATE  MORNING AFTERNOON BEDTIME    Day 1 0 0 1    Day 2 0 0 1    Day 3 0 0 1    Day 4 1 0 1    Day 5 1 0 1    Day 6 1 0 1    Day 7 1 1 1     Continue medication, taking 1 capsules three times daily  Please call if you have any questions regarding how to take your medication

## 2023-09-14 ENCOUNTER — MYC MEDICAL ADVICE (OUTPATIENT)
Dept: PHYSICAL MEDICINE AND REHAB | Facility: CLINIC | Age: 68
End: 2023-09-14
Payer: COMMERCIAL

## 2023-09-14 DIAGNOSIS — G89.29 CHRONIC BILATERAL LOW BACK PAIN WITHOUT SCIATICA: Primary | ICD-10-CM

## 2023-09-14 DIAGNOSIS — M54.50 CHRONIC BILATERAL LOW BACK PAIN WITHOUT SCIATICA: Primary | ICD-10-CM

## 2023-09-14 DIAGNOSIS — F40.240 CLAUSTROPHOBIA: ICD-10-CM

## 2023-09-18 RX ORDER — DIAZEPAM 5 MG
TABLET ORAL
Qty: 1 TABLET | Refills: 0 | Status: SHIPPED | OUTPATIENT
Start: 2023-09-18 | End: 2024-08-08

## 2023-10-16 ENCOUNTER — HOSPITAL ENCOUNTER (OUTPATIENT)
Dept: GENERAL RADIOLOGY | Facility: CLINIC | Age: 68
Discharge: HOME | End: 2023-10-16
Attending: NURSE PRACTITIONER
Payer: COMMERCIAL

## 2023-10-16 ENCOUNTER — HOSPITAL ENCOUNTER (OUTPATIENT)
Dept: MRI IMAGING | Facility: CLINIC | Age: 68
Discharge: HOME | End: 2023-10-16
Attending: NURSE PRACTITIONER
Payer: COMMERCIAL

## 2023-10-16 DIAGNOSIS — G89.29 CHRONIC BILATERAL LOW BACK PAIN WITHOUT SCIATICA: ICD-10-CM

## 2023-10-16 DIAGNOSIS — M43.16 SPONDYLOLISTHESIS OF LUMBAR REGION: ICD-10-CM

## 2023-10-16 DIAGNOSIS — M47.816 LUMBAR FACET ARTHROPATHY: ICD-10-CM

## 2023-10-16 DIAGNOSIS — M54.50 CHRONIC BILATERAL LOW BACK PAIN WITHOUT SCIATICA: ICD-10-CM

## 2023-10-16 DIAGNOSIS — M54.10 RADICULOPATHY WITH LOWER EXTREMITY SYMPTOMS: ICD-10-CM

## 2023-10-16 DIAGNOSIS — R20.2 NUMBNESS AND TINGLING OF BOTH FEET: ICD-10-CM

## 2023-10-16 DIAGNOSIS — R20.0 NUMBNESS AND TINGLING OF BOTH FEET: ICD-10-CM

## 2023-10-16 PROCEDURE — 72148 MRI LUMBAR SPINE W/O DYE: CPT

## 2023-10-16 PROCEDURE — 72110 X-RAY EXAM L-2 SPINE 4/>VWS: CPT

## 2023-10-17 DIAGNOSIS — M54.2 CHRONIC NECK PAIN: ICD-10-CM

## 2023-10-17 DIAGNOSIS — M43.16 SPONDYLOLISTHESIS OF LUMBAR REGION: ICD-10-CM

## 2023-10-17 DIAGNOSIS — M51.369 DDD (DEGENERATIVE DISC DISEASE), LUMBAR: ICD-10-CM

## 2023-10-17 DIAGNOSIS — M48.07 LUMBOSACRAL STENOSIS WITHOUT NEUROGENIC CLAUDICATION: ICD-10-CM

## 2023-10-17 DIAGNOSIS — G89.29 CHRONIC BILATERAL LOW BACK PAIN WITHOUT SCIATICA: Primary | ICD-10-CM

## 2023-10-17 DIAGNOSIS — G89.29 CHRONIC NECK PAIN: ICD-10-CM

## 2023-10-17 DIAGNOSIS — M47.816 LUMBAR FACET ARTHROPATHY: ICD-10-CM

## 2023-10-17 DIAGNOSIS — M54.50 CHRONIC BILATERAL LOW BACK PAIN WITHOUT SCIATICA: Primary | ICD-10-CM

## 2023-10-17 DIAGNOSIS — M54.10 RADICULOPATHY WITH LOWER EXTREMITY SYMPTOMS: ICD-10-CM

## 2023-11-03 ENCOUNTER — LAB (OUTPATIENT)
Dept: LAB | Facility: CLINIC | Age: 68
End: 2023-11-03
Payer: COMMERCIAL

## 2023-11-03 ENCOUNTER — THERAPY VISIT (OUTPATIENT)
Dept: PHYSICAL THERAPY | Facility: CLINIC | Age: 68
End: 2023-11-03
Attending: NURSE PRACTITIONER
Payer: COMMERCIAL

## 2023-11-03 DIAGNOSIS — M54.10 RADICULOPATHY WITH LOWER EXTREMITY SYMPTOMS: ICD-10-CM

## 2023-11-03 DIAGNOSIS — M05.79 SEROPOSITIVE RHEUMATOID ARTHRITIS OF MULTIPLE SITES (H): ICD-10-CM

## 2023-11-03 DIAGNOSIS — M54.2 CHRONIC NECK PAIN: ICD-10-CM

## 2023-11-03 DIAGNOSIS — M51.369 DDD (DEGENERATIVE DISC DISEASE), LUMBAR: ICD-10-CM

## 2023-11-03 DIAGNOSIS — Z79.899 ENCOUNTER FOR LONG-TERM (CURRENT) USE OF MEDICATIONS: ICD-10-CM

## 2023-11-03 DIAGNOSIS — G89.29 CHRONIC NECK PAIN: ICD-10-CM

## 2023-11-03 DIAGNOSIS — M43.16 SPONDYLOLISTHESIS OF LUMBAR REGION: ICD-10-CM

## 2023-11-03 DIAGNOSIS — M48.07 LUMBOSACRAL STENOSIS WITHOUT NEUROGENIC CLAUDICATION: ICD-10-CM

## 2023-11-03 DIAGNOSIS — M47.816 LUMBAR FACET ARTHROPATHY: ICD-10-CM

## 2023-11-03 DIAGNOSIS — M54.50 CHRONIC BILATERAL LOW BACK PAIN WITHOUT SCIATICA: ICD-10-CM

## 2023-11-03 DIAGNOSIS — G89.29 CHRONIC BILATERAL LOW BACK PAIN WITHOUT SCIATICA: ICD-10-CM

## 2023-11-03 LAB
ALBUMIN SERPL BCG-MCNC: 4 G/DL (ref 3.5–5.2)
ALT SERPL W P-5'-P-CCNC: 33 U/L (ref 0–50)
AST SERPL W P-5'-P-CCNC: 39 U/L (ref 0–45)
BASOPHILS # BLD AUTO: 0 10E3/UL (ref 0–0.2)
BASOPHILS NFR BLD AUTO: 0 %
CREAT SERPL-MCNC: 0.74 MG/DL (ref 0.51–0.95)
EGFRCR SERPLBLD CKD-EPI 2021: 88 ML/MIN/1.73M2
EOSINOPHIL # BLD AUTO: 0.1 10E3/UL (ref 0–0.7)
EOSINOPHIL NFR BLD AUTO: 2 %
ERYTHROCYTE [DISTWIDTH] IN BLOOD BY AUTOMATED COUNT: 13.2 % (ref 10–15)
HCT VFR BLD AUTO: 36.6 % (ref 35–47)
HGB BLD-MCNC: 12.6 G/DL (ref 11.7–15.7)
IMM GRANULOCYTES # BLD: 0 10E3/UL
IMM GRANULOCYTES NFR BLD: 0 %
LYMPHOCYTES # BLD AUTO: 1.8 10E3/UL (ref 0.8–5.3)
LYMPHOCYTES NFR BLD AUTO: 21 %
MCH RBC QN AUTO: 31.2 PG (ref 26.5–33)
MCHC RBC AUTO-ENTMCNC: 34.4 G/DL (ref 31.5–36.5)
MCV RBC AUTO: 91 FL (ref 78–100)
MONOCYTES # BLD AUTO: 0.7 10E3/UL (ref 0–1.3)
MONOCYTES NFR BLD AUTO: 8 %
NEUTROPHILS # BLD AUTO: 6.1 10E3/UL (ref 1.6–8.3)
NEUTROPHILS NFR BLD AUTO: 69 %
PLATELET # BLD AUTO: 233 10E3/UL (ref 150–450)
RBC # BLD AUTO: 4.04 10E6/UL (ref 3.8–5.2)
WBC # BLD AUTO: 8.8 10E3/UL (ref 4–11)

## 2023-11-03 PROCEDURE — 84460 ALANINE AMINO (ALT) (SGPT): CPT

## 2023-11-03 PROCEDURE — 36415 COLL VENOUS BLD VENIPUNCTURE: CPT

## 2023-11-03 PROCEDURE — 84450 TRANSFERASE (AST) (SGOT): CPT

## 2023-11-03 PROCEDURE — 82040 ASSAY OF SERUM ALBUMIN: CPT

## 2023-11-03 PROCEDURE — 82565 ASSAY OF CREATININE: CPT

## 2023-11-03 PROCEDURE — 97161 PT EVAL LOW COMPLEX 20 MIN: CPT | Mod: GP | Performed by: PHYSICAL THERAPIST

## 2023-11-03 PROCEDURE — 97110 THERAPEUTIC EXERCISES: CPT | Mod: GP | Performed by: PHYSICAL THERAPIST

## 2023-11-03 PROCEDURE — 85025 COMPLETE CBC W/AUTO DIFF WBC: CPT

## 2023-11-03 NOTE — PROGRESS NOTES
PHYSICAL THERAPY EVALUATION  Type of Visit: Evaluation    See electronic medical record for Abuse and Falls Screening details.    Subjective       Presenting condition or subjective complaint: Lower back pain; numbness ,tingling in feet; pain in knees  Constant pain across the low back. Achy and sore with getting in/out of bed, any sore of bending. Constant numbness/tingling in both feet.   Date of onset: 10/03/23    Relevant medical history: Arthritis; Depression; Dizziness; Osteoarthritis; Osteoporosis; Pain at night or rest; Progressive neurological deficits; Rheumatoid arthritis; Severe dizziness   Dates & types of surgery: Hip replacement, shoulder repair, elbow repairs.    Prior diagnostic imaging/testing results: MRI; CT scan; X-ray     Prior therapy history for the same diagnosis, illness or injury: No      Prior Level of Function  Transfers: Independent  Ambulation: Independent  ADL: Independent  IADL:  independent    Living Environment  Social support: With a significant other or spouse   Type of home: House   Stairs to enter the home: Yes 3 Is there a railing: Yes   Ramp: No   Stairs inside the home: Yes 12 Is there a railing: Yes   Help at home: Self Cares (home health aide/personal care attendant, family, etc); Home management tasks (cooking, cleaning); Medication and/or finances; Home and Yard maintenance tasks; Assist for driving and community activities  Equipment owned: Straight Cane; Walker with wheels; Crutches; Grab bars; Raised toilet seat     Employment: No    Hobbies/Interests: Shopping    Patient goals for therapy: Walk without pain or numbness in feet, a back that doesn't hurt.    Pain assessment: See objective evaluation for additional pain details     Objective   LUMBAR SPINE EVALUATION  PAIN: Pain Level at Rest: 6/10  Pain Level with Use: 8/10  Pain Location: across low back   Pain Frequency: constant  Pain is Exacerbated By: turning over in bed, getting/in and out of bed, getting in/out  of chair, bending   Pain is Relieved By: heat   INTEGUMENTARY (edema, incisions):   POSTURE: Standing Posture: wnl  GAIT:   Weightbearing Status:   Assistive Device(s): None  Gait Deviations: WNL  BALANCE/PROPRIOCEPTION:   WEIGHTBEARING ALIGNMENT:   NON-WEIGHTBEARING ALIGNMENT:    ROM:   (Degrees) Left AROM Left PROM  Right AROM Right PROM   Hip Flexion  wnl  wnl   Hip Extension  wnl  wnl   Hip Abduction       Hip Adduction       Hip Internal Rotation  15  wnl   Hip External Rotation  wnl  wnl   Knee Flexion       Knee Extension       Lumbar Side glide Fingertips to inferior pole of patella, pain L > R Fingertips to inferior pole of patella   Lumbar Flexion Fingertips to ankles, increased pain   Lumbar Extension 50%, increased pain    Pain:   End feel:   PELVIC/SI SCREEN: Supine to Sit: positive  STRENGTH:  Hip abduction 4/5 B    MYOTOMES:    Left Right   T12-L3 (Hip Flexion) 4+ 4+   L2-4 (Quads)  5 5   L4 (Ankle DF) 5 5   L5 (Great Toe Ext) 5 5   S1 (Toe Raise) 5 5     DTR S: WNL  CORD SIGNS:   DERMATOMES: WNL  NEURAL TENSION:  + SLR B  FLEXIBILITY: Decreased hip IR L, Decreased piriformis L, Decreased hamstrings L, Decreased piriformis R, Decreased hamstrings R  LUMBAR/HIP Special Tests:  MONICA on the R caused R groin pain, L neg. FADIR neg B. Scour neg B, some anterior hip pain with matt test on the L     PELVIS/SI SPECIAL TESTS:  SI gapping neg, thigh thrust -  , - sacral thrust, - gaenslen, - compression   FUNCTIONAL TESTS:   PALPATION:  tender B PSIS, gluteals, piriformis, lumbar paraspinals, QL  SPINAL SEGMENTAL CONCLUSIONS:  hypomobile lumbar spine with tenderness with spring L1-5       Assessment & Plan   CLINICAL IMPRESSIONS  Medical Diagnosis: Chronic bilateral low back pain without sciatica (M54.50, G89.29)    Radiculopathy with lower extremity symptoms (M54.10)    Spondylolisthesis of lumbar region (M43.16)    Lumbar facet arthropathy (M47.816)    DDD (degenerative disc disease), lumbar (M51.36)     Lumbosacral stenosis without neurogenic claudication (M48.07)    Chronic neck pain (M54.2, G89.29)    Treatment Diagnosis: LBP   Impression/Assessment: Patient is a 67 year old female with low back complaints.  The following significant findings have been identified: Pain, Decreased ROM/flexibility, Decreased joint mobility, and Decreased strength. These impairments interfere with their ability to perform self care tasks, recreational activities, household chores, household mobility, and community mobility as compared to previous level of function.     Clinical Decision Making (Complexity):  Clinical Presentation: Stable/Uncomplicated  Clinical Presentation Rationale: based on medical and personal factors listed in PT evaluation  Clinical Decision Making (Complexity): Low complexity    PLAN OF CARE  Treatment Interventions:  Interventions: Manual Therapy, Neuromuscular Re-education, Therapeutic Activity, Therapeutic Exercise, Self-Care/Home Management    Long Term Goals     PT Goal 1  Goal Identifier: 1  Goal Description: Patient will report no pain with turning over in bed.  Target Date: 12/29/23  PT Goal 2  Goal Identifier: 2  Goal Description: Patient will report no increased pain with getting in/out of chairs.  Target Date: 12/29/23  PT Goal 3  Goal Identifier: 3  Goal Description: Patient will be independent and consistent with HEP at least 5x a week to aid functional recovery.  Target Date: 12/29/23      Frequency of Treatment: 1x a week  Duration of Treatment: 8 weeks    Recommended Referrals to Other Professionals:   Education Assessment:   Learner/Method: Patient;Listening;Demonstration    Risks and benefits of evaluation/treatment have been explained.   Patient/Family/caregiver agrees with Plan of Care.     Evaluation Time:     PT Eval, Low Complexity Minutes (54164): 30       Signing Clinician: Sima Morillo, PT      [facility]                                                                                    OUTPATIENT PHYSICAL THERAPY      PLAN OF TREATMENT FOR OUTPATIENT REHABILITATION   Patient's Last Name, First Name, Yaritza Abdalla YOB: 1955   Provider's Name   [facility]   Medical Record No.  [id number]     Onset Date: 10/03/23  Start of Care Date: 11/03/23     Medical Diagnosis:  Chronic bilateral low back pain without sciatica (M54.50, G89.29)    Radiculopathy with lower extremity symptoms (M54.10)    Spondylolisthesis of lumbar region (M43.16)    Lumbar facet arthropathy (M47.816)    DDD (degenerative disc disease), lumbar (M51.36)    Lumbosacral stenosis without neurogenic claudication (M48.07)    Chronic neck pain (M54.2, G89.29)      PT Treatment Diagnosis:  LBP Plan of Treatment  Frequency/Duration: 1x a week/ 8 weeks    Certification date from 11/03/23 to 12/29/23         See note for plan of treatment details and functional goals     Sima Morillo, PT                         I CERTIFY THE NEED FOR THESE SERVICES FURNISHED UNDER        THIS PLAN OF TREATMENT AND WHILE UNDER MY CARE     (Physician attestation of this document indicates review and certification of the therapy plan).                Referring Provider:  Marcela Ruiz      Initial Assessment  See Epic Evaluation- Start of Care Date: 11/03/23

## 2023-11-17 ENCOUNTER — THERAPY VISIT (OUTPATIENT)
Dept: PHYSICAL THERAPY | Facility: CLINIC | Age: 68
End: 2023-11-17
Attending: NURSE PRACTITIONER
Payer: COMMERCIAL

## 2023-11-17 DIAGNOSIS — G89.29 CHRONIC BILATERAL LOW BACK PAIN WITHOUT SCIATICA: Primary | ICD-10-CM

## 2023-11-17 DIAGNOSIS — M54.50 CHRONIC BILATERAL LOW BACK PAIN WITHOUT SCIATICA: Primary | ICD-10-CM

## 2023-11-17 PROCEDURE — 97140 MANUAL THERAPY 1/> REGIONS: CPT | Mod: GP | Performed by: PHYSICAL THERAPIST

## 2023-11-17 PROCEDURE — 97110 THERAPEUTIC EXERCISES: CPT | Mod: GP | Performed by: PHYSICAL THERAPIST

## 2023-12-01 ENCOUNTER — THERAPY VISIT (OUTPATIENT)
Dept: PHYSICAL THERAPY | Facility: CLINIC | Age: 68
End: 2023-12-01
Attending: NURSE PRACTITIONER
Payer: COMMERCIAL

## 2023-12-01 DIAGNOSIS — G89.29 CHRONIC BILATERAL LOW BACK PAIN WITHOUT SCIATICA: Primary | ICD-10-CM

## 2023-12-01 DIAGNOSIS — M54.50 CHRONIC BILATERAL LOW BACK PAIN WITHOUT SCIATICA: Primary | ICD-10-CM

## 2023-12-01 PROCEDURE — 97140 MANUAL THERAPY 1/> REGIONS: CPT | Mod: GP | Performed by: PHYSICAL THERAPIST

## 2023-12-01 PROCEDURE — 97110 THERAPEUTIC EXERCISES: CPT | Mod: GP | Performed by: PHYSICAL THERAPIST

## 2024-01-04 NOTE — PROGRESS NOTES
DISCHARGE  Reason for Discharge: Patient has failed to schedule further appointments.    Equipment Issued:     Discharge Plan: Patient to continue home program.    Referring Provider:  Marcela Ruiz     12/01/23 0500   Appointment Info   Signing clinician's name / credentials Sima Morillo, PT, DPT, OCS   Total/Authorized Visits 3   Visits Used 365   Medical Diagnosis Chronic bilateral low back pain without sciatica (M54.50, G89.29)    Radiculopathy with lower extremity symptoms (M54.10)    Spondylolisthesis of lumbar region (M43.16)    Lumbar facet arthropathy (M47.816)    DDD (degenerative disc disease), lumbar (M51.36)    Lumbosacral stenosis without neurogenic claudication (M48.07)    Chronic neck pain (M54.2, G89.29)   PT Tx Diagnosis LBP   Quick Adds Certification   Progress Note/Certification   Start of Care Date 11/03/23   Onset of illness/injury or Date of Surgery 10/03/23   Therapy Frequency 1x a week   Predicted Duration 8 weeks   Certification date from 11/03/23   Certification date to 12/29/23   Progress Note Due Date 12/29/23   GOALS   PT Goals 2;3   PT Goal 1   Goal Identifier 1   Goal Description Patient will report no pain with turning over in bed.   Target Date 12/29/23   PT Goal 2   Goal Identifier 2   Goal Description Patient will report no increased pain with getting in/out of chairs.   Target Date 12/29/23   PT Goal 3   Goal Identifier 3   Goal Description Patient will be independent and consistent with HEP at least 5x a week to aid functional recovery.   Target Date 12/29/23   Subjective Report   Subjective Report Patient reports the low back has been sore. Has not been doing the exercises as much which would probably help.   Objective Measures   Objective Measures Objective Measure 1   Objective Measure 1   Objective Measure Lumbar AROM   Details Flexion-fingertips to 2 inch above toes, inc pain. Extension 50%, pain. SB- Fingertips to superior pole of patella B.   Treatment  Interventions (PT)   Interventions Therapeutic Procedure/Exercise;Manual Therapy   Therapeutic Procedure/Exercise   Therapeutic Procedures: strength, endurance, ROM, flexibillity minutes (09513) 20   Ther Proc 1 - Details Bridge 2 x 10. SL clam 2 x 10 B. core stab marching 2 x 10 B. Hooklying LTR x 10 B, alt. supine piriformis stretching 20'x 2. seated hamstring stretching 30' x 2 B.   Skilled Intervention HEP instruction, strengthening   Patient Response/Progress cuing needed, no pain with ex   Manual Therapy   Manual Therapy: Mobilization, MFR, MLD, friction massage minutes (27575) 10   Manual Therapy 1 - Details MET: pubic shotgun and R ant rot innom. MET FRS R L5.   Skilled Intervention MT to improve moblility   Patient Response/Progress no pain with ROM post   Education   Learner/Method Patient;Listening;Demonstration   Plan   Home program PTRX - printed handout   Updates to plan of care f/u in 2 weeks   Plan for next session MT - recheck pelvis, check FRS/ERS, prog core strengthening   Total Session Time   Timed Code Treatment Minutes 30   Total Treatment Time (sum of timed and untimed services) 30         Please contact me with any questions or concerns.  Thank you for your referral.    Sima Morillo, PT, DPT, OCS  Physical Therapist, Orthopedic Certified Specialist    [place] [id number] [address] [address]  [email or web address]   Office: [phone]   Employed by [employer]

## 2024-02-26 ENCOUNTER — TELEPHONE (OUTPATIENT)
Dept: FAMILY MEDICINE | Facility: CLINIC | Age: 69
End: 2024-02-26
Payer: COMMERCIAL

## 2024-02-26 NOTE — TELEPHONE ENCOUNTER
Patient Quality Outreach    Patient is due for the following:   None    Next Steps:   - PHQ/SHEA    Type of outreach:    Sent [employer] message.      Questions for provider review:    None           Sophia Montes, CMA

## 2024-03-19 ENCOUNTER — HOSPITAL ENCOUNTER (OUTPATIENT)
Dept: BONE DENSITY | Facility: CLINIC | Age: 69
Discharge: HOME | End: 2024-03-19
Attending: INTERNAL MEDICINE | Admitting: INTERNAL MEDICINE
Payer: COMMERCIAL

## 2024-03-19 DIAGNOSIS — M81.0 OSTEOPOROSIS: ICD-10-CM

## 2024-03-19 PROCEDURE — 77080 DXA BONE DENSITY AXIAL: CPT

## 2024-03-26 ASSESSMENT — PATIENT HEALTH QUESTIONNAIRE - PHQ9: SUM OF ALL RESPONSES TO PHQ QUESTIONS 1-9: 23

## 2024-03-27 ENCOUNTER — OFFICE VISIT (OUTPATIENT)
Dept: FAMILY MEDICINE | Facility: CLINIC | Age: 69
End: 2024-03-27
Payer: COMMERCIAL

## 2024-03-27 ENCOUNTER — LAB (OUTPATIENT)
Dept: LAB | Facility: CLINIC | Age: 69
End: 2024-03-27
Payer: COMMERCIAL

## 2024-03-27 VITALS
DIASTOLIC BLOOD PRESSURE: 74 MMHG | OXYGEN SATURATION: 97 % | HEART RATE: 85 BPM | WEIGHT: 112.38 LBS | HEIGHT: 64 IN | BODY MASS INDEX: 19.18 KG/M2 | TEMPERATURE: 97.8 F | RESPIRATION RATE: 16 BRPM | SYSTOLIC BLOOD PRESSURE: 112 MMHG

## 2024-03-27 DIAGNOSIS — R63.4 WEIGHT LOSS: ICD-10-CM

## 2024-03-27 DIAGNOSIS — Z72.820 POOR SLEEP: ICD-10-CM

## 2024-03-27 DIAGNOSIS — M05.79 SEROPOSITIVE RHEUMATOID ARTHRITIS OF MULTIPLE SITES (H): ICD-10-CM

## 2024-03-27 DIAGNOSIS — F33.1 MODERATE RECURRENT MAJOR DEPRESSION (H): Chronic | ICD-10-CM

## 2024-03-27 DIAGNOSIS — F41.1 GENERALIZED ANXIETY DISORDER: Primary | ICD-10-CM

## 2024-03-27 DIAGNOSIS — I67.81 LEUKOARAIOSIS: ICD-10-CM

## 2024-03-27 DIAGNOSIS — Z79.899 ENCOUNTER FOR LONG-TERM (CURRENT) USE OF MEDICATIONS: ICD-10-CM

## 2024-03-27 DIAGNOSIS — M05.79 RHEUMATOID ARTHRITIS INVOLVING MULTIPLE SITES WITH POSITIVE RHEUMATOID FACTOR (H): ICD-10-CM

## 2024-03-27 LAB
ALBUMIN SERPL BCG-MCNC: 4.1 G/DL (ref 3.5–5.2)
ALT SERPL W P-5'-P-CCNC: 21 U/L (ref 0–50)
AST SERPL W P-5'-P-CCNC: 33 U/L (ref 0–45)
BASOPHILS # BLD AUTO: 0 10E3/UL (ref 0–0.2)
BASOPHILS NFR BLD AUTO: 1 %
CREAT SERPL-MCNC: 0.79 MG/DL (ref 0.51–0.95)
EGFRCR SERPLBLD CKD-EPI 2021: 81 ML/MIN/1.73M2
EOSINOPHIL # BLD AUTO: 0.1 10E3/UL (ref 0–0.7)
EOSINOPHIL NFR BLD AUTO: 1 %
ERYTHROCYTE [DISTWIDTH] IN BLOOD BY AUTOMATED COUNT: 13.5 % (ref 10–15)
HCT VFR BLD AUTO: 40.4 % (ref 35–47)
HGB BLD-MCNC: 13.6 G/DL (ref 11.7–15.7)
IMM GRANULOCYTES # BLD: 0 10E3/UL
IMM GRANULOCYTES NFR BLD: 0 %
LYMPHOCYTES # BLD AUTO: 1.8 10E3/UL (ref 0.8–5.3)
LYMPHOCYTES NFR BLD AUTO: 31 %
MCH RBC QN AUTO: 31.1 PG (ref 26.5–33)
MCHC RBC AUTO-ENTMCNC: 33.7 G/DL (ref 31.5–36.5)
MCV RBC AUTO: 92 FL (ref 78–100)
MONOCYTES # BLD AUTO: 0.5 10E3/UL (ref 0–1.3)
MONOCYTES NFR BLD AUTO: 9 %
NEUTROPHILS # BLD AUTO: 3.4 10E3/UL (ref 1.6–8.3)
NEUTROPHILS NFR BLD AUTO: 59 %
PLATELET # BLD AUTO: 215 10E3/UL (ref 150–450)
RBC # BLD AUTO: 4.38 10E6/UL (ref 3.8–5.2)
WBC # BLD AUTO: 5.8 10E3/UL (ref 4–11)

## 2024-03-27 PROCEDURE — 99214 OFFICE O/P EST MOD 30 MIN: CPT | Performed by: FAMILY MEDICINE

## 2024-03-27 PROCEDURE — 82565 ASSAY OF CREATININE: CPT

## 2024-03-27 PROCEDURE — 82040 ASSAY OF SERUM ALBUMIN: CPT

## 2024-03-27 PROCEDURE — 85025 COMPLETE CBC W/AUTO DIFF WBC: CPT

## 2024-03-27 PROCEDURE — 84460 ALANINE AMINO (ALT) (SGPT): CPT

## 2024-03-27 PROCEDURE — 36415 COLL VENOUS BLD VENIPUNCTURE: CPT

## 2024-03-27 PROCEDURE — 84450 TRANSFERASE (AST) (SGOT): CPT

## 2024-03-27 RX ORDER — MIRTAZAPINE 7.5 MG/1
7.5 TABLET, FILM COATED ORAL AT BEDTIME
Qty: 30 TABLET | Refills: 1 | Status: SHIPPED | OUTPATIENT
Start: 2024-03-27 | End: 2024-08-08

## 2024-03-27 ASSESSMENT — PATIENT HEALTH QUESTIONNAIRE - PHQ9
5. POOR APPETITE OR OVEREATING: NEARLY EVERY DAY
10. IF YOU CHECKED OFF ANY PROBLEMS, HOW DIFFICULT HAVE THESE PROBLEMS MADE IT FOR YOU TO DO YOUR WORK, TAKE CARE OF THINGS AT HOME, OR GET ALONG WITH OTHER PEOPLE: VERY DIFFICULT
SUM OF ALL RESPONSES TO PHQ QUESTIONS 1-9: 23

## 2024-03-27 ASSESSMENT — ANXIETY QUESTIONNAIRES
5. BEING SO RESTLESS THAT IT IS HARD TO SIT STILL: NEARLY EVERY DAY
6. BECOMING EASILY ANNOYED OR IRRITABLE: NEARLY EVERY DAY
2. NOT BEING ABLE TO STOP OR CONTROL WORRYING: NEARLY EVERY DAY
GAD7 TOTAL SCORE: 20
7. FEELING AFRAID AS IF SOMETHING AWFUL MIGHT HAPPEN: MORE THAN HALF THE DAYS
GAD7 TOTAL SCORE: 20
1. FEELING NERVOUS, ANXIOUS, OR ON EDGE: NEARLY EVERY DAY
3. WORRYING TOO MUCH ABOUT DIFFERENT THINGS: NEARLY EVERY DAY

## 2024-03-27 ASSESSMENT — PAIN SCALES - GENERAL: PAINLEVEL: NO PAIN (0)

## 2024-03-27 NOTE — PATIENT INSTRUCTIONS
"Stop the trazodone    Mirtazapine 7.5 mg at bedtime  Melatonin 1-3 mg at bedtime         When you are out of refills or the refills say \"zero\", it is time to schedule your next appointment in clinic!    Labs are released to you almost immediately and sometimes before I have had a chance to review them.  I review labs regularly and once they are all in, you will either be sent a letter with your results or if you are signed up for on-line services, you will be notified that results are available to you on [employer]. If there are serious findings, you typically will be called.    If you have any questions about your visit, your symptoms, your medication, your test results or it is not clear what your diagnosis or treatment plan is please contact me (via [employer]) or call the care team at [phone] option #2          "

## 2024-03-27 NOTE — PROGRESS NOTES
"  ASSESSMENT/PLAN:      ICD-10-CM    1. Generalized anxiety disorder  F41.1 Adult Mental Health  Referral     mirtazapine (REMERON) 7.5 MG tablet      2. Moderate recurrent major depression (H)  F33.1 Adult Mental Health  Referral     mirtazapine (REMERON) 7.5 MG tablet      3. Leukoaraiosis  I67.81 Adult Mental Health  Referral     mirtazapine (REMERON) 7.5 MG tablet      4. Weight loss  R63.4       5. Poor sleep  Z72.820       6. Rheumatoid arthritis involving multiple sites with positive rheumatoid factor (H)  M05.79         Multifactorial issues, but much of this stems from her leukoaraiosis and the fact that she feels like she's never \"clear\" and \"always in a fog\".     Losing weight due to poor intake, poor appetite, smaller portions.  Discussed adding Mirtazapine to help with sleep/appetite stimulation as well as moods.    Will have her stop the trazodone as there is increased risk of serotonin syndrome with this.     Can use melatonin for help with sleep in addition to the mirtazapine.  They will let me know in a few weeks how this is going, may  need a higher dose.     Increase activity (walking, etc).     Add boost/ensure/protein drink twice daily for calories      Patient Instructions   Stop the trazodone    Mirtazapine 7.5 mg at bedtime  Melatonin 1-3 mg at bedtime           Prosper Rivera is a 68 year old, presenting for the following health issues:  Weight Loss        3/27/2024    10:53 AM   Additional Questions   Roomed by Sophia kline CMA   Accompanied by      History of Present Illness       Reason for visit:  Weight loss, overall aches & pains, dizzy, fatigue, falls, lack concentration, poor memory, loss appetite, less taste, balance, spot on nose, runny nose    She eats 0-1 servings of fruits and vegetables daily.She consumes 1 sweetened beverage(s) daily.She exercises with enough effort to increase her heart rate 9 or less minutes per day.  She exercises with enough " "effort to increase her heart rate 3 or less days per week.   She is taking medications regularly.       - She notes a weight loss of about 15-20lb since last Spring. She is not actively trying to loss weight but notes that she does have a loss of appetite and is not eating as much at meals. She notes that she is a \"anxious person\". She is not on any current medications for this and feels that this is not well controlled. Her  she is anxious about most everything. She is also fatigued and is having balance/memory issues. She feels that her \"head is not focused\". She has been having problems falling asleep and this morning noted only getting 2 hours of sleep last night.  notes that she has to \"work though\" the days problems and find a solution before being able to fal lasleep.    Wt Readings from Last 4 Encounters:   03/27/24 51 kg (112 lb 6 oz)   09/12/23 54.4 kg (120 lb)   09/05/23 54.4 kg (120 lb)   08/24/23 59 kg (130 lb)       - Labs for Dr. Ulloa today  ]        Objective    /74   Pulse 85   Temp 97.8  F (36.6  C) (Tympanic)   Resp 16   Ht 1.619 m (5' 3.75\")   Wt 51 kg (112 lb 6 oz)   LMP  (LMP Unknown)   SpO2 97%   BMI 19.44 kg/m    Body mass index is 19.44 kg/m .  Physical Exam   GENERAL: alert and no distress  PSYCH: mentation appears normal, affect flat, judgement and insight intact, and appearance well groomed          Signed Electronically by: Aziza Aviles MD    "

## 2024-04-01 ASSESSMENT — ANXIETY QUESTIONNAIRES
IF YOU CHECKED OFF ANY PROBLEMS ON THIS QUESTIONNAIRE, HOW DIFFICULT HAVE THESE PROBLEMS MADE IT FOR YOU TO DO YOUR WORK, TAKE CARE OF THINGS AT HOME, OR GET ALONG WITH OTHER PEOPLE: SOMEWHAT DIFFICULT
6. BECOMING EASILY ANNOYED OR IRRITABLE: NEARLY EVERY DAY
2. NOT BEING ABLE TO STOP OR CONTROL WORRYING: NEARLY EVERY DAY
GAD7 TOTAL SCORE: 15
3. WORRYING TOO MUCH ABOUT DIFFERENT THINGS: MORE THAN HALF THE DAYS
4. TROUBLE RELAXING: NEARLY EVERY DAY
GAD7 TOTAL SCORE: 15
5. BEING SO RESTLESS THAT IT IS HARD TO SIT STILL: MORE THAN HALF THE DAYS
GAD7 TOTAL SCORE: 15
8. IF YOU CHECKED OFF ANY PROBLEMS, HOW DIFFICULT HAVE THESE MADE IT FOR YOU TO DO YOUR WORK, TAKE CARE OF THINGS AT HOME, OR GET ALONG WITH OTHER PEOPLE?: SOMEWHAT DIFFICULT
7. FEELING AFRAID AS IF SOMETHING AWFUL MIGHT HAPPEN: NOT AT ALL
7. FEELING AFRAID AS IF SOMETHING AWFUL MIGHT HAPPEN: NOT AT ALL
1. FEELING NERVOUS, ANXIOUS, OR ON EDGE: MORE THAN HALF THE DAYS

## 2024-04-01 ASSESSMENT — PATIENT HEALTH QUESTIONNAIRE - PHQ9
SUM OF ALL RESPONSES TO PHQ QUESTIONS 1-9: 23
10. IF YOU CHECKED OFF ANY PROBLEMS, HOW DIFFICULT HAVE THESE PROBLEMS MADE IT FOR YOU TO DO YOUR WORK, TAKE CARE OF THINGS AT HOME, OR GET ALONG WITH OTHER PEOPLE: SOMEWHAT DIFFICULT
SUM OF ALL RESPONSES TO PHQ QUESTIONS 1-9: 23

## 2024-04-02 ENCOUNTER — VIRTUAL VISIT (OUTPATIENT)
Dept: PSYCHOLOGY | Facility: CLINIC | Age: 69
End: 2024-04-02
Payer: COMMERCIAL

## 2024-04-02 DIAGNOSIS — F33.1 MODERATE RECURRENT MAJOR DEPRESSION (H): Chronic | ICD-10-CM

## 2024-04-02 DIAGNOSIS — F41.1 GENERALIZED ANXIETY DISORDER: Primary | ICD-10-CM

## 2024-04-02 DIAGNOSIS — I67.81 LEUKOARAIOSIS: ICD-10-CM

## 2024-04-02 DIAGNOSIS — M05.79 RHEUMATOID ARTHRITIS INVOLVING MULTIPLE SITES WITH POSITIVE RHEUMATOID FACTOR (H): Chronic | ICD-10-CM

## 2024-04-02 PROCEDURE — 90837 PSYTX W PT 60 MINUTES: CPT | Mod: 95 | Performed by: PSYCHOLOGIST

## 2024-04-02 NOTE — PROGRESS NOTES
Health Psychology          Faby Duarte, Ph.D.,  [phone]  Caitlyn Mcneil, Ph.D.,  [phone]  Lita Hurley, Ph.D.,  [phone]  An Garrido, Ph.D., , [facility] [phone]  Esteban Anderson, Ph.D., , [facility] [phone]  Ambika Murphy, Ph.D.,  [phone]  Tiffanie Srivastava, Ph.D., , [facility] [phone]    [facility], 3rd Floor  [address] [place] [address]       Health Psychology Progress Note    Patient Name: Yaritza Bradley    Service Type: Individual  Length of Visit: 57 minutes - extended session due to complexity of case and content  Start time: 1:01 PM   Stop time: 1:58 PM   Attendees: significant other/spouse, Remi, with patient's permission   Session #: 1    Telemedicine Information:     Telemedicine Visit: The patient's condition can be safely assessed and treated via synchronous audio and visual telemedicine encounter.    Reason for Telemedicine Visit: Patient has requested telehealth visit and Patient convenience (e.g. access to timely appointments / distance to available provider)  Originating Site (Patient Location): Patient's [place], [place]  Distant Location (provider location):  On-site: [facility]  Consent:  The patient/guardian has verbally consented to: the potential risks and benefits of telemedicine (video visit) versus in person care; bill my insurance or make self-payment for services provided; and responsibility for payment of non-covered services.   Mode of Communication:  Video Conference via [employer]EE[employer]    As the provider I attest to compliance with applicable laws and regulations related to telemedicine.     Identifying Information and Presenting Problem:  The patient is a 68 year old adult who is being seen for problematic symptoms of depression and anxiety in the context of leukoaraiosis.    Topics Discussed/Interventions Provided:    Orientation to Service:  Is patient the family member of an  employee who works at this clinic or connected to a patient health psychology provider is already treating? No  Discussed dual role conflicts? Yes   Discussed privacy and confidentiality, including limits? Yes   Is patient already established with a mental health provider? Yes, Sima Aly NP at [facility]. Rx for Trazodone.     Health Psychology Service Introduction: Discussed the health psychology service. Provided education about role as health psychology provider and model of care. Patient was given the opportunity to ask questions and state her goals/expectations for initiating services with the health psychology provider. Patient is interested in establishing services with the health psychology provider.    Session Content:     Background/Context: Patient with a PMH significant for depression, anxiety, suicidal ideation leukoaraiosis, and rheumatoid arthritis. She was referred to health psychology by her PCP, Aziza Aviles MD, for assistance with anxiety and depression in the context of multiple chronic health concerns as well as unintended weight loss related to decreased appetite. Patient was diagnosed with leukoaraiosis in 2014 and currently sees a neurologist for support and management. States that she was advised to stay active, stimulate her brain, and remain positive, but is finding it difficult to do these things. Patient also had neuropsychological testing in 2014 and in 2020. Most recent evaluation is available in chart by Dr. Jes Jessica. See report dated 11/2/2020 for additional details.    Patient reports that she previously attempted to establish care with a therapist in [place] and had 2 sessions, but states that the therapist told her she did not need therapy. However, she has been trying to seek care for her mental health concerns since that time. She notes that the impact of her neurological condition has negatively impacted her life. She has coped by engaging  in withdrawal, isolation, and misdirecting her frustration at her loved-ones. She notes that the way she is responding to stress is coming at the cost of her quality of life and relationships. Is socially connected to her mother, who has mild dementia and lives in an independent living community. Also has friends and family that she is connected with, but notes that her social support is limited. Patient states that her overall goals for therapy include addressing anxiety, depression, and frustration related to her neurological condition and to increase her sense of ramu in her life.      Skills/intervention: The majority of today's session was focused on gathering information, building rapport, and establishing a therapeutic alliance. Supported patient in processing her thoughts and emotions around her leukoaraiosis and the impact it has had on her life. Patient notes that sometimes she has thoughts that she doesn't have a purpose anymore, that things are not worth doing, and that she is just going through the motions. She notes that she has daily thoughts about suicide by car accident but denies any intention to act on these thoughts. She cites her mother as a reason for living. Discussed next steps for treatment and provided psychoeducation about therapeutic orientation and approach to treatment. Introduced ACT as a treatment framework. Next step will be to complete a diagnostic assessment.        Treatment Objective(s) Addressed in This Session:  Psychological distress related to Chronic Disease Management    Progress on / Status of Treatment Objective(s) / Homework:  New Objective established this session   Stable    Medication Adherence: Patient did not report medication changes. Current medication list is below:     Current Outpatient Medications   Medication Sig Dispense Refill    Cyanocobalamin (B-12) 1000 MCG TBCR Take 1,000 mcg by mouth daily 100 tablet 1    diazepam (VALIUM) 5 MG tablet Valium 5 mg p.o.,  take 1 tablet prior to MRI as instructed by radiology.  Must have . 1 tablet 0    folic acid (FOLVITE) 1 MG tablet Take 1 tablet (1,000 mcg) by mouth daily 90 tablet 3    methotrexate 50 MG/2ML injection CHEMO Inject 4 mg Subcutaneous every 7 days       mirtazapine (REMERON) 7.5 MG tablet Take 1 tablet (7.5 mg) by mouth at bedtime 30 tablet 1    Omega-3 Fatty Acids (OMEGA-3 FISH OIL PO) Take 1 g by mouth daily (DRY EYE OMEGA BENEFITS) 667-250 mg-unit cap      RESTASIS 0.05 % ophthalmic emulsion Place 1 drop into both eyes 2 times daily      SM CALCIUM SOFT CHEWS 500-100-40 OR CHEW Take 1 tablet by mouth 2 times daily   0    traZODone (DESYREL) 50 MG tablet Take 50 mg by mouth nightly as needed for sleep       No current facility-administered medications for this visit.        Assessment: The patient appeared to be active and engaged in today's session and was receptive to feedback.     Mental Status Exam:   Appearance: Appropriate   Eye Contact: Good    Orientation: Yes, x4  Behavior/relationship to provider/demeanor: Cooperative and Engaged  Motor Activity: restless  Mood (subjective report): Depressed, Anxious  Affect (objective appearance): Appropriate/mood congruent  Speech Rate: Normal  Speech Volume: Normal  Speech Articulation: Normal  Speech Coherence: Normal  Speech Spontaneity: Normal  Thought Content: Suicidal ideation, no plans or intent, endorses depressive cognitions  Thought Process (associations): Logical, Linear, and Goal directed  Thought Process (rate): Normal  Abnormal Perception: None  Attention/Concentration: Normal  Memory: Appears grossly intact, though patient and  endorse memory impairment  Fund of Knowledge: Appears within normal limits   Abstraction:  Normal  Insight:  Adequate  Judgment:   Adequate    Does the patient appear to be at imminent risk of harm to self/others at this time? No    The session was necessary to address anxiety and depression in the context of  neurological disease that have been interfering with patient's overall functioning and quality of life.  Ongoing psychotherapy is necessary to stabilize mood, provide counseling, improve functioning with daily activities, provide psychoeducation, provide support, and teach cognitive and behavioral skills.    Diagnoses:    1. Generalized anxiety disorder    2. Moderate recurrent major depression (H)    3. Leukoaraiosis    4. Rheumatoid arthritis involving multiple sites with positive rheumatoid factor (H)            Plan:    Follow-up video visit with me on 4/30/24 at 2:00pm  Patient to use 911 or other crisis resources in the event of a psychiatric emergency  Primary care needs and medications are managed by Aziza Aviles. Referring provider is Aziza Aviles  Next visit agenda/goals:   Watch video about ACT  Read article about ACT  Complete diagnostic assessment    NOTE: Treatment plan due in future visit    Tiffanie Srivastava, Ph.D., , [facility]  Clinical Health Psychologist  Phone: [phone]   Pager: [phone]  4/2/2024 1:01 PM

## 2024-04-10 ENCOUNTER — TRANSFERRED RECORDS (OUTPATIENT)
Dept: HEALTH INFORMATION MANAGEMENT | Facility: CLINIC | Age: 69
End: 2024-04-10
Payer: COMMERCIAL

## 2024-04-29 ASSESSMENT — ANXIETY QUESTIONNAIRES
6. BECOMING EASILY ANNOYED OR IRRITABLE: NEARLY EVERY DAY
8. IF YOU CHECKED OFF ANY PROBLEMS, HOW DIFFICULT HAVE THESE MADE IT FOR YOU TO DO YOUR WORK, TAKE CARE OF THINGS AT HOME, OR GET ALONG WITH OTHER PEOPLE?: SOMEWHAT DIFFICULT
4. TROUBLE RELAXING: MORE THAN HALF THE DAYS
7. FEELING AFRAID AS IF SOMETHING AWFUL MIGHT HAPPEN: NEARLY EVERY DAY
2. NOT BEING ABLE TO STOP OR CONTROL WORRYING: MORE THAN HALF THE DAYS
IF YOU CHECKED OFF ANY PROBLEMS ON THIS QUESTIONNAIRE, HOW DIFFICULT HAVE THESE PROBLEMS MADE IT FOR YOU TO DO YOUR WORK, TAKE CARE OF THINGS AT HOME, OR GET ALONG WITH OTHER PEOPLE: SOMEWHAT DIFFICULT
7. FEELING AFRAID AS IF SOMETHING AWFUL MIGHT HAPPEN: NEARLY EVERY DAY
1. FEELING NERVOUS, ANXIOUS, OR ON EDGE: NOT AT ALL
GAD7 TOTAL SCORE: 12
5. BEING SO RESTLESS THAT IT IS HARD TO SIT STILL: NOT AT ALL
3. WORRYING TOO MUCH ABOUT DIFFERENT THINGS: MORE THAN HALF THE DAYS
GAD7 TOTAL SCORE: 12

## 2024-04-30 ENCOUNTER — OFFICE VISIT (OUTPATIENT)
Dept: PSYCHOLOGY | Facility: CLINIC | Age: 69
End: 2024-04-30
Payer: COMMERCIAL

## 2024-04-30 DIAGNOSIS — F33.1 MODERATE RECURRENT MAJOR DEPRESSION (H): ICD-10-CM

## 2024-04-30 DIAGNOSIS — M05.79 RHEUMATOID ARTHRITIS INVOLVING MULTIPLE SITES WITH POSITIVE RHEUMATOID FACTOR (H): ICD-10-CM

## 2024-04-30 DIAGNOSIS — I67.81 LEUKOARAIOSIS: ICD-10-CM

## 2024-04-30 DIAGNOSIS — F41.1 GENERALIZED ANXIETY DISORDER WITH PANIC ATTACKS: Primary | ICD-10-CM

## 2024-04-30 DIAGNOSIS — F40.10 SOCIAL ANXIETY DISORDER: ICD-10-CM

## 2024-04-30 DIAGNOSIS — F41.0 GENERALIZED ANXIETY DISORDER WITH PANIC ATTACKS: Primary | ICD-10-CM

## 2024-04-30 PROCEDURE — 90791 PSYCH DIAGNOSTIC EVALUATION: CPT | Performed by: PSYCHOLOGIST

## 2024-04-30 NOTE — PROGRESS NOTES
Health Psychology          Faby Duarte, Ph.D.,  [phone]  Caitlyn Mcneil, Ph.D.,  [phone]  Lita Hurley, Ph.D.,  [phone]  An Garrido, Ph.D., , [facility] [phone]  Esteban Anderson, Ph.D., , [facility] [phone]  Ambika Murphy, Ph.D.,  [phone]  Tiffanie Srivastava, Ph.D., , [facility] [phone]    [facility] [place] [place], 3rd Floor  [address] [place] [address]     STANDARD DIAGNOSTIC ASSESSMENT      Information obtained from patient's self-report during face-to-face interaction, completion of assessment measures, and review of available medical records.     Services Provided: No    Length of Session:  63 minutes  Start time: 2:03 PM  End time: 3:06 PM    Type of assessment: Standard    Referred by: Aziza Aviles MD       IDENTIFYING INFORMATION:  Yaritza Bradley is a 68 year old female adult who was referred to health psychology by Stefan.  Patient presented with concerns of anxiety and depression in the context of multiple chronic medical concerns.    CURRENT LIFE SITUATION:  Patient currently resides in a house with her  of 44 years and her dog, Delmis.   Patient is currently .   Patient has 2 adult children.     Income comes from nursing home, social security, and pensions.    Educational history includes BA in Early Childhood Development.    Primary source(s) of social support is mother,  Remi, friends and patient rates the quality of social support as fair.   Patient identifies the following strengths and resources: caring, compassionate, caretaking/caregiving nature, stable housing and reliable transportation.   Patient identified the following current stressors/contextual factors that contribute to her presenting concern: health issues, limited social (emotional) support, and relationship stress.   Belief system is Sikh, attends virtually  Patient rates general physical health as fair  Relevant  cultural influences on treatment and patient's relationship to cultural heritage: Patient identifies as female, heterosexual, White American, identifies as someone who is emotionally sensitive and does not feel connected to her cultural heritage/community.     Current medications:   Current Outpatient Medications   Medication Sig Dispense Refill    Cyanocobalamin (B-12) 1000 MCG TBCR Take 1,000 mcg by mouth daily 100 tablet 1    diazepam (VALIUM) 5 MG tablet Valium 5 mg p.o., take 1 tablet prior to MRI as instructed by radiology.  Must have . 1 tablet 0    folic acid (FOLVITE) 1 MG tablet Take 1 tablet (1,000 mcg) by mouth daily 90 tablet 3    methotrexate 50 MG/2ML injection CHEMO Inject 4 mg Subcutaneous every 7 days       mirtazapine (REMERON) 7.5 MG tablet Take 1 tablet (7.5 mg) by mouth at bedtime 30 tablet 1    Omega-3 Fatty Acids (OMEGA-3 FISH OIL PO) Take 1 g by mouth daily (DRY EYE OMEGA BENEFITS) 667-250 mg-unit cap      RESTASIS 0.05 % ophthalmic emulsion Place 1 drop into both eyes 2 times daily      SM CALCIUM SOFT CHEWS 500-100-40 OR CHEW Take 1 tablet by mouth 2 times daily   0    traZODone (DESYREL) 50 MG tablet Take 50 mg by mouth nightly as needed for sleep       No current facility-administered medications for this visit.           REASON FOR ASSESSMENT:    Patient was seen previously for a consultation visit. History obtained at previous visit includes the following:     Patient with a PMH significant for depression, anxiety, suicidal ideation leukoaraiosis, and rheumatoid arthritis. She was referred to health psychology by her PCP, Aziza Aviles MD, for assistance with anxiety and depression in the context of multiple chronic health concerns as well as unintended weight loss related to decreased appetite. Patient was diagnosed with leukoaraiosis in 2014 and currently sees a neurologist for support and management. States that she was advised to stay active, stimulate her brain, and remain  "positive, but is finding it difficult to do these things. Patient also had neuropsychological testing in 2014 and in 2020. Most recent evaluation is available in chart by Dr. Jes Jessica. See report dated 11/2/2020 for additional details.     Patient reports that she previously attempted to establish care with a therapist in [place] and had 2 sessions, but states that the therapist told her she did not need therapy. However, she has been trying to seek care for her mental health concerns since that time. She notes that the impact of her neurological condition has negatively impacted her life. She has coped by engaging in withdrawal, isolation, and misdirecting her frustration at her loved-ones. She notes that the way she is responding to stress is coming at the cost of her quality of life and relationships. Is socially connected to her mother, who has mild dementia and lives in an independent living community. Also has friends and family that she is connected with, but notes that her social support is limited. Patient states that her overall goals for therapy include addressing anxiety, depression, and frustration related to her neurological condition and to increase her sense of ramu in her life.    Patient returning today to complete diagnostic assessment. Additional history obtained today includes the following:     Patient reports a longstanding history of anxiety and describes herself as shy and fearful in childhood. She notes that she had a difficult time with making friends in school and was afraid of her teachers calling on her. Patient describes herself as somewhat of a \"loner\" in childhood and adolescence. Overall, her history and symptoms appear most consistent with clinical depression, generalized anxiety, and social anxiety. See below for additional details.      Patient's perception of her concern: \"To get more insights into myself, gain some confidence, and get stronger in who I am without being so " "worried about what everyone else is thinking of me.\"      PSYCHIATRIC TREATMENT HISTORY:    Ever had mental health treatment in the past? Yes: psychotherapy and medication  Previous medication trials? Yes: antidepressants  Currently taking meds? Yes: trazodone, prescribed mirtazapine, but not taking yet  Willing to consider psychiatric medication consultation? Yes  Ever seen by someone in psychiatry at [facility]? No  Currently being seen by a mental health provider? Yes: Sima Aly NP at [facility]. Rx for Trazodone   Release of information for records? No    DEVELOPMENTAL INCIDENTS: No notable developmental incidents.     MALTREATMENT OR ABUSE: Patient denies history of maltreatment, abuse, neglect or exploitation.    HISTORY OF CHEMICAL USE AND DEPENDENCY:     4/1/2024  10:01 PM   CAGE-AID Flowsheet    Have you ever felt you should Cut down on your drinking or drug use? 0    Have people Annoyed you by criticizing your drinking or drug use? 0    Have you ever felt bad or Guilty about your drinking or drug use? 0    Have you ever had a drink or used drugs first thing in the morning to steady your nerves or to get rid of a hangover? (Eye opener) 0    CAGE-AID SCORE 0        CAGE-AID reprinted with permission from the [place] Medical Journal, MALEAN Ahumada. and LOCO Pandey, \"Conjoint screening questionnaires for alcohol and drug abuse\" [place] Medical Journal 94: 135-140, 1995.     CAGE-AID score was 0 indicating negative screen for risk of problematic alcohol or drug use. Patient currently reports consuming 1-2 alcoholic drinks per week and the following addiction symptoms: none.  Patient currently denies using illicit drugs and the following addiction symptoms: none. Denies using medical marijuana or other cannabis products.  Patient currently denies problematic use of prescription medications and the following addiction symptoms: none.  Patient currently denies using nicotine.      Patient " denies a history of problematic alcohol use/dependence. Patient denies a history of withdrawal symptoms.  Patient denies a history of problematic drug use/dependence. Patient denies a history of chemical dependency treatments.    HEALTH HISTORY AND FAMILY HEALTH HISTORY/MEDICAL CONCERNS: chemical dependency, cardiovascular disease, arthritis. Patient's current medical and mental health conditions are listed below:     Patient Active Problem List   Diagnosis    Rheumatoid arthritis (H)    Disorder of bone and cartilage    Hyperlipidemia LDL goal <160    GERD (gastroesophageal reflux disease)    Generalized anxiety disorder    Unintentional weight loss    Moderate recurrent major depression (H)    Memory loss or impairment    Femoral acetabular impingement, right    Primary osteoarthritis of right hip, end stage DJD    Balance problems    Leukoaraiosis    Chronic bilateral low back pain without sciatica        CULTURAL INFLUENCES AND IMPACT: Patient identified the following cultural influences and impact on her concerns: brother had chemical dependency concerns influenced her experience in her family of origin. Felt like she had to be a  and this influenced her high school experience. Also notes that her brother's behavior contributed to some of her social anxiety and shyness.      MENTAL STATUS EXAM:  Appearance: Appropriate   Eye Contact: Good    Orientation: Yes, x4  Behavior/relationship to provider/demeanor: Cooperative and Engaged  Motor Activity: restless  Mood (subjective report): Depressed, Anxious  Affect (objective appearance): Appropriate/mood congruent  Speech Rate: Normal  Speech Volume: Normal  Speech Articulation: Normal  Speech Coherence: Normal  Speech Spontaneity: Normal  Thought Content: Suicidal ideation, no plans or intent, endorses depressive cognitions  Thought Process (associations): Logical, Linear, and Goal directed  Thought Process (rate): Normal  Abnormal Perception:  None  Attention/Concentration: Normal  Memory: Appears grossly intact, though patient and  endorse memory impairment  Fund of Knowledge: Appears within normal limits   Abstraction:  Normal  Insight:  Adequate  Judgment:  Adequate    ASSESSMENT OF NEEDS: Emotional or mental health: health psychology intervention to treat depression and anxiety in the context of multiple chronic medical concerns.     CURRENT SYMPTOMS AND HISTORY:    PTSD SCREEN  Patient denies history of trauma.     BIPOLAR SCREEN  Patient endorses the following symptoms of bipolar disorder: no history of manic/hypomanic episode    DEPRESSION SCREEN    PATIENT HEALTH QUESTIONNAIRE-9 (PHQ - 9)     4/1/2024  9:54 PM   PHQ-9 ([employer])    1.  Little interest or pleasure in doing things 3    2.  Feeling down, depressed, or hopeless 3    3.  Trouble falling or staying asleep, or sleeping too much 2    4.  Feeling tired or having little energy 3    5.  Poor appetite or overeating 2    6.  Feeling bad about yourself - or that you are a failure or have let yourself or your family down 3    7.  Trouble concentrating on things, such as reading the newspaper or watching television 3    8.  Moving or speaking so slowly that other people could have noticed. Or the opposite - being so fidgety or restless that you have been moving around a lot more than usual 2    9.  Thoughts that you would be better off dead, or of hurting yourself in some way 2    PHQ-9 Total Score 23        Patient endorses the following symptoms: see above. Onset occurred in college and was associated with the death of her father. Patient endorses a history of multiple major depressive episodes. Meets criteria for recurrent major depressive disorder.     ANXIETY DISORDER SCREEN    Generalized Anxiety Disorder 7-item (SHEA-7)   4/29/2024  8:26 AM   SHEA-7   [employer], 2002; Used with Permission)    1. Feeling nervous, anxious, or on edge 0    2. Not being able to stop or control worrying 2     3. Worrying too much about different things 2    4. Trouble relaxing 2    5. Being so restless that it is hard to sit still 0    6. Becoming easily annoyed or irritable 3    7. Feeling afraid, as if something awful might happen 3    SHEA-7 Total Score 12          Patient currently endorses the following anxiety symptoms: history of panic attacks, excessive generalized worry, difficulty controlling the worry, restlessness, being easily fatigued, difficulty concentrating/mind going blank, irritability, muscle tension, sleep disturbance (falling asleep, staying asleep, restless sleep), fear of social situations that involve possible scrutiny, social situations almost always provoke fear/anxiety, fear/anxiety is out of proportion to the actual threat, avoidance of social situation, the fear, anxiety, or avoidance causes clinically significant distress. Patient meets criteria for social anxiety disorder and generalized anxiety disorder.     OTHER MENTAL HEALTH CONCERNS SCREEN    PSYCHOSIS SCREEN: Patient currently endorses the following psychotic symptoms: none  PERSONALITY TRAITS: Patient endorsed and/or exhibits the following personality traits: Deferred.       SAFETY SCREEN    Suicide & Self-Harm Assessment:    In the past month, have you wished you were dead or wished you could go to sleep and not wake up? Yes: wished she was dead  In the past month, have you actually had any thoughts of killing yourself? Yes: has thought about driving into oncoming traffic, overdosing  Have you ever done anything, started to do anything, or prepared to do anything to end your life? No    History of psychiatric hospitalizations No  Any family/friends/loved ones die by suicide: No  Access to guns/weapons? No  Safety Plan? No    Violence/Homicide Risk Assessment:    History of problems with anger management: No  History of violence: No  History of significant damage to property: No  History of threats made to harm or kill someone:  No  History of legal involvement due to violence/aggression: No  Safety Plan? No    Patient reports current or recent suicidal ideation or behaviors. She notes that she has daily thoughts about suicide by car accident but denies any intention to act on these thoughts. She cites her mother and her dog as reasons for living.    Patient denies thoughts to engage in self-injurious behavior without suicidal intent.   Patient denies current or recent homicidal ideation or behaviors.      GENERAL FUNCTIONIN/1/2024  10:00 PM   PROMIS 10    In general, would you say your health is: Fair    In general, would you say your quality of life is: Poor    In general, how would you rate your physical health? Fair    In general, how would you rate your mental health, including your mood and your ability to think? Poor    In general, how would you rate your satisfaction with your social activities and relationships? Poor    In general, please rate how well you carry out your usual social activities and roles Fair    To what extent are you able to carry out your everyday physical activities such as walking, climbing stairs, carrying groceries, or moving a chair? Moderately    In the past 7 days, how often have you been bothered by emotional problems such as feeling anxious, depressed, or irritable? Always    In the past 7 days, how would you rate your fatigue on average? Severe    In the past 7 days, how would you rate your pain on average, where 0 means no pain, and 10 means worst imaginable pain? 4    In general, would you say your health is: 2    In general, would you say your quality of life is: 1    In general, how would you rate your physical health? 2    In general, how would you rate your mental health, including your mood and your ability to think? 1    In general, how would you rate your satisfaction with your social activities and relationships? 1    In general, please rate how well you carry out your usual social  activities and roles. (This includes activities at home, at work and in your community, and responsibilities as a parent, child, spouse, employee, friend, etc.) 2    To what extent are you able to carry out your everyday physical activities such as walking, climbing stairs, carrying groceries, or moving a chair? 3    In the past 7 days, how often have you been bothered by emotional problems such as feeling anxious, depressed, or irritable? 5    In the past 7 days, how would you rate your fatigue on average? 4    In the past 7 days, how would you rate your pain on average, where 0 means no pain, and 10 means worst imaginable pain? 4    Global Mental Health Score 4    Global Physical Health Score 10    PROMIS TOTAL - SUBSCORES 14            [id number] PROMIS HEALTH ORGANIZATION AND PROMIS COOPERATIVE GROUP VERSION 1.1     HEALTH RELATED CONCERNS TO BE ADDRESSED: pain, leukoaraiosis    LABS: Have basic labs been completed within the last year? Yes, CBC WNL. Have labs been completed to check for Vitamin-D and TSH levels? Yes, TSH WNL, vitamin-D not checked. Is patient taking supplement? Unknown      CLINICAL SUMMARY, CONCLUSIONS, AND RECOMMENDATIONS:   Yaritza Bradley is a 68 year old female who was referred to health psychology by Aziza Aviles MD, for assistance with anxiety and depression in the context of multiple chronic health concerns as well as unintended weight loss related to decreased appetite. The patient completed the assessment independently. Based on the patient's report, her symptoms are likely explained by a diagnosis of recurrent major depressive disorder, generalized anxiety disorder, and social anxiety disorder. The patient's mental health concerns have been affecting her interpersonal and health functioning and have been causing clinically significant distress. The patient denies a history of problematic drug/alcohol use. The patient is experiencing moderate psychosocial stress. Additional stressors  include: health issues, limited social support, and relationship stress. She reports safety concerns including suicidal ideation with no plans or intent. She cites her mother, her children, and her dog as reasons for living. Without additional treatment, the patient's symptoms are likely to persist or worsen. Based on the patient's reported symptoms and impact on functioning, the recommendation for the patient is to complete a health psychology intervention to treat anxiety and depression in the context of her multiple, chronic medica concerns. Patient expressed a preference to continue individual therapy with me.    PROVISIONAL CLINICAL HYPOTHESIS (Diagnostic Impressions):    1. Generalized anxiety disorder with panic attacks    2. Social anxiety disorder    3. Moderate recurrent major depression (H)    4. Leukoaraiosis    5. Rheumatoid arthritis involving multiple sites with positive rheumatoid factor (H)           TREATMENT PLAN: will generate with patient at next visit     PLAN:     Start a trial of individual psychotherapy.  Patient to return for a follow-up visit on 5/15/24 at 4:00pm.  Psychiatric medications are managed by Sima Aly NP at [facility] [place], operated by [facility].  Primary care provider and referring provider is Aziza Aviles MD.   Patient to contact [facility], [facility] or other community resources if there was a psychiatric emergency.  Next visit agenda:   Read handouts on diagnoses  Complete module 1 of the [facility] workbook on self-compassion    Tiffanie Srivastava, Ph.D., , [facility]  Clinical Health Psychologist  Phone: [phone]   Pager: [phone]  4/30/2024 2:03 PM

## 2024-05-09 ENCOUNTER — TRANSFERRED RECORDS (OUTPATIENT)
Dept: HEALTH INFORMATION MANAGEMENT | Facility: CLINIC | Age: 69
End: 2024-05-09
Payer: COMMERCIAL

## 2024-05-15 ENCOUNTER — VIRTUAL VISIT (OUTPATIENT)
Dept: PSYCHOLOGY | Facility: CLINIC | Age: 69
End: 2024-05-15
Payer: COMMERCIAL

## 2024-05-15 DIAGNOSIS — I67.81 LEUKOARAIOSIS: ICD-10-CM

## 2024-05-15 DIAGNOSIS — F33.1 MODERATE RECURRENT MAJOR DEPRESSION (H): ICD-10-CM

## 2024-05-15 DIAGNOSIS — F41.1 GENERALIZED ANXIETY DISORDER WITH PANIC ATTACKS: Primary | ICD-10-CM

## 2024-05-15 DIAGNOSIS — F40.10 SOCIAL ANXIETY DISORDER: ICD-10-CM

## 2024-05-15 DIAGNOSIS — F41.0 GENERALIZED ANXIETY DISORDER WITH PANIC ATTACKS: Primary | ICD-10-CM

## 2024-05-15 DIAGNOSIS — M05.79 RHEUMATOID ARTHRITIS INVOLVING MULTIPLE SITES WITH POSITIVE RHEUMATOID FACTOR (H): ICD-10-CM

## 2024-05-15 PROCEDURE — 90834 PSYTX W PT 45 MINUTES: CPT | Mod: 95 | Performed by: PSYCHOLOGIST

## 2024-05-15 NOTE — Clinical Note
Follow-up IN PERSON visit with me on 6/12/24 at 11:00am please. Thanks!  Tiffanie Srivastava, Ph.D., , [facility] Clinical Health Psychologist Phone: [phone]  Pager: [phone] 5/15/2024 4:45 PM
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
## 2024-05-21 ENCOUNTER — TRANSFERRED RECORDS (OUTPATIENT)
Dept: HEALTH INFORMATION MANAGEMENT | Facility: CLINIC | Age: 69
End: 2024-05-21
Payer: COMMERCIAL

## 2024-06-04 ENCOUNTER — TRANSFERRED RECORDS (OUTPATIENT)
Dept: HEALTH INFORMATION MANAGEMENT | Facility: CLINIC | Age: 69
End: 2024-06-04
Payer: COMMERCIAL

## 2024-06-07 ENCOUNTER — TRANSFERRED RECORDS (OUTPATIENT)
Dept: HEALTH INFORMATION MANAGEMENT | Facility: CLINIC | Age: 69
End: 2024-06-07
Payer: COMMERCIAL

## 2024-06-11 ASSESSMENT — ANXIETY QUESTIONNAIRES
GAD7 TOTAL SCORE: 18
6. BECOMING EASILY ANNOYED OR IRRITABLE: NEARLY EVERY DAY
5. BEING SO RESTLESS THAT IT IS HARD TO SIT STILL: MORE THAN HALF THE DAYS
6. BECOMING EASILY ANNOYED OR IRRITABLE: NEARLY EVERY DAY
5. BEING SO RESTLESS THAT IT IS HARD TO SIT STILL: MORE THAN HALF THE DAYS
IF YOU CHECKED OFF ANY PROBLEMS ON THIS QUESTIONNAIRE, HOW DIFFICULT HAVE THESE PROBLEMS MADE IT FOR YOU TO DO YOUR WORK, TAKE CARE OF THINGS AT HOME, OR GET ALONG WITH OTHER PEOPLE: VERY DIFFICULT
3. WORRYING TOO MUCH ABOUT DIFFERENT THINGS: NEARLY EVERY DAY
1. FEELING NERVOUS, ANXIOUS, OR ON EDGE: MORE THAN HALF THE DAYS
3. WORRYING TOO MUCH ABOUT DIFFERENT THINGS: NEARLY EVERY DAY
2. NOT BEING ABLE TO STOP OR CONTROL WORRYING: NEARLY EVERY DAY
7. FEELING AFRAID AS IF SOMETHING AWFUL MIGHT HAPPEN: MORE THAN HALF THE DAYS
7. FEELING AFRAID AS IF SOMETHING AWFUL MIGHT HAPPEN: MORE THAN HALF THE DAYS
2. NOT BEING ABLE TO STOP OR CONTROL WORRYING: NEARLY EVERY DAY
7. FEELING AFRAID AS IF SOMETHING AWFUL MIGHT HAPPEN: MORE THAN HALF THE DAYS
8. IF YOU CHECKED OFF ANY PROBLEMS, HOW DIFFICULT HAVE THESE MADE IT FOR YOU TO DO YOUR WORK, TAKE CARE OF THINGS AT HOME, OR GET ALONG WITH OTHER PEOPLE?: VERY DIFFICULT
1. FEELING NERVOUS, ANXIOUS, OR ON EDGE: MORE THAN HALF THE DAYS
4. TROUBLE RELAXING: NEARLY EVERY DAY
GAD7 TOTAL SCORE: 18
4. TROUBLE RELAXING: NEARLY EVERY DAY
IF YOU CHECKED OFF ANY PROBLEMS ON THIS QUESTIONNAIRE, HOW DIFFICULT HAVE THESE PROBLEMS MADE IT FOR YOU TO DO YOUR WORK, TAKE CARE OF THINGS AT HOME, OR GET ALONG WITH OTHER PEOPLE: VERY DIFFICULT
GAD7 TOTAL SCORE: 18

## 2024-06-12 ENCOUNTER — VIRTUAL VISIT (OUTPATIENT)
Dept: PSYCHOLOGY | Facility: CLINIC | Age: 69
End: 2024-06-12
Payer: COMMERCIAL

## 2024-06-12 DIAGNOSIS — F41.1 GENERALIZED ANXIETY DISORDER WITH PANIC ATTACKS: Primary | ICD-10-CM

## 2024-06-12 DIAGNOSIS — I67.81 LEUKOARAIOSIS: ICD-10-CM

## 2024-06-12 DIAGNOSIS — M05.79 RHEUMATOID ARTHRITIS INVOLVING MULTIPLE SITES WITH POSITIVE RHEUMATOID FACTOR (H): ICD-10-CM

## 2024-06-12 DIAGNOSIS — F40.10 SOCIAL ANXIETY DISORDER: ICD-10-CM

## 2024-06-12 DIAGNOSIS — F41.0 GENERALIZED ANXIETY DISORDER WITH PANIC ATTACKS: Primary | ICD-10-CM

## 2024-06-12 DIAGNOSIS — F33.1 MODERATE RECURRENT MAJOR DEPRESSION (H): ICD-10-CM

## 2024-06-12 PROCEDURE — 90834 PSYTX W PT 45 MINUTES: CPT | Mod: 95 | Performed by: PSYCHOLOGIST

## 2024-06-12 NOTE — NURSING NOTE
Is the patient currently in the state of [place]? YES    Visit mode:VIDEO    If the visit is dropped, the patient can be reconnected by: VIDEO VISIT: Send to e-mail at: [email or web address]    Will anyone else be joining the visit? NO  (If patient encounters technical issues they should call [phone]    How would you like to obtain your AVS? MyChart    Are changes needed to the allergy or medication list? No    Are refills needed on medications prescribed by this physician? NO    Reason for visit: RECHECK    Avelino GUTIERRES

## 2024-06-12 NOTE — PROGRESS NOTES
Health Psychology          Faby Duarte, Ph.D.,  [phone]  Caitlyn Mcneil, Ph.D.,  [phone]  Ltia Hurley, Ph.D.,  [phone]  Esteban Anderson, Ph.D., , [facility] [phone]  Ambika Murphy, Ph.D.,  [phone]  Tiffanie Srivastava, Ph.D., , [facility] [phone]    [facility], 3rd Floor  [address] [place] [address]       Health Psychology Progress Note    Patient Name: Yaritza Bradley    Service Type: Individual  Length of Visit: 46 minutes  Start time: 11:02 AM  Stop time: 11:48 AM   Attendees: significant other/spouse, Remi, initially with patient's permission   Session #: 4    Telemedicine Information:     Telemedicine Visit: The patient's condition can be safely assessed and treated via synchronous audio and visual telemedicine encounter.    Reason for Telemedicine Visit: Patient has requested telehealth visit and Patient convenience (e.g. access to timely appointments / distance to available provider)  Originating Site (Patient Location): Patient's home, [place]  Distant Location (provider location):  On-site: [facility]  Consent:  The patient/guardian has verbally consented to: the potential risks and benefits of telemedicine (video visit) versus in person care; bill my insurance or make self-payment for services provided; and responsibility for payment of non-covered services.   Mode of Communication:  Video Conference via [employer]    As the provider I attest to compliance with applicable laws and regulations related to telemedicine.     Identifying Information and Presenting Problem:  The patient is a 68 year old adult who is being seen for problematic symptoms of depression and anxiety in the context of leukoaraiosis.    Topics Discussed/Interventions Provided:    Session Content:     Update: Patient reports doing okay. Reports some ongoing thoughts of inadequacy and frustration, but denies any active SI, plans,  or intent.      Homework Review: Patient did not complete module 1 of the self-compassion workbook or look into alternative biking options.      Skills/intervention: Assisted patient in identifying and exploring barriers to completing therapy homework and goals between sessions. Discussed therapy interfering behaviors and assisted patient in reflecting on obstacles for engagement. Patient identified procrastination, low motivation, and low self-worth. Discussed strategies for overcoming these barriers. Completed a behavioral experiment in which patient completed the first page of module 1 during the session. Explored insights. Provided psychoeducation about behavioral activation and strategies for overcoming behavioral inertia. Patient agreed to complete the module for our next visit and will start with completing it in the morning for 2.5 minutes at a time. She will also reflect on commitment and therapy engagement.       Treatment Objective(s) Addressed in This Session:  Psychological distress related to Chronic Disease Management    Progress on / Status of Treatment Objective(s) / Homework:  In progress  Stable    Medication Adherence: Patient did not report medication changes. Current medication list is below:     Current Outpatient Medications   Medication Sig Dispense Refill    Cyanocobalamin (B-12) 1000 MCG TBCR Take 1,000 mcg by mouth daily 100 tablet 1    diazepam (VALIUM) 5 MG tablet Valium 5 mg p.o., take 1 tablet prior to MRI as instructed by radiology.  Must have . 1 tablet 0    folic acid (FOLVITE) 1 MG tablet Take 1 tablet (1,000 mcg) by mouth daily 90 tablet 3    methotrexate 50 MG/2ML injection CHEMO Inject 4 mg Subcutaneous every 7 days       mirtazapine (REMERON) 7.5 MG tablet Take 1 tablet (7.5 mg) by mouth at bedtime 30 tablet 1    Omega-3 Fatty Acids (OMEGA-3 FISH OIL PO) Take 1 g by mouth daily (DRY EYE OMEGA BENEFITS) 667-250 mg-unit cap      RESTASIS 0.05 % ophthalmic emulsion Place 1  drop into both eyes 2 times daily      SM CALCIUM SOFT CHEWS 500-100-40 OR CHEW Take 1 tablet by mouth 2 times daily   0    traZODone (DESYREL) 50 MG tablet Take 50 mg by mouth nightly as needed for sleep       No current facility-administered medications for this visit.        Assessment: The patient appeared to be active and engaged in today's session and was receptive to feedback.     Mental Status Exam:   Appearance: Appropriate   Eye Contact: Good    Orientation: Yes, x4  Behavior/relationship to provider/demeanor: Cooperative and Engaged  Motor Activity: restless  Mood (subjective report): Depressed, Anxious  Affect (objective appearance): Appropriate/mood congruent  Speech Rate: Normal  Speech Volume: Normal  Speech Articulation: Normal  Speech Coherence: Normal  Speech Spontaneity: Normal  Thought Content: Suicidal ideation, no plans or intent, endorses depressive cognitions  Thought Process (associations): Logical, Linear, and Goal directed  Thought Process (rate): Normal  Abnormal Perception: None  Attention/Concentration: Normal  Memory: Appears grossly intact, though patient and  endorse memory impairment  Fund of Knowledge: Appears within normal limits   Abstraction:  Normal  Insight:  Adequate  Judgment:   Adequate    Does the patient appear to be at imminent risk of harm to self/others at this time? No    The session was necessary to address anxiety and depression in the context of neurological disease that have been interfering with patient's overall functioning and quality of life.  Ongoing psychotherapy is necessary to stabilize mood, provide counseling, improve functioning with daily activities, provide psychoeducation, provide support, and teach cognitive and behavioral skills.    Diagnoses:    1. Generalized anxiety disorder with panic attacks    2. Social anxiety disorder    3. Moderate recurrent major depression (H)    4. Leukoaraiosis    5. Rheumatoid arthritis involving multiple sites  "with positive rheumatoid factor (H)      Plan:    Follow-up video visit with me on 7/10/24 at 10:00am  Patient to use 911 or other crisis resources in the event of a psychiatric emergency  Primary care needs and medications are managed by Aziza Aviles MD. Referring provider is Aziza Aviles MD  Next visit agenda/goals:   Complete module 1 of the self-compassion workbook by doing 2.5 minutes per day at the beginning of the day  Reflect on whether I am ready to put in the effort that will be required to benefit from therapy    Skills taught/interventions used thus far:  Psychoeducation  Awareness training  Behavioral activation    NOTE: Treatment plan update due 5/15/25; 90 day treatment plan review due 8/13/24                                              Individual Treatment Plan    Patient's Name: Yaritza Bradley  YOB: 1955    Date of Creation: 05/15/24  Date Treatment Plan Last Reviewed/Revised: 05/15/24     DSM5 Diagnoses:     1. Generalized anxiety disorder with panic attacks    2. Social anxiety disorder    3. Moderate recurrent major depression (H)        Psychosocial / Contextual Factors: health issues, limited social (emotional) support, and relationship stress  PROMIS (reviewed every 90 days):      4/1/2024  10:00 PM   PROMIS 10    PROMIS TOTAL - SUBSCORES 14          Referral / Collaboration:  Collaboration with medical providers and specialists as needed.    Anticipated number of session for this episode of care: 15-20  Anticipation frequency of session: 2x  Anticipated Duration of each session: 38-52 minutes  Treatment plan will be reviewed in 90 days or when goals have been changed.     Measurable Treatment Goal(s) related to diagnosis/functional impairment(s)  Goal 1: Patient will increase her sense of ramu and quality of life   \"I will know I've met my goal when I think more in a positive way and find a way to shoot down the negative when it comes or to replace it with something " "that is more constructive.\"     Objective #A (Patient Action)    Patient will increase the frequency of participating in enriching and meaningful activities (going out to lunch with a friend, getting movement, going to [employer] in person, social physical activity, support groups, and other social activities) from 0 to 2x per week  Status: New - Date: 05/15/24      Intervention(s)  Therapist will teach behavioral activation and committed action skills.    Objective #B  Patient will experience an increase in meaning connected to her current activities.  Status: New - Date: 05/15/24      Intervention(s)  Therapist will teach gratitude skills, values clarification, and mindfulness skills.    Objective #C  Patient will experience an increase in self-compassion and a decrease in self-criticism.  Status: New - Date: 05/15/24     Intervention(s)  Therapist will assign the completion of a self-compassion workbook and teach self-compassion skills.    Objective #D  Patient will get some form of movement or physical activity at least 3x per week.  Status: New - Date: 05/15/24     Intervention(s)  Therapist will teach behavioral activation, committed action, problem-solving, goal-setting/habit forming, and health behavior change skills.    Objective #E  Patient will engage in activities that are cognitively stimulating and that facilitate cognitive flexibility.  Status: New - Date: 05/15/24     Intervention(s)  Therapist will teach cognitive reappraisal, support mental exercises, and mindfulness skills.      Patient has reviewed and agreed to the above plan.      Tiffanie Srivastava, PhD  May 15, 2024      Tiffanie Srivastava, Ph.D., , [facility]  Clinical Health Psychologist  Phone: [phone]   Pager: [phone]         "

## 2024-07-09 ASSESSMENT — ANXIETY QUESTIONNAIRES
GAD7 TOTAL SCORE: 15
GAD7 TOTAL SCORE: 15
5. BEING SO RESTLESS THAT IT IS HARD TO SIT STILL: SEVERAL DAYS
IF YOU CHECKED OFF ANY PROBLEMS ON THIS QUESTIONNAIRE, HOW DIFFICULT HAVE THESE PROBLEMS MADE IT FOR YOU TO DO YOUR WORK, TAKE CARE OF THINGS AT HOME, OR GET ALONG WITH OTHER PEOPLE: VERY DIFFICULT
7. FEELING AFRAID AS IF SOMETHING AWFUL MIGHT HAPPEN: SEVERAL DAYS
8. IF YOU CHECKED OFF ANY PROBLEMS, HOW DIFFICULT HAVE THESE MADE IT FOR YOU TO DO YOUR WORK, TAKE CARE OF THINGS AT HOME, OR GET ALONG WITH OTHER PEOPLE?: VERY DIFFICULT
7. FEELING AFRAID AS IF SOMETHING AWFUL MIGHT HAPPEN: SEVERAL DAYS
1. FEELING NERVOUS, ANXIOUS, OR ON EDGE: MORE THAN HALF THE DAYS
6. BECOMING EASILY ANNOYED OR IRRITABLE: NEARLY EVERY DAY
4. TROUBLE RELAXING: MORE THAN HALF THE DAYS
3. WORRYING TOO MUCH ABOUT DIFFERENT THINGS: NEARLY EVERY DAY
2. NOT BEING ABLE TO STOP OR CONTROL WORRYING: NEARLY EVERY DAY
GAD7 TOTAL SCORE: 15

## 2024-07-09 ASSESSMENT — PATIENT HEALTH QUESTIONNAIRE - PHQ9
SUM OF ALL RESPONSES TO PHQ QUESTIONS 1-9: 20
10. IF YOU CHECKED OFF ANY PROBLEMS, HOW DIFFICULT HAVE THESE PROBLEMS MADE IT FOR YOU TO DO YOUR WORK, TAKE CARE OF THINGS AT HOME, OR GET ALONG WITH OTHER PEOPLE: VERY DIFFICULT
SUM OF ALL RESPONSES TO PHQ QUESTIONS 1-9: 20

## 2024-07-10 ENCOUNTER — VIRTUAL VISIT (OUTPATIENT)
Dept: PSYCHOLOGY | Facility: CLINIC | Age: 69
End: 2024-07-10
Payer: COMMERCIAL

## 2024-07-10 DIAGNOSIS — M05.79 RHEUMATOID ARTHRITIS INVOLVING MULTIPLE SITES WITH POSITIVE RHEUMATOID FACTOR (H): ICD-10-CM

## 2024-07-10 DIAGNOSIS — F40.10 SOCIAL ANXIETY DISORDER: ICD-10-CM

## 2024-07-10 DIAGNOSIS — F41.1 GENERALIZED ANXIETY DISORDER WITH PANIC ATTACKS: Primary | ICD-10-CM

## 2024-07-10 DIAGNOSIS — F41.0 GENERALIZED ANXIETY DISORDER WITH PANIC ATTACKS: Primary | ICD-10-CM

## 2024-07-10 DIAGNOSIS — F33.1 MODERATE RECURRENT MAJOR DEPRESSION (H): ICD-10-CM

## 2024-07-10 DIAGNOSIS — I67.81 LEUKOARAIOSIS: ICD-10-CM

## 2024-07-10 PROCEDURE — 90837 PSYTX W PT 60 MINUTES: CPT | Mod: 95 | Performed by: PSYCHOLOGIST

## 2024-07-10 NOTE — Clinical Note
Multiple follow-up video visits at 4:00pm on Mondays on the followind dates please:   8/5/24 8/19/24 9/9/24 9/23/24  Thank you!  Tiffanie Srivastava, Ph.D., , [facility] Phone: [phone]  7/10/2024 11:00 AM

## 2024-07-10 NOTE — PROGRESS NOTES
Health Psychology          Faby Duarte, Ph.D.,  [phone]  Caitlyn Mcneil, Ph.D.,  [phone]  Lita Hurley, Ph.D.,  [phone]  Esteban Anderson, Ph.D., , [facility] [phone]  Ambika Murphy, Ph.D.,  [phone]  Tiffanie Srivastava, Ph.D., , [facility] [phone]    [facility], 3rd Floor  [address] [place] [address]       Health Psychology Progress Note    Patient Name: Yaritza Bradley    Service Type: Individual  Length of Visit: 53 minutes - extended session due to complexity of case and content and duration between visits  Start time: 10:05 AM  Stop time: 10:58 AM   Attendees: significant other/spouse, Remi, initially with patient's permission   Session #: 5    Telemedicine Information:     Telemedicine Visit: The patient's condition can be safely assessed and treated via synchronous audio and visual telemedicine encounter.    Reason for Telemedicine Visit: Patient has requested telehealth visit and Patient convenience (e.g. access to timely appointments / distance to available provider)  Originating Site (Patient Location): Patient's [place], [place]  Distant Location (provider location):  On-site: [facility]  Consent:  The patient/guardian has verbally consented to: the potential risks and benefits of telemedicine (video visit) versus in person care; bill my insurance or make self-payment for services provided; and responsibility for payment of non-covered services.   Mode of Communication:  Video Conference via [employer]    As the provider I attest to compliance with applicable laws and regulations related to telemedicine.     Identifying Information and Presenting Problem:  The patient is a 68 year old adult who is being seen for problematic symptoms of depression and anxiety in the context of leukoaraiosis.    Topics Discussed/Interventions Provided:    Session Content:     Update: Patient reports worsening depression  and an increase in low self-worth. Endorses having thoughts that she would be better off dead and that others would be better off without her. Denies active SI, plans, or intent at this time. States that she would not act on any thoughts while her ailing mother is dependent on her for some of her care. Patient also agreed that she would use the crisis numbers if she needed them.     Homework Review: Patient completed module 1 of the self-compassion workbook. Discussed insights.     Skills/intervention: Assisted patient in identifying and exploring factors that contribute to low self-worth. Identified social comparison to her , her condition, and lack of social contact with people who are emotionally responsive and validating. Patient notes an ongoing challenge in her marriage related to lack of emotional responsiveness. She states that her  has difficulty with providing validation and emotional support. Explored ways that patient can deepen and expand her social relationships. Also discussed reconnecting to her zane and values associated with her zane. She notes that she is open to and willing to schedule a meeting with her Religion leader/ for some spiritual counseling. Discussed how increasing her self-worth and decreasing her self-critical narratives would be consistent with her spiritual values. Briefly introduced module 2 of the self-compassion workbook and the ACT principle of values clarification.         Treatment Objective(s) Addressed in This Session:  Psychological distress related to Chronic Disease Management    Progress on / Status of Treatment Objective(s) / Homework:  In progress  Stable    Medication Adherence: Patient did not report medication changes. Current medication list is below:     Current Outpatient Medications   Medication Sig Dispense Refill    Cyanocobalamin (B-12) 1000 MCG TBCR Take 1,000 mcg by mouth daily 100 tablet 1    diazepam (VALIUM) 5 MG tablet Valium 5 mg  p.o., take 1 tablet prior to MRI as instructed by radiology.  Must have . 1 tablet 0    folic acid (FOLVITE) 1 MG tablet Take 1 tablet (1,000 mcg) by mouth daily 90 tablet 3    methotrexate 50 MG/2ML injection CHEMO Inject 4 mg Subcutaneous every 7 days       mirtazapine (REMERON) 7.5 MG tablet Take 1 tablet (7.5 mg) by mouth at bedtime 30 tablet 1    Omega-3 Fatty Acids (OMEGA-3 FISH OIL PO) Take 1 g by mouth daily (DRY EYE OMEGA BENEFITS) 667-250 mg-unit cap      RESTASIS 0.05 % ophthalmic emulsion Place 1 drop into both eyes 2 times daily      SM CALCIUM SOFT CHEWS 500-100-40 OR CHEW Take 1 tablet by mouth 2 times daily   0    traZODone (DESYREL) 50 MG tablet Take 50 mg by mouth nightly as needed for sleep       No current facility-administered medications for this visit.        Assessment: The patient appeared to be active and engaged in today's session and was receptive to feedback.     Mental Status Exam:   Appearance: Appropriate   Eye Contact: Good    Orientation: Yes, x4  Behavior/relationship to provider/demeanor: Cooperative and Engaged  Motor Activity: restless  Mood (subjective report): Depressed, Anxious  Affect (objective appearance): Appropriate/mood congruent  Speech Rate: Normal  Speech Volume: Normal  Speech Articulation: Normal  Speech Coherence: Normal  Speech Spontaneity: Normal  Thought Content: Suicidal ideation, no plans or intent, endorses depressive cognitions  Thought Process (associations): Logical, Linear, and Goal directed  Thought Process (rate): Normal  Abnormal Perception: None  Attention/Concentration: Normal  Memory: Appears grossly intact, though patient and  endorse memory impairment  Fund of Knowledge: Appears within normal limits   Abstraction:  Normal  Insight:  Adequate  Judgment:   Adequate    Does the patient appear to be at imminent risk of harm to self/others at this time? No    The session was necessary to address anxiety and depression in the context of  neurological disease that have been interfering with patient's overall functioning and quality of life.  Ongoing psychotherapy is necessary to stabilize mood, provide counseling, improve functioning with daily activities, provide psychoeducation, provide support, and teach cognitive and behavioral skills.    Diagnoses:    1. Generalized anxiety disorder with panic attacks    2. Social anxiety disorder    3. Moderate recurrent major depression (H)    4. Leukoaraiosis    5. Rheumatoid arthritis involving multiple sites with positive rheumatoid factor (H)      Plan:    Follow-up video visit with me on 8/5/24 at 4:00pm. Meetings 2x per month thereafter  Patient to use 911 or other crisis resources in the event of a psychiatric emergency  Primary care needs and medications are managed by Aziza Aviles MD. Referring provider is Aziza Aviles MD  Next visit agenda/goals:   Complete module 2 of the self-compassion workbook by doing 2.5 minutes per day at the beginning of the day  Complete the values clarification exercise    Skills taught/interventions used thus far:  Psychoeducation  Awareness training  Behavioral activation  Self-compassion (CCI module 1)  Values clarification    NOTE: Treatment plan update due 5/15/25; 90 day treatment plan review due 8/13/24                                              Individual Treatment Plan    Patient's Name: Yaritza Bradley  YOB: 1955    Date of Creation: 05/15/24  Date Treatment Plan Last Reviewed/Revised: 05/15/24     DSM5 Diagnoses:     1. Generalized anxiety disorder with panic attacks    2. Social anxiety disorder    3. Moderate recurrent major depression (H)        Psychosocial / Contextual Factors: health issues, limited social (emotional) support, and relationship stress  PROMIS (reviewed every 90 days):      4/1/2024  10:00 PM   PROMIS 10    PROMIS TOTAL - SUBSCORES 14          Referral / Collaboration:  Collaboration with medical providers and  "specialists as needed.    Anticipated number of session for this episode of care: 15-20  Anticipation frequency of session: 2x  Anticipated Duration of each session: 38-52 minutes  Treatment plan will be reviewed in 90 days or when goals have been changed.     Measurable Treatment Goal(s) related to diagnosis/functional impairment(s)  Goal 1: Patient will increase her sense of ramu and quality of life   \"I will know I've met my goal when I think more in a positive way and find a way to shoot down the negative when it comes or to replace it with something that is more constructive.\"     Objective #A (Patient Action)    Patient will increase the frequency of participating in enriching and meaningful activities (going out to lunch with a friend, getting movement, going to [employer] in person, social physical activity, support groups, and other social activities) from 0 to 2x per week  Status: New - Date: 05/15/24      Intervention(s)  Therapist will teach behavioral activation and committed action skills.    Objective #B  Patient will experience an increase in meaning connected to her current activities.  Status: New - Date: 05/15/24      Intervention(s)  Therapist will teach gratitude skills, values clarification, and mindfulness skills.    Objective #C  Patient will experience an increase in self-compassion and a decrease in self-criticism.  Status: New - Date: 05/15/24     Intervention(s)  Therapist will assign the completion of a self-compassion workbook and teach self-compassion skills.    Objective #D  Patient will get some form of movement or physical activity at least 3x per week.  Status: New - Date: 05/15/24     Intervention(s)  Therapist will teach behavioral activation, committed action, problem-solving, goal-setting/habit forming, and health behavior change skills.    Objective #E  Patient will engage in activities that are cognitively stimulating and that facilitate cognitive flexibility.  Status: New - Date: " 05/15/24     Intervention(s)  Therapist will teach cognitive reappraisal, support mental exercises, and mindfulness skills.      Patient has reviewed and agreed to the above plan.      Tiffanie Srivastava, PhD  May 15, 2024      Tiffanie Srivastava, Ph.D., , [facility]  Clinical Health Psychologist  Phone: [phone]   Pager: [phone]

## 2024-07-10 NOTE — NURSING NOTE
Is the patient currently in the state of [place]? YES    Current patient location: [address] [place] [address]    Visit mode:VIDEO    If the visit is dropped, the patient can be reconnected by: VIDEO VISIT: Text to cell phone:   Telephone Information:   Mobile [phone]       Will anyone else be joining the visit? No  (If patient encounters technical issues they should call [phone])    How would you like to obtain your AVS? MyChart    Are changes needed to the allergy or medication list? No    Rooming Documentation: Patient will complete qnrs in mychart.    Reason for visit: NENITA Mckeon

## 2024-07-12 ENCOUNTER — TRANSFERRED RECORDS (OUTPATIENT)
Dept: HEALTH INFORMATION MANAGEMENT | Facility: CLINIC | Age: 69
End: 2024-07-12
Payer: COMMERCIAL

## 2024-08-04 ASSESSMENT — ANXIETY QUESTIONNAIRES
7. FEELING AFRAID AS IF SOMETHING AWFUL MIGHT HAPPEN: NEARLY EVERY DAY
2. NOT BEING ABLE TO STOP OR CONTROL WORRYING: MORE THAN HALF THE DAYS
8. IF YOU CHECKED OFF ANY PROBLEMS, HOW DIFFICULT HAVE THESE MADE IT FOR YOU TO DO YOUR WORK, TAKE CARE OF THINGS AT HOME, OR GET ALONG WITH OTHER PEOPLE?: VERY DIFFICULT
3. WORRYING TOO MUCH ABOUT DIFFERENT THINGS: NEARLY EVERY DAY
5. BEING SO RESTLESS THAT IT IS HARD TO SIT STILL: NOT AT ALL
GAD7 TOTAL SCORE: 16
GAD7 TOTAL SCORE: 16
7. FEELING AFRAID AS IF SOMETHING AWFUL MIGHT HAPPEN: NEARLY EVERY DAY
1. FEELING NERVOUS, ANXIOUS, OR ON EDGE: NEARLY EVERY DAY
4. TROUBLE RELAXING: NEARLY EVERY DAY
IF YOU CHECKED OFF ANY PROBLEMS ON THIS QUESTIONNAIRE, HOW DIFFICULT HAVE THESE PROBLEMS MADE IT FOR YOU TO DO YOUR WORK, TAKE CARE OF THINGS AT HOME, OR GET ALONG WITH OTHER PEOPLE: VERY DIFFICULT
6. BECOMING EASILY ANNOYED OR IRRITABLE: MORE THAN HALF THE DAYS

## 2024-08-05 ENCOUNTER — VIRTUAL VISIT (OUTPATIENT)
Dept: PSYCHOLOGY | Facility: CLINIC | Age: 69
End: 2024-08-05
Payer: COMMERCIAL

## 2024-08-05 DIAGNOSIS — F33.1 MODERATE RECURRENT MAJOR DEPRESSION (H): ICD-10-CM

## 2024-08-05 DIAGNOSIS — M05.79 RHEUMATOID ARTHRITIS INVOLVING MULTIPLE SITES WITH POSITIVE RHEUMATOID FACTOR (H): ICD-10-CM

## 2024-08-05 DIAGNOSIS — I67.81 LEUKOARAIOSIS: ICD-10-CM

## 2024-08-05 DIAGNOSIS — F41.1 GENERALIZED ANXIETY DISORDER WITH PANIC ATTACKS: Primary | ICD-10-CM

## 2024-08-05 DIAGNOSIS — F41.0 GENERALIZED ANXIETY DISORDER WITH PANIC ATTACKS: Primary | ICD-10-CM

## 2024-08-05 DIAGNOSIS — F40.10 SOCIAL ANXIETY DISORDER: ICD-10-CM

## 2024-08-05 PROCEDURE — 90837 PSYTX W PT 60 MINUTES: CPT | Mod: 95 | Performed by: PSYCHOLOGIST

## 2024-08-05 NOTE — PROGRESS NOTES
Health Psychology          Faby Duarte, Ph.D.,  [phone]  Caitlyn Mcneil, Ph.D.,  [phone]  Lita Hurley, Ph.D.,  [phone]  Esteban Anderson, Ph.D., , [facility] [phone]  Ambika Murphy, Ph.D.,  [phone]  Tiffanie Srivastava, Ph.D., , [facility] [phone]    [facility], 3rd Floor  [address] [place] [address]       Health Psychology Progress Note    Patient Name: Yaritza Bradley    Service Type: Individual  Length of Visit: 56 minutes - extended session due to complexity of case and content  Start time: 4:01 PM  Stop time: 4:57 PM    Attendees: significant other/spouse, Remi, initially with patient's permission   Session #: 6    Telemedicine Information:     Telemedicine Visit: The patient's condition can be safely assessed and treated via synchronous audio and visual telemedicine encounter.    Reason for Telemedicine Visit: Patient has requested telehealth visit and Patient convenience (e.g. access to timely appointments / distance to available provider)  Originating Site (Patient Location): Patient's home, [place]  Distant Location (provider location):  Off-site: Provider's Home Office  Consent:  The patient/guardian has verbally consented to: the potential risks and benefits of telemedicine (video visit) versus in person care; bill my insurance or make self-payment for services provided; and responsibility for payment of non-covered services.   Mode of Communication:  Video Conference via [employer]    As the provider I attest to compliance with applicable laws and regulations related to telemedicine.     Identifying Information and Presenting Problem:  The patient is a 68 year old adult who is being seen for problematic symptoms of depression and anxiety in the context of leukoaraiosis.    Topics Discussed/Interventions Provided:    Session Content:     Update: Patient reports continued difficulty with depression and low  self-worth. Endorses having thoughts that she would be better off dead and that others would be better off without her. Endorses SI, but no plans or intent at this time. Has contemplated means via overdose or car accident. States that she would not act on any thoughts while her ailing mother is dependent on her for some of her care. Expressed concern about patient's worsening symptoms and recommended a more intensive treatment program in addition to our work together. Patient agreed.     Homework Review: Patient completed part of module 2 of the self-compassion workbook. Discussed insights. She was able to acknowledge that she engages in a lot of self-critical self-talk and has many negative self-narratives. Did not complete values clarification exercise. Completed during session. Top 6 values identified included: caring, compassion, honesty, order, self-care, supportiveness.       Skills/intervention: Assisted patient in identifying things that bring her meaning. She identified her dog, her mother, and some of her friends. She was able to acknowledge that she feels happy in the presence of her dog, mother, and friends. Provided psychoeducation about the values and using an ACT approach to have values guide behaviors and decisions. Explored how self-compassion can be an act of self-care and enable her to execute her values more effectively. Provided additional information about how to complete the kindness vs criticism experiment.          Treatment Objective(s) Addressed in This Session:  Psychological distress related to Chronic Disease Management    Progress on / Status of Treatment Objective(s) / Homework:  In progress  Stable    Medication Adherence: Patient did not report medication changes. Current medication list is below:     Current Outpatient Medications   Medication Sig Dispense Refill    Cyanocobalamin (B-12) 1000 MCG TBCR Take 1,000 mcg by mouth daily 100 tablet 1    diazepam (VALIUM) 5 MG tablet  Valium 5 mg p.o., take 1 tablet prior to MRI as instructed by radiology.  Must have . 1 tablet 0    folic acid (FOLVITE) 1 MG tablet Take 1 tablet (1,000 mcg) by mouth daily 90 tablet 3    methotrexate 50 MG/2ML injection CHEMO Inject 4 mg Subcutaneous every 7 days       mirtazapine (REMERON) 7.5 MG tablet Take 1 tablet (7.5 mg) by mouth at bedtime 30 tablet 1    Omega-3 Fatty Acids (OMEGA-3 FISH OIL PO) Take 1 g by mouth daily (DRY EYE OMEGA BENEFITS) 667-250 mg-unit cap      RESTASIS 0.05 % ophthalmic emulsion Place 1 drop into both eyes 2 times daily      SM CALCIUM SOFT CHEWS 500-100-40 OR CHEW Take 1 tablet by mouth 2 times daily   0    traZODone (DESYREL) 50 MG tablet Take 50 mg by mouth nightly as needed for sleep       No current facility-administered medications for this visit.        Assessment: The patient appeared to be active and engaged in today's session and was receptive to feedback.     Mental Status Exam:   Appearance: Appropriate   Eye Contact: Good    Orientation: Yes, x4  Behavior/relationship to provider/demeanor: Cooperative and Engaged  Motor Activity: restless  Mood (subjective report): Depressed, Anxious  Affect (objective appearance): Appropriate/mood congruent  Speech Rate: Normal  Speech Volume: Normal  Speech Articulation: Normal  Speech Coherence: Normal  Speech Spontaneity: Normal  Thought Content: Suicidal ideation, no plans or intent, endorses depressive cognitions  Thought Process (associations): Logical, Linear, and Goal directed  Thought Process (rate): Normal  Abnormal Perception: None  Attention/Concentration: Normal  Memory: Appears grossly intact, though patient and  endorse memory impairment  Fund of Knowledge: Appears within normal limits   Abstraction:  Normal  Insight:  Adequate  Judgment:   Adequate    Does the patient appear to be at imminent risk of harm to self/others at this time? No    The session was necessary to address anxiety and depression in the  context of neurological disease that have been interfering with patient's overall functioning and quality of life.  Ongoing psychotherapy is necessary to stabilize mood, provide counseling, improve functioning with daily activities, provide psychoeducation, provide support, and teach cognitive and behavioral skills.    Diagnoses:    1. Generalized anxiety disorder with panic attacks    2. Social anxiety disorder    3. Moderate recurrent major depression (H)    4. Leukoaraiosis    5. Rheumatoid arthritis involving multiple sites with positive rheumatoid factor (H)      Plan:    Follow-up video visit with me on 8/19/24 at 4:00pm.   Patient to use 911 or other crisis resources in the event of a psychiatric emergency  Primary care needs and medications are managed by Aziza Aviles MD. Referring provider is Aziza Aviles MD  Next visit agenda/goals:   Complete the kindness vs criticism experiment in module 2 of the self-compassion workbook.  Practice positive self-talk    Skills taught/interventions used thus far:  Psychoeducation  Awareness training  Behavioral activation  Self-compassion (CCI modules 1-2)  Values clarification    NOTE: Treatment plan update due 5/15/25; 90 day treatment plan review due 8/13/24                                              Individual Treatment Plan    Patient's Name: Yaritza Bradley  YOB: 1955    Date of Creation: 05/15/24  Date Treatment Plan Last Reviewed/Revised: 05/15/24     DSM5 Diagnoses:     1. Generalized anxiety disorder with panic attacks    2. Social anxiety disorder    3. Moderate recurrent major depression (H)        Psychosocial / Contextual Factors: health issues, limited social (emotional) support, and relationship stress  PROMIS (reviewed every 90 days):      4/1/2024  10:00 PM   PROMIS 10    PROMIS TOTAL - SUBSCORES 14          Referral / Collaboration:  Collaboration with medical providers and specialists as needed.    Anticipated number of session  "for this episode of care: 15-20  Anticipation frequency of session: 2x  Anticipated Duration of each session: 38-52 minutes  Treatment plan will be reviewed in 90 days or when goals have been changed.     Measurable Treatment Goal(s) related to diagnosis/functional impairment(s)  Goal 1: Patient will increase her sense of ramu and quality of life   \"I will know I've met my goal when I think more in a positive way and find a way to shoot down the negative when it comes or to replace it with something that is more constructive.\"     Objective #A (Patient Action)    Patient will increase the frequency of participating in enriching and meaningful activities (going out to lunch with a friend, getting movement, going to [employer] in person, social physical activity, support groups, and other social activities) from 0 to 2x per week  Status: New - Date: 05/15/24      Intervention(s)  Therapist will teach behavioral activation and committed action skills.    Objective #B  Patient will experience an increase in meaning connected to her current activities.  Status: New - Date: 05/15/24      Intervention(s)  Therapist will teach gratitude skills, values clarification, and mindfulness skills.    Objective #C  Patient will experience an increase in self-compassion and a decrease in self-criticism.  Status: New - Date: 05/15/24     Intervention(s)  Therapist will assign the completion of a self-compassion workbook and teach self-compassion skills.    Objective #D  Patient will get some form of movement or physical activity at least 3x per week.  Status: New - Date: 05/15/24     Intervention(s)  Therapist will teach behavioral activation, committed action, problem-solving, goal-setting/habit forming, and health behavior change skills.    Objective #E  Patient will engage in activities that are cognitively stimulating and that facilitate cognitive flexibility.  Status: New - Date: 05/15/24     Intervention(s)  Therapist will teach " cognitive reappraisal, support mental exercises, and mindfulness skills.      Patient has reviewed and agreed to the above plan.      Tiffanie Srivastava, PhD  May 15, 2024      Tiffanie Srivastava, Ph.D., , [facility]  Clinical Health Psychologist  Phone: [phone]   Pager: [phone]

## 2024-08-05 NOTE — NURSING NOTE
Current patient location: [address] [place] [address]    Is the patient currently in the state of [place]? YES    Visit mode:VIDEO    If the visit is dropped, the patient can be reconnected by: VIDEO VISIT: Send to e-mail at: [email or web address]    Will anyone else be joining the visit? NO  (If patient encounters technical issues they should call [phone]    How would you like to obtain your AVS? MyChart    Are changes needed to the allergy or medication list? N/A    Are refills needed on medications prescribed by this physician? NO    Rooming Documentation:  Questionnaire(s) not done per department protocol      Reason for visit: RECHECK    Mary GRIJALVAF

## 2024-08-06 ENCOUNTER — PATIENT OUTREACH (OUTPATIENT)
Dept: CARE COORDINATION | Facility: CLINIC | Age: 69
End: 2024-08-06
Payer: COMMERCIAL

## 2024-08-07 ASSESSMENT — PATIENT HEALTH QUESTIONNAIRE - PHQ9
10. IF YOU CHECKED OFF ANY PROBLEMS, HOW DIFFICULT HAVE THESE PROBLEMS MADE IT FOR YOU TO DO YOUR WORK, TAKE CARE OF THINGS AT HOME, OR GET ALONG WITH OTHER PEOPLE: VERY DIFFICULT
SUM OF ALL RESPONSES TO PHQ QUESTIONS 1-9: 24
SUM OF ALL RESPONSES TO PHQ QUESTIONS 1-9: 24

## 2024-08-08 ENCOUNTER — OFFICE VISIT (OUTPATIENT)
Dept: FAMILY MEDICINE | Facility: CLINIC | Age: 69
End: 2024-08-08
Payer: COMMERCIAL

## 2024-08-08 VITALS
RESPIRATION RATE: 18 BRPM | OXYGEN SATURATION: 99 % | HEIGHT: 64 IN | DIASTOLIC BLOOD PRESSURE: 68 MMHG | TEMPERATURE: 97.7 F | BODY MASS INDEX: 17.8 KG/M2 | HEART RATE: 76 BPM | WEIGHT: 104.25 LBS | SYSTOLIC BLOOD PRESSURE: 110 MMHG

## 2024-08-08 DIAGNOSIS — I67.81 LEUKOARAIOSIS: ICD-10-CM

## 2024-08-08 DIAGNOSIS — F41.1 GENERALIZED ANXIETY DISORDER: ICD-10-CM

## 2024-08-08 DIAGNOSIS — F33.1 MODERATE RECURRENT MAJOR DEPRESSION (H): Chronic | ICD-10-CM

## 2024-08-08 DIAGNOSIS — E46 PROTEIN-CALORIE MALNUTRITION, UNSPECIFIED SEVERITY (H): Primary | ICD-10-CM

## 2024-08-08 DIAGNOSIS — R63.4 WEIGHT LOSS: ICD-10-CM

## 2024-08-08 LAB
ALBUMIN SERPL BCG-MCNC: 4.2 G/DL (ref 3.5–5.2)
ALP SERPL-CCNC: 85 U/L (ref 40–150)
ALT SERPL W P-5'-P-CCNC: 22 U/L (ref 0–50)
ANION GAP SERPL CALCULATED.3IONS-SCNC: 11 MMOL/L (ref 7–15)
AST SERPL W P-5'-P-CCNC: 39 U/L (ref 0–45)
BILIRUB SERPL-MCNC: 0.6 MG/DL
BUN SERPL-MCNC: 11.4 MG/DL (ref 8–23)
CALCIUM SERPL-MCNC: 10.5 MG/DL (ref 8.8–10.4)
CHLORIDE SERPL-SCNC: 101 MMOL/L (ref 98–107)
CREAT SERPL-MCNC: 0.78 MG/DL (ref 0.51–0.95)
EGFRCR SERPLBLD CKD-EPI 2021: 82 ML/MIN/1.73M2
ERYTHROCYTE [DISTWIDTH] IN BLOOD BY AUTOMATED COUNT: 14 % (ref 10–15)
GLUCOSE SERPL-MCNC: 88 MG/DL (ref 70–99)
HCO3 SERPL-SCNC: 27 MMOL/L (ref 22–29)
HCT VFR BLD AUTO: 41.9 % (ref 35–47)
HGB BLD-MCNC: 13.7 G/DL (ref 11.7–15.7)
MCH RBC QN AUTO: 31.4 PG (ref 26.5–33)
MCHC RBC AUTO-ENTMCNC: 32.7 G/DL (ref 31.5–36.5)
MCV RBC AUTO: 96 FL (ref 78–100)
PLATELET # BLD AUTO: 204 10E3/UL (ref 150–450)
POTASSIUM SERPL-SCNC: 5.1 MMOL/L (ref 3.4–5.3)
PREALB SERPL-MCNC: 16.5 MG/DL (ref 20–40)
PROT SERPL-MCNC: 7 G/DL (ref 6.4–8.3)
RBC # BLD AUTO: 4.37 10E6/UL (ref 3.8–5.2)
SODIUM SERPL-SCNC: 139 MMOL/L (ref 135–145)
TSH SERPL DL<=0.005 MIU/L-ACNC: 1.54 UIU/ML (ref 0.3–4.2)
WBC # BLD AUTO: 6.8 10E3/UL (ref 4–11)

## 2024-08-08 PROCEDURE — 84443 ASSAY THYROID STIM HORMONE: CPT | Performed by: FAMILY MEDICINE

## 2024-08-08 PROCEDURE — 36415 COLL VENOUS BLD VENIPUNCTURE: CPT | Performed by: FAMILY MEDICINE

## 2024-08-08 PROCEDURE — 80053 COMPREHEN METABOLIC PANEL: CPT | Performed by: FAMILY MEDICINE

## 2024-08-08 PROCEDURE — 99214 OFFICE O/P EST MOD 30 MIN: CPT | Performed by: FAMILY MEDICINE

## 2024-08-08 PROCEDURE — 84134 ASSAY OF PREALBUMIN: CPT | Performed by: FAMILY MEDICINE

## 2024-08-08 PROCEDURE — 85027 COMPLETE CBC AUTOMATED: CPT | Performed by: FAMILY MEDICINE

## 2024-08-08 RX ORDER — TRAZODONE HYDROCHLORIDE 50 MG/1
50 TABLET, FILM COATED ORAL
Qty: 90 TABLET | Refills: 3 | Status: CANCELLED | OUTPATIENT
Start: 2024-08-08

## 2024-08-08 RX ORDER — MIRTAZAPINE 7.5 MG/1
7.5 TABLET, FILM COATED ORAL AT BEDTIME
Qty: 30 TABLET | Refills: 1 | Status: SHIPPED | OUTPATIENT
Start: 2024-08-08 | End: 2024-09-12 | Stop reason: DRUGHIGH

## 2024-08-08 ASSESSMENT — PAIN SCALES - GENERAL: PAINLEVEL: NO PAIN (0)

## 2024-08-08 NOTE — PROGRESS NOTES
Assessment & Plan   Weight loss  Protein-calorie malnutrition, unspecified severity (H24)   brought with him a graph of calories from one week of counting average calories. She gets <1200 kcal/day -goal for maintenance of weight is about 1500 calories/day and to gain likely needs closer to 2000 calories/day.    In March I prescribed Mirtazapine 7.5 mg which she did not start.   Strongly recommend we try to increase her caloric intake  High protein/calorie foods recommended  Consider nutritionist referral, but would likely need to be out of pocket as medicare coverage is poor for this.    CT scan to r/o other potential causes of weight loss.      - CT Chest/Abdomen/Pelvis w Contrast; Future  - TSH with free T4 reflex; Future  - Comprehensive metabolic panel (BMP + Alb, Alk Phos, ALT, AST, Total. Bili, TP); Future  - CBC with platelets; Future  - TSH with free T4 reflex  - Comprehensive metabolic panel (BMP + Alb, Alk Phos, ALT, AST, Total. 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    - CT Chest/Abdomen/Pelvis w Contrast; Future  - TSH with free T4 reflex; Future  - Comprehensive metabolic panel (BMP + Alb, Alk Phos, ALT, AST, Total. Bili, TP); Future  - CBC with platelets; Future  - TSH with free T4 reflex  - Comprehensive metabolic panel (BMP + Alb, Alk Phos, ALT, AST, Total. Bili, TP)  - CBC with platelets          Depression Screening Follow Up        8/7/2024     8:42 PM   PHQ   PHQ-9 Total Score 24   Q9: Thoughts of better off dead/self-harm past 2 weeks Nearly every day   F/U: Thoughts of suicide or self-harm Yes   F/U:  "Self harm-plan Yes   F/U: Self-harm action No   F/U: Safety concerns No         Subjective   Nicole is a 68 year old, presenting for the following health issues:  Weight Loss        8/8/2024    10:55 AM   Additional Questions   Roomed by Sophia COFFMAN CMA   Accompanied by      History of Present Illness       Reason for visit:  Weight loss to 102# from  ~120.  Renew trazodone Rx.  Feeling crummy most everyday.    She eats 0-1 servings of fruits and vegetables daily.She consumes 2 sweetened beverage(s) daily.She exercises with enough effort to increase her heart rate 9 or less minutes per day.  She exercises with enough effort to increase her heart rate 3 or less days per week.   She is taking medications regularly.         - Weight loss of about 20lb since January. She also notes dizziness and fatigue.  notes one episode of LOC 7/7 when she was shopping at [employer], she was walking out of the shop and felt shaky, she was able to lower herself to the ground so did not fall but did loose consciousness. EMS was called and episode was thought to be due to dehydration.  notes 2 other episodes of this also happening over the summer. Episodes usually seem to last about 10 minutes and then she is able to stand back up and continue when she was doing. She notes nausea just prior to episodes happening. No headaches, vision changes, emesis, chest pain, sweating. She notes having a feeling of \"lack of focus\" and \"unable to process\" with the constant dizziness.    She notes that appetite is doing well and that she does eat everything she wants to eat.  is keeping a food journal for her and she is averaging 1,200.calories per day.     Last visit with me 3/2024, did not implement the changes we talked about at that time (starting mirtazapine), boost/ensure (lactose intolerance, doesn't like the taste, etc).    Wt Readings from Last 4 Encounters:   08/08/24 47.3 kg (104 lb 4 oz)   03/27/24 51 kg (112 lb 6 " "oz)   09/12/23 54.4 kg (120 lb)   09/05/23 54.4 kg (120 lb)       Review of Systems  Constitutional, HEENT, cardiovascular, pulmonary, gi and gu systems are negative, except as otherwise noted.      Objective    /68   Pulse 76   Temp 97.7  F (36.5  C) (Tympanic)   Resp 18   Ht 1.619 m (5' 3.75\")   Wt 47.3 kg (104 lb 4 oz)   LMP  (LMP Unknown)   SpO2 99%   BMI 18.04 kg/m    Body mass index is 18.04 kg/m .  Physical Exam   GENERAL: alert and no distress  EYES: Eyes grossly normal to inspection, PERRL and conjunctivae and sclerae normal  NECK: no adenopathy, no asymmetry, masses, or scars  RESP: lungs clear to auscultation - no rales, rhonchi or wheezes  CV: regular rate and rhythm, normal S1 S2, no S3 or S4, no murmur, click or rub, no peripheral edema  MS: no gross musculoskeletal defects noted, no edema  PSYCH: anxious and appearance well groomed    Results for orders placed or performed in visit on 08/08/24   CBC with platelets     Status: Normal   Result Value Ref Range    WBC Count 6.8 4.0 - 11.0 10e3/uL    RBC Count 4.37 3.80 - 5.20 10e6/uL    Hemoglobin 13.7 11.7 - 15.7 g/dL    Hematocrit 41.9 35.0 - 47.0 %    MCV 96 78 - 100 fL    MCH 31.4 26.5 - 33.0 pg    MCHC 32.7 31.5 - 36.5 g/dL    RDW 14.0 10.0 - 15.0 %    Platelet Count 204 150 - 450 10e3/uL             Signed Electronically by: Aziza Aviles MD    "

## 2024-08-12 ENCOUNTER — MYC MEDICAL ADVICE (OUTPATIENT)
Dept: FAMILY MEDICINE | Facility: CLINIC | Age: 69
End: 2024-08-12
Payer: COMMERCIAL

## 2024-08-12 DIAGNOSIS — F40.240 CLAUSTROPHOBIA: Primary | ICD-10-CM

## 2024-08-12 RX ORDER — LORAZEPAM 0.5 MG/1
0.5 TABLET ORAL ONCE
Qty: 1 TABLET | Refills: 0 | Status: SHIPPED | OUTPATIENT
Start: 2024-08-12 | End: 2024-08-12

## 2024-08-19 ENCOUNTER — VIRTUAL VISIT (OUTPATIENT)
Dept: PSYCHOLOGY | Facility: CLINIC | Age: 69
End: 2024-08-19
Payer: COMMERCIAL

## 2024-08-19 DIAGNOSIS — F41.1 GENERALIZED ANXIETY DISORDER WITH PANIC ATTACKS: Primary | ICD-10-CM

## 2024-08-19 DIAGNOSIS — I67.81 LEUKOARAIOSIS: ICD-10-CM

## 2024-08-19 DIAGNOSIS — F41.0 GENERALIZED ANXIETY DISORDER WITH PANIC ATTACKS: Primary | ICD-10-CM

## 2024-08-19 DIAGNOSIS — F40.10 SOCIAL ANXIETY DISORDER: ICD-10-CM

## 2024-08-19 DIAGNOSIS — F33.1 MODERATE RECURRENT MAJOR DEPRESSION (H): ICD-10-CM

## 2024-08-19 DIAGNOSIS — M05.79 RHEUMATOID ARTHRITIS INVOLVING MULTIPLE SITES WITH POSITIVE RHEUMATOID FACTOR (H): ICD-10-CM

## 2024-08-19 PROCEDURE — 90834 PSYTX W PT 45 MINUTES: CPT | Mod: 95 | Performed by: PSYCHOLOGIST

## 2024-08-19 ASSESSMENT — ANXIETY QUESTIONNAIRES
8. IF YOU CHECKED OFF ANY PROBLEMS, HOW DIFFICULT HAVE THESE MADE IT FOR YOU TO DO YOUR WORK, TAKE CARE OF THINGS AT HOME, OR GET ALONG WITH OTHER PEOPLE?: SOMEWHAT DIFFICULT
7. FEELING AFRAID AS IF SOMETHING AWFUL MIGHT HAPPEN: MORE THAN HALF THE DAYS
2. NOT BEING ABLE TO STOP OR CONTROL WORRYING: SEVERAL DAYS
5. BEING SO RESTLESS THAT IT IS HARD TO SIT STILL: MORE THAN HALF THE DAYS
GAD7 TOTAL SCORE: 12
6. BECOMING EASILY ANNOYED OR IRRITABLE: NEARLY EVERY DAY
7. FEELING AFRAID AS IF SOMETHING AWFUL MIGHT HAPPEN: MORE THAN HALF THE DAYS
IF YOU CHECKED OFF ANY PROBLEMS ON THIS QUESTIONNAIRE, HOW DIFFICULT HAVE THESE PROBLEMS MADE IT FOR YOU TO DO YOUR WORK, TAKE CARE OF THINGS AT HOME, OR GET ALONG WITH OTHER PEOPLE: SOMEWHAT DIFFICULT
4. TROUBLE RELAXING: SEVERAL DAYS
3. WORRYING TOO MUCH ABOUT DIFFERENT THINGS: SEVERAL DAYS
1. FEELING NERVOUS, ANXIOUS, OR ON EDGE: MORE THAN HALF THE DAYS
GAD7 TOTAL SCORE: 12

## 2024-08-19 NOTE — PROGRESS NOTES
"    Health Psychology          Faby Duarte, Ph.D.,  [phone]  Caitlyn Mcneil, Ph.D.,  [phone]  Lita Hurley, Ph.D.,  [phone]  Esteban Anderson, Ph.D., , [facility] [phone]  Ambika Murphy, Ph.D.,  [phone]  Tiffanie Srivastava, Ph.D., , [facility] [phone]    [facility], 3rd Floor  [address] [place] [address]       Health Psychology Progress Note    Patient Name: Yaritza Bradley    Service Type: Individual  Length of Visit: 43 minutes  Start time: 4:02 PM  Stop time: 4:45 PM (call dropped and could not be reconnected)  Attendees: significant other/spouse, Remi, initially with patient's permission   Session #: 7    Telemedicine Information:     Telemedicine Visit: The patient's condition can be safely assessed and treated via synchronous audio and visual telemedicine encounter.    Reason for Telemedicine Visit: Patient has requested telehealth visit and Patient convenience (e.g. access to timely appointments / distance to available provider)  Originating Site (Patient Location): Patient's home, [place]  Distant Location (provider location):  Off-site: Provider's Home Office  Consent:  The patient/guardian has verbally consented to: the potential risks and benefits of telemedicine (video visit) versus in person care; bill my insurance or make self-payment for services provided; and responsibility for payment of non-covered services.   Mode of Communication:  Video Conference via [employer]    As the provider I attest to compliance with applicable laws and regulations related to telemedicine.     Identifying Information and Presenting Problem:  The patient is a 68 year old adult who is being seen for problematic symptoms of depression and anxiety in the context of leukoaraiosis.    Topics Discussed/Interventions Provided:    Session Content:     Update: Patient states that she is \"hanging in there.\" Reports some challenges with " regard to her health. States that she started mirtazapine a little over a week ago to help with sleep and anxiety. Reports that it might be helping marginally with sleep but not noticing much of a change in anxiety, depression, or appetite. Patient notes that she has been feeling a bit better and is noticing some of the benefits of self-compassion.     Homework Review: Patient completed the kindness vs criticism experiment from module 2 of the self-compassion workbook. Discussed insights. She notes that on her kind days, she felt more positive toward herself and toward other people. She noted that her mood was better and she got more things accomplished on her kind days. She was able to acknowledge that positive self-talk and self-compassion lead to feeling better both physically and mentally and is experiencing a greater sense of accomplishment.        Skills/intervention: Discussed how self-compassion is generative and is leading to more positive interactions with other people. Introduced module 3 of the CCI workbook. Did not complete treatment plan review due to being disconnected from the visit prematurely. Will complete in next visit.         Top 6 values identified included: caring, compassion, honesty, order, self-care, supportiveness.    Treatment Objective(s) Addressed in This Session:  Psychological distress related to Chronic Disease Management    Progress on / Status of Treatment Objective(s) / Homework:  In progress  Stable    Medication Adherence: Patient reports initiating mirtazapine. Current medication list is below:     Current Outpatient Medications   Medication Sig Dispense Refill    Cyanocobalamin (B-12) 1000 MCG TBCR Take 1,000 mcg by mouth daily 100 tablet 1    folic acid (FOLVITE) 1 MG tablet Take 1 tablet (1,000 mcg) by mouth daily 90 tablet 3    methotrexate 50 MG/2ML injection CHEMO Inject 4 mg Subcutaneous every 7 days       mirtazapine (REMERON) 7.5 MG tablet Take 1 tablet (7.5 mg) by  mouth at bedtime 30 tablet 1    Omega-3 Fatty Acids (OMEGA-3 FISH OIL PO) Take 1 g by mouth daily (DRY EYE OMEGA BENEFITS) 667-250 mg-unit cap      RESTASIS 0.05 % ophthalmic emulsion Place 1 drop into both eyes 2 times daily      SM CALCIUM SOFT CHEWS 500-100-40 OR CHEW Take 1 tablet by mouth 2 times daily   0    traZODone (DESYREL) 50 MG tablet Take 50 mg by mouth nightly as needed for sleep       No current facility-administered medications for this visit.        Assessment: The patient appeared to be active and engaged in today's session and was receptive to feedback.     Mental Status Exam:   Appearance: Appropriate   Eye Contact: Good    Orientation: Yes, x4  Behavior/relationship to provider/demeanor: Cooperative and Engaged  Motor Activity: restless  Mood (subjective report): Depressed, Anxious, but better  Affect (objective appearance): Appropriate/mood congruent  Speech Rate: Normal  Speech Volume: Normal  Speech Articulation: Normal  Speech Coherence: Normal  Speech Spontaneity: Normal  Thought Content: Recent suicidal ideation, no plans or intent, endorses depressive cognitions  Thought Process (associations): Logical, Linear, and Goal directed  Thought Process (rate): Normal  Abnormal Perception: None  Attention/Concentration: Normal  Memory: Appears grossly intact, though patient and  endorse memory impairment  Fund of Knowledge: Appears within normal limits   Abstraction:  Normal  Insight:  Adequate  Judgment:   Adequate    Does the patient appear to be at imminent risk of harm to self/others at this time? No    The session was necessary to address anxiety and depression in the context of neurological disease that have been interfering with patient's overall functioning and quality of life.  Ongoing psychotherapy is necessary to stabilize mood, provide counseling, improve functioning with daily activities, provide psychoeducation, provide support, and teach cognitive and behavioral  skills.    Diagnoses:    1. Generalized anxiety disorder with panic attacks    2. Social anxiety disorder    3. Moderate recurrent major depression (H)    4. Leukoaraiosis    5. Rheumatoid arthritis involving multiple sites with positive rheumatoid factor (H)      Plan:    Follow-up video visit with me on 9/9/24 at 4:00pm.   Patient to use 911 or other crisis resources in the event of a psychiatric emergency  Primary care needs and medications are managed by Aziza Aviles MD. Referring provider is Aziza Aviles MD  Next visit agenda/goals:   90 day treatment plan review  Complete module 3 of the self-compassion workbook.  Continue to practice positive self-talk    Skills taught/interventions used thus far:  Psychoeducation  Awareness training  Behavioral activation  Self-compassion   CCI modules 1-2  Positive self-talk  Values clarification    NOTE: Treatment plan update due 5/15/25; 90 day treatment plan review due 8/13/24                                              Individual Treatment Plan    Patient's Name: Yaritza Bradley  YOB: 1955    Date of Creation: 05/15/24  Date Treatment Plan Last Reviewed/Revised: 05/15/24    DSM5 Diagnoses:     1. Generalized anxiety disorder with panic attacks    2. Social anxiety disorder    3. Moderate recurrent major depression (H)        Psychosocial / Contextual Factors: health issues, limited social (emotional) support, and relationship stress  PROMIS (reviewed every 90 days):          4/1/2024  10:00 PM   PROMIS 10     PROMIS TOTAL - SUBSCORES 14           Referral / Collaboration:  Collaboration with medical providers and specialists as needed.    Anticipated number of session for this episode of care: 15-20  Anticipation frequency of session: 2x  Anticipated Duration of each session: 38-52 minutes  Treatment plan will be reviewed in 90 days or when goals have been changed.     Measurable Treatment Goal(s) related to diagnosis/functional  "impairment(s)  Goal 1: Patient will increase her sense of ramu and quality of life   \"I will know I've met my goal when I think more in a positive way and find a way to shoot down the negative when it comes or to replace it with something that is more constructive.\"     Objective #A (Patient Action)    Patient will increase the frequency of participating in enriching and meaningful activities (going out to lunch with a friend, getting movement, going to Worship in person, social physical activity, support groups, and other social activities) from 0 to 2x per week  Status: New - Date: 05/15/24      Intervention(s)  Therapist will teach behavioral activation and committed action skills.    Objective #B  Patient will experience an increase in meaning connected to her current activities.  Status: New - Date: 05/15/24      Intervention(s)  Therapist will teach gratitude skills, values clarification, and mindfulness skills.    Objective #C  Patient will experience an increase in self-compassion and a decrease in self-criticism.  Status: New - Date: 05/15/24     Intervention(s)  Therapist will assign the completion of a self-compassion workbook and teach self-compassion skills.    Objective #D  Patient will get some form of movement or physical activity at least 3x per week.  Status: New - Date: 05/15/24     Intervention(s)  Therapist will teach behavioral activation, committed action, problem-solving, goal-setting/habit forming, and health behavior change skills.    Objective #E  Patient will engage in activities that are cognitively stimulating and that facilitate cognitive flexibility.  Status: New - Date: 05/15/24     Intervention(s)  Therapist will teach cognitive reappraisal, support mental exercises, and mindfulness skills.      Patient has reviewed and agreed to the above plan.      Tiffanie Srivastava, PhD  May 15, 2024      Tiffanie Srivastava, Ph.D., , [facility]  Clinical Health Psychologist  Phone: [phone] "   Pager: [phone]

## 2024-08-19 NOTE — NURSING NOTE
Is the patient currently in the state of [place]? YES    Current patient location: [address] [place] [id number]    Visit mode:VIDEO    If the visit is dropped, the patient can be reconnected by: VIDEO VISIT: Text to cell phone:   Telephone Information:   Mobile [phone]       Will anyone else be joining the visit? No  (If patient encounters technical issues they should call [phone])    How would you like to obtain your AVS? MyChart    Are changes needed to the allergy or medication list? N/A    Are refills needed on medications prescribed by this physician? NO    Rooming Documentation: Questionnaire(s) not done per department protocol.    Reason for visit: RECHECK     NENITA Bhandari

## 2024-08-27 ENCOUNTER — MYC MEDICAL ADVICE (OUTPATIENT)
Dept: FAMILY MEDICINE | Facility: CLINIC | Age: 69
End: 2024-08-27
Payer: COMMERCIAL

## 2024-08-28 ENCOUNTER — TELEPHONE (OUTPATIENT)
Dept: FAMILY MEDICINE | Facility: CLINIC | Age: 69
End: 2024-08-28
Payer: COMMERCIAL

## 2024-08-28 DIAGNOSIS — U07.1 COVID: Primary | ICD-10-CM

## 2024-08-28 NOTE — TELEPHONE ENCOUNTER
RN COVID TREATMENT VISIT  08/28/24      The patient has been triaged and does not require a higher level of care.    Yaritza Bradley  68 year old  Current weight? 100 lbs.     Has the patient been seen by a primary care provider at an [facility] or [facility] within the past two years? Yes.   Have you been in close proximity to/do you have a known exposure to a person with a confirmed case of influenza? No.     General treatment eligibility:  Date of positive COVID test (PCR or at home)?  Last night    Are you or have you been hospitalized for this COVID-19 infection? No.   Have you received monoclonal antibodies or antiviral treatment for COVID-19 since this positive test? No.   Do you have any of the following conditions that place you at risk of being very sick from COVID-19?   - Age 50 years or older  Yes, patient has at least one high risk condition as noted above.     Current COVID symptoms:   - fever or chills  - headache  - nausea or vomiting  Yes. Patient has at least one symptom as selected.     How many days since symptoms started? 5 days or less. Established patient, 12 years or older weighing at least 88.2 lbs, who has symptoms that started in the past 5 days, has not been hospitalized nor received treatment already, and is at risk for being very sick from COVID-19.     Treatment eligibility by RN:  Are you currently pregnant or nursing? No  Do you have a clinically significant hypersensitivity to nirmatrelvir or ritonavir, or toxic epidermal necrolysis (TEN) or Ponce-Demar Syndrome? No  Do you have a history of hepatitis, any hepatic impairment on the Problem List (such as Child-Schultz Class C, cirrhosis, fatty liver disease, alcoholic liver disease), or was the last liver lab (hepatic panel, ALT, AST, ALK Phos, bilirubin) elevated in the past 6 months? No  Do you have any history of severe renal impairment (eGFR < 30mL/min)? No    Is patient eligible to continue? Yes,  patient meets all eligibility requirements for the RN COVID treatment (as denoted by all no responses above).     Current Outpatient Medications   Medication Sig Dispense Refill    Cyanocobalamin (B-12) 1000 MCG TBCR Take 1,000 mcg by mouth daily 100 tablet 1    folic acid (FOLVITE) 1 MG tablet Take 1 tablet (1,000 mcg) by mouth daily 90 tablet 3    methotrexate 50 MG/2ML injection CHEMO Inject 4 mg Subcutaneous every 7 days       mirtazapine (REMERON) 7.5 MG tablet Take 1 tablet (7.5 mg) by mouth at bedtime 30 tablet 1    Omega-3 Fatty Acids (OMEGA-3 FISH OIL PO) Take 1 g by mouth daily (DRY EYE OMEGA BENEFITS) 667-250 mg-unit cap      RESTASIS 0.05 % ophthalmic emulsion Place 1 drop into both eyes 2 times daily      SM CALCIUM SOFT CHEWS 500-100-40 OR CHEW Take 1 tablet by mouth 2 times daily   0    traZODone (DESYREL) 50 MG tablet Take 50 mg by mouth nightly as needed for sleep         Medications from List 1 of the standing order (on medications that exclude the use of Paxlovid) that patient is taking: NONE. Is patient taking [place]'s Wort? No  Is patient taking Adamaris's Wort or any meds from List 1? No.   Medications from List 2 of the standing order (on meds that provider needs to adjust) that patient is taking: NONE. Is patient on any of the meds from List 2? No.   Medications from List 3 of standing order (on meds that a RN needs to adjust) that patient is taking: NONE. Is patient on any meds from List 3? No.     Paxlovid has an approximate 90% reduction in hospitalization. Paxlovid can possibly cause altered sense of taste, diarrhea (loose, watery stools), high blood pressure, muscle aches.     Would patient like a Paxlovid prescription?   Yes.   Lab Results   Component Value Date    GFRESTIMATED 82 08/08/2024       Was last eGFR reduced? No, eGFR 60 or greater/ No Result on record. Patient can receive the normal renal function dose. Paxlovid Rx sent to [facility]  [phone]    [id number], [facility] [place] [address]    Hours:   Mon-Fri: 9:00a - 5:00p    Drive Thru available    Temporary change to home medications: None    All medication adjustments (holds, etc) were discussed with the patient and patient was asked to repeat back (teachback) their med adjustment.  Did patient understand med adjustment? Yes, patient repeated back and understood correctly.        Reviewed the following instructions with the patient:    Paxlovid (nimatrelvir and ritonavir)    How it works  Two medicines (nirmatrelvir and ritonavir) are taken together. They stop the virus from growing. Less amount of virus is easier for your body to fight.    How to take  Medicine comes in a daily container with both medicine tablets. Take by mouth twice daily (once in the morning, once at night) for 5 days.  The number of tablets to take varies by patient.  Don't chew or break capsules. Swallow whole.    When to take  Take as soon as possible after positive COVID-19 test result, and within 5 days of your first symptoms.    Possible side effects  Can cause altered sense of taste, diarrhea (loose, watery stools), high blood pressure, muscle aches.    Ida Morocho RN

## 2024-08-28 NOTE — TELEPHONE ENCOUNTER
Patient called and was prescribed paxlovid in telephone encounter today.     Tasha Gonzalez RN on 8/28/2024 at 12:08 PM

## 2024-08-30 ENCOUNTER — NURSE TRIAGE (OUTPATIENT)
Dept: FAMILY MEDICINE | Facility: CLINIC | Age: 69
End: 2024-08-30
Payer: COMMERCIAL

## 2024-08-30 ENCOUNTER — HOSPITAL ENCOUNTER (EMERGENCY)
Facility: CLINIC | Age: 69
Discharge: HOME | End: 2024-08-30
Attending: EMERGENCY MEDICINE | Admitting: EMERGENCY MEDICINE
Payer: COMMERCIAL

## 2024-08-30 VITALS
BODY MASS INDEX: 16.9 KG/M2 | HEART RATE: 72 BPM | DIASTOLIC BLOOD PRESSURE: 72 MMHG | TEMPERATURE: 99 F | OXYGEN SATURATION: 99 % | HEIGHT: 64 IN | WEIGHT: 99 LBS | RESPIRATION RATE: 18 BRPM | SYSTOLIC BLOOD PRESSURE: 145 MMHG

## 2024-08-30 DIAGNOSIS — U07.1 COVID-19 VIRUS INFECTION: ICD-10-CM

## 2024-08-30 PROCEDURE — 99284 EMERGENCY DEPT VISIT MOD MDM: CPT | Mod: 25 | Performed by: EMERGENCY MEDICINE

## 2024-08-30 PROCEDURE — 250N000013 HC RX MED GY IP 250 OP 250 PS 637: Performed by: EMERGENCY MEDICINE

## 2024-08-30 PROCEDURE — 96365 THER/PROPH/DIAG IV INF INIT: CPT | Performed by: EMERGENCY MEDICINE

## 2024-08-30 PROCEDURE — 258N000003 HC RX IP 258 OP 636: Performed by: EMERGENCY MEDICINE

## 2024-08-30 PROCEDURE — 250N000012 HC RX MED GY IP 250 OP 636 PS 637: Performed by: EMERGENCY MEDICINE

## 2024-08-30 PROCEDURE — 99283 EMERGENCY DEPT VISIT LOW MDM: CPT | Performed by: EMERGENCY MEDICINE

## 2024-08-30 PROCEDURE — S5010 5% DEXTROSE AND 0.45% SALINE: HCPCS | Performed by: EMERGENCY MEDICINE

## 2024-08-30 RX ORDER — DEXTROSE MONOHYDRATE AND SODIUM CHLORIDE 5; .45 G/100ML; G/100ML
INJECTION, SOLUTION INTRAVENOUS ONCE
Status: COMPLETED | OUTPATIENT
Start: 2024-08-30 | End: 2024-08-30

## 2024-08-30 RX ORDER — PREDNISONE 20 MG/1
40 TABLET ORAL ONCE
Status: COMPLETED | OUTPATIENT
Start: 2024-08-30 | End: 2024-08-30

## 2024-08-30 RX ORDER — IBUPROFEN 600 MG/1
600 TABLET, FILM COATED ORAL ONCE
Status: COMPLETED | OUTPATIENT
Start: 2024-08-30 | End: 2024-08-30

## 2024-08-30 RX ORDER — ACETAMINOPHEN 500 MG
1000 TABLET ORAL ONCE
Status: COMPLETED | OUTPATIENT
Start: 2024-08-30 | End: 2024-08-30

## 2024-08-30 RX ADMIN — ACETAMINOPHEN 1000 MG: 500 TABLET, FILM COATED ORAL at 18:48

## 2024-08-30 RX ADMIN — DEXTROSE AND SODIUM CHLORIDE: 5; 450 INJECTION, SOLUTION INTRAVENOUS at 18:56

## 2024-08-30 RX ADMIN — PREDNISONE 40 MG: 20 TABLET ORAL at 18:48

## 2024-08-30 RX ADMIN — IBUPROFEN 600 MG: 600 TABLET, FILM COATED ORAL at 18:47

## 2024-08-30 ASSESSMENT — ACTIVITIES OF DAILY LIVING (ADL)
ADLS_ACUITY_SCORE: 35
ADLS_ACUITY_SCORE: 35

## 2024-08-30 ASSESSMENT — COLUMBIA-SUICIDE SEVERITY RATING SCALE - C-SSRS
2. HAVE YOU ACTUALLY HAD ANY THOUGHTS OF KILLING YOURSELF IN THE PAST MONTH?: NO
6. HAVE YOU EVER DONE ANYTHING, STARTED TO DO ANYTHING, OR PREPARED TO DO ANYTHING TO END YOUR LIFE?: NO
1. IN THE PAST MONTH, HAVE YOU WISHED YOU WERE DEAD OR WISHED YOU COULD GO TO SLEEP AND NOT WAKE UP?: NO

## 2024-08-30 NOTE — TELEPHONE ENCOUNTER
Reason for Disposition   Drinking very little and dehydration suspected (e.g., no urine > 12 hours, very dry mouth, very lightheaded)    Additional Information   Negative: SEVERE difficulty breathing (e.g., struggling for each breath, speaks in single words)   Negative: Difficult to awaken or acting confused (e.g., disoriented, slurred speech)   Negative: Bluish (or gray) lips or face now   Negative: Shock suspected (e.g., cold/pale/clammy skin, too weak to stand, low BP, rapid pulse)   Negative: Sounds like a life-threatening emergency to the triager   Negative: Diagnosed or suspected COVID-19 and symptoms lasting 3 or more weeks   Negative: COVID-19 exposure and no symptoms   Negative: COVID-19 vaccine reaction suspected (e.g., fever, headache, muscle aches) occurring 1 to 3 days after getting vaccine   Negative: COVID-19 vaccine, questions about   Negative: Lives with someone known to have influenza (flu test positive) and flu-like symptoms (e.g., cough, runny nose, sore throat, SOB; with or without fever)   Negative: Possible COVID-19 symptoms and triager concerned about severity of symptoms or other causes   Negative: COVID-19 and breastfeeding, questions about   Negative: SEVERE or constant chest pain or pressure  (Exception: Mild central chest pain, present only when coughing.)   Negative: MODERATE difficulty breathing (e.g., speaks in phrases, SOB even at rest, pulse 100-120)   Negative: Headache and stiff neck (can't touch chin to chest)   Negative: Oxygen level (e.g., pulse oximetry) 90% or lower    Protocols used: Coronavirus (COVID-19) Diagnosed or [id number]       calling because he thinks wife needs to be seen for dehydration. She is taking Paxlovid for postitive Covid, has had a fever up to 101, and that he has only seen her drink 20oz water since Tuesday. He states she has not eated very well and been very fatigued sleeping a lot. He states she has lost 2lb since Covid. He did not want to  wake her up for 30 more minutes until her Paxlovid. Advised he needs to bring her in for evaluation for dehydration. He verbalizes understanding.  Melissa Lange RN on 8/30/2024 at 10:32 AM

## 2024-08-30 NOTE — ED TRIAGE NOTES
Pt symptoms started on 8/26, pt tested positive for covid on the 27th, pt was started on paxlovid. Pt reports she feels horrible, pt feels fatigue. Per  report pt has had a poor appetite since Tuesday, pt temp has improved, temp today 99 without medication

## 2024-08-30 NOTE — ED PROVIDER NOTES
History     Chief Complaint   Patient presents with    Fever     Pt symptoms started on 8/26, pt tested positive for covid on the 27th, pt was started on paxlovid. Pt reports she feels horrible, pt feels fatigue. Per  report pt has had a poor appetite since Tuesday, pt temp has improved, temp today 99 without medication      HPI  Yaritza Bradley is a 68 year old female who has some fatigue and a fever.  The patient tested positive for COVID 3 days ago.  Her symptoms started on Monday.  She is on Paxlovid already.  She says that she is feeling very tired.  She has some cough.  No diarrhea.  She has a poor appetite.  No vomiting.  No nausea.  She is having muscle pain and some chills.  She has a history of rheumatoid arthritis and GERD, chronic bilateral lower back pain, hemorrhage of rectum and anus, and she takes methotrexate chronically.  She is not hypoxic.  She is afebrile on arrival.  Her  is at bedside providing additional history.  I reviewed her primary care notes and it looks like she started on mirtazapine to begin in the month in order to increase her caloric intake.    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     Priority: Medium     Managed by neurologist        Balance problems 08/31/2020     Priority: Medium    Femoral acetabular impingement, right 04/26/2016     Priority: Medium    Primary osteoarthritis of right hip, end stage DJD 04/26/2016     Priority: Medium    Memory loss or impairment 07/07/2014     Priority: Medium    Unintentional weight loss 07/07/2012     Priority: Medium    Moderate recurrent major depression (H) 07/07/2012     Priority: Medium    Generalized anxiety disorder 04/10/2012     Priority: Medium    GERD (gastroesophageal reflux disease) 03/17/2011      Priority: Medium    Hyperlipidemia LDL goal <160 10/31/2010     Priority: Medium    Disorder of bone and cartilage 02/20/2007     Priority: Medium     H/o osteopenia; previously on Actonel      Rheumatoid arthritis (H) 04/26/2006     Priority: Medium     Celine Moreno          Past Medical History:    Past Medical History:   Diagnosis Date    Acetabular labrum tear, right, initial encounter 4/26/2016    Arthritis     Cellulitis 5/20/2018    Depressive disorder     Hemorrhage of rectum and anus 2002    Post-operative nausea and vomiting 7/14/2017    Trochanteric bursitis of right hip 4/26/2016       Past Surgical History:    Past Surgical History:   Procedure Laterality Date    ARTHROSCOPY SHOULDER ROTATOR CUFF REPAIR      COLONOSCOPY N/A 10/14/2016    Procedure: COLONOSCOPY;  Surgeon: Gerson Douglas MD;  Location: [facility]    COLONOSCOPY N/A 7/8/2020    Procedure: COLONOSCOPY, WITH POLYPECTOMY AND BIOPSY;  Surgeon: Jerry Cleveland DO;  Location: [facility]    DILATION AND CURETTAGE      ELBOW SURGERY      ESOPHAGOSCOPY, GASTROSCOPY, DUODENOSCOPY (EGD), COMBINED  8/19/2011    Procedure:COMBINED ESOPHAGOSCOPY, GASTROSCOPY, DUODENOSCOPY (EGD), BIOPSY SINGLE OR MULTIPLE; Gastroscopy with biopsy; Surgeon:FARZAD GARCIA; Location:[facility]    ESOPHAGOSCOPY, GASTROSCOPY, DUODENOSCOPY (EGD), COMBINED  7/28/2014    Procedure: COMBINED ESOPHAGOSCOPY, GASTROSCOPY, DUODENOSCOPY (EGD), BIOPSY SINGLE OR MULTIPLE;  Surgeon: Marshall Martinez MD;  Location: [facility]    ESOPHAGOSCOPY, GASTROSCOPY, DUODENOSCOPY (EGD), COMBINED N/A 7/18/2018    Procedure: COMBINED ESOPHAGOSCOPY, GASTROSCOPY, DUODENOSCOPY (EGD), BIOPSY SINGLE OR MULTIPLE;  gastroscopy with biopsies;  Surgeon: Jorge Schofield MD;  Location: [facility]    HC DILATION/CURETTAGE DIAG/THER NON OB  2001    for uterine fibroids    OPEN REDUCTION INTERNAL FIXATION ELBOW Right 1/22/2016    right THR    ROTATOR CUFF REPAIR RT/LT  2009    left    TOTAL  "HIP ARTHROPLASTY Right 5/3/2016    Procedure: RIGHT TOTAL HIP ARTHROPLASTY;  Surgeon: Jeffrey Swann MD;  Location: [facility] OR;  Service:        Family History:    Family History   Problem Relation Age of Onset    Cancer Mother         skin    Colon Cancer Mother     C.A.D. Father 54    C.A.D. Brother     Alcohol/Drug Brother     Alzheimer Disease Maternal Grandmother     Alzheimer Disease Maternal Grandfather     Alzheimer Disease Paternal Grandmother     C.A.D. Paternal Grandfather     Anesthesia Reaction No family hx of     Clotting Disorder No family hx of        Social History:  Marital Status:   [2]  Social History     Tobacco Use    Smoking status: Never    Smokeless tobacco: Never   Substance Use Topics    Alcohol use: Yes     Comment: 1 wine a month hardly    Drug use: No        Medications:    Cyanocobalamin (B-12) 1000 MCG TBCR  folic acid (FOLVITE) 1 MG tablet  methotrexate 50 MG/2ML injection CHEMO  mirtazapine (REMERON) 7.5 MG tablet  nirmatrelvir and ritonavir (PAXLOVID) 300 mg/100 mg therapy pack  Omega-3 Fatty Acids (OMEGA-3 FISH OIL PO)  RESTASIS 0.05 % ophthalmic emulsion  SM CALCIUM SOFT CHEWS 500-100-40 OR CHEW          Review of Systems    Physical Exam   BP: 132/73  Pulse: 90  Temp: 99  F (37.2  C)  Resp: 18  Height: 162.6 cm (5' 4\")  Weight: 44.9 kg (99 lb)  SpO2: 97 %      Physical Exam  Constitutional:       General: She is not in acute distress.     Appearance: She is not toxic-appearing.   HENT:      Right Ear: External ear normal.      Left Ear: External ear normal.      Nose: Nose normal.      Mouth/Throat:      Mouth: Mucous membranes are moist.   Cardiovascular:      Rate and Rhythm: Normal rate.   Pulmonary:      Comments: Not hypoxic  Lungs are clear b/l, no wheezing or rales    Abdominal:      General: There is no distension.      Tenderness: There is no abdominal tenderness.   Musculoskeletal:         General: No swelling or deformity.   Skin:     Capillary " Refill: Capillary refill takes less than 2 seconds.   Neurological:      General: No focal deficit present.      Cranial Nerves: No cranial nerve deficit.         ED Course     ED Course as of 08/30/24 2208   Fri Aug 30, 2024   1901 Patient got medication.   1901 Fluids are running.   1947 Patient is feeling well at this time.  She would like to be discharged.  Her  would like her to be discharged as well.  She is nontoxic.   1947 He says she is feeling better and I encouraged hydration and food intake.  She says she will try to stay hydrated and eat even though she does not have much of an appetite.  I told her that if she takes Tylenol around-the-clock that will probably make her feel better and she will be able to eat more.  I gave her ER precautions.  She expressed understanding.  I will discharge her at her request at this time     Procedures              No results found for this or any previous visit (from the past 24 hour(s)).    Medications   predniSONE (DELTASONE) tablet 40 mg (40 mg Oral $Given 8/30/24 1848)   acetaminophen (TYLENOL) tablet 1,000 mg (1,000 mg Oral $Given 8/30/24 1848)   dextrose 5% and 0.45% NaCl infusion (0 mLs Intravenous Stopped 8/30/24 1951)   ibuprofen (ADVIL/MOTRIN) tablet 600 mg (600 mg Oral $Given 8/30/24 1847)       Assessments & Plan (with Medical Decision Making)     This seems to be a case of fatigue and appetite loss in the setting of viral syndrome, in this case COVID.  I going to defer chest x-ray as I doubt lung pathology as she is not coughing in her respiratory exam is reassuring.  I think that this is likely primarily driven by COVID and I have a low suspicion that this is hypothyroidism or infection of other etiology.  Going to give her some prednisone to stimulate her appetite some fluids including D5 half NS.  I am also going to give her some Tylenol Motrin for her myalgias.    I have reviewed the nursing notes.    I have reviewed the findings, diagnosis, plan  and need for follow up with the patient.      Medical Decision Making  The patient's presentation was of moderate complexity (an acute illness with systemic symptoms).    The patient's evaluation involved:  an assessment requiring an independent historian (see separate area of note for details)  strong consideration of a test (see separate area of note for details) that was ultimately deferred    The patient's management necessitated moderate risk (prescription drug management including medications given in the ED).        Discharge Medication List as of 8/30/2024  7:52 PM          Final diagnoses:   COVID-19 virus infection       8/30/2024   [facility] EMERGENCY DEPT       Daniel Dias MD  08/30/24 [id number]

## 2024-08-31 NOTE — DISCHARGE INSTRUCTIONS
Stay hydrated, try to eat.    Yancey foods like bananas and rice may be a good option.    You can take 500 milligrams of Tylenol and 600 milligrams of ibuprofen every 8 hours.  You can stagger these so that you are getting one of them every 4 hours. This will help you feel better and improve your appetite.    Take care and feel better soon.

## 2024-09-03 ENCOUNTER — MYC MEDICAL ADVICE (OUTPATIENT)
Dept: FAMILY MEDICINE | Facility: CLINIC | Age: 69
End: 2024-09-03
Payer: COMMERCIAL

## 2024-09-11 ASSESSMENT — PATIENT HEALTH QUESTIONNAIRE - PHQ9
10. IF YOU CHECKED OFF ANY PROBLEMS, HOW DIFFICULT HAVE THESE PROBLEMS MADE IT FOR YOU TO DO YOUR WORK, TAKE CARE OF THINGS AT HOME, OR GET ALONG WITH OTHER PEOPLE: VERY DIFFICULT
SUM OF ALL RESPONSES TO PHQ QUESTIONS 1-9: 26
SUM OF ALL RESPONSES TO PHQ QUESTIONS 1-9: 26

## 2024-09-12 ENCOUNTER — OFFICE VISIT (OUTPATIENT)
Dept: FAMILY MEDICINE | Facility: CLINIC | Age: 69
End: 2024-09-12
Payer: COMMERCIAL

## 2024-09-12 VITALS
TEMPERATURE: 97.3 F | WEIGHT: 99.25 LBS | OXYGEN SATURATION: 98 % | RESPIRATION RATE: 18 BRPM | HEART RATE: 83 BPM | BODY MASS INDEX: 16.94 KG/M2 | DIASTOLIC BLOOD PRESSURE: 70 MMHG | HEIGHT: 64 IN | SYSTOLIC BLOOD PRESSURE: 110 MMHG

## 2024-09-12 DIAGNOSIS — E46 PROTEIN-CALORIE MALNUTRITION, UNSPECIFIED SEVERITY (H): Primary | ICD-10-CM

## 2024-09-12 DIAGNOSIS — F41.1 GENERALIZED ANXIETY DISORDER: ICD-10-CM

## 2024-09-12 DIAGNOSIS — F33.1 MODERATE RECURRENT MAJOR DEPRESSION (H): ICD-10-CM

## 2024-09-12 PROCEDURE — 99214 OFFICE O/P EST MOD 30 MIN: CPT | Performed by: FAMILY MEDICINE

## 2024-09-12 RX ORDER — MIRTAZAPINE 15 MG/1
15 TABLET, FILM COATED ORAL AT BEDTIME
Qty: 30 TABLET | Refills: 1 | Status: SHIPPED | OUTPATIENT
Start: 2024-09-12

## 2024-09-12 RX ORDER — MIRTAZAPINE 15 MG/1
15 TABLET, FILM COATED ORAL AT BEDTIME
Qty: 30 TABLET | Refills: 1 | Status: SHIPPED | OUTPATIENT
Start: 2024-09-12 | End: 2024-09-12 | Stop reason: DRUGHIGH

## 2024-09-12 RX ORDER — MIRTAZAPINE 15 MG/1
15 TABLET, FILM COATED ORAL AT BEDTIME
Qty: 30 TABLET | Refills: 1 | Status: SHIPPED | OUTPATIENT
Start: 2024-09-12 | End: 2024-09-12

## 2024-09-12 ASSESSMENT — PAIN SCALES - GENERAL: PAINLEVEL: NO PAIN (0)

## 2024-09-12 NOTE — PATIENT INSTRUCTIONS
"Increase Mirtazapine to 15 mg at bedtime    Increase fluid/water intake, you can liberalize salt intake a bit.        When you are out of refills or the refills say \"zero\", it is time to schedule your next appointment in clinic!    Labs are released to you almost immediately and sometimes before I have had a chance to review them.  I review labs regularly and once they are all in, you will either be sent a letter with your results or if you are signed up for on-line services, you will be notified that results are available to you on [employer]. If there are serious findings, you typically will be called.    If you have any questions about your visit, your symptoms, your medication, your test results or it is not clear what your diagnosis or treatment plan is please contact me (via [employer]) or call the care team at [phone] and say \"Care Team\"          "

## 2024-09-12 NOTE — PROGRESS NOTES
Assessment & Plan     Protein-calorie malnutrition, unspecified severity (H24)  Increase mirtazapine to 15 mg  Unfortunately a set back with recent covid, poor appetite with that  Recheck in 1 month at annual wellness visit    - mirtazapine (REMERON) 15 MG tablet; Take 1 tablet (15 mg) by mouth at bedtime.    Moderate recurrent major depression (H)     - mirtazapine (REMERON) 15 MG tablet; Take 1 tablet (15 mg) by mouth at bedtime.    Generalized anxiety disorder     - mirtazapine (REMERON) 15 MG tablet; Take 1 tablet (15 mg) by mouth at bedtime.    Covid infection  Recent infection, vitals stable  Still a bit unsteady and off balance  Neuro exam is unremarkable today   Continue to push fluids/rest    MED REC REQUIRED  Post Medication Reconciliation Status: discharge medications reconciled and changed, per note/orders  Depression Screening Follow Up        9/11/2024     3:26 PM   PHQ   PHQ-9 Total Score 26   Q9: Thoughts of better off dead/self-harm past 2 weeks Nearly every day   F/U: Thoughts of suicide or self-harm No   F/U: Safety concerns No                     Follow Up Actions Taken      Discussed the following ways the patient can remain in a safe environment:          Prosper Rivera is a 68 year old, presenting for the following health issues:  Cough        9/12/2024     9:24 AM   Additional Questions   Roomed by Sophia COFFMAN CMA   Accompanied by      HPI       ED/UC Followup:    Facility:  [facility]  Date of visit: 8/30/2024  Reason for visit: Covid19  Current Status: Today she notes feeling very fatigues with lightheadedness, dizziness and off balance. No LOC/falls. No other cold sx.    Mirtazapine started a month ago- better sleep and moods seem improved.  Didn't seem to help much with appetite, but then got COVID and lost weight down to 95 lbs.          Review of Systems  Constitutional, HEENT, cardiovascular, pulmonary, gi and gu systems are negative, except as otherwise noted.     "  Objective    /70   Pulse 83   Temp 97.3  F (36.3  C) (Tympanic)   Resp 18   Ht 1.626 m (5' 4\")   Wt 45 kg (99 lb 4 oz)   LMP  (LMP Unknown)   SpO2 98%   BMI 17.04 kg/m    Body mass index is 17.04 kg/m .  Physical Exam   GENERAL: alert and no distress  NECK: no adenopathy, no asymmetry, masses, or scars  RESP: lungs clear to auscultation - no rales, rhonchi or wheezes  CV: regular rate and rhythm, normal S1 S2, no S3 or S4, no murmur, click or rub, no peripheral edema  ABDOMEN: soft, nontender, no hepatosplenomegaly, no masses and bowel sounds normal  NEURO: Normal strength and tone, sensory exam grossly normal, mentation intact, speech normal, and Romberg normal            Signed Electronically by: Aziza Aviles MD    "

## 2024-10-03 ENCOUNTER — TELEPHONE (OUTPATIENT)
Dept: FAMILY MEDICINE | Facility: CLINIC | Age: 69
End: 2024-10-03
Payer: COMMERCIAL

## 2024-10-03 NOTE — TELEPHONE ENCOUNTER
Patient Quality Outreach    Patient is due for the following:   Mammogram    Next Steps:   - DAP  - Schedule wellness visit  - Vaccine update    Type of outreach:    None, pt has wellness visit scheduled 10/15. Will address then      Questions for provider review:    None           Sophia Montes, CMA

## 2024-10-03 NOTE — LETTER
My Depression Action Plan  Name: Yaritza Bradley   Date of Birth 1955  Date: 10/3/2024    My doctor: Aziza Aviles   My clinic: [facility]  [address] [facility] [address]  [phone]            GREEN    ZONE   Good Control    What it looks like:   Things are going generally well. You have normal ups and downs. You may even feel depressed from time to time, but bad moods usually last less than a day.   What you need to do:  Continue to care for yourself (see self care plan)  Check your depression survival kit and update it as needed  Follow your physician s recommendations including any medication.  Do not stop taking medication unless you consult with your physician first.             YELLOW         ZONE Getting Worse    What it looks like:   Depression is starting to interfere with your life.   It may be hard to get out of bed; you may be starting to isolate yourself from others.  Symptoms of depression are starting to last most all day and this has happened for several days.   You may have suicidal thoughts but they are not constant.   What you need to do:     Call your care team. Your response to treatment will improve if you keep your care team informed of your progress. Yellow periods are signs an adjustment may need to be made.     Continue your self-care.  Just get dressed and ready for the day.  Don't give yourself time to talk yourself out of it.    Talk to someone in your support network.    Open up your Depression Self-Care Plan/Wellness Kit.             RED    ZONE Medical Alert - Get Help    What it looks like:   Depression is seriously interfering with your life.   You may experience these or other symptoms: You can t get out of bed most days, can t work or engage in other necessary activities, you have trouble taking care of basic hygiene, or basic responsibilities, thoughts of suicide or death that will not go away, self-injurious  behavior.     What you need to do:  Call your care team and request a same-day appointment. If they are not available (weekends or after hours) call your local crisis line, emergency room or 911.          Depression Self-Care Plan / Wellness Kit    Many people find that medication and therapy are helpful treatments for managing depression. In addition, making small changes to your everyday life can help to boost your mood and improve your wellbeing. Below are some tips for you to consider. Be sure to talk with your medical provider and/or behavioral health consultant if your symptoms are worsening or not improving.     Sleep   Sleep hygiene  means all of the habits that support good, restful sleep. It includes maintaining a consistent bedtime and wake time, using your bedroom only for sleeping or sex, and keeping the bedroom dark and free of distractions like a computer, smartphone, or television.     Develop a Healthy Routine  Maintain good hygiene. Get out of bed in the morning, make your bed, brush your teeth, take a shower, and get dressed. Don t spend too much time viewing media that makes you feel stressed. Find time to relax each day.    Exercise  Get some form of exercise every day. This will help reduce pain and release endorphins, the  feel good  chemicals in your brain. It can be as simple as just going for a walk or doing some gardening, anything that will get you moving.      Diet  Strive to eat healthy foods, including fruits and vegetables. Drink plenty of water. Avoid excessive sugar, caffeine, alcohol, and other mood-altering substances.     Stay Connected with Others  Stay in touch with friends and family members.    Manage Your Mood  Try deep breathing, massage therapy, biofeedback, or meditation. Take part in fun activities when you can. Try to find something to smile about each day.     Psychotherapy  Be open to working with a therapist if your provider recommends it.     Medication  Be sure to  take your medication as prescribed. Most anti-depressants need to be taken every day. It usually takes several weeks for medications to work. Not all medicines work for all people. It is important to follow-up with your provider to make sure you have a treatment plan that is working for you. Do not stop your medication abruptly without first discussing it with your provider.    Crisis Resources   These hotlines are for both adults and children. They and are open 24 hours a day, 7 days a week unless noted otherwise.    National Suicide Prevention Lifeline   988 or 9-910-771-VBDU (5315)    Crisis Text Line    www.crisistextline.org  Text HOME to 165401 from anywhere in the [place], anytime, about any type of crisis. A live, trained crisis counselor will receive the text and respond quickly.    [place] Lifeline for LGBTQ Youth  A national crisis intervention and suicide lifeline for LGBTQ youth under 25. Provides a safe place to talk without judgement. Call 1-645.115.1160; text START to 089011 or visit www.thetrevorproject.org to talk to a trained counselor.    For [facility] crisis numbers, visit the [facility] website at:  https://[place].gov/dhs/people-we-serve/adults/health-care/mental-health/resources/crisis-contacts.jsp

## 2024-10-09 ENCOUNTER — TRANSFERRED RECORDS (OUTPATIENT)
Dept: HEALTH INFORMATION MANAGEMENT | Facility: CLINIC | Age: 69
End: 2024-10-09
Payer: COMMERCIAL

## 2024-10-09 LAB
ALT SERPL-CCNC: 13 IU/L (ref 6–32)
AST SERPL-CCNC: 32 U/L (ref 10–35)
CREATININE (EXTERNAL): 0.7 MG/DL (ref 0.5–1.05)

## 2024-10-11 ENCOUNTER — MYC MEDICAL ADVICE (OUTPATIENT)
Dept: FAMILY MEDICINE | Facility: CLINIC | Age: 69
End: 2024-10-11
Payer: COMMERCIAL

## 2024-10-11 NOTE — TELEPHONE ENCOUNTER
"Added \"wants cognitive assessment\" to appt notes on 10/15/24 appt for annual wellness and replied to patient's spouse via My Chart.    An Michael LPN  "

## 2024-10-14 PROBLEM — M81.0 AGE-RELATED OSTEOPOROSIS WITHOUT CURRENT PATHOLOGICAL FRACTURE: Status: ACTIVE | Noted: 2024-10-14

## 2024-10-14 SDOH — HEALTH STABILITY: PHYSICAL HEALTH: ON AVERAGE, HOW MANY MINUTES DO YOU ENGAGE IN EXERCISE AT THIS LEVEL?: 10 MIN

## 2024-10-14 SDOH — HEALTH STABILITY: PHYSICAL HEALTH: ON AVERAGE, HOW MANY DAYS PER WEEK DO YOU ENGAGE IN MODERATE TO STRENUOUS EXERCISE (LIKE A BRISK WALK)?: 3 DAYS

## 2024-10-14 ASSESSMENT — SOCIAL DETERMINANTS OF HEALTH (SDOH): HOW OFTEN DO YOU GET TOGETHER WITH FRIENDS OR RELATIVES?: THREE TIMES A WEEK

## 2024-10-14 ASSESSMENT — PATIENT HEALTH QUESTIONNAIRE - PHQ9
SUM OF ALL RESPONSES TO PHQ QUESTIONS 1-9: 9
SUM OF ALL RESPONSES TO PHQ QUESTIONS 1-9: 9
10. IF YOU CHECKED OFF ANY PROBLEMS, HOW DIFFICULT HAVE THESE PROBLEMS MADE IT FOR YOU TO DO YOUR WORK, TAKE CARE OF THINGS AT HOME, OR GET ALONG WITH OTHER PEOPLE: SOMEWHAT DIFFICULT

## 2024-10-15 ENCOUNTER — OFFICE VISIT (OUTPATIENT)
Dept: FAMILY MEDICINE | Facility: CLINIC | Age: 69
End: 2024-10-15
Payer: COMMERCIAL

## 2024-10-15 VITALS
HEART RATE: 83 BPM | BODY MASS INDEX: 17.42 KG/M2 | OXYGEN SATURATION: 98 % | DIASTOLIC BLOOD PRESSURE: 70 MMHG | TEMPERATURE: 97.7 F | WEIGHT: 102 LBS | HEIGHT: 64 IN | SYSTOLIC BLOOD PRESSURE: 124 MMHG

## 2024-10-15 DIAGNOSIS — Z00.00 ENCOUNTER FOR MEDICARE ANNUAL WELLNESS EXAM: Primary | ICD-10-CM

## 2024-10-15 DIAGNOSIS — I67.81 LEUKOARAIOSIS: ICD-10-CM

## 2024-10-15 DIAGNOSIS — F41.1 GENERALIZED ANXIETY DISORDER: ICD-10-CM

## 2024-10-15 DIAGNOSIS — F33.1 MODERATE RECURRENT MAJOR DEPRESSION (H): Chronic | ICD-10-CM

## 2024-10-15 DIAGNOSIS — E46 PROTEIN-CALORIE MALNUTRITION, UNSPECIFIED SEVERITY (H): ICD-10-CM

## 2024-10-15 DIAGNOSIS — F03.90 DEMENTIA WITHOUT BEHAVIORAL DISTURBANCE, PSYCHOTIC DISTURBANCE, MOOD DISTURBANCE, OR ANXIETY, UNSPECIFIED DEMENTIA SEVERITY, UNSPECIFIED DEMENTIA TYPE (H): ICD-10-CM

## 2024-10-15 PROCEDURE — 99213 OFFICE O/P EST LOW 20 MIN: CPT | Performed by: FAMILY MEDICINE

## 2024-10-15 PROCEDURE — G0439 PPPS, SUBSEQ VISIT: HCPCS | Mod: 25 | Performed by: FAMILY MEDICINE

## 2024-10-15 RX ORDER — MIRTAZAPINE 7.5 MG/1
7.5 TABLET, FILM COATED ORAL AT BEDTIME
Qty: 90 TABLET | Refills: 3 | Status: SHIPPED | OUTPATIENT
Start: 2024-10-15

## 2024-10-15 ASSESSMENT — PAIN SCALES - GENERAL: PAINLEVEL: NO PAIN (0)

## 2024-10-15 NOTE — PATIENT INSTRUCTIONS
Patient Education   Preventive Care Advice   This is general advice given by our system to help you stay healthy. However, your care team may have specific advice just for you. Please talk to your care team about your preventive care needs.  Nutrition  Eat 5 or more servings of fruits and vegetables each day.  Try wheat bread, brown rice and whole grain pasta (instead of white bread, rice, and pasta).  Get enough calcium and vitamin D. Check the label on foods and aim for 100% of the RDA (recommended daily allowance).  Lifestyle  Exercise at least 150 minutes each week  (30 minutes a day, 5 days a week).  Do muscle strengthening activities 2 days a week. These help control your weight and prevent disease.  No smoking.  Wear sunscreen to prevent skin cancer.  Have a dental exam and cleaning every 6 months.  Yearly exams  See your health care team every year to talk about:  Any changes in your health.  Any medicines your care team has prescribed.  Preventive care, family planning, and ways to prevent chronic diseases.  Shots (vaccines)   HPV shots (up to age 26), if you've never had them before.  Hepatitis B shots (up to age 59), if you've never had them before.  COVID-19 shot: Get this shot when it's due.  Flu shot: Get a flu shot every year.  Tetanus shot: Get a tetanus shot every 10 years.  Pneumococcal, hepatitis A, and RSV shots: Ask your care team if you need these based on your risk.  Shingles shot (for age 50 and up)  General health tests  Diabetes screening:  Starting at age 35, Get screened for diabetes at least every 3 years.  If you are younger than age 35, ask your care team if you should be screened for diabetes.  Cholesterol test: At age 39, start having a cholesterol test every 5 years, or more often if advised.  Bone density scan (DEXA): At age 50, ask your care team if you should have this scan for osteoporosis (brittle bones).  Hepatitis C: Get tested at least once in your life.  STIs (sexually  transmitted infections)  Before age 24: Ask your care team if you should be screened for STIs.  After age 24: Get screened for STIs if you're at risk. You are at risk for STIs (including HIV) if:  You are sexually active with more than one person.  You don't use condoms every time.  You or a partner was diagnosed with a sexually transmitted infection.  If you are at risk for HIV, ask about PrEP medicine to prevent HIV.  Get tested for HIV at least once in your life, whether you are at risk for HIV or not.  Cancer screening tests  Cervical cancer screening: If you have a cervix, begin getting regular cervical cancer screening tests starting at age 21.  Breast cancer scan (mammogram): If you've ever had breasts, begin having regular mammograms starting at age 40. This is a scan to check for breast cancer.  Colon cancer screening: It is important to start screening for colon cancer at age 45.  Have a colonoscopy test every 10 years (or more often if you're at risk) Or, ask your provider about stool tests like a FIT test every year or Cologuard test every 3 years.  To learn more about your testing options, visit:   .  For help making a decision, visit:   https://bit.ly/kq81199.  Prostate cancer screening test: If you have a prostate, ask your care team if a prostate cancer screening test (PSA) at age 55 is right for you.  Lung cancer screening: If you are a current or former smoker ages 50 to 80, ask your care team if ongoing lung cancer screenings are right for you.  For informational purposes only. Not to replace the advice of your health care provider. Copyright   2023 [facility]. All rights reserved. Clinically reviewed by the [facility] Transitions Program. [employer] 553130 - REV 01/24.  Learning About Activities of Daily Living  What are activities of daily living?     Activities of daily living (ADLs) are the basic self-care tasks you do every day. These include eating, bathing, dressing,  and moving around.  As you age, and if you have health problems, you may find that it's harder to do some of these tasks. If so, your doctor can suggest ideas that may help.  To measure what kind of help you may need, your doctor will ask how well you are able to do ADLs. Let your doctor know if there are any tasks that you are having trouble doing. This is an important first step to getting help. And when you have the help you need, you can stay as independent as possible.  How will a doctor assess your ADLs?  Asking about ADLs is part of a routine health checkup your doctor will likely do as you age. Your health check might be done in a doctor's office, in your home, or at a hospital. The goal is to find out if you are having any problems that could make it hard to care for yourself or that make it unsafe for you to be on your own.  To measure your ADLs, your doctor will ask how hard it is for you to do routine tasks. Your doctor may also want to know if you have changed the way you do a task because of a health problem. Your doctor may watch how you:  Walk back and forth.  Keep your balance while you stand or walk.  Move from sitting to standing or from a bed to a chair.  Button or unbutton a shirt or sweater.  Remove and put on your shoes.  It's common to feel a little worried or anxious if you find you can't do all the things you used to be able to do. Talking with your doctor about ADLs is a way to make sure you're as safe as possible and able to care for yourself as well as you can. You may want to bring a caregiver, friend, or family member to your checkup. They can help you talk to your doctor.  Follow-up care is a key part of your treatment and safety. Be sure to make and go to all appointments, and call your doctor if you are having problems. It's also a good idea to know your test results and keep a list of the medicines you take.  Current as of: October 24, 2023  Content Version: 14.2 2024 [facility]  [employer].   Care instructions adapted under license by your healthcare professional. If you have questions about a medical condition or this instruction, always ask your healthcare professional. [employer]orporated disclaims any warranty or liability for your use of this information.    Preventing Falls: Care Instructions  Injuries and health problems such as trouble walking or poor eyesight can increase your risk of falling. So can some medicines. But there are things you can do to help prevent falls. You can exercise to get stronger. You can also arrange your home to make it safer.    Talk to your doctor about the medicines you take. Ask if any of them increase the risk of falls and whether they can be changed or stopped.   Try to exercise regularly. It can help improve your strength and balance. This can help lower your risk of falling.         Practice fall safety and prevention.   Wear low-heeled shoes that fit well and give your feet good support. Talk to your doctor if you have foot problems that make this hard.  Carry a cellphone or wear a medical alert device that you can use to call for help.  Use stepladders instead of chairs to reach high objects. Don't climb if you're at risk for falls. Ask for help, if needed.  Wear the correct eyeglasses, if you need them.        Make your home safer.   Remove rugs, cords, clutter, and furniture from walkways.  Keep your house well lit. Use night-lights in hallways and bathrooms.  Install and use sturdy handrails on stairways.  Wear nonskid footwear, even inside. Don't walk barefoot or in socks without shoes.        Be safe outside.   Use handrails, curb cuts, and ramps whenever possible.  Keep your hands free by using a shoulder bag or backpack.  Try to walk in well-lit areas. Watch out for uneven ground, changes in pavement, and debris.  Be careful in the winter. Walk on the grass or gravel when sidewalks are slippery. Use de-icer on steps and walkways.  "Add non-slip devices to shoes.    Put grab bars and nonskid mats in your shower or tub and near the toilet. Try to use a shower chair or bath bench when bathing.   Get into a tub or shower by putting in your weaker leg first. Get out with your strong side first. Have a phone or medical alert device in the bathroom with you.   Where can you learn more?  Go to https://www.[employer].net/patiented  Enter G117 in the search box to learn more about \"Preventing Falls: Care Instructions.\"  Current as of: July 17, 2023  Content Version: 14.2 2024 [employer].   Care instructions adapted under license by your healthcare professional. If you have questions about a medical condition or this instruction, always ask your healthcare professional. Health[employer] Inc[employer] disclaims any warranty or liability for your use of this information.    Learning About Sleeping Well  What does sleeping well mean?     Sleeping well means getting enough sleep to feel good and stay healthy. How much sleep is enough varies among people.  The number of hours you sleep and how you feel when you wake up are both important. If you do not feel refreshed, you probably need more sleep. Another sign of not getting enough sleep is feeling tired during the day.  Experts recommend that adults get at least 7 or more hours of sleep per day. Children and older adults need more sleep.  Why is getting enough sleep important?  Getting enough quality sleep is a basic part of good health. When your sleep suffers, your physical health, mood, and your thoughts can suffer too. You may find yourself feeling more grumpy or stressed. Not getting enough sleep also can lead to serious problems, including injury, accidents, anxiety, and depression.  What might cause poor sleeping?  Many things can cause sleep problems, including:  Changes to your sleep schedule.  Stress. Stress can be caused by fear about a single event, such as giving a speech. Or you may " "have ongoing stress, such as worry about work or school.  Depression, anxiety, and other mental or emotional conditions.  Changes in your sleep habits or surroundings. This includes changes that happen where you sleep, such as noise, light, or sleeping in a different bed. It also includes changes in your sleep pattern, such as having jet lag or working a late shift.  Health problems, such as pain, breathing problems, and restless legs syndrome.  Lack of regular exercise.  Using alcohol, nicotine, or caffeine before bed.  How can you help yourself?  Here are some tips that may help you sleep more soundly and wake up feeling more refreshed.  Your sleeping area   Use your bedroom only for sleeping and sex. A bit of light reading may help you fall asleep. But if it doesn't, do your reading elsewhere in the house. Try not to use your TV, computer, smartphone, or tablet while you are in bed.  Be sure your bed is big enough to stretch out comfortably, especially if you have a sleep partner.  Keep your bedroom quiet, dark, and cool. Use curtains, blinds, or a sleep mask to block out light. To block out noise, use earplugs, soothing music, or a \"white noise\" machine.  Your evening and bedtime routine   Create a relaxing bedtime routine. You might want to take a warm shower or bath, or listen to soothing music.  Go to bed at the same time every night. And get up at the same time every morning, even if you feel tired.  What to avoid   Limit caffeine (coffee, tea, caffeinated sodas) during the day, and don't have any for at least 6 hours before bedtime.  Avoid drinking alcohol before bedtime. Alcohol can cause you to wake up more often during the night.  Try not to smoke or use tobacco, especially in the evening. Nicotine can keep you awake.  Limit naps during the day, especially close to bedtime.  Avoid lying in bed awake for too long. If you can't fall asleep or if you wake up in the middle of the night and can't get back to " "sleep within about 20 minutes, get out of bed and go to another room until you feel sleepy.  Avoid taking medicine right before bed that may keep you awake or make you feel hyper or energized. Your doctor can tell you if your medicine may do this and if you can take it earlier in the day.  If you can't sleep   Imagine yourself in a peaceful, pleasant scene. Focus on the details and feelings of being in a place that is relaxing.  Get up and do a quiet or boring activity until you feel sleepy.  Avoid drinking any liquids before going to bed to help prevent waking up often to use the bathroom.  Where can you learn more?  Go to https://www.[employer].net/patiented  Enter J942 in the search box to learn more about \"Learning About Sleeping Well.\"  Current as of: July 10, 2023  Content Version: 14.2 2024 [employer].   Care instructions adapted under license by your healthcare professional. If you have questions about a medical condition or this instruction, always ask your healthcare professional. Healthwise, Incorporated disclaims any warranty or liability for your use of this information.    Learning About Depression Screening  What is depression screening?  Depression screening is a way to see if you have depression symptoms. It may be done by a doctor or counselor. It's often part of a routine checkup. That's because your mental health is just as important as your physical health.  Depression is a mental health condition that affects how you feel, think, and act. You may:  Have less energy.  Lose interest in your daily activities.  Feel sad and grouchy for a long time.  Depression is very common. It affects people of all ages.  Many things can lead to depression. Some people become depressed after they have a stroke or find out they have a major illness like cancer or heart disease. The death of a loved one or a breakup may lead to depression. It can run in families. Most experts believe that a " "combination of inherited genes and stressful life events can cause it.  What happens during screening?  You may be asked to fill out a form about your depression symptoms. You and the doctor will discuss your answers. The doctor may ask you more questions to learn more about how you think, act, and feel.  What happens after screening?  If you have symptoms of depression, your doctor will talk to you about your options.  Doctors usually treat depression with medicines or counseling. Often, combining the two works best. Many people don't get help because they think that they'll get over the depression on their own. But people with depression may not get better unless they get treatment.  The cause of depression is not well understood. There may be many factors involved. But if you have depression, it's not your fault.  A serious symptom of depression is thinking about death or suicide. If you or someone you care about talks about this or about feeling hopeless, get help right away.  It's important to know that depression can be treated. Medicine, counseling, and self-care may help.  Where can you learn more?  Go to https://www.[employer]net/patiented  Enter T185 in the search box to learn more about \"Learning About Depression Screening.\"  Current as of: June 24, 2023  Content Version: 14.2 2024 [facility] [employer].   Care instructions adapted under license by your healthcare professional. If you have questions about a medical condition or this instruction, always ask your healthcare professional. Healthwise, Incorporated disclaims any warranty or liability for your use of this information.       "

## 2024-10-15 NOTE — PROGRESS NOTES
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  Patient/ wanted an updated MoCA/SLUMS today - SLUMS administered and she scored 14/30 which indicates dementia.  She has not had neuropsych testing since 2020 (done virtually).  She does have a consult with Neuropsychiatrist in November.   F/up with Neurologist    Dementia without behavioral disturbance, psychotic disturbance, mood disturbance, or anxiety, unspecified dementia severity, unspecified dementia type (H)  As above  Offered more resources through our care coordinators - declined at this time  Thankfully she's still doing ADLs independently - dressing, eating, toileting, bathing, basic cleaning, etc.   Having more and more difficult with executive functioning    Moderate recurrent major depression (H)  Doing better on this, helping with sleep and appetite to some degree   - mirtazapine (REMERON) 7.5 MG tablet; Take 1 tablet (7.5 mg) by mouth at bedtime.    Generalized anxiety disorder  Continue counseling    Protein-calorie malnutrition, unspecified severity (H)  Weight up a bit at home, continue higher calorie/higher protein options  - mirtazapine (REMERON) 7.5 MG tablet; Take 1 tablet (7.5 mg) by mouth at bedtime.    Patient has been advised of split billing requirements and indicates understanding: Yes        Depression Screening Follow Up        10/14/2024     7:24 PM   PHQ   PHQ-9 Total Score 9   Q9: Thoughts of better off dead/self-harm past 2 weeks Several days   F/U: Thoughts of suicide or self-harm No   F/U: Safety concerns No         Counseling  Appropriate preventive services were addressed with this patient via screening, questionnaire, or discussion as appropriate for fall prevention, nutrition, physical activity, Tobacco-use cessation, social engagement,  weight loss and cognition.  Checklist reviewing preventive services available has been given to the patient.  Reviewed patient's diet, addressing concerns and/or questions.   She is at risk for lack of exercise and has been provided with information to increase physical activity for the benefit of her well-being.   She is at risk for psychosocial distress and has been provided with information to reduce risk.   Discussed possible causes of fatigue. Updated plan of care.  Patient reported difficulty with activities of daily living were addressed today.Addressed any concerns about safety while driving.  The patient's PHQ-9 score is consistent with mild depression. She was provided with information regarding depression.           Prosper Rivera is a 68 year old, presenting for the following:  Medicare Visit        10/15/2024    11:07 AM   Additional Questions   Roomed by Sophia COFFMAN CMA   Accompanied by          Health Care Directive  Patient has a Health Care Directive on file  Advance care planning document is on file and is current.    HPI      - Cognitive concerns.        10/14/2024   General Health   How would you rate your overall physical health? (!) FAIR   Feel stress (tense, anxious, or unable to sleep) Only a little      (!) STRESS CONCERN      10/14/2024   Nutrition   Diet: Regular (no restrictions)            10/14/2024   Exercise   Days per week of moderate/strenous exercise 3 days   Average minutes spent exercising at this level 10 min            10/14/2024   Social Factors   Frequency of gathering with friends or relatives Three times a week   Worry food won't last until get money to buy more No   Food not last or not have enough money for food? No   Do you have housing? (Housing is defined as stable permanent housing and does not include staying ouside in a car, in a tent, in an abandoned building, in an overnight shelter, or couch-surfing.) Yes   Are you worried about losing your housing? No   Lack  of transportation? No   Unable to get utilities (heat,electricity)? No            10/15/2024   Fall Risk   Gait Speed Test (Document in seconds) 4   Gait Speed Test Interpretation Less than or equal to 5.00 seconds - PASS             10/14/2024   Activities of Daily Living- Home Safety   Needs help with the following daily activites Transportation    Shopping    Preparing meals    Housework    Medication administration    Money management   Safety concerns in the home None of the above       Multiple values from one day are sorted in reverse-chronological order         10/14/2024   Dental   Dentist two times every year? Yes            10/14/2024   Hearing Screening   Hearing concerns? None of the above            10/14/2024   Driving Risk Screening   Patient/family members have concerns about driving (!) YES              10/14/2024   General Alertness/Fatigue Screening   Have you been more tired than usual lately? (!) YES            10/14/2024   Urinary Incontinence Screening   Bothered by leaking urine in past 6 months No            10/14/2024   TB Screening   Were you born outside of the [place]? No          Today's PHQ-9 Score:       10/14/2024     7:24 PM   PHQ-9 SCORE   PHQ-9 Total Score MyChart 9 (Mild depression)   PHQ-9 Total Score 9         10/14/2024   Substance Use   Alcohol more than 3/day or more than 7/wk No   Do you have a current opioid prescription? No   How severe/bad is pain from 1 to 10? 1/10   Do you use any other substances recreationally? No        Social History     Tobacco Use    Smoking status: Never    Smokeless tobacco: Never   Substance Use Topics    Alcohol use: Yes     Comment: 1 wine a month hardly    Drug use: No           9/5/2023   LAST FHS-7 RESULTS   1st degree relative breast or ovarian cancer No   Any relative bilateral breast cancer No   Any male have breast cancer No   Any ONE woman have BOTH breast AND ovarian cancer No   Any woman with breast cancer before 50yrs No   2 or more  relatives with breast AND/OR ovarian cancer No   2 or more relatives with breast AND/OR bowel cancer No           Mammogram Screening - Mammogram every 1-2 years updated in Health Maintenance based on mutual decision making      History of abnormal Pap smear: No - age 65 or older with adequate negative prior screening test results (3 consecutive negative cytology results, 2 consecutive negative cotesting results, or 2 consecutive negative HrHPV test results within 10 years, with the most recent test occurring within the recommended screening interval for the test used)        Latest Ref Rng & Units 4/10/2019     1:57 PM 4/10/2019     1:30 PM 9/22/2016     1:39 PM   PAP / HPV   PAP (Historical)  NIL   NIL    HPV 16 DNA NEG^Negative  Negative  Negative    HPV 18 DNA NEG^Negative  Negative  Negative    Other HR HPV NEG^Negative  Negative  Negative      ASCVD Risk   The ASCVD Risk score (Jena MOSELEY, et al., 2019) failed to calculate for the following reasons:    The valid total cholesterol range is 130 to 320 mg/dL            Reviewed and updated as needed this visit by Provider                      Current providers sharing in care for this patient include:  Patient Care Team:  Aziza Aviles MD as PCP - General (Family Practice)  Aziza Aviles MD as Assigned PCP  An Yi PA-C as Physician Assistant (Dermatology)  Yefri Kruger DPM as Assigned Surgical Provider  Marcela Ruiz CNP as Assigned Neuroscience Provider  Tiffanie Srivastava, PhD as Assigned Behavioral Health Provider    The following health maintenance items are reviewed in Epic and correct as of today:  Health Maintenance   Topic Date Due    Pneumococcal Vaccine: 65+ Years (2 of 2 - PCV) 11/01/2019    COVID-19 Vaccine (7 - 2024-25 season) 09/01/2024    MAMMO SCREENING  09/05/2024    DEPRESSION 6 MO INDEX REPEAT PHQ-9  10/29/2024    DEXA  03/19/2025    PHQ-9  04/15/2025    COLORECTAL CANCER SCREENING  07/08/2025     "ANNUAL REVIEW OF HM ORDERS  08/08/2025    MEDICARE ANNUAL WELLNESS VISIT  10/15/2025    FALL RISK ASSESSMENT  10/15/2025    GLUCOSE  08/08/2027    LIPID  08/25/2027    DTAP/TDAP/TD IMMUNIZATION (3 - Td or Tdap) 05/20/2028    ADVANCE CARE PLANNING  09/05/2028    HEPATITIS C SCREENING  Completed    DEPRESSION ACTION PLAN  Completed    INFLUENZA VACCINE  Completed    ZOSTER IMMUNIZATION  Completed    RSV VACCINE  Completed    HPV IMMUNIZATION  Aged Out    MENINGITIS IMMUNIZATION  Aged Out    RSV MONOCLONAL ANTIBODY  Aged Out         Review of Systems  Constitutional, HEENT, cardiovascular, pulmonary, gi and gu systems are negative, except as otherwise noted.     Objective    Exam  /70   Pulse 83   Temp 97.7  F (36.5  C) (Tympanic)   Ht 1.626 m (5' 4\")   Wt 46.3 kg (102 lb)   LMP  (LMP Unknown)   SpO2 98%   BMI 17.51 kg/m     Estimated body mass index is 17.51 kg/m  as calculated from the following:    Height as of this encounter: 1.626 m (5' 4\").    Weight as of this encounter: 46.3 kg (102 lb).    Physical Exam  GENERAL: alert and no distress  NECK: no adenopathy, no asymmetry, masses, or scars  RESP: lungs clear to auscultation - no rales, rhonchi or wheezes  CV: regular rate and rhythm, normal S1 S2, no S3 or S4, no murmur, click or rub, no peripheral edema  ABDOMEN: soft, nontender, no hepatosplenomegaly, no masses and bowel sounds normal  MS: no gross musculoskeletal defects noted, no edema  NEURO: Normal strength and tone, follows conversation and speech is linear/goal directed, answers questions well.     PSYCH: mentation appears normal, tearful, and appearance well groomed    SLUMS 14/30          10/15/2024   Mini Cog   Mini-Cog Not Completed (choose reason) Known dementia                 Signed Electronically by: Aziza Aviles MD    "

## 2024-10-22 ASSESSMENT — ANXIETY QUESTIONNAIRES
3. WORRYING TOO MUCH ABOUT DIFFERENT THINGS: NOT AT ALL
GAD7 TOTAL SCORE: 4
IF YOU CHECKED OFF ANY PROBLEMS ON THIS QUESTIONNAIRE, HOW DIFFICULT HAVE THESE PROBLEMS MADE IT FOR YOU TO DO YOUR WORK, TAKE CARE OF THINGS AT HOME, OR GET ALONG WITH OTHER PEOPLE: NOT DIFFICULT AT ALL
4. TROUBLE RELAXING: SEVERAL DAYS
2. NOT BEING ABLE TO STOP OR CONTROL WORRYING: SEVERAL DAYS
GAD7 TOTAL SCORE: 4
7. FEELING AFRAID AS IF SOMETHING AWFUL MIGHT HAPPEN: NOT AT ALL
1. FEELING NERVOUS, ANXIOUS, OR ON EDGE: SEVERAL DAYS
7. FEELING AFRAID AS IF SOMETHING AWFUL MIGHT HAPPEN: NOT AT ALL
5. BEING SO RESTLESS THAT IT IS HARD TO SIT STILL: NOT AT ALL
GAD7 TOTAL SCORE: 4
8. IF YOU CHECKED OFF ANY PROBLEMS, HOW DIFFICULT HAVE THESE MADE IT FOR YOU TO DO YOUR WORK, TAKE CARE OF THINGS AT HOME, OR GET ALONG WITH OTHER PEOPLE?: NOT DIFFICULT AT ALL
6. BECOMING EASILY ANNOYED OR IRRITABLE: SEVERAL DAYS

## 2024-10-23 ENCOUNTER — VIRTUAL VISIT (OUTPATIENT)
Dept: PSYCHOLOGY | Facility: CLINIC | Age: 69
End: 2024-10-23
Payer: COMMERCIAL

## 2024-10-23 DIAGNOSIS — F33.1 MODERATE RECURRENT MAJOR DEPRESSION (H): ICD-10-CM

## 2024-10-23 DIAGNOSIS — M05.79 RHEUMATOID ARTHRITIS INVOLVING MULTIPLE SITES WITH POSITIVE RHEUMATOID FACTOR (H): ICD-10-CM

## 2024-10-23 DIAGNOSIS — I67.81 LEUKOARAIOSIS: ICD-10-CM

## 2024-10-23 DIAGNOSIS — F40.10 SOCIAL ANXIETY DISORDER: ICD-10-CM

## 2024-10-23 DIAGNOSIS — F41.1 GENERALIZED ANXIETY DISORDER WITH PANIC ATTACKS: Primary | ICD-10-CM

## 2024-10-23 DIAGNOSIS — F41.0 GENERALIZED ANXIETY DISORDER WITH PANIC ATTACKS: Primary | ICD-10-CM

## 2024-10-23 PROCEDURE — 90837 PSYTX W PT 60 MINUTES: CPT | Mod: 95 | Performed by: PSYCHOLOGIST

## 2024-10-23 NOTE — PROGRESS NOTES
Health Psychology          Faby Duarte, Ph.D.,  [phone]  Caitlyn Mcneil, Ph.D.,  [phone]  Lita Hurley, Ph.D.,  [phone]  Esteban Anderson, Ph.D., , [facility] [phone]  Ambika Murphy, Ph.D.,  [phone]  Tiffanie Srivastava, Ph.D., , [facility] [phone]    [facility], 3rd Floor  [address] [place] [address]       Health Psychology Progress Note    Patient Name: Yaritza Bradley    Service Type: Individual  Length of Visit: 56 minutes - extended session due to complexity of case, content, and duration between visits  Start time: 1:03 PM  Stop time: 1:59 PM   Attendees: significant other/spouse, Remi, with patient's permission   Session #: 8    Telemedicine Information:     Telemedicine Visit: The patient's condition can be safely assessed and treated via synchronous audio and visual telemedicine encounter.    Reason for Telemedicine Visit: Patient has requested telehealth visit and Patient convenience (e.g. access to timely appointments / distance to available provider)  Originating Site (Patient Location): Patient's [place], [place]  Distant Location (provider location):  On-site: [facility]  Consent:  The patient/guardian has verbally consented to: the potential risks and benefits of telemedicine (video visit) versus in person care; bill my insurance or make self-payment for services provided; and responsibility for payment of non-covered services.   Mode of Communication:  Video Conference via [employer]    As the provider I attest to compliance with applicable laws and regulations related to telemedicine.     Identifying Information and Presenting Problem:  The patient is a 68 year old adult who is being seen for problematic symptoms of depression and anxiety in the context of leukoaraiosis.    Topics Discussed/Interventions Provided:    Session Content:     Update: Patient returning for a follow-up appointment after  a 2 month hiatus. Notes that mirtazapine has helped with sleep and reducing negative mood. Had an annual physical with her PCP, Aziza Aviles MD, and completed a SLUMS. Obtained a score of 14/30, which places her in the dementia range. Patient and  note that this news was difficult to absorb and they would like to spend today's session discussing next steps.    Treatment Planning: Next steps for treatment. Preserve as much quality of life as possible. End of life planning. Making wishes known. Completed 90 day treatment plan review. Discussed progress and improvements made over time and priorities for next phase of treatment. Mutually agreed to continue to focus on preserving quality of life and helping patient expand her social contact.      Skills/intervention: Supported patient and  in processing their thoughts and emotions around the recent news about patient's continued cognitive decline. Discussed options for expanding social contact.        Top 6 values identified included: caring, compassion, honesty, order, self-care, supportiveness.    Treatment Objective(s) Addressed in This Session:  Psychological distress related to Chronic Disease Management    Progress on / Status of Treatment Objective(s) / Homework:  In progress  Stable    Medication Adherence: Patient reports initiating mirtazapine. Current medication list is below:     Current Outpatient Medications   Medication Sig Dispense Refill    Cyanocobalamin (B-12) 1000 MCG TBCR Take 1,000 mcg by mouth daily 100 tablet 1    folic acid (FOLVITE) 1 MG tablet Take 1 tablet (1,000 mcg) by mouth daily 90 tablet 3    methotrexate 50 MG/2ML injection CHEMO Inject 4 mg Subcutaneous every 7 days       mirtazapine (REMERON) 7.5 MG tablet Take 1 tablet (7.5 mg) by mouth at bedtime. 90 tablet 3    Omega-3 Fatty Acids (OMEGA-3 FISH OIL PO) Take 1 g by mouth daily (DRY EYE OMEGA BENEFITS) 667-250 mg-unit cap      RESTASIS 0.05 % ophthalmic emulsion Place  1 drop into both eyes 2 times daily      SM CALCIUM SOFT CHEWS 500-100-40 OR CHEW Take 1 tablet by mouth 2 times daily   0     No current facility-administered medications for this visit.        Assessment: The patient appeared to be active and engaged in today's session and was receptive to feedback.     Mental Status Exam:   Appearance: Appropriate   Eye Contact: Good    Orientation: Yes, x4  Behavior/relationship to provider/demeanor: Cooperative and Engaged  Motor Activity: restless  Mood (subjective report): Better  Affect (objective appearance): Appropriate/mood congruent  Speech Rate: Normal  Speech Volume: Normal  Speech Articulation: Normal  Speech Coherence: Normal  Speech Spontaneity: Normal  Thought Content: Recent suicidal ideation, no plans or intent, endorses depressive cognitions  Thought Process (associations): Logical, Linear, and Goal directed  Thought Process (rate): Normal  Abnormal Perception: None  Attention/Concentration: Normal  Memory: Appears grossly intact, though patient and  endorse memory impairment  Fund of Knowledge: Appears within normal limits   Abstraction:  Normal  Insight:  Adequate  Judgment:   Adequate    Does the patient appear to be at imminent risk of harm to self/others at this time? No    The session was necessary to address anxiety and depression in the context of neurological disease that have been interfering with patient's overall functioning and quality of life.  Ongoing psychotherapy is necessary to stabilize mood, provide counseling, improve functioning with daily activities, provide psychoeducation, provide support, and teach cognitive and behavioral skills.    Diagnoses:    1. Generalized anxiety disorder with panic attacks    2. Social anxiety disorder    3. Moderate recurrent major depression (H)    4. Leukoaraiosis    5. Rheumatoid arthritis involving multiple sites with positive rheumatoid factor (H)      Plan:    Follow-up video visit with me on  "11/20/24 at 1:00pm.   Patient to use 911 or other crisis resources in the event of a psychiatric emergency  Primary care needs and medications are managed by Aziza Aviles MD. Referring provider is Aziza Aviles MD  Next visit agenda/goals:   Explore ways to expand social contact    Skills taught/interventions used thus far:  Psychoeducation  Awareness training  Behavioral activation  Self-compassion   CCI modules 1-2  Positive self-talk  Values clarification    NOTE: Treatment plan update due 5/15/25; 90 day treatment plan review due 1/21/25                                              Individual Treatment Plan    Patient's Name: Yaritza Bradley  YOB: 1955    Date of Creation: 05/15/24  Date Treatment Plan Last Reviewed/Revised: 10/23/24    DSM5 Diagnoses:     1. Generalized anxiety disorder with panic attacks    2. Social anxiety disorder    3. Moderate recurrent major depression (H)        Psychosocial / Contextual Factors: health issues, limited social (emotional) support, and relationship stress  PROMIS (reviewed every 90 days):      10/22/2024  3:04 PM   PROMIS 10    PROMIS TOTAL - SUBSCORES 24        Referral / Collaboration:  Collaboration with medical providers and specialists as needed.    Anticipated number of session for this episode of care: 15-20  Anticipation frequency of session: 2x  Anticipated Duration of each session: 38-52 minutes  Treatment plan will be reviewed in 90 days or when goals have been changed.     Measurable Treatment Goal(s) related to diagnosis/functional impairment(s)  Goal 1: Patient will increase her sense of ramu and quality of life   \"I will know I've met my goal when I think more in a positive way and find a way to shoot down the negative when it comes or to replace it with something that is more constructive.\"     Objective #A (Patient Action)    Patient will increase the frequency of participating in enriching and meaningful activities (going out to " lunch with a friend, getting movement, going to Taoism in person, social physical activity, support groups, and other social activities) from 0 to 2x per week  Status: In progress - Date: 10/23/24 (patient reports walking daily, but notes she would like to increase the frequency of social contact)    Intervention(s)  Therapist will teach behavioral activation and committed action skills.    Objective #B  Patient will experience an increase in meaning connected to her current activities.  Status: In progress - Date: 10/23/24 (notes increase in sense of meaning with walking)    Intervention(s)  Therapist will teach gratitude skills, values clarification, and mindfulness skills.    Objective #C  Patient will experience an increase in self-compassion and a decrease in self-criticism.  Status: Significant progress - Date: 10/23/24 (improvement in self-compassion and decrease in self-criticism)     Intervention(s)  Therapist will teach self-compassion skills.    Objective #D  Patient will get some form of movement or physical activity at least 3x per week.  Status: Satisfactory progress - Date: 10/23/24 (patient is currently exceeding this goal)     Intervention(s)  Therapist will teach behavioral activation, committed action, problem-solving, goal-setting/habit forming, and health behavior change skills.    Objective #E  Patient will engage in activities that are cognitively stimulating.  Status: In progress - Date: 10/23/24 (patient is currently doing the crossword puzzle every day and will focus on increasing reading)     Intervention(s)  Therapist will support mental exercises and mentally stimulating activities.    Objective #F  Patient will explore end of life and advanced care planning.  Status: New - Date: 10/23/24    Intervention(s)  Therapist will support patient in updating living will, healthcare directive, and making advanced care planning decisions.    Objective #G  Patient will reflect on and identify areas  of appreciation in her life.  Status: New - Date: 10/23/24    Intervention(s)  Therapist will guide patient through reminiscence, gratitude, and dignity exercises.      Patient has reviewed and agreed to the above plan.      Tiffanie Srivastava, PhD  May 15, 2024      Tiffanie Srivastava, Ph.D., , [facility]  Clinical Health Psychologist  Phone: [phone]   Pager: [phone]

## 2024-10-23 NOTE — NURSING NOTE
Is the patient currently in the state of [place]? YES    Current patient location: [address] [place] [id number]    Visit mode:VIDEO    If the visit is dropped, the patient can be reconnected by: VIDEO VISIT: Text to cell phone:   Telephone Information:   Mobile [phone]       Will anyone else be joining the visit? No  (If patient encounters technical issues they should call [phone])    Are changes needed to the allergy or medication list? No    Are refills needed on medications prescribed by this physician? No    Rooming Documentation: Questionnaire(s) completed.    Reason for visit: NENITA Mckeon

## 2024-10-28 ENCOUNTER — PATIENT OUTREACH (OUTPATIENT)
Dept: CARE COORDINATION | Facility: CLINIC | Age: 69
End: 2024-10-28
Payer: COMMERCIAL

## 2024-11-06 ASSESSMENT — PATIENT HEALTH QUESTIONNAIRE - PHQ9: SUM OF ALL RESPONSES TO PHQ QUESTIONS 1-9: 19

## 2024-11-15 ENCOUNTER — HOSPITAL ENCOUNTER (OUTPATIENT)
Dept: CT IMAGING | Facility: CLINIC | Age: 69
Discharge: HOME | End: 2024-11-15
Attending: FAMILY MEDICINE | Admitting: FAMILY MEDICINE
Payer: COMMERCIAL

## 2024-11-15 DIAGNOSIS — R63.4 WEIGHT LOSS: ICD-10-CM

## 2024-11-15 LAB
CREAT BLD-MCNC: 0.8 MG/DL (ref 0.5–1)
EGFRCR SERPLBLD CKD-EPI 2021: >60 ML/MIN/1.73M2

## 2024-11-15 PROCEDURE — 250N000009 HC RX 250: Performed by: RADIOLOGY

## 2024-11-15 PROCEDURE — 82565 ASSAY OF CREATININE: CPT

## 2024-11-15 PROCEDURE — 74177 CT ABD & PELVIS W/CONTRAST: CPT

## 2024-11-15 PROCEDURE — 250N000011 HC RX IP 250 OP 636: Performed by: RADIOLOGY

## 2024-11-15 PROCEDURE — 71260 CT THORAX DX C+: CPT

## 2024-11-15 RX ORDER — IOPAMIDOL 755 MG/ML
50 INJECTION, SOLUTION INTRAVASCULAR ONCE
Status: COMPLETED | OUTPATIENT
Start: 2024-11-15 | End: 2024-11-15

## 2024-11-15 RX ADMIN — IOPAMIDOL 50 ML: 755 INJECTION, SOLUTION INTRAVENOUS at 12:09

## 2024-11-15 RX ADMIN — SODIUM CHLORIDE 52 ML: 9 INJECTION, SOLUTION INTRAVENOUS at 12:09

## 2024-11-27 ENCOUNTER — OFFICE VISIT (OUTPATIENT)
Dept: FAMILY MEDICINE | Facility: CLINIC | Age: 69
End: 2024-11-27
Payer: COMMERCIAL

## 2024-11-27 VITALS
SYSTOLIC BLOOD PRESSURE: 132 MMHG | HEART RATE: 103 BPM | TEMPERATURE: 98.4 F | BODY MASS INDEX: 17.29 KG/M2 | DIASTOLIC BLOOD PRESSURE: 64 MMHG | WEIGHT: 101.31 LBS | OXYGEN SATURATION: 95 % | HEIGHT: 64 IN

## 2024-11-27 DIAGNOSIS — I25.10 CORONARY ARTERY CALCIFICATION SEEN ON CAT SCAN: Primary | ICD-10-CM

## 2024-11-27 DIAGNOSIS — E78.5 HYPERLIPIDEMIA LDL GOAL <130: ICD-10-CM

## 2024-11-27 DIAGNOSIS — R10.12 LUQ ABDOMINAL PAIN: ICD-10-CM

## 2024-11-27 PROCEDURE — 99214 OFFICE O/P EST MOD 30 MIN: CPT | Performed by: FAMILY MEDICINE

## 2024-11-27 ASSESSMENT — PAIN SCALES - GENERAL: PAINLEVEL_OUTOF10: MODERATE PAIN (5)

## 2024-11-27 NOTE — PROGRESS NOTES
Assessment & Plan     Coronary artery calcification seen on CAT scan  Risks, benefits and alternatives discussed for statin  Recommend we get a fasting lipid.   Last time LDL was in the 20s before the statin was discontinued.  Neurologist shared with them some studies regarding the statin and the white mater changes in her brain.    - Lipid panel reflex to direct LDL Fasting; Future    Hyperlipidemia LDL goal <130     - Lipid panel reflex to direct LDL Fasting; Future    LUQ abdominal pain  CT scan recently was normal.   No hernia palpable  No injury  No fever/chills  No bowel changes.                 Prosper Rivera is a 69 year old, presenting for the following health issues:  Lipids        11/27/2024     8:50 AM   Additional Questions   Roomed by Sophia COFFMAN CMA   Accompanied by      History of Present Illness       Vascular Disease:  She presents for follow up of vascular disease.     She never takes nitroglycerin. She is not taking daily aspirin.    She eats 0-1 servings of fruits and vegetables daily.She consumes 1 sweetened beverage(s) daily.She exercises with enough effort to increase her heart rate 10 to 19 minutes per day.  She exercises with enough effort to increase her heart rate 3 or less days per week.   She is taking medications regularly.     - Discuss restarting statin. She has been on Simvastatin in the past for this without side effect.    Recent CT scan 11/15/2024:  MEDIASTINUM/AXILLAE: No intrathoracic lymphadenopathy is identified by  size criteria. Normal caliber thoracic aorta. Normal heart size. No  pericardial effusion. Moderate to severe coronary artery  calcifications. Normal esophagus.      Cholesterol   Date Value Ref Range Status   08/25/2022 112 <200 mg/dL Final   08/19/2021 118 <200 mg/dL Final     Comment:     Age 0-19 years  Desirable: <170 mg/dL  Borderline high:  170-199 mg/dl  High:            >199 mg/dl    Age 20 years and older  Desirable: <200 mg/dL   07/31/2020 146  <200 mg/dL Final   12/12/2018 195 <200 mg/dL Final     HDL Cholesterol   Date Value Ref Range Status   07/31/2020 74 >49 mg/dL Final   12/12/2018 72 >49 mg/dL Final     Direct Measure HDL   Date Value Ref Range Status   08/25/2022 66 >=50 mg/dL Final   08/19/2021 77 >=50 mg/dL Final     Comment:     0-19 years:       Greater than or equal to 45 mg/dL   Low: Less than 40 mg/dL   Borderline low: 40-44 mg/dL     20 years and older:   Female: Greater than or equal to 50 mg/dL   Male:   Greater than or equal to 40 mg/dL          LDL Cholesterol Calculated   Date Value Ref Range Status   08/25/2022 26 <=100 mg/dL Final   08/19/2021 23 <=100 mg/dL Final     Comment:     Age 0-19 years:  Desirable: 0-110 mg/dL   Borderline high: 110-129 mg/dL   High: >= 130 mg/dL    Age 20 years and older:  Desirable: <100mg/dL  Above desirable: 100-129 mg/dL   Borderline high: 130-159 mg/dL   High: 160-189 mg/dL   Very high: >= 190 mg/dL   07/31/2020 58 <100 mg/dL Final     Comment:     Desirable:       <100 mg/dl   12/12/2018 97 <100 mg/dL Final     Comment:     Desirable:       <100 mg/dl     Triglycerides   Date Value Ref Range Status   08/25/2022 98 <150 mg/dL Final   08/19/2021 88 <150 mg/dL Final     Comment:     0-9 years:  Normal:    Less than 75 mg/dL  Borderline high:  75-99 mg/dL  High:             Greater than or equal to 100 mg/dL    0-19 years:  Normal:    Less than 90 mg/dL  Borderline high:   mg/dL  High:             Greater than or equal to 130 mg/dL    20 years and older:  Normal:    Less than 150 mg/dL  Borderline high:  150-199 mg/dL  High:             200-499 mg/dL  Very high:   Greater than or equal to 500 mg/dL   07/31/2020 69 <150 mg/dL Final     Comment:     Non Fasting   12/12/2018 129 <150 mg/dL Final     Comment:     Non Fasting     Cholesterol/HDL Ratio   Date Value Ref Range Status   07/31/2010 3.0 0.0 - 5.0 Final   08/27/2009 2.7 0.0 - 5.0 Final     .   Review of Systems  Constitutional, HEENT,  "cardiovascular, pulmonary, gi and gu systems are negative, except as otherwise noted.      Objective    /64   Pulse 103   Temp 98.4  F (36.9  C) (Tympanic)   Ht 1.626 m (5' 4\")   Wt 46 kg (101 lb 5 oz)   LMP  (LMP Unknown)   SpO2 95%   BMI 17.39 kg/m    Body mass index is 17.39 kg/m .  Physical Exam   GENERAL: alert and no distress  ABDOMEN: soft, nontender, no hepatosplenomegaly, no masses and bowel sounds normal                Signed Electronically by: Aziza Aviles MD    "

## 2024-12-10 ENCOUNTER — OFFICE VISIT (OUTPATIENT)
Dept: FAMILY MEDICINE | Facility: CLINIC | Age: 69
End: 2024-12-10
Payer: COMMERCIAL

## 2024-12-10 ENCOUNTER — ANCILLARY PROCEDURE (OUTPATIENT)
Dept: GENERAL RADIOLOGY | Facility: CLINIC | Age: 69
End: 2024-12-10
Attending: FAMILY MEDICINE
Payer: COMMERCIAL

## 2024-12-10 VITALS
WEIGHT: 96.31 LBS | OXYGEN SATURATION: 96 % | TEMPERATURE: 97.9 F | HEIGHT: 64 IN | BODY MASS INDEX: 16.44 KG/M2 | HEART RATE: 85 BPM | SYSTOLIC BLOOD PRESSURE: 112 MMHG | DIASTOLIC BLOOD PRESSURE: 70 MMHG

## 2024-12-10 DIAGNOSIS — E78.5 HYPERLIPIDEMIA LDL GOAL <130: ICD-10-CM

## 2024-12-10 DIAGNOSIS — R10.13 EPIGASTRIC PAIN: ICD-10-CM

## 2024-12-10 DIAGNOSIS — I25.10 CORONARY ARTERY CALCIFICATION SEEN ON CAT SCAN: ICD-10-CM

## 2024-12-10 DIAGNOSIS — R10.9 LEFT FLANK PAIN: ICD-10-CM

## 2024-12-10 DIAGNOSIS — R41.3 MEMORY LOSS OR IMPAIRMENT: ICD-10-CM

## 2024-12-10 DIAGNOSIS — R07.81 RIB PAIN ON LEFT SIDE: ICD-10-CM

## 2024-12-10 DIAGNOSIS — R10.9 LEFT FLANK PAIN: Primary | ICD-10-CM

## 2024-12-10 DIAGNOSIS — E43 SEVERE PROTEIN-CALORIE MALNUTRITION (H): ICD-10-CM

## 2024-12-10 DIAGNOSIS — M81.0 AGE-RELATED OSTEOPOROSIS WITHOUT CURRENT PATHOLOGICAL FRACTURE: ICD-10-CM

## 2024-12-10 LAB
ALBUMIN SERPL BCG-MCNC: 3.9 G/DL (ref 3.5–5.2)
ALP SERPL-CCNC: 84 U/L (ref 40–150)
ALT SERPL W P-5'-P-CCNC: 13 U/L (ref 0–50)
ANION GAP SERPL CALCULATED.3IONS-SCNC: 12 MMOL/L (ref 7–15)
AST SERPL W P-5'-P-CCNC: 29 U/L (ref 0–45)
BASOPHILS # BLD AUTO: 0 10E3/UL (ref 0–0.2)
BASOPHILS NFR BLD AUTO: 0 %
BILIRUB SERPL-MCNC: 0.6 MG/DL
BUN SERPL-MCNC: 11.8 MG/DL (ref 8–23)
CALCIUM SERPL-MCNC: 10.1 MG/DL (ref 8.8–10.4)
CHLORIDE SERPL-SCNC: 101 MMOL/L (ref 98–107)
CHOLEST SERPL-MCNC: 142 MG/DL
CREAT SERPL-MCNC: 0.75 MG/DL (ref 0.51–0.95)
EGFRCR SERPLBLD CKD-EPI 2021: 85 ML/MIN/1.73M2
EOSINOPHIL # BLD AUTO: 0.2 10E3/UL (ref 0–0.7)
EOSINOPHIL NFR BLD AUTO: 3 %
ERYTHROCYTE [DISTWIDTH] IN BLOOD BY AUTOMATED COUNT: 13 % (ref 10–15)
FASTING STATUS PATIENT QL REPORTED: YES
FASTING STATUS PATIENT QL REPORTED: YES
GLUCOSE SERPL-MCNC: 105 MG/DL (ref 70–99)
HCO3 SERPL-SCNC: 26 MMOL/L (ref 22–29)
HCT VFR BLD AUTO: 41 % (ref 35–47)
HDLC SERPL-MCNC: 41 MG/DL
HGB BLD-MCNC: 13.7 G/DL (ref 11.7–15.7)
IMM GRANULOCYTES # BLD: 0 10E3/UL
IMM GRANULOCYTES NFR BLD: 0 %
LDLC SERPL CALC-MCNC: 81 MG/DL
LYMPHOCYTES # BLD AUTO: 1.3 10E3/UL (ref 0.8–5.3)
LYMPHOCYTES NFR BLD AUTO: 26 %
MCH RBC QN AUTO: 29.7 PG (ref 26.5–33)
MCHC RBC AUTO-ENTMCNC: 33.4 G/DL (ref 31.5–36.5)
MCV RBC AUTO: 89 FL (ref 78–100)
MONOCYTES # BLD AUTO: 0.8 10E3/UL (ref 0–1.3)
MONOCYTES NFR BLD AUTO: 15 %
NEUTROPHILS # BLD AUTO: 2.8 10E3/UL (ref 1.6–8.3)
NEUTROPHILS NFR BLD AUTO: 56 %
NONHDLC SERPL-MCNC: 101 MG/DL
PLATELET # BLD AUTO: 263 10E3/UL (ref 150–450)
POTASSIUM SERPL-SCNC: 3.9 MMOL/L (ref 3.4–5.3)
PROT SERPL-MCNC: 6.6 G/DL (ref 6.4–8.3)
RBC # BLD AUTO: 4.62 10E6/UL (ref 3.8–5.2)
SODIUM SERPL-SCNC: 139 MMOL/L (ref 135–145)
TRIGL SERPL-MCNC: 100 MG/DL
WBC # BLD AUTO: 5.1 10E3/UL (ref 4–11)

## 2024-12-10 PROCEDURE — 99214 OFFICE O/P EST MOD 30 MIN: CPT | Performed by: FAMILY MEDICINE

## 2024-12-10 PROCEDURE — 36415 COLL VENOUS BLD VENIPUNCTURE: CPT | Performed by: FAMILY MEDICINE

## 2024-12-10 PROCEDURE — G2211 COMPLEX E/M VISIT ADD ON: HCPCS | Performed by: FAMILY MEDICINE

## 2024-12-10 PROCEDURE — 90480 ADMN SARSCOV2 VAC 1/ONLY CMP: CPT | Performed by: FAMILY MEDICINE

## 2024-12-10 PROCEDURE — 80053 COMPREHEN METABOLIC PANEL: CPT | Performed by: FAMILY MEDICINE

## 2024-12-10 PROCEDURE — 71101 X-RAY EXAM UNILAT RIBS/CHEST: CPT | Mod: TC | Performed by: INTERNAL MEDICINE

## 2024-12-10 PROCEDURE — 91320 SARSCV2 VAC 30MCG TRS-SUC IM: CPT | Performed by: FAMILY MEDICINE

## 2024-12-10 PROCEDURE — 85025 COMPLETE CBC W/AUTO DIFF WBC: CPT | Performed by: FAMILY MEDICINE

## 2024-12-10 PROCEDURE — 80061 LIPID PANEL: CPT | Performed by: FAMILY MEDICINE

## 2024-12-10 RX ORDER — OMEPRAZOLE 40 MG/1
40 CAPSULE, DELAYED RELEASE ORAL DAILY
Qty: 90 CAPSULE | Refills: 1 | Status: SHIPPED | OUTPATIENT
Start: 2024-12-10

## 2024-12-10 ASSESSMENT — PAIN SCALES - GENERAL: PAINLEVEL_OUTOF10: NO PAIN (0)

## 2024-12-10 ASSESSMENT — ENCOUNTER SYMPTOMS: FATIGUE: 1

## 2024-12-10 NOTE — PROGRESS NOTES
Assessment & Plan   Rib pain on left side  Left flank pain  Etiology uncertain.  No definite rib fracture, there is significant osteopenia of the ribs.  She also has scoliosis and this could represent a thoracic radiculopathy - she declines MRI  - XR Ribs & Chest Left G/E 3 Views; Future      Severe protein-calorie malnutrition (H)   Recommend nutrition consultation.  We have discussed the malnutrition on several occurrences and she is trying hard to eat but her portion sizes are small and she feels full quickly.  Has not tolerated more than 7.5 mg of mirtazapine.  She may benefit from palliative consult to see if there are other options to help with appetite stimulation.     - Adult Nutrition  Referral; Future  - Adult Palliative Care  Referral; Future    Age-related osteoporosis without current pathological fracture     - Adult Palliative Care  Referral; Future    Memory loss or impairment     - Adult Palliative Care  Referral; Future    Epigastric pain  Declined EGD to r/o gastritis or PUD  Offered PPI - will prescribe omeprazole 40 mg daily    - Comprehensive metabolic panel (BMP + Alb, Alk Phos, ALT, AST, Total. Bili, TP); Future  - CBC with platelets and differential; Future  - Comprehensive metabolic panel (BMP + Alb, Alk Phos, ALT, AST, Total. Bili, TP)  - CBC with platelets and differential    Coronary artery calcification seen on CAT scan   CT scan recently showed significant coronary artery disease.   Long discussion previously about statins and risk.  She wanted to see what her fasting lipids were before that.   - Lipid panel reflex to direct LDL Fasting    Hyperlipidemia LDL goal <130   As above  - Lipid panel reflex to direct LDL Fasting      The longitudinal plan of care for the diagnosis(es)/condition(s) as documented were addressed during this visit. Due to the added complexity in care, I will continue to support Nicole in the subsequent management and with ongoing  "continuity of care.          Subjective   Nicole is a 69 year old, presenting for the following health issues:  Fatigue        12/10/2024    10:39 AM   Additional Questions   Roomed by Sophia kline CMA   Accompanied by Self     History of Present Illness       Mental Health Follow-up:  Patient presents to follow-up on Depression.Patient's depression since last visit has been:  Bad  The patient is not having other symptoms associated with depression.      Any significant life events: health concerns and other  Patient is not feeling anxious or having panic attacks.  Patient has no concerns about alcohol or drug use.    Reason for visit:  Weight loss, continued pain in upper right abdomen, depression    She eats 0-1 servings of fruits and vegetables daily.She consumes 2 sweetened beverage(s) daily.She exercises with enough effort to increase her heart rate 9 or less minutes per day.  She exercises with enough effort to increase her heart rate 3 or less days per week. She is missing 1 dose(s) of medications per week.  She is not taking prescribed medications regularly due to remembering to take.       - Weight loss. She is eating regular meals. She notes some dizziness today but also notes she has not eaten yet today.  states that she has been more dizzy lately, when this happens she lays down with improvement. No LOC/falls. Recheck labs today    Wt Readings from Last 4 Encounters:   12/10/24 43.7 kg (96 lb 5 oz)   11/27/24 46 kg (101 lb 5 oz)   10/15/24 46.3 kg (102 lb)   09/12/24 45 kg (99 lb 4 oz)       - Fatigue, dizziness    - Covid vaccine    Pain when I saw her on 11/27 - seemed to have a pain that would make her catch her breath a few times an hour.  Shooting pain.      Nicole noticed it a lot overnight, couldn't find a comfortable position.    Taking a deep breath would kind of relieve the sharp pain for a short time.   Several times an hour \"multiple shooting pains\"  But the underlying pain is always there  - has " "a very hard time describing this but \"dull\" pain        Review of Systems  Constitutional, HEENT, cardiovascular, pulmonary, gi and gu systems are negative, except as otherwise noted.      Objective    /70   Pulse 85   Temp 97.9  F (36.6  C) (Tympanic)   Ht 1.626 m (5' 4\")   Wt 43.7 kg (96 lb 5 oz)   LMP  (LMP Unknown)   SpO2 96%   BMI 16.53 kg/m    Body mass index is 16.53 kg/m .  Physical Exam   GENERAL: alert and no distress  ABDOMEN: tenderness epigastric and LUQ and bowel sounds normal  MS: tender to palpation left posterior ribs - light touch causes reproduction of pain  PSYCH: memory fair, flow of conversation okay.  Checks with  for some answers. affect flat and judgement and insight impaired    Results for orders placed or performed in visit on 12/10/24   CBC with platelets and differential     Status: None   Result Value Ref Range    WBC Count 5.1 4.0 - 11.0 10e3/uL    RBC Count 4.62 3.80 - 5.20 10e6/uL    Hemoglobin 13.7 11.7 - 15.7 g/dL    Hematocrit 41.0 35.0 - 47.0 %    MCV 89 78 - 100 fL    MCH 29.7 26.5 - 33.0 pg    MCHC 33.4 31.5 - 36.5 g/dL    RDW 13.0 10.0 - 15.0 %    Platelet Count 263 150 - 450 10e3/uL    % Neutrophils 56 %    % Lymphocytes 26 %    % Monocytes 15 %    % Eosinophils 3 %    % Basophils 0 %    % Immature Granulocytes 0 %    Absolute Neutrophils 2.8 1.6 - 8.3 10e3/uL    Absolute Lymphocytes 1.3 0.8 - 5.3 10e3/uL    Absolute Monocytes 0.8 0.0 - 1.3 10e3/uL    Absolute Eosinophils 0.2 0.0 - 0.7 10e3/uL    Absolute Basophils 0.0 0.0 - 0.2 10e3/uL    Absolute Immature Granulocytes 0.0 <=0.4 10e3/uL   CBC with platelets and differential     Status: None    Narrative    The following orders were created for panel order CBC with platelets and differential.  Procedure                               Abnormality         Status                     ---------                               -----------         ------                     CBC with platelets and d...[[id number]]     "                  Final result                 Please view results for these tests on the individual orders.     Rib and chest x ray: pending review by radiologist.         Signed Electronically by: Aziza Aviles MD

## 2024-12-10 NOTE — RESULT ENCOUNTER NOTE
Yaritza,    These labs are normal or acceptable.    Please contact my office if you have questions.    Aziza Aviles M.D.

## 2024-12-16 ENCOUNTER — MYC MEDICAL ADVICE (OUTPATIENT)
Dept: FAMILY MEDICINE | Facility: CLINIC | Age: 69
End: 2024-12-16
Payer: COMMERCIAL

## 2024-12-16 DIAGNOSIS — E78.5 HYPERLIPIDEMIA LDL GOAL <130: Primary | ICD-10-CM

## 2024-12-16 DIAGNOSIS — I25.10 CORONARY ARTERY CALCIFICATION SEEN ON CAT SCAN: ICD-10-CM

## 2024-12-18 RX ORDER — SIMVASTATIN 20 MG
20 TABLET ORAL AT BEDTIME
Qty: 90 TABLET | Refills: 3 | Status: SHIPPED | OUTPATIENT
Start: 2024-12-18

## 2024-12-29 ENCOUNTER — HEALTH MAINTENANCE LETTER (OUTPATIENT)
Age: 69
End: 2024-12-29

## 2025-01-20 ENCOUNTER — MYC MEDICAL ADVICE (OUTPATIENT)
Dept: FAMILY MEDICINE | Facility: CLINIC | Age: 70
End: 2025-01-20
Payer: COMMERCIAL

## 2025-01-20 DIAGNOSIS — E53.8 VITAMIN B12 DEFICIENCY (NON ANEMIC): Primary | ICD-10-CM

## 2025-01-20 DIAGNOSIS — R79.89 ELEVATED HOMOCYSTEINE: ICD-10-CM

## 2025-01-20 DIAGNOSIS — E46 PROTEIN-CALORIE MALNUTRITION, UNSPECIFIED SEVERITY: ICD-10-CM

## 2025-01-22 ENCOUNTER — LAB (OUTPATIENT)
Dept: LAB | Facility: CLINIC | Age: 70
End: 2025-01-22
Payer: COMMERCIAL

## 2025-01-22 DIAGNOSIS — E46 PROTEIN-CALORIE MALNUTRITION, UNSPECIFIED SEVERITY: ICD-10-CM

## 2025-01-22 DIAGNOSIS — M05.79 SEROPOSITIVE RHEUMATOID ARTHRITIS OF MULTIPLE SITES (H): ICD-10-CM

## 2025-01-22 DIAGNOSIS — Z79.899 OTHER LONG TERM (CURRENT) DRUG THERAPY: Primary | ICD-10-CM

## 2025-01-22 DIAGNOSIS — E53.8 VITAMIN B12 DEFICIENCY (NON ANEMIC): ICD-10-CM

## 2025-01-22 LAB
ALBUMIN SERPL BCG-MCNC: 3.8 G/DL (ref 3.5–5.2)
ALT SERPL W P-5'-P-CCNC: 32 U/L (ref 0–50)
AST SERPL W P-5'-P-CCNC: 41 U/L (ref 0–45)
BASOPHILS # BLD AUTO: 0 10E3/UL (ref 0–0.2)
BASOPHILS NFR BLD AUTO: 0 %
CREAT SERPL-MCNC: 0.73 MG/DL (ref 0.51–0.95)
CRP SERPL-MCNC: <3 MG/L
EGFRCR SERPLBLD CKD-EPI 2021: 88 ML/MIN/1.73M2
EOSINOPHIL # BLD AUTO: 0.1 10E3/UL (ref 0–0.7)
EOSINOPHIL NFR BLD AUTO: 2 %
ERYTHROCYTE [DISTWIDTH] IN BLOOD BY AUTOMATED COUNT: 16.2 % (ref 10–15)
FOLATE SERPL-MCNC: 10.8 NG/ML (ref 4.6–34.8)
HCT VFR BLD AUTO: 38.7 % (ref 35–47)
HGB BLD-MCNC: 13.1 G/DL (ref 11.7–15.7)
IMM GRANULOCYTES # BLD: 0 10E3/UL
IMM GRANULOCYTES NFR BLD: 0 %
LYMPHOCYTES # BLD AUTO: 1.4 10E3/UL (ref 0.8–5.3)
LYMPHOCYTES NFR BLD AUTO: 29 %
MCH RBC QN AUTO: 30.3 PG (ref 26.5–33)
MCHC RBC AUTO-ENTMCNC: 33.9 G/DL (ref 31.5–36.5)
MCV RBC AUTO: 90 FL (ref 78–100)
MONOCYTES # BLD AUTO: 0.3 10E3/UL (ref 0–1.3)
MONOCYTES NFR BLD AUTO: 7 %
NEUTROPHILS # BLD AUTO: 3.1 10E3/UL (ref 1.6–8.3)
NEUTROPHILS NFR BLD AUTO: 63 %
PLATELET # BLD AUTO: 171 10E3/UL (ref 150–450)
RBC # BLD AUTO: 4.32 10E6/UL (ref 3.8–5.2)
WBC # BLD AUTO: 5 10E3/UL (ref 4–11)

## 2025-01-22 PROCEDURE — 84134 ASSAY OF PREALBUMIN: CPT

## 2025-01-22 PROCEDURE — 82746 ASSAY OF FOLIC ACID SERUM: CPT

## 2025-01-22 PROCEDURE — 82607 VITAMIN B-12: CPT

## 2025-01-22 PROCEDURE — 83090 ASSAY OF HOMOCYSTEINE: CPT

## 2025-01-22 PROCEDURE — 86140 C-REACTIVE PROTEIN: CPT

## 2025-01-22 PROCEDURE — 85025 COMPLETE CBC W/AUTO DIFF WBC: CPT

## 2025-01-22 PROCEDURE — 36415 COLL VENOUS BLD VENIPUNCTURE: CPT

## 2025-01-22 PROCEDURE — 83921 ORGANIC ACID SINGLE QUANT: CPT

## 2025-01-22 PROCEDURE — 84460 ALANINE AMINO (ALT) (SGPT): CPT

## 2025-01-22 PROCEDURE — 82040 ASSAY OF SERUM ALBUMIN: CPT

## 2025-01-22 PROCEDURE — 82565 ASSAY OF CREATININE: CPT

## 2025-01-22 PROCEDURE — 84450 TRANSFERASE (AST) (SGOT): CPT

## 2025-01-23 LAB
HCYS SERPL-SCNC: 24.2 UMOL/L (ref 0–15)
PREALB SERPL-MCNC: 14.6 MG/DL (ref 20–40)
VIT B12 SERPL-MCNC: 1008 PG/ML (ref 232–1245)

## 2025-01-24 NOTE — PROGRESS NOTES
"Palliative Care Clinic Consult Note    Patient Name: Yaritza Bradley  Primary Provider: Aziza Aviles    Chief Complaint/Patient ID:   She has a PMHx of leukoariosis/dementia and malnutrition. Additional medical history includes SHEA, MDD, spondylolisthesis with LE radiculopathy, and RA.     Reviewed: Yes    Social History: . One son in [place] (see rarely) and daughter in [place].  Black lab named Fabio.     Advanced Care Planning: HCD+ and scanned in chart. Names  as primary HCA.      History of Present Illness:  Yaritza Bradley is seen for a new consult via Video visit today with Palliative Care. Her  Remi is also present and provides additional history.     Reviewed PCP note from 12/10. Ongoing struggles with malnutrition. Hasn't tolerated >7.5mg Remeron. Referred for nutrition and palliative consults.    Reviewed Neuroscience Note from 1/17. Advised plant-based sources of protein for increased caloric intake. Trial of omeprazole for epigastric pain.     reports memory struggles started in 2012. Gone from mild cognitive impairments now progressed to full dementia.  She additionally has bouts of dizziness and some mobility struggles.  She also deals with side effects from meds for RA.    Weight loss - She has lost 40 pounds approximately since r September 2022.  Reports not feeling hungry at all. She can eat and enjoy it but isn't sure how much quantity she's eating. Does try 3 meals and then a snack (anything from a piece of fruit, to an ice cream bar, to some potato chips) at bedtime.  has been keeping food log- avg between 2088-1190 calories per day. Has protein shakes but has a hard time getting them in. Seems to have a dairy allergy (causes a runny nose, but no real GI side effects). Also allergic to chocolate.  Regarding the weight loss, she stated \"I worry it might be a cancer or something?  That is my fear\".    Sleep - Most nights well, others not as well. Remeron 7.5 mg does " "help (especially better than trazodone), and she sleeps about 11 to 12 hours. When she can't sleep, it is because her mind won't shut off. Does use the bathroom but able to fall back asleep. Triggers for anxiety can include her mom as well as doctors visits. They previously tried Remeron up to 15 mg nightly, however this caused her to sleep 14 to 15 hours per night and then taking nap during the day.     is main support person.  1 child lives locally, however they do not see him very often.  She does have friends but hard to get them all together, so  does a lot more orchestrating.  Did share that she hasn't shared diagnosis of dementia with friends.     They are trying to do some engagement type activities.  Last week day walked [employer] track 2x.   is also brought up the [employer] program at their community center, which needs 1-3 times per month for various outings or activities.    Completed healthcare directive \"in her 50s\".  Currently has her mom listed as her alternate healthcare agent, but her mom is now in 24/7 memory care.   reviewed that they likely need to think about \"her work measures\" now that they are in their 70s.    Physical Exam:   Constitutional: Alert, pleasant, no apparent distress. Sitting up in chair.  Eyes: Sclera non-icteric, no eye discharge.  ENT: No nasal discharge. Ears grossly normal.  Respiratory: Unlabored respirations. Speaking in full sentences.  Musculoskeletal: Extremities appear normal- no gross deformities noted. No edema noted on upper body.   Skin: No suspicious lesions or rashes on visible skin.  Neurologic: Clear speech, no aphasia but periodic word finding trouble. No facial droop.  Psychiatric: Mentation appears a little bit slowed, appropriate attention. Affect normal/bright. Does not appear anxious or depressed.    Key Data Reviewed:  LABS:   Lab Results   Component Value Date    WBC 5.0 01/22/2025    HGB 13.1 01/22/2025    HCT " "38.7 01/22/2025     01/22/2025     12/10/2024    POTASSIUM 3.9 12/10/2024    CHLORIDE 101 12/10/2024    CO2 26 12/10/2024    BUN 11.8 12/10/2024    CR 0.73 01/22/2025     (H) 12/10/2024    SED 7 02/09/2012    DD 1.7 (H) 02/12/2009    TROPI <0.012 02/12/2009    AST 41 01/22/2025    ALT 32 01/22/2025    ALKPHOS 84 12/10/2024    BILITOTAL 0.6 12/10/2024    TIMMY <10 (L) 09/29/2014 1/22/25 - GFR 88, Albumin 3.8    IMAGING: Brain MRI 1/10/25- IMPRESSION:   1. No finding for intracranial hemorrhage, mass, or acute infarct.   2.  Moderate volume loss and advanced presumed sequela chronic microvascular ischemic change.       Impression & Recommendations & Counseling:  Yaritza Bradley has a history of  leukoariosis/dementia and malnutrition.    Introduced the role of palliative care as an interdisciplinary team that cares for patients with serious illness to help support symptom management, communication, coping for patients and their families as well as support with medical decision making.    Reviewed the difficulty with appetite stimulation.  While there are some medications that can be tried, none of them have \"silver bullet\" evidence behind them, and we always have to weigh the risks and benefits of additional side effects.  1 thought I had was for Zyprexa, however if she became very sedated with increasing Remeron, I have concerns about how she would react to Zyprexa.  We did discuss the option of Megace in the future, which should have less side effects.  We discussed the risks and benefits of adding another medication right now, and ultimately I recommended trying to focus on increasing daily calories with the aim of maintaining weight first, then from there can look at further increasing diet to attempt weight gain.  We do have an appointment with nutrition on the calendar, however this is currently not scheduled until April.  Below are a cancellation list.    Additionally discussed the " "importance of engaging with other activities and people, as a means of support for both of them, especially if things continue to progress with her dementia.  As far as her friends are concerned, discussed that people don't know how to help if they don't know what's going on.  Suggested sharing her situation with a couple of her close friends and seeing if they could help be more proactive about getting together to see her.  Alternatively, I love the idea of the forever young program, since this is a very low-key monthly commitment that would still get her out of the house.    Reviewed healthcare directive and the importance of having up-to-date healthcare agents.  If her mom is in a position where she can no longer make medical decisions for herself, she definitely should not be making them for Nicole.     Introduced the topic of CPR as a medical decision that is generally considered important, particularly with patients with prior health conditions.  Discussed how CPR is a very aggressive process and does not have the same outcomes in real life as it does on TV.  Discussed the varying outcomes that can result when someone has gone through CPR and they are \"brought back\".  Discussed that often times patients are \"less than\" what they were prior to going through cardiac arrest (in terms of their physical and mental functionality). We talked about how everybody has different criteria for what they consider acceptable in their lives, and this varies from person to person.  Encouraged her to think about what she considers important and if there are any situations she would deem not acceptable and to use this information to help inform how she makes decisions about her health care, namely what sort of interventions or procedures she would want to go through.      -Encouraged them to review medical decisions through the lens of what is important to her and what brings meaning and value to her life, and then fit the medical " "\"stuff\" into that framework.       Recommendations:  -Suggested setting alarms every 2-3 hours during the day to focus on eating something.  -Trial of other plant-based protein shakes, including brands such as Spinal Modulation or LP Amina.  Suggested limiting a protein shake with other items or flavors that are more appealing, such as bananas, nut butter, or nondairy ice cream.  -Continue Remeron 7.5 mg at bedtime.  -Could consider adding in Megace in the future to see if there is some appetite stimulation benefit.  -Keep Nutritionist visit.  -Discussed setting attainable and sustainable goals for various activities.  -Agree with getting out to walk 1-2 times per week.  -Agree with looking into the forever young program.  - Suggested sharing situation with a couple of close friends and seeing if they could help be more proactive about getting together more often.  -Discussed CPR and other \"heroic\" measures.  -Discussed healthcare agent selection.    -Provided information for honoring choices [place] in after visit summary to update directive.    Follow up: 3-4 months      Video-Visit Details  Video Start Time: 10:02AM  Video End Time: 11:17AM    Originating Location (pt. Location): Home     Distant Location (provider location):  Offsite- Personal Home      Platform used for Video Visit: Noemy     Total time spent on day of encounter is 91 mins, including reviewing record, review of above studies, above visit with patient, symptomatic discussion as above, including medication adjustments/prescription management, and documentation.       The longitudinal plan of care for the diagnosis(es)/condition(s) as documented were addressed during this visit.?Due to the added complexity in care, I will continue to support Yaritza Bradley in the subsequent management and with the ongoing continuity of care.    Zohreh Castañeda, DO  Palliative Medicine       Some chart documentation performed using Dragon Voice recognition " Software. Although reviewed after completion, some words and grammatical errors may remain.

## 2025-01-27 ENCOUNTER — MYC MEDICAL ADVICE (OUTPATIENT)
Dept: FAMILY MEDICINE | Facility: CLINIC | Age: 70
End: 2025-01-27
Payer: COMMERCIAL

## 2025-01-28 ENCOUNTER — VIRTUAL VISIT (OUTPATIENT)
Dept: PALLIATIVE CARE | Facility: CLINIC | Age: 70
End: 2025-01-28
Attending: FAMILY MEDICINE
Payer: COMMERCIAL

## 2025-01-28 VITALS — BODY MASS INDEX: 16.22 KG/M2 | HEIGHT: 64 IN | WEIGHT: 95 LBS

## 2025-01-28 DIAGNOSIS — F41.9 ANXIETY: ICD-10-CM

## 2025-01-28 DIAGNOSIS — R63.0 DECREASED APPETITE: ICD-10-CM

## 2025-01-28 DIAGNOSIS — Z51.5 ENCOUNTER FOR PALLIATIVE CARE: ICD-10-CM

## 2025-01-28 DIAGNOSIS — G47.9 DIFFICULTY SLEEPING: ICD-10-CM

## 2025-01-28 DIAGNOSIS — Z71.89 ADVANCED CARE PLANNING/COUNSELING DISCUSSION: ICD-10-CM

## 2025-01-28 DIAGNOSIS — E43 SEVERE PROTEIN-CALORIE MALNUTRITION: ICD-10-CM

## 2025-01-28 DIAGNOSIS — Z71.89 GOALS OF CARE, COUNSELING/DISCUSSION: ICD-10-CM

## 2025-01-28 DIAGNOSIS — R41.3 MEMORY LOSS OR IMPAIRMENT: Primary | ICD-10-CM

## 2025-01-28 PROCEDURE — 99417 PROLNG OP E/M EACH 15 MIN: CPT | Performed by: STUDENT IN AN ORGANIZED HEALTH CARE EDUCATION/TRAINING PROGRAM

## 2025-01-28 PROCEDURE — G2211 COMPLEX E/M VISIT ADD ON: HCPCS | Performed by: STUDENT IN AN ORGANIZED HEALTH CARE EDUCATION/TRAINING PROGRAM

## 2025-01-28 PROCEDURE — 98003 SYNCH AUDIO-VIDEO NEW HI 60: CPT | Performed by: STUDENT IN AN ORGANIZED HEALTH CARE EDUCATION/TRAINING PROGRAM

## 2025-01-28 ASSESSMENT — PAIN SCALES - GENERAL: PAINLEVEL_OUTOF10: NO PAIN (0)

## 2025-01-28 NOTE — NURSING NOTE
Current patient location: [address] [place] [id number]    Is the patient currently in the state of [place]? YES    Visit mode: VIDEO    If the visit is dropped, the patient can be reconnected by:VIDEO VISIT: Text to cell phone:   Telephone Information:   Mobile [phone]       Will anyone else be joining the visit? NO  (If patient encounters technical issues they should call [phone]    Are changes needed to the allergy or medication list? Pt stated no changes to allergies and Pt stated no med changes    Are refills needed on medications prescribed by this physician? NO    Rooming Documentation:  Questionnaire(s) not done per department protocol    Reason for visit: Consult    Digna GRIJALVAF

## 2025-01-28 NOTE — LETTER
1/28/2025       RE: Yaritza Bradley  [address] [facility] [address]     Dear Colleague,    Thank you for referring your patient, Yaritza Bradley, to the [facility] at [facility]. Please see a copy of my visit note below.    Palliative Care Clinic Consult Note    Patient Name: Yaritza Bradley  Primary Provider: Aziza Aviles    Chief Complaint/Patient ID:   She has a PMHx of leukoariosis/dementia and malnutrition. Additional medical history includes SHEA, MDD, spondylolisthesis with LE radiculopathy, and RA.     Reviewed: Yes    Social History: . One son in [place] (see rarely) and daughter in [place].  Black lab named Fabio.     Advanced Care Planning: HCD+ and scanned in chart. Names  as primary HCA.      History of Present Illness:  Yaritza Bradley is seen for a new consult via Video visit today with Palliative Care. Her  Remi is also present and provides additional history.     Reviewed PCP note from 12/10. Ongoing struggles with malnutrition. Hasn't tolerated >7.5mg Remeron. Referred for nutrition and palliative consults.    Reviewed Neuroscience Note from 1/17. Advised plant-based sources of protein for increased caloric intake. Trial of omeprazole for epigastric pain.     reports memory struggles started in 2012. Gone from mild cognitive impairments now progressed to full dementia.  She additionally has bouts of dizziness and some mobility struggles.  She also deals with side effects from meds for RA.    Weight loss - She has lost 40 pounds approximately since r September 2022.  Reports not feeling hungry at all. She can eat and enjoy it but isn't sure how much quantity she's eating. Does try 3 meals and then a snack (anything from a piece of fruit, to an ice cream bar, to some potato chips) at bedtime.  has been keeping food log- avg between 5831-8563 calories per day. Has protein shakes but has a hard  "time getting them in. Seems to have a dairy allergy (causes a runny nose, but no real GI side effects). Also allergic to chocolate.  Regarding the weight loss, she stated \"I worry it might be a cancer or something?  That is my fear\".    Sleep - Most nights well, others not as well. Remeron 7.5 mg does help (especially better than trazodone), and she sleeps about 11 to 12 hours. When she can't sleep, it is because her mind won't shut off. Does use the bathroom but able to fall back asleep. Triggers for anxiety can include her mom as well as doctors visits. They previously tried Remeron up to 15 mg nightly, however this caused her to sleep 14 to 15 hours per night and then taking nap during the day.     is main support person.  1 child lives locally, however they do not see him very often.  She does have friends but hard to get them all together, so  does a lot more orchestrating.  Did share that she hasn't shared diagnosis of dementia with friends.     They are trying to do some engagement type activities.  Last week day walked [employer] track 2x.   is also brought up the [facility] program at their community center, which needs 1-3 times per month for various outings or activities.    Completed healthcare directive \"in her 50s\".  Currently has her mom listed as her alternate healthcare agent, but her mom is now in 24/7 memory care.   reviewed that they likely need to think about \"her work measures\" now that they are in their 70s.    Physical Exam:   Constitutional: Alert, pleasant, no apparent distress. Sitting up in chair.  Eyes: Sclera non-icteric, no eye discharge.  ENT: No nasal discharge. Ears grossly normal.  Respiratory: Unlabored respirations. Speaking in full sentences.  Musculoskeletal: Extremities appear normal- no gross deformities noted. No edema noted on upper body.   Skin: No suspicious lesions or rashes on visible skin.  Neurologic: Clear speech, no aphasia but " "periodic word finding trouble. No facial droop.  Psychiatric: Mentation appears a little bit slowed, appropriate attention. Affect normal/bright. Does not appear anxious or depressed.    Key Data Reviewed:  LABS:   Lab Results   Component Value Date    WBC 5.0 01/22/2025    HGB 13.1 01/22/2025    HCT 38.7 01/22/2025     01/22/2025     12/10/2024    POTASSIUM 3.9 12/10/2024    CHLORIDE 101 12/10/2024    CO2 26 12/10/2024    BUN 11.8 12/10/2024    CR 0.73 01/22/2025     (H) 12/10/2024    SED 7 02/09/2012    DD 1.7 (H) 02/12/2009    TROPI <0.012 02/12/2009    AST 41 01/22/2025    ALT 32 01/22/2025    ALKPHOS 84 12/10/2024    BILITOTAL 0.6 12/10/2024    TIMMY <10 (L) 09/29/2014 1/22/25 - GFR 88, Albumin 3.8    IMAGING: Brain MRI 1/10/25- IMPRESSION:   1. No finding for intracranial hemorrhage, mass, or acute infarct.   2.  Moderate volume loss and advanced presumed sequela chronic microvascular ischemic change.       Impression & Recommendations & Counseling:  Yaritza Bradley has a history of  leukoariosis/dementia and malnutrition.    Introduced the role of palliative care as an interdisciplinary team that cares for patients with serious illness to help support symptom management, communication, coping for patients and their families as well as support with medical decision making.    Reviewed the difficulty with appetite stimulation.  While there are some medications that can be tried, none of them have \"silver bullet\" evidence behind them, and we always have to weigh the risks and benefits of additional side effects.  1 thought I had was for Zyprexa, however if she became very sedated with increasing Remeron, I have concerns about how she would react to Zyprexa.  We did discuss the option of Megace in the future, which should have less side effects.  We discussed the risks and benefits of adding another medication right now, and ultimately I recommended trying to focus on increasing daily " "calories with the aim of maintaining weight first, then from there can look at further increasing diet to attempt weight gain.  We do have an appointment with nutrition on the calendar, however this is currently not scheduled until April.  Below are a cancellation list.    Additionally discussed the importance of engaging with other activities and people, as a means of support for both of them, especially if things continue to progress with her dementia.  As far as her friends are concerned, discussed that people don't know how to help if they don't know what's going on.  Suggested sharing her situation with a couple of her close friends and seeing if they could help be more proactive about getting together to see her.  Alternatively, I love the idea of the forever young program, since this is a very low-key monthly commitment that would still get her out of the house.    Reviewed healthcare directive and the importance of having up-to-date healthcare agents.  If her mom is in a position where she can no longer make medical decisions for herself, she definitely should not be making them for Nicole.     Introduced the topic of CPR as a medical decision that is generally considered important, particularly with patients with prior health conditions.  Discussed how CPR is a very aggressive process and does not have the same outcomes in real life as it does on TV.  Discussed the varying outcomes that can result when someone has gone through CPR and they are \"brought back\".  Discussed that often times patients are \"less than\" what they were prior to going through cardiac arrest (in terms of their physical and mental functionality). We talked about how everybody has different criteria for what they consider acceptable in their lives, and this varies from person to person.  Encouraged her to think about what she considers important and if there are any situations she would deem not acceptable and to use this information to " "help inform how she makes decisions about her health care, namely what sort of interventions or procedures she would want to go through.      -Encouraged them to review medical decisions through the lens of what is important to her and what brings meaning and value to her life, and then fit the medical \"stuff\" into that framework.       Recommendations:  -Suggested setting alarms every 2-3 hours during the day to focus on eating something.  -Trial of other plant-based protein shakes, including brands such as [employer] or [employer].  Suggested limiting a protein shake with other items or flavors that are more appealing, such as bananas, nut butter, or nondairy ice cream.  -Continue Remeron 7.5 mg at bedtime.  -Could consider adding in Megace in the future to see if there is some appetite stimulation benefit.  -Keep Nutritionist visit.  -Discussed setting attainable and sustainable goals for various activities.  -Agree with getting out to walk 1-2 times per week.  -Agree with looking into the forever [employer] program.  - Suggested sharing situation with a couple of close friends and seeing if they could help be more proactive about getting together more often.  -Discussed CPR and other \"heroic\" measures.  -Discussed healthcare agent selection.    -Provided information for honoring choices [place] in after visit summary to update directive.    Follow up: 3-4 months      Video-Visit Details  Video Start Time: 10:02AM  Video End Time: 11:17AM    Originating Location (pt. Location): Home     Distant Location (provider location):  Offsite- Personal Home      Platform used for Video Visit: Noemy     Total time spent on day of encounter is 91 mins, including reviewing record, review of above studies, above visit with patient, symptomatic discussion as above, including medication adjustments/prescription management, and documentation.       The longitudinal plan of care for the diagnosis(es)/condition(s) as documented were " addressed during this visit.?Due to the added complexity in care, I will continue to support Yaritza Bradley in the subsequent management and with the ongoing continuity of care.    Zohreh Castañeda, DO  Palliative Medicine       Some chart documentation performed using Dragon Voice recognition Software. Although reviewed after completion, some words and grammatical errors may remain.      Again, thank you for allowing me to participate in the care of your patient.      Sincerely,    Zohreh Castañeda, DO

## 2025-01-28 NOTE — PATIENT INSTRUCTIONS
"Recommendations:  -Suggested setting alarms every 2-3 hours during the day to focus on eating something.  -Trial of other plant-based protein shakes, including brands such as [employer] or [employer].  Suggested limiting a protein shake with other items or flavors that are more appealing, such as bananas, nut butter, or nondairy ice cream.  -Continue Remeron 7.5 mg at bedtime.  -Could consider adding in Megace in the future to see if there is some appetite stimulation benefit.  -Keep Nutritionist visit.  -Discussed setting attainable and sustainable goals for various activities.  -Agree with getting out to walk 1-2 times per week.  -Agree with looking into the [employer] program.  - Suggested sharing situation with a couple of close friends and seeing if they could help be more proactive about getting together more often.  Introduced the topic of CPR as a medical decision that is generally considered important, particularly with patients with prior health conditions.  Discussed how CPR is a very aggressive process and does not have the same outcomes in real life as it does on TV.  Discussed the varying outcomes that can result when someone has gone through CPR and they are \"brought back\".  Discussed that often times patients are \"less than\" what they were prior to going through cardiac arrest (in terms of their physical and mental functionality). We talked about how everybody has different criteria for what they consider acceptable in their lives, and this varies from person to person. Encouraged you to think about what you consider important and if there are any situations you would deem not acceptable and to use this information to help inform how you make decisions about your health care, namely what sort of interventions or procedures you would want to go through.    -Encouraged you to review medical decisions through the lens of what is important to you and what brings meaning and value to your life, and then " "fit the medical \"stuff\" into that framework.     -Discussed healthcare agent selection.    -We talked about updating your healthcare directive.  I recommend you check out- [email or web address]     They have several tools and some advice about getting started.  Under the \"How do I document my choices\" section, I am a fan of the full length healthcare directive (for adults with serious illness) because I think it has a good combination of the medical questions that are important as well as the social and personal questions that allow healthcare providers to get to know you as a person if you are unable to speak for yourself.  You do not have to complete every question on the document.  I generally recommend patients start with 3 or 4 questions on the goals page and work from there.      Follow up: 3 to 4 months      Reasons to Call    If you are having worsening/uncontrolled symptoms we want you to call!    You or your other physicians make any changes to medications we have prescribed.  -Please call for refills 4-5 days before you will run out of medication.    Important Phone Numbers, including: Refills, scheduling, and general questions     Palliative Care RN: Maya Gaston : [phone]  *For scheduling needs/follow up visits : [phone]  *After hours or on weekends- Will connect you with on call MD : [phone].    "

## 2025-01-29 LAB — METHYLMALONATE SERPL-SCNC: 0.24 UMOL/L (ref 0–0.4)

## 2025-04-21 ENCOUNTER — MYC MEDICAL ADVICE (OUTPATIENT)
Dept: FAMILY MEDICINE | Facility: CLINIC | Age: 70
End: 2025-04-21
Payer: COMMERCIAL

## 2025-04-21 NOTE — TELEPHONE ENCOUNTER
See My chart message.     Last seen in clinic on 12- and not scheduled to be seen again by PCP until 10-22-25.    Is scheduled to see Dr. Becky Zuleta on 5-6-25.     Yara Lee RN

## 2025-04-28 ENCOUNTER — VIRTUAL VISIT (OUTPATIENT)
Dept: ONCOLOGY | Facility: CLINIC | Age: 70
End: 2025-04-28
Attending: STUDENT IN AN ORGANIZED HEALTH CARE EDUCATION/TRAINING PROGRAM
Payer: COMMERCIAL

## 2025-04-28 VITALS — HEIGHT: 64 IN | BODY MASS INDEX: 16.73 KG/M2 | WEIGHT: 98 LBS

## 2025-04-28 DIAGNOSIS — Z51.5 ENCOUNTER FOR PALLIATIVE CARE: ICD-10-CM

## 2025-04-28 DIAGNOSIS — Z71.89 GOALS OF CARE, COUNSELING/DISCUSSION: ICD-10-CM

## 2025-04-28 DIAGNOSIS — R63.0 DECREASED APPETITE: ICD-10-CM

## 2025-04-28 DIAGNOSIS — E46 PROTEIN-CALORIE MALNUTRITION, UNSPECIFIED SEVERITY: ICD-10-CM

## 2025-04-28 DIAGNOSIS — F03.90 DEMENTIA WITHOUT BEHAVIORAL DISTURBANCE, PSYCHOTIC DISTURBANCE, MOOD DISTURBANCE, OR ANXIETY, UNSPECIFIED DEMENTIA SEVERITY, UNSPECIFIED DEMENTIA TYPE (H): Primary | ICD-10-CM

## 2025-04-28 DIAGNOSIS — F33.1 MODERATE RECURRENT MAJOR DEPRESSION (H): Chronic | ICD-10-CM

## 2025-04-28 DIAGNOSIS — Z71.89 ADVANCED CARE PLANNING/COUNSELING DISCUSSION: ICD-10-CM

## 2025-04-28 PROCEDURE — 98007 SYNCH AUDIO-VIDEO EST HI 40: CPT | Mod: 25 | Performed by: STUDENT IN AN ORGANIZED HEALTH CARE EDUCATION/TRAINING PROGRAM

## 2025-04-28 PROCEDURE — 1126F AMNT PAIN NOTED NONE PRSNT: CPT | Mod: 95 | Performed by: STUDENT IN AN ORGANIZED HEALTH CARE EDUCATION/TRAINING PROGRAM

## 2025-04-28 PROCEDURE — 99497 ADVNCD CARE PLAN 30 MIN: CPT | Mod: 25 | Performed by: STUDENT IN AN ORGANIZED HEALTH CARE EDUCATION/TRAINING PROGRAM

## 2025-04-28 PROCEDURE — G2211 COMPLEX E/M VISIT ADD ON: HCPCS | Performed by: STUDENT IN AN ORGANIZED HEALTH CARE EDUCATION/TRAINING PROGRAM

## 2025-04-28 RX ORDER — DONEPEZIL HYDROCHLORIDE 10 MG/1
10 TABLET, FILM COATED ORAL AT BEDTIME
COMMUNITY
Start: 2025-04-28

## 2025-04-28 RX ORDER — MIRTAZAPINE 7.5 MG/1
11.25 TABLET, FILM COATED ORAL AT BEDTIME
Qty: 135 TABLET | Refills: 1 | Status: SHIPPED | OUTPATIENT
Start: 2025-04-28

## 2025-04-28 ASSESSMENT — PAIN SCALES - GENERAL: PAINLEVEL_OUTOF10: NO PAIN (0)

## 2025-04-28 NOTE — PROGRESS NOTES
"Palliative Care Clinic Progress note    Patient Name: Yaritza Bradley  Primary Provider: Aziza Aviles    Chief Complaint/Patient ID:   She has a PMHx of leukoariosis/dementia (started having symptoms in 2012) and malnutrition. Additional medical history includes SHEA, MDD, spondylolisthesis with LE radiculopathy, and RA.    Last Palliative Care Appt: 1/24/25 with me.      Reviewed: Yes    Social History: . One son in [place] (see rarely) and daughter in [place].  Black lab named Fabio.     Advanced Care Planning: HCD+ and scanned in chart. Names  as primary HCA.  We have been having discussions about revising this.      History of Present Illness:  Yaritza Bradley is seen for a follow-up video visit today with Palliative Care. Her  Remi is also present and provides additional history.     Prior to her visit,  shared a MyChart message with some of the concerns that they have.    Her weight has increased slightly since her last visit, however she is not seeing the scale will up more.  Getting in enough calories per day has been difficult, and they have not been able to find a protein supplement she enjoys.  Wondering about any additional recommendations or options.  Nicole has asked for a second opinion regarding assessing her weight loss    She says her weight is up and down.  says she does like the Kind bars; had a few others that were less palatable.  She tends to forget to have a snack. They are tucked in the cupboard.    At her last visit, we had discussed the mirtazapine.  When the dose was increased to 15 mg, she had become too sedated, however  is wondering if she may have become more accustomed to the 7.5 mg now and we can try increasing again to potentially achieve more appetite benefit.    She is worried about taking more medicine.  She says that she is always felt that \"The less meds I am on, then the less sick I am.\"    We saw neurology in March.  She is on Aricept 10mg " "now. Tolerating well. No diarrhea.    She continues to be active, walk doing a mile at least 5 days a week.  She continues to be independent with her ADLs.  She is very frustrated when she is unable to do before she wants to.  He cites examples where she will refer to herself as \"stupid\" she has had some trouble now remembering numbers.    She has been less interested in participating in group settings.    She says she has a couple friends, sees them about once a month. \"I don't have a bright cheery outlook. I feel like I'm going through the motions, walking because I know I need to, not because I enjoy it\". Loves spending time with her dog. Doesn't want to impose on other people. Judi friends may not know how significant her frustartions are- she doesn't like pity parties. They always talk about happy things. She tends to keep things to herself. Doesn't tend to share details- doesn't want to be a burden. She says that her mood can be \"crabby\".  Gets frustrated not being able to do things she used to do- harder to do things in general.    I asked about local support groups.   notes that their local [facility] has 2 dementia groups, 1 for patient/clients and 1 for caregivers.  He also notes that a local [facility] [facility] has a caregiver support group, and their local community center has a 55+ social group that gets together once a month.  He has wondered if participation in something like this would be helpful for her.    Regarding some of the groups she stated \"I don't see myself in that group\".  Says she doesn't feel \"old\".    Additionally, they wanted like to review our conversation from last time about boundaries and limitations that people placed on their health care, particularly around CODE STATUS, dialysis, and nutritional support.    When I asked her thoughts on the conversation, Nicole stated \"I feel like this is a long way away\".      Physical Exam:   Constitutional: Alert, pleasant, no apparent distress. " "Sitting up in chair.  Eyes: Sclera non-icteric, no eye discharge.  ENT: No nasal discharge. Ears grossly normal.  Respiratory: Unlabored respirations. Speaking in full sentences.  Musculoskeletal: Extremities appear normal- no gross deformities noted. No edema noted on upper body.   Skin: No suspicious lesions or rashes on visible skin.  Neurologic: Clear speech, no aphasia but periodic word finding trouble. No facial droop.  Psychiatric: Mentation appears a little bit slowed, appropriate attention. Affect normal/bright. Does not appear anxious or depressed.    Key Data Reviewed:  LABS:   Lab Results   Component Value Date    WBC 5.0 01/22/2025    HGB 13.1 01/22/2025    HCT 38.7 01/22/2025     01/22/2025     12/10/2024    POTASSIUM 3.9 12/10/2024    CHLORIDE 101 12/10/2024    CO2 26 12/10/2024    BUN 11.8 12/10/2024    CR 0.73 01/22/2025     (H) 12/10/2024    SED 7 02/09/2012    DD 1.7 (H) 02/12/2009    TROPI <0.012 02/12/2009    AST 41 01/22/2025    ALT 32 01/22/2025    ALKPHOS 84 12/10/2024    BILITOTAL 0.6 12/10/2024    TIMMY <10 (L) 09/29/2014 1/22/25 - GFR 88, Albumin 3.8        Impression & Recommendations & Counseling:  Yaritza Bradley has a history of  leukoariosis/dementia and malnutrition.    Reviewed the difficulty with appetite stimulation.  While there are some medications that can be tried, none of them have \"silver bullet\" evidence behind them, and we always have to weigh the risks and benefits of additional side effects.  We previously had discussed the option of Megace at our first visit in January. Since she is tolerating the mirtazapine well now, we discussed the option of increasing the dose by half, to 11.25 mg to see if this is better tolerated, less sedating, but could still give a bump in appetite.  She ultimately agreed to do this today.  We also talked about ways to brainstorm having snacks more visible for her to remember to eat during the day.    Additionally " "discussed the importance of engaging with other activities and people, as a means of support for both of them, especially if things continue to progress with her dementia.  As far as her friends are concerned, discussed that people don't know how to help if they don't know what's going on.  We also discussed many people prefer to keep their friends as a safe place to have fun and lighthearted conversations, and having a support group of some sort can be a safe place to reflect on prior struggles and also find some camaraderie and other people for going through similar situations.  In that regard, I did make a strong recommendation to try to reach out to at least one sort of support group for social group and attend for 3-5 sessions before making a judgment on how it is going.    Advance Care Planning Discussion 4/28/2025. I, Zohreh Castañeda, DO met with Patient and their spouse today during a clinic visit to discuss Advance Care Planning. Yaritza Bradley  has limited decisional capacity  and was present for this discussion.  Those present were informed of the voluntary nature of this discussion and wished to proceed.   -Reviewed healthcare directive and the importance of having up-to-date healthcare agents.  They agree that they are going to have to update the healthcare directive soon, as her mom is no longer in a place to be able to serve as an alternate (has dementia herself).  Their son has been open to conversations with them about advance care planning, which is wonderful.    -Reviewed the topic of CPR as a medical decision that is generally considered important, particularly with patients with prior health conditions.  Discussed how CPR is a very aggressive process and does not have the same outcomes in real life as it does on TV.  Discussed the varying outcomes that can result when someone has gone through CPR and they are \"brought back\".  Discussed that often times patients are \"less than\" what " "they were prior to going through cardiac arrest (in terms of their physical and mental functionality).  We also discussed intubation, and what is entailed with this, including sedation.  We discussed that some people do time-limited trials on intubation as they are open to initial treatment but would not want long-term mechanical ventilation with a tracheostomy.  We talked about how everybody has different criteria for what they consider acceptable in their lives, and this varies from person to person.  Encouraged her to think about what she considers important and if there are any situations she would deem not acceptable and to use this information to help inform how she makes decisions about her health care, namely what sort of interventions or procedures she would want to go through.      -Encouraged them to review medical decisions through the lens of what is important to her and what brings meaning and value to her life, and then fit the medical \"stuff\" into that framework.     -Although you have this opportunity given a brief introduction to the POLST form.    -She felt that this was enough information for today, however we did briefly discuss that other questions of medical interventions such as nutritional support and dialysis can be offered in \"time-limited trial\" type capacities.  Meaning, if right now she is not sure what sort of decisions she would make for these things, she be more open to decisions in the future, however those decisions and of himself do also not need to be permanent.      Recommendations:  -Set out 2 Kind bars every day to hopefully prompt remembering to eat some snacks.  -Try increasing mirtazapine to 11.25mg. New prescription sent today.  -Agree with Aricept as long as tolerated OK (no severe diarrhea).   -Disucssed there can be benefits from being arounf people who are   - Advised to reach out to support groups from [facility] for [facility].  Discussed attending for at least 3-5 " "times before making a judgment.  -Discussed CPR and other \"heroic\" measures including intubation.  -Discussed healthcare agent selection.    -Provided information for honoring choices [place] in after visit summary to update directive.      Follow up: 3 months      Video-Visit Details  Video Start Time: 12:38PM  Video End Time: 1:47PM    Originating Location (pt. Location): Home     Distant Location (provider location):  Offsite- Personal Home      Platform used for Video Visit: Noemy     Total time spent on day of encounter is 25 mins, including reviewing record, review of above studies, above visit with patient, symptomatic discussion as above, including medication adjustments/prescription management, 25 minutes spent exclusively on advanced care planning and goals of care discussion regarding prognosis with dementia, conversation about healthcare directive, and code status discussion including introduction to POLST form, and documentation.       The longitudinal plan of care for the diagnosis(es)/condition(s) as documented were addressed during this visit.?Due to the added complexity in care, I will continue to support Yaritza Bradley in the subsequent management and with the ongoing continuity of care.    Zohreh Castañeda, DO  Palliative Medicine       Some chart documentation performed using Dragon Voice recognition Software. Although reviewed after completion, some words and grammatical errors may remain.    "

## 2025-04-28 NOTE — PATIENT INSTRUCTIONS
"Recommendations:  -Set out 2 Kind bars every day to hopefully prompt remembering to eat some snacks.  -Try increasing mirtazapine to 11.25mg. New prescription sent today.  -Agree with Aricept as long as tolerated OK (no severe diarrhea).   -Disucssed there can be benefits from being arounf people who are   - Advised to reach out to support groups from [facility] for [facility].  Discussed attending for at least 3-5 times before making a judgment.  -Discussed CPR and other \"heroic\" measures including intubation.  We talked about starting to look at completing a healthcare directive.  I recommend you check out- [email or web address]     They have several tools and some advice about getting started.  Under the \"How do I document my choices\" section, I am a fan of the full length healthcare directive (for adults with serious illness) because I think it has a good combination of the medical questions that are important as well as the social and personal questions that allow healthcare providers to get to know you as a person if you are unable to speak for yourself.  You do not have to complete every question on the document.  I generally recommend patients start with 3 or 4 questions on the goals page and work from there.      Follow up: 3 months      *Please do not make any changes to medications that we prescribe, including pain medicines, without talking to our team*    Reasons to Call    If you are having worsening/uncontrolled symptoms we want you to call!    You or your other physicians make any changes to medications we have prescribed.  -Please call for refills 4-5 days before you will run out of medication.    Important Phone Numbers, including: Refills, scheduling, and general questions     Palliative Care RN: Maya Gaston : [phone]  *For scheduling needs/follow up visits : [phone]  *After hours or on weekends- Will connect you with on call " MD : [phone].

## 2025-04-28 NOTE — NURSING NOTE
Current patient location: [address] [place] [id number]    Is the patient currently in the state of [place]? YES    Visit mode: VIDEO    If the visit is dropped, the patient can be reconnected by: nura    Will anyone else be joining the visit? NO  (If patient encounters technical issues they should call [phone])    Are changes needed to the allergy or medication list? No    Are refills needed on medications prescribed by this physician? NO    Rooming Documentation:  Questionnaire(s) completed    Reason for visit: RECHECK    Ania GUTIERRES

## 2025-04-28 NOTE — LETTER
4/28/2025      Yaritza Bradley  [address] [address]      Dear Colleague,    Thank you for referring your patient, Yaritza Bradley, to the [facility] [place]. Please see a copy of my visit note below.    Palliative Care Clinic Progress note    Patient Name: Yaritza Bradley  Primary Provider: Aziza Aviles    Chief Complaint/Patient ID:   She has a PMHx of leukoariosis/dementia (started having symptoms in 2012) and malnutrition. Additional medical history includes SHEA, MDD, spondylolisthesis with LE radiculopathy, and RA.    Last Palliative Care Appt: 1/24/25 with me.      Reviewed: Yes    Social History: . One son in [place] (see rarely) and daughter in [place].  Black lab named Fabio.     Advanced Care Planning: HCD+ and scanned in chart. Names  as primary HCA.  We have been having discussions about revising this.      History of Present Illness:  Yaritza Bradley is seen for a follow-up video visit today with Palliative Care. Her  Remi is also present and provides additional history.     Prior to her visit,  shared a [employer] message with some of the concerns that they have.    Her weight has increased slightly since her last visit, however she is not seeing the scale will up more.  Getting in enough calories per day has been difficult, and they have not been able to find a protein supplement she enjoys.  Wondering about any additional recommendations or options.  Nicole has asked for a second opinion regarding assessing her weight loss    She says her weight is up and down.  says she does like the Kind bars; had a few others that were less palatable.  She tends to forget to have a snack. They are tucked in the cupboard.    At her last visit, we had discussed the mirtazapine.  When the dose was increased to 15 mg, she had become too sedated, however  is wondering if she may have become more accustomed to the 7.5 mg now and we can try increasing  "again to potentially achieve more appetite benefit.    She is worried about taking more medicine.  She says that she is always felt that \"The less meds I am on, then the less sick I am.\"    We saw neurology in March.  She is on Aricept 10mg now. Tolerating well. No diarrhea.    She continues to be active, walk doing a mile at least 5 days a week.  She continues to be independent with her ADLs.  She is very frustrated when she is unable to do before she wants to.  He cites examples where she will refer to herself as \"stupid\" she has had some trouble now remembering numbers.    She has been less interested in participating in group settings.    She says she has a couple friends, sees them about once a month. \"I don't have a bright cheery outlook. I feel like I'm going through the motions, walking because I know I need to, not because I enjoy it\". Loves spending time with her dog. Doesn't want to impose on other people. [employer] friends may not know how significant her frustartions are- she doesn't like pity parties. They always talk about happy things. She tends to keep things to herself. Doesn't tend to share details- doesn't want to be a burden. She says that her mood can be \"crabby\".  Gets frustrated not being able to do things she used to do- harder to do things in general.    I asked about local support groups.   notes that their local [facility] has 2 dementia groups, 1 for patient/clients and 1 for caregivers.  He also notes that a local [employer] has a caregiver support group, and their local community center has a 55+ social group that gets together once a month.  He has wondered if participation in something like this would be helpful for her.    Regarding some of the groups she stated \"I don't see myself in that group\".  Says she doesn't feel \"old\".    Additionally, they wanted like to review our conversation from last time about boundaries and limitations that people placed on their health care, " "particularly around CODE STATUS, dialysis, and nutritional support.    When I asked her thoughts on the conversation, Nicole stated \"I feel like this is a long way away\".      Physical Exam:   Constitutional: Alert, pleasant, no apparent distress. Sitting up in chair.  Eyes: Sclera non-icteric, no eye discharge.  ENT: No nasal discharge. Ears grossly normal.  Respiratory: Unlabored respirations. Speaking in full sentences.  Musculoskeletal: Extremities appear normal- no gross deformities noted. No edema noted on upper body.   Skin: No suspicious lesions or rashes on visible skin.  Neurologic: Clear speech, no aphasia but periodic word finding trouble. No facial droop.  Psychiatric: Mentation appears a little bit slowed, appropriate attention. Affect normal/bright. Does not appear anxious or depressed.    Key Data Reviewed:  LABS:   Lab Results   Component Value Date    WBC 5.0 01/22/2025    HGB 13.1 01/22/2025    HCT 38.7 01/22/2025     01/22/2025     12/10/2024    POTASSIUM 3.9 12/10/2024    CHLORIDE 101 12/10/2024    CO2 26 12/10/2024    BUN 11.8 12/10/2024    CR 0.73 01/22/2025     (H) 12/10/2024    SED 7 02/09/2012    DD 1.7 (H) 02/12/2009    TROPI <0.012 02/12/2009    AST 41 01/22/2025    ALT 32 01/22/2025    ALKPHOS 84 12/10/2024    BILITOTAL 0.6 12/10/2024    TIMMY <10 (L) 09/29/2014 1/22/25 - GFR 88, Albumin 3.8        Impression & Recommendations & Counseling:  Yaritza Bradley has a history of  leukoariosis/dementia and malnutrition.    Reviewed the difficulty with appetite stimulation.  While there are some medications that can be tried, none of them have \"silver bullet\" evidence behind them, and we always have to weigh the risks and benefits of additional side effects.  We previously had discussed the option of Megace at our first visit in January. Since she is tolerating the mirtazapine well now, we discussed the option of increasing the dose by half, to 11.25 mg to see if this is " better tolerated, less sedating, but could still give a bump in appetite.  She ultimately agreed to do this today.  We also talked about ways to brainstorm having snacks more visible for her to remember to eat during the day.    Additionally discussed the importance of engaging with other activities and people, as a means of support for both of them, especially if things continue to progress with her dementia.  As far as her friends are concerned, discussed that people don't know how to help if they don't know what's going on.  We also discussed many people prefer to keep their friends as a safe place to have fun and lighthearted conversations, and having a support group of some sort can be a safe place to reflect on prior struggles and also find some camaraderie and other people for going through similar situations.  In that regard, I did make a strong recommendation to try to reach out to at least one sort of support group for social group and attend for 3-5 sessions before making a judgment on how it is going.    Advance Care Planning Discussion 4/28/2025. I, Zohreh Castañeda, DO met with Patient and their spouse today during a clinic visit to discuss Advance Care Planning. Yaritza Bradley  has limited decisional capacity  and was present for this discussion.  Those present were informed of the voluntary nature of this discussion and wished to proceed.   -Reviewed healthcare directive and the importance of having up-to-date healthcare agents.  They agree that they are going to have to update the healthcare directive soon, as her mom is no longer in a place to be able to serve as an alternate (has dementia herself).  Their son has been open to conversations with them about advance care planning, which is wonderful.    -Reviewed the topic of CPR as a medical decision that is generally considered important, particularly with patients with prior health conditions.  Discussed how CPR is a very aggressive  "process and does not have the same outcomes in real life as it does on TV.  Discussed the varying outcomes that can result when someone has gone through CPR and they are \"brought back\".  Discussed that often times patients are \"less than\" what they were prior to going through cardiac arrest (in terms of their physical and mental functionality).  We also discussed intubation, and what is entailed with this, including sedation.  We discussed that some people do time-limited trials on intubation as they are open to initial treatment but would not want long-term mechanical ventilation with a tracheostomy.  We talked about how everybody has different criteria for what they consider acceptable in their lives, and this varies from person to person.  Encouraged her to think about what she considers important and if there are any situations she would deem not acceptable and to use this information to help inform how she makes decisions about her health care, namely what sort of interventions or procedures she would want to go through.      -Encouraged them to review medical decisions through the lens of what is important to her and what brings meaning and value to her life, and then fit the medical \"stuff\" into that framework.     -Although you have this opportunity given a brief introduction to the POLST form.    -She felt that this was enough information for today, however we did briefly discuss that other questions of medical interventions such as nutritional support and dialysis can be offered in \"time-limited trial\" type capacities.  Meaning, if right now she is not sure what sort of decisions she would make for these things, she be more open to decisions in the future, however those decisions and of himself do also not need to be permanent.      Recommendations:  -Set out 2 Kind bars every day to hopefully prompt remembering to eat some snacks.  -Try increasing mirtazapine to 11.25mg. New prescription sent " "today.  -Agree with Aricept as long as tolerated OK (no severe diarrhea).   -Disucssed there can be benefits from being arounf people who are   - Advised to reach out to support groups from [facility] for [facility].  Discussed attending for at least 3-5 times before making a judgment.  -Discussed CPR and other \"heroic\" measures including intubation.  -Discussed healthcare agent selection.    -Provided information for honoring choices [place] in after visit summary to update directive.      Follow up: 3 months      Video-Visit Details  Video Start Time: 12:38PM  Video End Time: 1:47PM    Originating Location (pt. Location): Home     Distant Location (provider location):  Offsite- Personal Home      Platform used for Video Visit: Noemy     Total time spent on day of encounter is 25 mins, including reviewing record, review of above studies, above visit with patient, symptomatic discussion as above, including medication adjustments/prescription management, 25 minutes spent exclusively on advanced care planning and goals of care discussion regarding prognosis with dementia, conversation about healthcare directive, and code status discussion including introduction to POLST form, and documentation.       The longitudinal plan of care for the diagnosis(es)/condition(s) as documented were addressed during this visit.?Due to the added complexity in care, I will continue to support Yaritza Bradley in the subsequent management and with the ongoing continuity of care.    Zohreh Castañeda, DO  Palliative Medicine       Some chart documentation performed using Dragon Voice recognition Software. Although reviewed after completion, some words and grammatical errors may remain.      Again, thank you for allowing me to participate in the care of your patient.        Sincerely,        Zohreh Castañeda, DO    Electronically signed"

## 2025-04-28 NOTE — LETTER
4/28/2025      Yaritza Bradley  [address] [facility] [facility] [address]      Dear Colleague,    Thank you for referring your patient, Yaritza Bradley, to the [facility] [place]. Please see a copy of my visit note below.    Palliative Care Clinic Progress note    Patient Name: Yaritza Bradley  Primary Provider: Aziza Aviles    Chief Complaint/Patient ID:   She has a PMHx of leukoariosis/dementia (started having symptoms in 2012) and malnutrition. Additional medical history includes SHEA, MDD, spondylolisthesis with LE radiculopathy, and RA.    Last Palliative Care Appt: 1/24/25 with me.      Reviewed: Yes    Social History: . One son in [place] (see rarely) and daughter in [place].  Black lab named Fabio.     Advanced Care Planning: HCD+ and scanned in chart. Names  as primary HCA.  We have been having discussions about revising this.      History of Present Illness:  Yaritza Bradley is seen for a follow-up video visit today with Palliative Care. Her  Remi is also present and provides additional history.     Prior to her visit,  shared a [employer] message with some of the concerns that they have.    Her weight has increased slightly since her last visit, however she is not seeing the scale will up more.  Getting in enough calories per day has been difficult, and they have not been able to find a protein supplement she enjoys.  Wondering about any additional recommendations or options.  Nicole has asked for a second opinion regarding assessing her weight loss    She says her weight is up and down.  says she does like the Kind bars; had a few others that were less palatable.  She tends to forget to have a snack. They are tucked in the cupboard.    At her last visit, we had discussed the mirtazapine.  When the dose was increased to 15 mg, she had become too sedated, however  is wondering if she may have become more accustomed to the 7.5 mg now and we can try increasing  "again to potentially achieve more appetite benefit.    She is worried about taking more medicine.  She says that she is always felt that \"The less meds I am on, then the less sick I am.\"    We saw neurology in March.  She is on Aricept 10mg now. Tolerating well. No diarrhea.    She continues to be active, walk doing a mile at least 5 days a week.  She continues to be independent with her ADLs.  She is very frustrated when she is unable to do before she wants to.  He cites examples where she will refer to herself as \"stupid\" she has had some trouble now remembering numbers.    She has been less interested in participating in group settings.    She says she has a couple friends, sees them about once a month. \"I don't have a bright cheery outlook. I feel like I'm going through the motions, walking because I know I need to, not because I enjoy it\". Loves spending time with her dog. Doesn't want to impose on other people. [employer] friends may not know how significant her frustartions are- she doesn't like pity parties. They always talk about happy things. She tends to keep things to herself. Doesn't tend to share details- doesn't want to be a burden. She says that her mood can be \"crabby\".  Gets frustrated not being able to do things she used to do- harder to do things in general.    I asked about local support groups.   notes that their local [facility] has 2 dementia groups, 1 for patient/clients and 1 for caregivers.  He also notes that a local [employer] has a caregiver support group, and their local community center has a 55+ social group that gets together once a month.  He has wondered if participation in something like this would be helpful for her.    Regarding some of the groups she stated \"I don't see myself in that group\".  Says she doesn't feel \"old\".    Additionally, they wanted like to review our conversation from last time about boundaries and limitations that people placed on their health care, " "particularly around CODE STATUS, dialysis, and nutritional support.    When I asked her thoughts on the conversation, Nicole stated \"I feel like this is a long way away\".      Physical Exam:   Constitutional: Alert, pleasant, no apparent distress. Sitting up in chair.  Eyes: Sclera non-icteric, no eye discharge.  ENT: No nasal discharge. Ears grossly normal.  Respiratory: Unlabored respirations. Speaking in full sentences.  Musculoskeletal: Extremities appear normal- no gross deformities noted. No edema noted on upper body.   Skin: No suspicious lesions or rashes on visible skin.  Neurologic: Clear speech, no aphasia but periodic word finding trouble. No facial droop.  Psychiatric: Mentation appears a little bit slowed, appropriate attention. Affect normal/bright. Does not appear anxious or depressed.    Key Data Reviewed:  LABS:   Lab Results   Component Value Date    WBC 5.0 01/22/2025    HGB 13.1 01/22/2025    HCT 38.7 01/22/2025     01/22/2025     12/10/2024    POTASSIUM 3.9 12/10/2024    CHLORIDE 101 12/10/2024    CO2 26 12/10/2024    BUN 11.8 12/10/2024    CR 0.73 01/22/2025     (H) 12/10/2024    SED 7 02/09/2012    DD 1.7 (H) 02/12/2009    TROPI <0.012 02/12/2009    AST 41 01/22/2025    ALT 32 01/22/2025    ALKPHOS 84 12/10/2024    BILITOTAL 0.6 12/10/2024    TIMMY <10 (L) 09/29/2014 1/22/25 - GFR 88, Albumin 3.8        Impression & Recommendations & Counseling:  Yaritza Bradley has a history of  leukoariosis/dementia and malnutrition.    Reviewed the difficulty with appetite stimulation.  While there are some medications that can be tried, none of them have \"silver bullet\" evidence behind them, and we always have to weigh the risks and benefits of additional side effects.  We previously had discussed the option of Megace at our first visit in January. Since she is tolerating the mirtazapine well now, we discussed the option of increasing the dose by half, to 11.25 mg to see if this is " better tolerated, less sedating, but could still give a bump in appetite.  She ultimately agreed to do this today.  We also talked about ways to brainstorm having snacks more visible for her to remember to eat during the day.    Additionally discussed the importance of engaging with other activities and people, as a means of support for both of them, especially if things continue to progress with her dementia.  As far as her friends are concerned, discussed that people don't know how to help if they don't know what's going on.  We also discussed many people prefer to keep their friends as a safe place to have fun and lighthearted conversations, and having a support group of some sort can be a safe place to reflect on prior struggles and also find some camaraderie and other people for going through similar situations.  In that regard, I did make a strong recommendation to try to reach out to at least one sort of support group for social group and attend for 3-5 sessions before making a judgment on how it is going.    Advance Care Planning Discussion 4/28/2025. I, Zohreh Castañeda, DO met with Patient and their spouse today during a clinic visit to discuss Advance Care Planning. Yaritza Bradley  has limited decisional capacity  and was present for this discussion.  Those present were informed of the voluntary nature of this discussion and wished to proceed.   -Reviewed healthcare directive and the importance of having up-to-date healthcare agents.  They agree that they are going to have to update the healthcare directive soon, as her mom is no longer in a place to be able to serve as an alternate (has dementia herself).  Their son has been open to conversations with them about advance care planning, which is wonderful.    -Reviewed the topic of CPR as a medical decision that is generally considered important, particularly with patients with prior health conditions.  Discussed how CPR is a very aggressive  "process and does not have the same outcomes in real life as it does on TV.  Discussed the varying outcomes that can result when someone has gone through CPR and they are \"brought back\".  Discussed that often times patients are \"less than\" what they were prior to going through cardiac arrest (in terms of their physical and mental functionality).  We also discussed intubation, and what is entailed with this, including sedation.  We discussed that some people do time-limited trials on intubation as they are open to initial treatment but would not want long-term mechanical ventilation with a tracheostomy.  We talked about how everybody has different criteria for what they consider acceptable in their lives, and this varies from person to person.  Encouraged her to think about what she considers important and if there are any situations she would deem not acceptable and to use this information to help inform how she makes decisions about her health care, namely what sort of interventions or procedures she would want to go through.      -Encouraged them to review medical decisions through the lens of what is important to her and what brings meaning and value to her life, and then fit the medical \"stuff\" into that framework.     -Although you have this opportunity given a brief introduction to the POLST form.    -She felt that this was enough information for today, however we did briefly discuss that other questions of medical interventions such as nutritional support and dialysis can be offered in \"time-limited trial\" type capacities.  Meaning, if right now she is not sure what sort of decisions she would make for these things, she be more open to decisions in the future, however those decisions and of himself do also not need to be permanent.      Recommendations:  -Set out 2 Kind bars every day to hopefully prompt remembering to eat some snacks.  -Try increasing mirtazapine to 11.25mg. New prescription sent " "today.  -Agree with Aricept as long as tolerated OK (no severe diarrhea).   -Disucssed there can be benefits from being arounf people who are   - Advised to reach out to support groups from [facility] for [facility].  Discussed attending for at least 3-5 times before making a judgment.  -Discussed CPR and other \"heroic\" measures including intubation.  -Discussed healthcare agent selection.    -Provided information for honoring choices [place] in after visit summary to update directive.      Follow up: 3 months      Video-Visit Details  Video Start Time: 12:38PM  Video End Time: 1:47PM    Originating Location (pt. Location): Home     Distant Location (provider location):  Offsite- Personal Home      Platform used for Video Visit: Noemy     Total time spent on day of encounter is 25 mins, including reviewing record, review of above studies, above visit with patient, symptomatic discussion as above, including medication adjustments/prescription management, 25 minutes spent exclusively on advanced care planning and goals of care discussion regarding prognosis with dementia, conversation about healthcare directive, and code status discussion including introduction to POLST form, and documentation.       The longitudinal plan of care for the diagnosis(es)/condition(s) as documented were addressed during this visit.?Due to the added complexity in care, I will continue to support Yaritza Bradley in the subsequent management and with the ongoing continuity of care.    Zohreh Castañeda, DO  Palliative Medicine       Some chart documentation performed using Dragon Voice recognition Software. Although reviewed after completion, some words and grammatical errors may remain.      Again, thank you for allowing me to participate in the care of your patient.        Sincerely,        Zohreh Castañeda, DO    Electronically signed"

## 2025-05-06 ENCOUNTER — MYC MEDICAL ADVICE (OUTPATIENT)
Dept: FAMILY MEDICINE | Facility: CLINIC | Age: 70
End: 2025-05-06

## 2025-05-06 ENCOUNTER — OFFICE VISIT (OUTPATIENT)
Dept: FAMILY MEDICINE | Facility: CLINIC | Age: 70
End: 2025-05-06
Payer: COMMERCIAL

## 2025-05-06 VITALS
SYSTOLIC BLOOD PRESSURE: 118 MMHG | TEMPERATURE: 97.4 F | DIASTOLIC BLOOD PRESSURE: 80 MMHG | OXYGEN SATURATION: 99 % | BODY MASS INDEX: 16.99 KG/M2 | HEIGHT: 64 IN | HEART RATE: 93 BPM | WEIGHT: 99.5 LBS

## 2025-05-06 DIAGNOSIS — R10.13 EPIGASTRIC PAIN: ICD-10-CM

## 2025-05-06 DIAGNOSIS — I67.81 LEUKOARAIOSIS: ICD-10-CM

## 2025-05-06 DIAGNOSIS — Z12.31 VISIT FOR SCREENING MAMMOGRAM: ICD-10-CM

## 2025-05-06 DIAGNOSIS — R35.0 URINARY FREQUENCY: ICD-10-CM

## 2025-05-06 DIAGNOSIS — F03.90 DEMENTIA WITHOUT BEHAVIORAL DISTURBANCE, PSYCHOTIC DISTURBANCE, MOOD DISTURBANCE, OR ANXIETY, UNSPECIFIED DEMENTIA SEVERITY, UNSPECIFIED DEMENTIA TYPE (H): ICD-10-CM

## 2025-05-06 DIAGNOSIS — R63.4 UNINTENTIONAL WEIGHT LOSS: Primary | ICD-10-CM

## 2025-05-06 DIAGNOSIS — R79.9 ABNORMAL FINDING OF BLOOD CHEMISTRY, UNSPECIFIED: ICD-10-CM

## 2025-05-06 LAB
ALBUMIN UR-MCNC: NEGATIVE MG/DL
APPEARANCE UR: ABNORMAL
BACTERIA #/AREA URNS HPF: ABNORMAL /HPF
BILIRUB UR QL STRIP: NEGATIVE
CAOX CRY #/AREA URNS HPF: ABNORMAL /HPF
COLOR UR AUTO: YELLOW
GLUCOSE UR STRIP-MCNC: NEGATIVE MG/DL
HGB UR QL STRIP: ABNORMAL
HYALINE CASTS #/AREA URNS LPF: ABNORMAL /LPF
IRON SERPL-MCNC: 250 UG/DL (ref 37–145)
KETONES UR STRIP-MCNC: ABNORMAL MG/DL
LEUKOCYTE ESTERASE UR QL STRIP: NEGATIVE
MUCOUS THREADS #/AREA URNS LPF: PRESENT /LPF
NITRATE UR QL: NEGATIVE
PH UR STRIP: 5.5 [PH] (ref 5–7)
RBC #/AREA URNS AUTO: ABNORMAL /HPF
SP GR UR STRIP: >=1.03 (ref 1–1.03)
SQUAMOUS #/AREA URNS AUTO: ABNORMAL /LPF
TSH SERPL DL<=0.005 MIU/L-ACNC: 1.37 UIU/ML (ref 0.3–4.2)
UROBILINOGEN UR STRIP-ACNC: 0.2 E.U./DL
WBC #/AREA URNS AUTO: ABNORMAL /HPF

## 2025-05-06 PROCEDURE — 81001 URINALYSIS AUTO W/SCOPE: CPT | Performed by: INTERNAL MEDICINE

## 2025-05-06 PROCEDURE — 3074F SYST BP LT 130 MM HG: CPT | Performed by: INTERNAL MEDICINE

## 2025-05-06 PROCEDURE — 99215 OFFICE O/P EST HI 40 MIN: CPT | Mod: 25 | Performed by: INTERNAL MEDICINE

## 2025-05-06 PROCEDURE — G0009 ADMIN PNEUMOCOCCAL VACCINE: HCPCS | Performed by: INTERNAL MEDICINE

## 2025-05-06 PROCEDURE — 87086 URINE CULTURE/COLONY COUNT: CPT | Performed by: INTERNAL MEDICINE

## 2025-05-06 PROCEDURE — 36415 COLL VENOUS BLD VENIPUNCTURE: CPT | Performed by: INTERNAL MEDICINE

## 2025-05-06 PROCEDURE — 1126F AMNT PAIN NOTED NONE PRSNT: CPT | Performed by: INTERNAL MEDICINE

## 2025-05-06 PROCEDURE — 83540 ASSAY OF IRON: CPT | Performed by: INTERNAL MEDICINE

## 2025-05-06 PROCEDURE — 3079F DIAST BP 80-89 MM HG: CPT | Performed by: INTERNAL MEDICINE

## 2025-05-06 PROCEDURE — 84443 ASSAY THYROID STIM HORMONE: CPT | Performed by: INTERNAL MEDICINE

## 2025-05-06 PROCEDURE — 90677 PCV20 VACCINE IM: CPT | Performed by: INTERNAL MEDICINE

## 2025-05-06 ASSESSMENT — PATIENT HEALTH QUESTIONNAIRE - PHQ9
10. IF YOU CHECKED OFF ANY PROBLEMS, HOW DIFFICULT HAVE THESE PROBLEMS MADE IT FOR YOU TO DO YOUR WORK, TAKE CARE OF THINGS AT HOME, OR GET ALONG WITH OTHER PEOPLE: SOMEWHAT DIFFICULT
SUM OF ALL RESPONSES TO PHQ QUESTIONS 1-9: 15
SUM OF ALL RESPONSES TO PHQ QUESTIONS 1-9: 15

## 2025-05-06 ASSESSMENT — PAIN SCALES - GENERAL: PAINLEVEL_OUTOF10: NO PAIN (0)

## 2025-05-06 NOTE — PROGRESS NOTES
Assessment & Plan     Unintentional weight loss  Long list of differential including occult malignancy, occult infection (on immunosuppression) medication side effect, metabolic, malabsorption, other.  PCP did initial round of testing that was unrevealing including imaging and lab as outlined below.  Further testing as below.  Due for colonoscopy.  Given epigastric pain, recommend upper endoscopy to rule out malignancy, ulcer, Candida.  If all normal, suspected is related to dementia although often the decreased oral intake is a late finding of dementia, but this is not always the case.  Follow-up with PCP fall testing is negative  - TSH with free T4 reflex; Future  - Tissue transglutaminase aliya IgA and IgG; Future  - Iron; Future  - Fecal Fat Qualitative Random; Future  - Adult GI  Referral - Procedure Only; Future  - TSH with free T4 reflex  - Tissue transglutaminase aliya IgA and IgG  - Iron    Dementia without behavioral disturbance, psychotic disturbance, mood disturbance, or anxiety, unspecified dementia severity, unspecified dementia type (H)  Contributes to primary problem.  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    Epigastric pain  Recommend upper endoscopy  - Adult GI  Referral - Procedure Only; Future    Urinary frequency  Check urine to rule out infection  - UA Macroscopic with reflex to Microscopic and Culture - Lab Collect; Future  - UA Macroscopic with reflex to Microscopic and Culture - Lab Collect    Visit for screening mammogram  Due  - MA Screen Bilateral w/Bernabe; Future    Abnormal finding of blood chemistry, unspecified  - Iron; Future  - Iron          Depression Screening Follow Up        Weight loss  Use more of calorie dense foods - oils, nut butters, nuts, avocado  Blood and urine test today  Recommend upper and lower endoscopy  You are due for a mammogram.  Please call [phone] to schedule.  Urine test today to rule out  infection  If all testing is normal, recommend follow-up with PCP.  Dementia and loss of 'executive function' can lead to decreased oral intake and weight loss    Runny Nose   We discussed nasal spray, but you are not bothered by this, so defer for now        55 minutes spent on chart review the day of the visit, patient visit and documentation, Ordering tests, interpreting tests    Subjective   Nicole is a 69 year old, presenting for the following health issues:  Weight Management (Second opinion for unexplained weight loss and poor chronic health. See my chart message that printed out )        5/6/2025     1:40 PM   Additional Questions   Roomed by lora   Accompanied by Spouse Remi         5/6/2025     1:40 PM   Patient Reported Additional Medications   Patient reports taking the following new medications na     History of Present Illness       Reason for visit:  Second opinion for unexplained weight loss and poor chronic health.    She eats 0-1 servings of fruits and vegetables daily.She consumes 1 sweetened beverage(s) daily.She exercises with enough effort to increase her heart rate 9 or less minutes per day.  She exercises with enough effort to increase her heart rate 5 days per week.   She is taking medications regularly.        Unintentional Weight Loss  -- She has used mirtazapine 7.5 mg, 15 mg was too sedating.  At last visit it was increased to 11.25 mg  --Portion sizes are small and she feels full quickly  --She forgets to have a snack at times  --Insurance does not cover nutritionist  --There was some thought that the methotrexate was contributing to appetite suppression/weight loss  --she is still very active, walking her dog regularly  --typical weight during her adult life 125 lb  --she reports her appetite is good.  Eats 2-3 meals/day. Mostly 2 with some snacks.  Remi states it is a struggle to get her to eat      Breakfast - toast with jelly and PB  Lunch - sandwich/fruit  Dinner -  "'hearty\"  Snack -Kind Bar, protein shakes     did a food journal and her food intake was [id number] luz marina/day  --has tried multiple protein drinks and she did not like the taste    --In the last 5 months she has had a normal CMP, glucose, CBC with differential, B12, folate, CRP  --Her prealbumin is low at 14.6 next line  --Had colonoscopy 7/20 and was recommended to follow-up in 5 years  --mammogram 9/23 was normal  --She had a CT chest/abdomen/pelvis 11/24 which was fairly unremarkable, had moderate to severe coronary artery calcification    --no fevers, chills, night sweats, headache, vision changes, dysphagia, odynophagia, chest pain, shortness of breath, nausea, vomiting, diarrhea, constipation, melena, hematochezia, new arthralgias, skin changes  --++early satiety  --+occ dry mouth  --++epigastric pain intermittent, does not worsen with eating. Onset 6+ months; PCP started omeprazole in Dec for the epigastric pain but she never started it; reluctant to take pills  --++urinary urgency, passing small amounts of urine.  No dysuria, hematuria. Onset 6+ months  --has low energy    Mental health   --normally tends to be pessimistic   --enjoys walking; walks with 2 poles due to balance; previous hobby - softball, bike ride, scrapbooking  --retired 2014; became more socially isolated after this but she and  were not bothered by this; gets together with co-workers every 4-6 months and she enjoys that  --her dog brings her ramu  --tends toward anxiety but denies any panic attacks    Dementia  -- She follows with palliative care, last visit 4/28    RA  --previously followed with [facility] in [place] - Dr. Amberly Ulloa  --considering transferring to [facility]  --primary symptoms are joint pains of bilateral hands, feet, occ knees  --is stable. No longer on humuria  --has been on methotrexate 10+ years.    Rhinorrhea  --spouse notes chronic runny nose for many many months.    Current Outpatient Medications " "  Medication Sig Dispense Refill    calcium carbonate (OS-EVA) 1500 (600 Ca) MG tablet Take 1,500 mg by mouth daily.      Cyanocobalamin (B-12) 1000 MCG TBCR Take 1,000 mcg by mouth daily 100 tablet 1    donepezil (ARICEPT) 10 MG tablet Take 1 tablet (10 mg) by mouth at bedtime.      folic acid (FOLVITE) 1 MG tablet Take 1 tablet (1,000 mcg) by mouth daily 90 tablet 3    Methotrexate 10 MG/0.4ML SOSY Inject 10 mg subcutaneously once a week.      mirtazapine (REMERON) 7.5 MG tablet Take 1.5 tablets (11.25 mg) by mouth at bedtime. 135 tablet 1    omeprazole (PRILOSEC) 40 MG DR capsule Take 1 capsule (40 mg) by mouth daily. 90 capsule 1    RESTASIS 0.05 % ophthalmic emulsion Place 1 drop into both eyes 2 times daily      simvastatin (ZOCOR) 20 MG tablet Take 1 tablet (20 mg) by mouth at bedtime. (Patient taking differently: Take 20 mg by mouth at bedtime. Every other day) 90 tablet 3    SM CALCIUM SOFT CHEWS 500-100-40 OR CHEW Take 1 tablet by mouth 2 times daily   0    dextrose 5% flush Inject into the vein. DEXTROSE 5 % SOLN WITH METHOTREXATE 50 MG/2ML SOLN      Omega-3 Fatty Acids (OMEGA-3 FISH OIL PO) Take 1 g by mouth daily (DRY EYE OMEGA BENEFITS) 667-250 mg-unit cap             Review of Systems  Constitutional, neuro, ENT, endocrine, pulmonary, cardiac, gastrointestinal, genitourinary, musculoskeletal, integument and psychiatric systems are negative, except as otherwise noted.      Objective    /80 (BP Location: Right arm, Patient Position: Sitting, Cuff Size: Adult Small)   Pulse 93   Temp 97.4  F (36.3  C) (Tympanic)   Ht 1.626 m (5' 4\")   Wt 45.1 kg (99 lb 8 oz)   LMP  (LMP Unknown)   SpO2 99%   BMI 17.08 kg/m    Body mass index is 17.08 kg/m .  Physical Exam   GENERAL: alert and no distress thin, frail  HENT: ear canals and TM's normal, nose and mouth without ulcers or lesions  NECK: no adenopathy, no asymmetry, masses, or scars  RESP: lungs clear to auscultation - no rales, rhonchi or " wheezes  CV: regular rate and rhythm, normal S1 S2, no S3 or S4, no murmur, click or rub, no peripheral edema   ABDOMEN: soft, nontender, no hepatosplenomegaly, no masses and bowel sounds normal  MS: no gross musculoskeletal defects noted, no edema  SKIN: no suspicious lesions or rashes  PSYCH: mentation appears normal, affect normal/bright            Signed Electronically by: Becky Zuleta DO

## 2025-05-06 NOTE — PATIENT INSTRUCTIONS
Weight loss  Use more of calorie dense foods - oils, nut butters, nuts, avocado  Blood and urine test today  Recommend upper and lower endoscopy  You are due for a mammogram.  Please call [phone] to schedule.  Urine test today to rule out infection  If all testing is normal, recommend follow-up with PCP.  Dementia and loss of 'executive function' can lead to decreased oral intake and weight loss    Runny Nose   We discussed nasal spray, but you are not bothered by this, so defer for now

## 2025-05-07 ENCOUNTER — RESULTS FOLLOW-UP (OUTPATIENT)
Dept: FAMILY MEDICINE | Facility: CLINIC | Age: 70
End: 2025-05-07

## 2025-05-07 DIAGNOSIS — R35.0 URINARY FREQUENCY: Primary | ICD-10-CM

## 2025-05-08 LAB — BACTERIA UR CULT: NORMAL

## 2025-05-14 LAB
TTG IGA SER-ACNC: 0.3 U/ML
TTG IGG SER-ACNC: 1 U/ML

## 2025-05-19 ENCOUNTER — LAB (OUTPATIENT)
Dept: LAB | Facility: CLINIC | Age: 70
End: 2025-05-19
Payer: COMMERCIAL

## 2025-05-19 DIAGNOSIS — Z79.899 OTHER LONG TERM (CURRENT) DRUG THERAPY: ICD-10-CM

## 2025-05-19 DIAGNOSIS — M05.79 RHEUMATOID ARTHRITIS INVOLVING MULTIPLE SITES WITH POSITIVE RHEUMATOID FACTOR (H): ICD-10-CM

## 2025-05-19 LAB
ALBUMIN SERPL BCG-MCNC: 3.9 G/DL (ref 3.5–5.2)
ALT SERPL W P-5'-P-CCNC: 15 U/L (ref 0–50)
AST SERPL W P-5'-P-CCNC: 25 U/L (ref 0–45)
BASOPHILS # BLD AUTO: 0 10E3/UL (ref 0–0.2)
BASOPHILS NFR BLD AUTO: 0 %
CREAT SERPL-MCNC: 0.73 MG/DL (ref 0.51–0.95)
CRP SERPL-MCNC: <3 MG/L
EGFRCR SERPLBLD CKD-EPI 2021: 88 ML/MIN/1.73M2
EOSINOPHIL # BLD AUTO: 0.1 10E3/UL (ref 0–0.7)
EOSINOPHIL NFR BLD AUTO: 2 %
ERYTHROCYTE [DISTWIDTH] IN BLOOD BY AUTOMATED COUNT: 15.5 % (ref 10–15)
ERYTHROCYTE [SEDIMENTATION RATE] IN BLOOD BY WESTERGREN METHOD: 16 MM/HR (ref 0–30)
HCT VFR BLD AUTO: 39.3 % (ref 35–47)
HGB BLD-MCNC: 12.9 G/DL (ref 11.7–15.7)
IMM GRANULOCYTES # BLD: 0 10E3/UL
IMM GRANULOCYTES NFR BLD: 0 %
LYMPHOCYTES # BLD AUTO: 1.7 10E3/UL (ref 0.8–5.3)
LYMPHOCYTES NFR BLD AUTO: 34 %
MCH RBC QN AUTO: 30.1 PG (ref 26.5–33)
MCHC RBC AUTO-ENTMCNC: 32.8 G/DL (ref 31.5–36.5)
MCV RBC AUTO: 92 FL (ref 78–100)
MONOCYTES # BLD AUTO: 0.5 10E3/UL (ref 0–1.3)
MONOCYTES NFR BLD AUTO: 10 %
NEUTROPHILS # BLD AUTO: 2.8 10E3/UL (ref 1.6–8.3)
NEUTROPHILS NFR BLD AUTO: 55 %
PLATELET # BLD AUTO: 167 10E3/UL (ref 150–450)
RBC # BLD AUTO: 4.29 10E6/UL (ref 3.8–5.2)
WBC # BLD AUTO: 5.1 10E3/UL (ref 4–11)

## 2025-05-19 PROCEDURE — 82040 ASSAY OF SERUM ALBUMIN: CPT

## 2025-05-19 PROCEDURE — 36415 COLL VENOUS BLD VENIPUNCTURE: CPT

## 2025-05-19 PROCEDURE — 85025 COMPLETE CBC W/AUTO DIFF WBC: CPT

## 2025-05-19 PROCEDURE — 84460 ALANINE AMINO (ALT) (SGPT): CPT

## 2025-05-19 PROCEDURE — 85652 RBC SED RATE AUTOMATED: CPT

## 2025-05-19 PROCEDURE — 82565 ASSAY OF CREATININE: CPT

## 2025-05-19 PROCEDURE — 86140 C-REACTIVE PROTEIN: CPT

## 2025-05-19 PROCEDURE — 84450 TRANSFERASE (AST) (SGOT): CPT

## 2025-05-28 ENCOUNTER — TRANSFERRED RECORDS (OUTPATIENT)
Dept: HEALTH INFORMATION MANAGEMENT | Facility: CLINIC | Age: 70
End: 2025-05-28
Payer: COMMERCIAL

## 2025-07-31 ENCOUNTER — VIRTUAL VISIT (OUTPATIENT)
Dept: ONCOLOGY | Facility: CLINIC | Age: 70
End: 2025-07-31
Attending: STUDENT IN AN ORGANIZED HEALTH CARE EDUCATION/TRAINING PROGRAM
Payer: COMMERCIAL

## 2025-07-31 VITALS — WEIGHT: 98 LBS | HEIGHT: 64 IN | BODY MASS INDEX: 16.73 KG/M2

## 2025-07-31 DIAGNOSIS — Z71.89 ADVANCED CARE PLANNING/COUNSELING DISCUSSION: ICD-10-CM

## 2025-07-31 DIAGNOSIS — Z71.89 GOALS OF CARE, COUNSELING/DISCUSSION: ICD-10-CM

## 2025-07-31 DIAGNOSIS — R45.89 DIFFICULTY COPING WITH NEW SITUATIONS: ICD-10-CM

## 2025-07-31 DIAGNOSIS — E46 PROTEIN-CALORIE MALNUTRITION, UNSPECIFIED SEVERITY: ICD-10-CM

## 2025-07-31 DIAGNOSIS — F41.9 ANXIETY: ICD-10-CM

## 2025-07-31 DIAGNOSIS — Z51.5 ENCOUNTER FOR PALLIATIVE CARE: ICD-10-CM

## 2025-07-31 DIAGNOSIS — F03.90 DEMENTIA WITHOUT BEHAVIORAL DISTURBANCE, PSYCHOTIC DISTURBANCE, MOOD DISTURBANCE, OR ANXIETY, UNSPECIFIED DEMENTIA SEVERITY, UNSPECIFIED DEMENTIA TYPE (H): Primary | ICD-10-CM

## 2025-07-31 DIAGNOSIS — F33.1 MODERATE RECURRENT MAJOR DEPRESSION (H): Chronic | ICD-10-CM

## 2025-07-31 RX ORDER — MIRTAZAPINE 15 MG/1
22.5 TABLET, FILM COATED ORAL AT BEDTIME
Qty: 135 TABLET | Refills: 1 | Status: SHIPPED | OUTPATIENT
Start: 2025-07-31

## 2025-07-31 ASSESSMENT — PAIN SCALES - GENERAL: PAINLEVEL_OUTOF10: MODERATE PAIN (6)

## 2025-07-31 NOTE — NURSING NOTE
Patient confirms medications and allergies are accurate via patients echeck in completion, and or denies any changes since last reviewed/verified.     Current patient location: [address] [place] [id number]    Is the patient currently in the state of [place]? YES    Visit mode: VIDEO    If the visit is dropped, the patient can be reconnected by:VIDEO VISIT: Text to cell phone:   Telephone Information:   Mobile [phone]       Will anyone else be joining the visit? Remi  (If patient encounters technical issues they should call [phone]    Are changes needed to the allergy or medication list? No    Are refills needed on medications prescribed by this physician? YES    Rooming Documentation:  Questionnaire(s) completed    Reason for visit: RECHECK    Nohemy GUTIERRES

## 2025-07-31 NOTE — PROGRESS NOTES
"Palliative Care Clinic Progress note    Patient Name: Yaritza Bradley  Primary Provider: Aziza Aviles    Chief Complaint/Patient ID:   She has a PMHx of leukoariosis/dementia (started having symptoms in 2012) and malnutrition. Additional medical history includes SHEA, MDD, spondylolisthesis with LE radiculopathy, and RA.    Last Palliative Care Appt: 4/28/25 with me.      Reviewed: Yes    Social History: . One son in [place] (see rarely) and daughter in [place].  Black lab named Fabio.     Advanced Care Planning: HCD+ and scanned in chart. Names  as primary HCA.  We have been having discussions about revising this.      History of Present Illness:  Yaritza Bradley is seen for a follow-up video visit today with Palliative Care. Her  Remi is also present and provides additional history.     Prior to her visit,  shared a [employer] message with some of the concerns that they have.  \"Some days (typically just one day a week, infrequently 2 days) she describes herself as \"off\", whether dizzy, off-balance, in a fog, tired, fatigue, achy, not thinking straight, or overly forgetful.  She will lie down and nap, watch tv (i.e. Dateline or 20/20), or sit on the porch and watch the world go by.  She seems very frustrated when this overwhelms her..  She confuses day, date, day of week, and year.  The passage of time can drag on or fly by.    Schedules are hard to keep.  She sometimes has problems remembering the credit card pin (embarrassing) and garage door pin number (can be locked outside).  The dials, buttons, and icons on car radio and A/C and other devices are confusing.   She seems to have stopped initiating conversations and only participates minimally, frequently repeating prior comments and questions.  She seems to be reluctant to answer the phone from anyone but her mother and daughter.  She has little patience with her mother's repetitious calls about her most recent crisis.  She seldom makes phone " "calls.   Nicole helps with household chores and will load and unload the , (but it is a scavenger hunt to find things afterwards).  She sweeps the floor, washes counters, does laundry, and dusts.  She has no problems with ADLs; however, she does no finances, driving, appointments, medication management and only limited shopping, cooking, and phone use.     She always asks \"what are we doing today?\"  She seldom makes suggestions.  She has few hobbies to fill time.  She does weekly [employer], she reads some of the Sunday paper (mostly the obituaries and the comics).  She read half of a short book this year.  I have not been able to find worthwhile activities she would like and that she can do.  We went to [place] overnight in the spring.  We have a 3-night reservation at a [facility] this fall, this will be an adventure.      She does not want anyone to know she has a cognitive disease.   When things go wrong, she will become frustrated and may overreact, usually saying she is stupid, worthless, I wish I were dead, or you should've  someone else.\"     Additionally, Psychiatry had suggested increasing Mirtazapine to 15mg daily. This was done on 7/19, and she has been tolerating well. There had been some conversation about increasing the dose to     She continues to be active, walk doing a mile at least 5 days a week.  She continues to be independent with her ADLs.  She is very frustrated when she is unable to do before she wants to. [place] to lake often.    Having snacks on counter wasn't consistently helpful. She would think that  left it out and she'd get frustrated with him for leaving things out for her to deal with.    She admits to \"some\" social outings. Did get together with friends over 4th of July.    She agrees with feeling \"hazy- more than going through the motions but still not connected\". This mental haziness does improve when she goes for a walk.    Denies " feeling sad but endorses feeling anxious or worried. Thinks not feeling well or right contributes to anxiety. She's not sure how this anxiety comes out - feels like it might always be there but it can peak when she has something to do or remember. Thinks it makes her less likely to want to do things.  says that some things trigger her- sometimes it's when things don't go right or how she expects. Ex: her mother calling for the 5th time to ask a question.  has started taking phone for a few hours to off load some of this.     I asked about any engagement with the local dementia support groups.  She admits that she has not really done this.    Additionally, we reviewed our conversation from last time about boundaries and limitations that people placed on their health care, particularly around CODE STATUS. They really have not have much additional conversation about healthcare directive.  They do know they need to remove her mother as a healthcare agent due to her own cognitive decline.      Physical Exam:   Constitutional: Alert, pleasant, no apparent distress. Sitting up in chair.  Eyes: Sclera non-icteric, no eye discharge.  ENT: No nasal discharge. Ears grossly normal.  Respiratory: Unlabored respirations. Speaking in full sentences.  Musculoskeletal: Extremities appear normal- no gross deformities noted. No edema noted on upper body.   Skin: No suspicious lesions or rashes on visible skin.  Neurologic: Clear speech, no aphasia but periodic word finding trouble. No facial droop.  Psychiatric: Mentation appears a little bit slowed, appropriate attention. Affect normal/bright. Does not appear anxious or depressed.    Key Data Reviewed:  LABS:   Lab Results   Component Value Date    WBC 5.1 05/19/2025    HGB 12.9 05/19/2025    HCT 39.3 05/19/2025     05/19/2025     12/10/2024    POTASSIUM 3.9 12/10/2024    CHLORIDE 101 12/10/2024    CO2 26 12/10/2024    BUN 11.8 12/10/2024    CR 0.73  "05/19/2025     (H) 12/10/2024    SED 16 05/19/2025    DD 1.7 (H) 02/12/2009    TROPI <0.012 02/12/2009    AST 25 05/19/2025    ALT 15 05/19/2025    ALKPHOS 84 12/10/2024    BILITOTAL 0.6 12/10/2024    TIMMY <10 (L) 09/29/2014     GFR Estimate   Date Value Ref Range Status   05/19/2025 88 >60 mL/min/1.73m2 Final     Comment:     eGFR calculated using 2021 CKD-EPI equation.   01/15/2021 >90 >60 mL/min/[1.73_m2] Final     Comment:     Non  GFR Calc  Starting 12/18/2018, serum creatinine based estimated GFR (eGFR) will be   calculated using the Chronic Kidney Disease Epidemiology Collaboration   (CKD-EPI) equation.       GFR, ESTIMATED POCT   Date Value Ref Range Status   11/15/2024 >60 >60 mL/min/1.73m2 Final     Lab Results   Component Value Date    ALBUMIN 3.9 05/19/2025    ALBUMIN 4.1 08/01/2022    ALBUMIN 3.5 01/15/2021       Impression & Recommendations & Counseling:  Yaritza Bradley has a history of  leukoariosis/dementia and malnutrition.    Reviewed the difficulty with appetite stimulation.  While there are some medications that can be tried, none of them have \"silver bullet\" evidence behind them, and we always have to weigh the risks and benefits of additional side effects.  We previously had discussed the option of Megace at our first visit in January. Since she is tolerating the 15mg mirtazapine well now, and has gained 2 lbs. We discussed the option of increasing the dose to 22.5mg to see if this is better tolerated, less sedating, but could still give a bump in appetite. She ultimately agreed to do this today.      We also talked about the increased stress that comes from the repeated calls from her mother, who has much more significant dementia.  Generally Nicole does okay with a couple of days in the morning, however as the number of calls persists throughout the afternoon and evening, they really do contribute to increased anxiety and irritability for Nicole.  I am glad that Remi has " "been taking some of these calls for her, however we did talk about the idea of setting a boundary to only answer calls during a certain time of day.  Her mother is well taking care of and a full care memory unit, so if there is something that is \"really wrong\", which has been a concern for Nicole she does not answer the phone, I reiterated that the staff at the facility will call them.  Suggested setting the boundary of taking calls in the morning and then telling her mother they will not be available the rest of the day, and then holding to this and not answering the phone for her calls.    Additionally discussed the importance of engaging with other activities and people, as a means of support for both of them, especially if things continue to progress with her dementia. We also discussed having a support group of some sort can be a safe place to reflect on prior struggles and also find some camaraderie and other people for going through similar situations.  In that regard, I did make a strong recommendation to try to reach out to at least one sort of support group for social group and attend for 3-5 sessions before making a judgment on how it is going.    Advance Care Planning Discussion 7/31/2025. I, Zohreh Castañeda, DO met with Patient and their spouse today during a clinic visit to discuss Advance Care Planning. Yaritza ROSE Kirk has limited decisional capacity and was present for this discussion.  Those present were informed of the voluntary nature of this discussion and wished to proceed.   -Reviewed healthcare directive and the importance of having up-to-date healthcare agents.  They agree that they are going to have to update the healthcare directive soon, as her mom is no longer in a place to be able to serve as an alternate (has dementia herself).  Their son has been open to conversations with them about advance care planning, which is wonderful.    -Reviewed the topic of CPR as a medical decision " "that is generally considered important, particularly with patients with prior health conditions.  Discussed how CPR is a very aggressive process and does not have the same outcomes in real life as it does on TV.  Discussed the varying outcomes that can result when someone has gone through CPR and they are \"brought back\".  Discussed that often times patients are \"less than\" what they were prior to going through cardiac arrest (in terms of their physical and mental functionality).  We also discussed intubation, and what is entailed with this, including sedation.  We discussed that some people do time-limited trials on intubation as they are open to initial treatment but would not want long-term mechanical ventilation with a tracheostomy.  We talked about how everybody has different criteria for what they consider acceptable in their lives, and this varies from person to person.  Encouraged her to think about what she considers important and if there are any situations she would deem not acceptable and to use this information to help inform how she makes decisions about her health care, namely what sort of interventions or procedures she would want to go through.      -Encouraged them to review medical decisions through the lens of what is important to her and what brings meaning and value to her life, and then fit the medical \"stuff\" into that framework.     - As a part of our conversation today, she did agree that she would not want to go through CPR, however she was unable to articulate thoughts about any other limitations today.  We agreed to complete a POLST form, which stated DNR/Full Treatment.  Also updated CODE STATUS in the computer.        Recommendations:  - Continue to work on finding snacks that are appealing and easily left out for munching throughout the day.  - Try increasing mirtazapine to 22.5mg. New prescription sent today.  - We discussed setting a boundary on answering telephone calls from " "Page.  It would be reasonable to still answer a few phone calls in the morning, however then let her know that you will not be available the rest of the day and do not answer the phone calls.  She is well taken care of in her current facility, and if there are any big concerns, the staff will call you.  - Reviewed that increasing the mirtazapine may also help some with anxiety.  - Agree with at least annual follow-up with neurology and [facility], if not every 6 months  - Discussed there can be benefits from being around people who are going through similar situations  - Advised to reach out to support groups from [facility] for [facility].  Discussed attending for at least 3-5 times before making a judgment.  - Discussed CPR and other \"heroic\" measures including intubation.  Completed POLST form today stating DNR/Full treatment. We will scanned this into her chart and mail the physical copy to them.  - Updated CODE STATUS in computer.  - Discussed healthcare agent selection and again reviewed decision to update healthcare directive    - In the future, could consider some form of  Care, even just a few times a week, through an Agency such as [facility].  This is not something that is interesting to her at this time.      Follow up: 4 months      Video-Visit Details  Video Start Time: 10:44AM  Video End Time: 11:38AM    Originating Location (pt. Location): Home     Distant Location (provider location):  [facility]      Platform used for Video Visit: Noemy     Total time spent on day of encounter is 87 mins, including reviewing record, review of above studies, above visit with patient, symptomatic discussion as above, including medication adjustments/prescription management, 20 minutes spent exclusively on advanced care planning and goals of care discussion regarding prognosis with dementia, conversation about healthcare directive, and code status discussion including completion " of POLST form, and documentation.         The longitudinal plan of care for the diagnosis(es)/condition(s) as documented were addressed during this visit.?Due to the added complexity in care, I will continue to support Yaritza Bradley in the subsequent management and with the ongoing continuity of care.    Zohreh Castañeda, DO  Palliative Medicine       Some chart documentation performed using Dragon Voice recognition Software. Although reviewed after completion, some words and grammatical errors may remain.

## 2025-07-31 NOTE — PATIENT INSTRUCTIONS
Recommendations:  - Continue to work on finding snacks that are appealing and easily left out for munching throughout the day.  - Try increasing mirtazapine to 22.5mg. New prescription sent today.  - We discussed setting a boundary on answering telephone calls from [facility].  It would be reasonable to still answer a few phone calls in the morning, however then let her know that you will not be available the rest of the day and do not answer the phone calls.  She is well taken care of in her current facility, and if there are any big concerns, the staff will call you.  - Reviewed that increasing the mirtazapine may also help some with anxiety.  - Agree with at least annual follow-up with neurology and [facility], if not every 6 months  - Discussed there can be benefits from being around people who are going through similar situations.  - Advised to reach out to support groups from [facility] for [facility].  Discussed attending for at least 3-5 times before making a judgment.  -We discussed code status today, which has to do with your thoughts on resuscitation. Based on this conversation, we have that you would not want CPR performed on you if your heart were to stop and you  (DNR). We also discussed that you would be open to hospitalization and medical treatment for reversible conditions including the option of intubation/ventilation if your breathing were to deteriorate. We completed the POLST form virtually today to reflect these wishes. We will send this to you through the mail.  When you receive it, please sign it and place it somewhere visible in your home.  We discussed that EMS is trained to look near the refrigerator.  - Discussed healthcare agent selection and again reviewed decision to update healthcare directive    - In the future, could consider some form of  Care, even just a few times a week, through an Agency such as [facility]-  [email or web address]    Follow up: 4 months      *Please do not make any changes to medications that we prescribe, including pain medicines, without talking to our team*    Reasons to Call    If you are having worsening/uncontrolled symptoms we want you to call!    You or your other physicians make any changes to medications we have prescribed.  -Please call for refills 4-5 days before you will run out of medication.    Important Phone Numbers, including: Refills, scheduling, and general questions     Palliative Care RN: Maya Gaston : [phone]  *For scheduling needs/follow up visits : [phone]  *After hours or on weekends- Will connect you with on call MD : [phone].

## 2025-07-31 NOTE — LETTER
"7/31/2025      Yaritza Bradley  [address] [address]      Dear Colleague,    Thank you for referring your patient, Yaritza Bradley, to the [facility] [place]. Please see a copy of my visit note below.    Palliative Care Clinic Progress note    Patient Name: Yaritza Bradley  Primary Provider: Aziza Aviles    Chief Complaint/Patient ID:   She has a PMHx of leukoariosis/dementia (started having symptoms in 2012) and malnutrition. Additional medical history includes SHEA, MDD, spondylolisthesis with LE radiculopathy, and RA.    Last Palliative Care Appt: 4/28/25 with me.      Reviewed: Yes    Social History: . One son in [place] (see rarely) and daughter in [place].  Black lab named Fabio.     Advanced Care Planning: HCD+ and scanned in chart. Names  as primary HCA.  We have been having discussions about revising this.      History of Present Illness:  Yaritza Bradley is seen for a follow-up video visit today with Palliative Care. Her  Remi is also present and provides additional history.     Prior to her visit,  shared a [employer] message with some of the concerns that they have.  \"Some days (typically just one day a week, infrequently 2 days) she describes herself as \"off\", whether dizzy, off-balance, in a fog, tired, fatigue, achy, not thinking straight, or overly forgetful.  She will lie down and nap, watch tv (i.e. Dateline or 20/20), or sit on the porch and watch the world go by.  She seems very frustrated when this overwhelms her..  She confuses day, date, day of week, and year.  The passage of time can drag on or fly by.    Schedules are hard to keep.  She sometimes has problems remembering the credit card pin (embarrassing) and garage door pin number (can be locked outside).  The dials, buttons, and icons on car radio and A/C and other devices are confusing.   She seems to have stopped initiating conversations and only participates minimally, frequently " "repeating prior comments and questions.  She seems to be reluctant to answer the phone from anyone but her mother and daughter.  She has little patience with her mother's repetitious calls about her most recent crisis.  She seldom makes phone calls.   Nicole helps with household chores and will load and unload the , (but it is a scavenger hunt to find things afterwards).  She sweeps the floor, washes counters, does laundry, and dusts.  She has no problems with ADLs; however, she does no finances, driving, appointments, medication management and only limited shopping, cooking, and phone use.     She always asks \"what are we doing today?\"  She seldom makes suggestions.  She has few hobbies to fill time.  She does weekly [employer], she reads some of the Sunday paper (mostly the obituaries and the comics).  She read half of a short book this year.  I have not been able to find worthwhile activities she would like and that she can do.  We went to [place] overnight in the spring.  We have a 3-night reservation at a [facility] this fall, this will be an adventure.      She does not want anyone to know she has a cognitive disease.   When things go wrong, she will become frustrated and may overreact, usually saying she is stupid, worthless, I wish I were dead, or you should've  someone else.\"     Additionally, Psychiatry had suggested increasing Mirtazapine to 15mg daily. This was done on 7/19, and she has been tolerating well. There had been some conversation about increasing the dose to     She continues to be active, walk doing a mile at least 5 days a week.  She continues to be independent with her ADLs.  She is very frustrated when she is unable to do before she wants to. [place] to lake often.    Having snacks on counter wasn't consistently helpful. She would think that  left it out and she'd get frustrated with him for leaving things out for her to deal with.    She admits to " "\"some\" social outings. Did get together with friends over 4th of July.    She agrees with feeling \"hazy- more than going through the motions but still not connected\". This mental haziness does improve when she goes for a walk.    Denies feeling sad but endorses feeling anxious or worried. Thinks not feeling well or right contributes to anxiety. She's not sure how this anxiety comes out - feels like it might always be there but it can peak when she has something to do or remember. Thinks it makes her less likely to want to do things.  says that some things trigger her- sometimes it's when things don't go right or how she expects. Ex: her mother calling for the 5th time to ask a question.  has started taking phone for a few hours to off load some of this.     I asked about any engagement with the local dementia support groups.  She admits that she has not really done this.    Additionally, we reviewed our conversation from last time about boundaries and limitations that people placed on their health care, particularly around CODE STATUS. They really have not have much additional conversation about healthcare directive.  They do know they need to remove her mother as a healthcare agent due to her own cognitive decline.      Physical Exam:   Constitutional: Alert, pleasant, no apparent distress. Sitting up in chair.  Eyes: Sclera non-icteric, no eye discharge.  ENT: No nasal discharge. Ears grossly normal.  Respiratory: Unlabored respirations. Speaking in full sentences.  Musculoskeletal: Extremities appear normal- no gross deformities noted. No edema noted on upper body.   Skin: No suspicious lesions or rashes on visible skin.  Neurologic: Clear speech, no aphasia but periodic word finding trouble. No facial droop.  Psychiatric: Mentation appears a little bit slowed, appropriate attention. Affect normal/bright. Does not appear anxious or depressed.    Key Data Reviewed:  LABS:   Lab Results   Component " "Value Date    WBC 5.1 05/19/2025    HGB 12.9 05/19/2025    HCT 39.3 05/19/2025     05/19/2025     12/10/2024    POTASSIUM 3.9 12/10/2024    CHLORIDE 101 12/10/2024    CO2 26 12/10/2024    BUN 11.8 12/10/2024    CR 0.73 05/19/2025     (H) 12/10/2024    SED 16 05/19/2025    DD 1.7 (H) 02/12/2009    TROPI <0.012 02/12/2009    AST 25 05/19/2025    ALT 15 05/19/2025    ALKPHOS 84 12/10/2024    BILITOTAL 0.6 12/10/2024    TIMMY <10 (L) 09/29/2014     GFR Estimate   Date Value Ref Range Status   05/19/2025 88 >60 mL/min/1.73m2 Final     Comment:     eGFR calculated using 2021 CKD-EPI equation.   01/15/2021 >90 >60 mL/min/[1.73_m2] Final     Comment:     Non  GFR Calc  Starting 12/18/2018, serum creatinine based estimated GFR (eGFR) will be   calculated using the Chronic Kidney Disease Epidemiology Collaboration   (CKD-EPI) equation.       GFR, ESTIMATED POCT   Date Value Ref Range Status   11/15/2024 >60 >60 mL/min/1.73m2 Final     Lab Results   Component Value Date    ALBUMIN 3.9 05/19/2025    ALBUMIN 4.1 08/01/2022    ALBUMIN 3.5 01/15/2021       Impression & Recommendations & Counseling:  Yaritza Bradley has a history of  leukoariosis/dementia and malnutrition.    Reviewed the difficulty with appetite stimulation.  While there are some medications that can be tried, none of them have \"silver bullet\" evidence behind them, and we always have to weigh the risks and benefits of additional side effects.  We previously had discussed the option of Megace at our first visit in January. Since she is tolerating the 15mg mirtazapine well now, and has gained 2 lbs. We discussed the option of increasing the dose to 22.5mg to see if this is better tolerated, less sedating, but could still give a bump in appetite. She ultimately agreed to do this today.      We also talked about the increased stress that comes from the repeated calls from her mother, who has much more significant dementia.  Generally " "Nicole does okay with a couple of days in the morning, however as the number of calls persists throughout the afternoon and evening, they really do contribute to increased anxiety and irritability for Nicole.  I am glad that Remi has been taking some of these calls for her, however we did talk about the idea of setting a boundary to only answer calls during a certain time of day.  Her mother is well taking care of and a full care memory unit, so if there is something that is \"really wrong\", which has been a concern for Nicole she does not answer the phone, I reiterated that the staff at the facility will call them.  Suggested setting the boundary of taking calls in the morning and then telling her mother they will not be available the rest of the day, and then holding to this and not answering the phone for her calls.    Additionally discussed the importance of engaging with other activities and people, as a means of support for both of them, especially if things continue to progress with her dementia. We also discussed having a support group of some sort can be a safe place to reflect on prior struggles and also find some camaraderie and other people for going through similar situations.  In that regard, I did make a strong recommendation to try to reach out to at least one sort of support group for social group and attend for 3-5 sessions before making a judgment on how it is going.    Advance Care Planning Discussion 7/31/2025. I, Zohreh Castañeda, DO met with Patient and their spouse today during a clinic visit to discuss Advance Care Planning. Yaritza ROSE Bradley has limited decisional capacity and was present for this discussion.  Those present were informed of the voluntary nature of this discussion and wished to proceed.   -Reviewed healthcare directive and the importance of having up-to-date healthcare agents.  They agree that they are going to have to update the healthcare directive soon, as her mom is no " "longer in a place to be able to serve as an alternate (has dementia herself).  Their son has been open to conversations with them about advance care planning, which is wonderful.    -Reviewed the topic of CPR as a medical decision that is generally considered important, particularly with patients with prior health conditions.  Discussed how CPR is a very aggressive process and does not have the same outcomes in real life as it does on TV.  Discussed the varying outcomes that can result when someone has gone through CPR and they are \"brought back\".  Discussed that often times patients are \"less than\" what they were prior to going through cardiac arrest (in terms of their physical and mental functionality).  We also discussed intubation, and what is entailed with this, including sedation.  We discussed that some people do time-limited trials on intubation as they are open to initial treatment but would not want long-term mechanical ventilation with a tracheostomy.  We talked about how everybody has different criteria for what they consider acceptable in their lives, and this varies from person to person.  Encouraged her to think about what she considers important and if there are any situations she would deem not acceptable and to use this information to help inform how she makes decisions about her health care, namely what sort of interventions or procedures she would want to go through.      -Encouraged them to review medical decisions through the lens of what is important to her and what brings meaning and value to her life, and then fit the medical \"stuff\" into that framework.     - As a part of our conversation today, she did agree that she would not want to go through CPR, however she was unable to articulate thoughts about any other limitations today.  We agreed to complete a POLST form, which stated DNR/Full Treatment.  Also updated CODE STATUS in the computer.        Recommendations:  - Continue to work on " "finding snacks that are appealing and easily left out for munching throughout the day.  - Try increasing mirtazapine to 22.5mg. New prescription sent today.  - We discussed setting a boundary on answering telephone calls from [facility].  It would be reasonable to still answer a few phone calls in the morning, however then let her know that you will not be available the rest of the day and do not answer the phone calls.  She is well taken care of in her current facility, and if there are any big concerns, the staff will call you.  - Reviewed that increasing the mirtazapine may also help some with anxiety.  - Agree with at least annual follow-up with neurology and [facility], if not every 6 months  - Discussed there can be benefits from being around people who are going through similar situations  - Advised to reach out to support groups from [facility].  Discussed attending for at least 3-5 times before making a judgment.  - Discussed CPR and other \"heroic\" measures including intubation.  Completed POLST form today stating DNR/Full treatment. We will scanned this into her chart and mail the physical copy to them.  - Updated CODE STATUS in computer.  - Discussed healthcare agent selection and again reviewed decision to update healthcare directive    - In the future, could consider some form of  Care, even just a few times a week, through an Agency such as [facility].  This is not something that is interesting to her at this time.      Follow up: 4 months      Video-Visit Details  Video Start Time: 10:44AM  Video End Time: 11:38AM    Originating Location (pt. Location): Home     Distant Location (provider location):  [facility]      Platform used for Video Visit: FaceRig     Total time spent on day of encounter is 87 mins, including reviewing record, review of above studies, above visit with patient, symptomatic discussion as above, including medication " adjustments/prescription management, 20 minutes spent exclusively on advanced care planning and goals of care discussion regarding prognosis with dementia, conversation about healthcare directive, and code status discussion including completion of POLST form, and documentation.         The longitudinal plan of care for the diagnosis(es)/condition(s) as documented were addressed during this visit.?Due to the added complexity in care, I will continue to support Yaritza ROSE Bradley in the subsequent management and with the ongoing continuity of care.    Zohreh Castañeda, DO  Palliative Medicine       Some chart documentation performed using Dragon Voice recognition Software. Although reviewed after completion, some words and grammatical errors may remain.      Again, thank you for allowing me to participate in the care of your patient.        Sincerely,        Zohreh Castañeda, DO    Electronically signed

## 2025-07-31 NOTE — LETTER
"7/31/2025      Yaritza Bradley  [address] [facility] [address]      Dear Colleague,    Thank you for referring your patient, Yaritza Bradley, to the [facility] [place]. Please see a copy of my visit note below.    Palliative Care Clinic Progress note    Patient Name: Yaritza Bradley  Primary Provider: Aziza Aviles    Chief Complaint/Patient ID:   She has a PMHx of leukoariosis/dementia (started having symptoms in 2012) and malnutrition. Additional medical history includes SHEA, MDD, spondylolisthesis with LE radiculopathy, and RA.    Last Palliative Care Appt: 4/28/25 with me.      Reviewed: Yes    Social History: . One son in [place] (see rarely) and daughter in [place].  Black lab named Fabio.     Advanced Care Planning: HCD+ and scanned in chart. Names  as primary HCA.  We have been having discussions about revising this.      History of Present Illness:  Yaritza Bradley is seen for a follow-up video visit today with Palliative Care. Her  Remi is also present and provides additional history.     Prior to her visit,  shared a [employer] message with some of the concerns that they have.  \"Some days (typically just one day a week, infrequently 2 days) she describes herself as \"off\", whether dizzy, off-balance, in a fog, tired, fatigue, achy, not thinking straight, or overly forgetful.  She will lie down and nap, watch tv (i.e. Dateline or 20/20), or sit on the porch and watch the world go by.  She seems very frustrated when this overwhelms her..  She confuses day, date, day of week, and year.  The passage of time can drag on or fly by.    Schedules are hard to keep.  She sometimes has problems remembering the credit card pin (embarrassing) and garage door pin number (can be locked outside).  The dials, buttons, and icons on car radio and A/C and other devices are confusing.   She seems to have stopped initiating conversations and only participates minimally, frequently " "repeating prior comments and questions.  She seems to be reluctant to answer the phone from anyone but her mother and daughter.  She has little patience with her mother's repetitious calls about her most recent crisis.  She seldom makes phone calls.   Nicole helps with household chores and will load and unload the , (but it is a scavenger hunt to find things afterwards).  She sweeps the floor, washes counters, does laundry, and dusts.  She has no problems with ADLs; however, she does no finances, driving, appointments, medication management and only limited shopping, cooking, and phone use.     She always asks \"what are we doing today?\"  She seldom makes suggestions.  She has few hobbies to fill time.  She does weekly [employer], she reads some of the Sunday paper (mostly the obituaries and the comics).  She read half of a short book this year.  I have not been able to find worthwhile activities she would like and that she can do.  We went to [place] overnight in the spring.  We have a 3-night reservation at a [facility] this fall, this will be an adventure.      She does not want anyone to know she has a cognitive disease.   When things go wrong, she will become frustrated and may overreact, usually saying she is stupid, worthless, I wish I were dead, or you should've  someone else.\"     Additionally, Psychiatry had suggested increasing Mirtazapine to 15mg daily. This was done on 7/19, and she has been tolerating well. There had been some conversation about increasing the dose to     She continues to be active, walk doing a mile at least 5 days a week.  She continues to be independent with her ADLs.  She is very frustrated when she is unable to do before she wants to. [place] lake often.    Having snacks on counter wasn't consistently helpful. She would think that  left it out and she'd get frustrated with him for leaving things out for her to deal with.    She admits to " "\"some\" social outings. Did get together with friends over 4th of July.    She agrees with feeling \"hazy- more than going through the motions but still not connected\". This mental haziness does improve when she goes for a walk.    Denies feeling sad but endorses feeling anxious or worried. Thinks not feeling well or right contributes to anxiety. She's not sure how this anxiety comes out - feels like it might always be there but it can peak when she has something to do or remember. Thinks it makes her less likely to want to do things.  says that some things trigger her- sometimes it's when things don't go right or how she expects. Ex: her mother calling for the 5th time to ask a question.  has started taking phone for a few hours to off load some of this.     I asked about any engagement with the local dementia support groups.  She admits that she has not really done this.    Additionally, we reviewed our conversation from last time about boundaries and limitations that people placed on their health care, particularly around CODE STATUS. They really have not have much additional conversation about healthcare directive.  They do know they need to remove her mother as a healthcare agent due to her own cognitive decline.      Physical Exam:   Constitutional: Alert, pleasant, no apparent distress. Sitting up in chair.  Eyes: Sclera non-icteric, no eye discharge.  ENT: No nasal discharge. Ears grossly normal.  Respiratory: Unlabored respirations. Speaking in full sentences.  Musculoskeletal: Extremities appear normal- no gross deformities noted. No edema noted on upper body.   Skin: No suspicious lesions or rashes on visible skin.  Neurologic: Clear speech, no aphasia but periodic word finding trouble. No facial droop.  Psychiatric: Mentation appears a little bit slowed, appropriate attention. Affect normal/bright. Does not appear anxious or depressed.    Key Data Reviewed:  LABS:   Lab Results   Component " "Value Date    WBC 5.1 05/19/2025    HGB 12.9 05/19/2025    HCT 39.3 05/19/2025     05/19/2025     12/10/2024    POTASSIUM 3.9 12/10/2024    CHLORIDE 101 12/10/2024    CO2 26 12/10/2024    BUN 11.8 12/10/2024    CR 0.73 05/19/2025     (H) 12/10/2024    SED 16 05/19/2025    DD 1.7 (H) 02/12/2009    TROPI <0.012 02/12/2009    AST 25 05/19/2025    ALT 15 05/19/2025    ALKPHOS 84 12/10/2024    BILITOTAL 0.6 12/10/2024    TIMMY <10 (L) 09/29/2014     GFR Estimate   Date Value Ref Range Status   05/19/2025 88 >60 mL/min/1.73m2 Final     Comment:     eGFR calculated using 2021 CKD-EPI equation.   01/15/2021 >90 >60 mL/min/[1.73_m2] Final     Comment:     Non  GFR Calc  Starting 12/18/2018, serum creatinine based estimated GFR (eGFR) will be   calculated using the Chronic Kidney Disease Epidemiology Collaboration   (CKD-EPI) equation.       GFR, ESTIMATED POCT   Date Value Ref Range Status   11/15/2024 >60 >60 mL/min/1.73m2 Final     Lab Results   Component Value Date    ALBUMIN 3.9 05/19/2025    ALBUMIN 4.1 08/01/2022    ALBUMIN 3.5 01/15/2021       Impression & Recommendations & Counseling:  Yaritza Bradley has a history of  leukoariosis/dementia and malnutrition.    Reviewed the difficulty with appetite stimulation.  While there are some medications that can be tried, none of them have \"silver bullet\" evidence behind them, and we always have to weigh the risks and benefits of additional side effects.  We previously had discussed the option of Megace at our first visit in January. Since she is tolerating the 15mg mirtazapine well now, and has gained 2 lbs. We discussed the option of increasing the dose to 22.5mg to see if this is better tolerated, less sedating, but could still give a bump in appetite. She ultimately agreed to do this today.      We also talked about the increased stress that comes from the repeated calls from her mother, who has much more significant dementia.  Generally " "Nicole does okay with a couple of days in the morning, however as the number of calls persists throughout the afternoon and evening, they really do contribute to increased anxiety and irritability for Nicole.  I am glad that Remi has been taking some of these calls for her, however we did talk about the idea of setting a boundary to only answer calls during a certain time of day.  Her mother is well taking care of and a full care memory unit, so if there is something that is \"really wrong\", which has been a concern for Nicole she does not answer the phone, I reiterated that the staff at the facility will call them.  Suggested setting the boundary of taking calls in the morning and then telling her mother they will not be available the rest of the day, and then holding to this and not answering the phone for her calls.    Additionally discussed the importance of engaging with other activities and people, as a means of support for both of them, especially if things continue to progress with her dementia. We also discussed having a support group of some sort can be a safe place to reflect on prior struggles and also find some camaraderie and other people for going through similar situations.  In that regard, I did make a strong recommendation to try to reach out to at least one sort of support group for social group and attend for 3-5 sessions before making a judgment on how it is going.    Advance Care Planning Discussion 7/31/2025. I, Zohreh Castañeda, DO met with Patient and their spouse today during a clinic visit to discuss Advance Care Planning. Yaritza ROSE Bradley has limited decisional capacity and was present for this discussion.  Those present were informed of the voluntary nature of this discussion and wished to proceed.   -Reviewed healthcare directive and the importance of having up-to-date healthcare agents.  They agree that they are going to have to update the healthcare directive soon, as her mom is no " "longer in a place to be able to serve as an alternate (has dementia herself).  Their son has been open to conversations with them about advance care planning, which is wonderful.    -Reviewed the topic of CPR as a medical decision that is generally considered important, particularly with patients with prior health conditions.  Discussed how CPR is a very aggressive process and does not have the same outcomes in real life as it does on TV.  Discussed the varying outcomes that can result when someone has gone through CPR and they are \"brought back\".  Discussed that often times patients are \"less than\" what they were prior to going through cardiac arrest (in terms of their physical and mental functionality).  We also discussed intubation, and what is entailed with this, including sedation.  We discussed that some people do time-limited trials on intubation as they are open to initial treatment but would not want long-term mechanical ventilation with a tracheostomy.  We talked about how everybody has different criteria for what they consider acceptable in their lives, and this varies from person to person.  Encouraged her to think about what she considers important and if there are any situations she would deem not acceptable and to use this information to help inform how she makes decisions about her health care, namely what sort of interventions or procedures she would want to go through.      -Encouraged them to review medical decisions through the lens of what is important to her and what brings meaning and value to her life, and then fit the medical \"stuff\" into that framework.     - As a part of our conversation today, she did agree that she would not want to go through CPR, however she was unable to articulate thoughts about any other limitations today.  We agreed to complete a POLST form, which stated DNR/Full Treatment.  Also updated CODE STATUS in the computer.        Recommendations:  - Continue to work on " "finding snacks that are appealing and easily left out for munching throughout the day.  - Try increasing mirtazapine to 22.5mg. New prescription sent today.  - We discussed setting a boundary on answering telephone calls from [place].  It would be reasonable to still answer a few phone calls in the morning, however then let her know that you will not be available the rest of the day and do not answer the phone calls.  She is well taken care of in her current facility, and if there are any big concerns, the staff will call you.  - Reviewed that increasing the mirtazapine may also help some with anxiety.  - Agree with at least annual follow-up with neurology and [facility], if not every 6 months  - Discussed there can be benefits from being around people who are going through similar situations  - Advised to reach out to support groups from [facility] for [facility].  Discussed attending for at least 3-5 times before making a judgment.  - Discussed CPR and other \"heroic\" measures including intubation.  Completed POLST form today stating DNR/Full treatment. We will scanned this into her chart and mail the physical copy to them.  - Updated CODE STATUS in computer.  - Discussed healthcare agent selection and again reviewed decision to update healthcare directive    - In the future, could consider some form of  Care, even just a few times a week, through an Agency such as [facility].  This is not something that is interesting to her at this time.      Follow up: 4 months      Video-Visit Details  Video Start Time: 10:44AM  Video End Time: 11:38AM    Originating Location (pt. Location): Home     Distant Location (provider location):  [facility]      Platform used for Video Visit: Y'all     Total time spent on day of encounter is 87 mins, including reviewing record, review of above studies, above visit with patient, symptomatic discussion as above, including medication " adjustments/prescription management, 20 minutes spent exclusively on advanced care planning and goals of care discussion regarding prognosis with dementia, conversation about healthcare directive, and code status discussion including completion of POLST form, and documentation.         The longitudinal plan of care for the diagnosis(es)/condition(s) as documented were addressed during this visit.?Due to the added complexity in care, I will continue to support Yaritza ROSE Bradley in the subsequent management and with the ongoing continuity of care.    Zohreh Castañeda, DO  Palliative Medicine       Some chart documentation performed using Dragon Voice recognition Software. Although reviewed after completion, some words and grammatical errors may remain.      Again, thank you for allowing me to participate in the care of your patient.        Sincerely,        Zohreh Castañeda, DO    Electronically signed

## 2025-08-21 ENCOUNTER — LAB (OUTPATIENT)
Dept: LAB | Facility: CLINIC | Age: 70
End: 2025-08-21
Payer: COMMERCIAL

## 2025-08-21 DIAGNOSIS — Z79.899 OTHER LONG TERM (CURRENT) DRUG THERAPY: ICD-10-CM

## 2025-08-21 DIAGNOSIS — M05.79 RHEUMATOID ARTHRITIS INVOLVING MULTIPLE SITES WITH POSITIVE RHEUMATOID FACTOR (H): ICD-10-CM

## 2025-08-21 LAB
ALBUMIN SERPL BCG-MCNC: 3.9 G/DL (ref 3.5–5.2)
ALT SERPL W P-5'-P-CCNC: 17 U/L (ref 0–50)
AST SERPL W P-5'-P-CCNC: 39 U/L (ref 0–45)
BASOPHILS # BLD AUTO: <0.04 10E3/UL (ref 0–0.2)
BASOPHILS NFR BLD AUTO: 0.4 %
CREAT SERPL-MCNC: 0.8 MG/DL (ref 0.51–0.95)
CRP SERPL-MCNC: 3.52 MG/L
EGFRCR SERPLBLD CKD-EPI 2021: 79 ML/MIN/1.73M2
EOSINOPHIL # BLD AUTO: 0.09 10E3/UL (ref 0–0.7)
EOSINOPHIL NFR BLD AUTO: 2 %
ERYTHROCYTE [DISTWIDTH] IN BLOOD BY AUTOMATED COUNT: 14.5 % (ref 10–15)
ERYTHROCYTE [SEDIMENTATION RATE] IN BLOOD BY WESTERGREN METHOD: 16 MM/HR (ref 0–30)
HCT VFR BLD AUTO: 39.4 % (ref 35–47)
HGB BLD-MCNC: 13.2 G/DL (ref 11.7–15.7)
IMM GRANULOCYTES # BLD: <0.04 10E3/UL
IMM GRANULOCYTES NFR BLD: 0 %
LYMPHOCYTES # BLD AUTO: 1.67 10E3/UL (ref 0.8–5.3)
LYMPHOCYTES NFR BLD AUTO: 37.2 %
MCH RBC QN AUTO: 30.4 PG (ref 26.5–33)
MCHC RBC AUTO-ENTMCNC: 33.5 G/DL (ref 31.5–36.5)
MCV RBC AUTO: 90.8 FL (ref 78–100)
MONOCYTES # BLD AUTO: 0.38 10E3/UL (ref 0–1.3)
MONOCYTES NFR BLD AUTO: 8.5 %
NEUTROPHILS # BLD AUTO: 2.33 10E3/UL (ref 1.6–8.3)
NEUTROPHILS NFR BLD AUTO: 51.9 %
PLATELET # BLD AUTO: 139 10E3/UL (ref 150–450)
RBC # BLD AUTO: 4.34 10E6/UL (ref 3.8–5.2)
WBC # BLD AUTO: 4.49 10E3/UL (ref 4–11)

## (undated) RX ORDER — PROPOFOL 10 MG/ML
INJECTION, EMULSION INTRAVENOUS
Status: DISPENSED
Start: 2020-07-08

## (undated) RX ORDER — GLYCOPYRROLATE 0.2 MG/ML
INJECTION, SOLUTION INTRAMUSCULAR; INTRAVENOUS
Status: DISPENSED
Start: 2020-07-08

## (undated) RX ORDER — LIDOCAINE HYDROCHLORIDE 10 MG/ML
INJECTION, SOLUTION EPIDURAL; INFILTRATION; INTRACAUDAL; PERINEURAL
Status: DISPENSED
Start: 2020-07-08

## (undated) RX ORDER — REGADENOSON 0.08 MG/ML
INJECTION, SOLUTION INTRAVENOUS
Status: DISPENSED
Start: 2020-09-16

## (undated) RX ORDER — PROPOFOL 10 MG/ML
INJECTION, EMULSION INTRAVENOUS
Status: DISPENSED
Start: 2018-07-18